# Patient Record
Sex: FEMALE | Race: WHITE | Employment: OTHER | URBAN - METROPOLITAN AREA
[De-identification: names, ages, dates, MRNs, and addresses within clinical notes are randomized per-mention and may not be internally consistent; named-entity substitution may affect disease eponyms.]

---

## 2017-03-08 ENCOUNTER — HOSPITAL ENCOUNTER (OUTPATIENT)
Dept: INFUSION THERAPY | Age: 77
Setting detail: INFUSION SERIES
Discharge: HOME | End: 2017-03-08
Payer: MEDICARE

## 2017-03-08 PROBLEM — D64.9 ANEMIA: Status: ACTIVE | Noted: 2017-03-08

## 2017-03-08 LAB
ABO/RH: NORMAL
ANTIBODY SCREEN: NORMAL
BLOOD BANK DISPENSE STATUS: NORMAL
BLOOD BANK DISPENSE STATUS: NORMAL
BLOOD BANK PRODUCT CODE: NORMAL
BLOOD BANK PRODUCT CODE: NORMAL
BPU ID: NORMAL
BPU ID: NORMAL
DESCRIPTION BLOOD BANK: NORMAL
DESCRIPTION BLOOD BANK: NORMAL

## 2017-03-08 PROCEDURE — 86923 COMPATIBILITY TEST ELECTRIC: CPT

## 2017-03-08 PROCEDURE — 6360000002 HC RX W HCPCS: Performed by: INTERNAL MEDICINE

## 2017-03-08 PROCEDURE — 2580000003 HC RX 258

## 2017-03-08 PROCEDURE — 86900 BLOOD TYPING SEROLOGIC ABO: CPT

## 2017-03-08 PROCEDURE — P9016 RBC LEUKOCYTES REDUCED: HCPCS

## 2017-03-08 PROCEDURE — 36430 TRANSFUSION BLD/BLD COMPNT: CPT

## 2017-03-08 PROCEDURE — 96374 THER/PROPH/DIAG INJ IV PUSH: CPT

## 2017-03-08 PROCEDURE — 86901 BLOOD TYPING SEROLOGIC RH(D): CPT

## 2017-03-08 PROCEDURE — 36415 COLL VENOUS BLD VENIPUNCTURE: CPT

## 2017-03-08 PROCEDURE — 86850 RBC ANTIBODY SCREEN: CPT

## 2017-03-08 RX ORDER — SODIUM CHLORIDE 9 MG/ML
INJECTION, SOLUTION INTRAVENOUS CONTINUOUS
Status: CANCELLED | OUTPATIENT
Start: 2017-03-08

## 2017-03-08 RX ORDER — FUROSEMIDE 10 MG/ML
40 INJECTION INTRAMUSCULAR; INTRAVENOUS ONCE
Status: CANCELLED | OUTPATIENT
Start: 2017-03-08 | End: 2017-03-08

## 2017-03-08 RX ORDER — FUROSEMIDE 10 MG/ML
40 INJECTION INTRAMUSCULAR; INTRAVENOUS ONCE
Status: DISCONTINUED | OUTPATIENT
Start: 2017-03-08 | End: 2017-03-08 | Stop reason: ALTCHOICE

## 2017-03-08 RX ORDER — SODIUM CHLORIDE 9 MG/ML
INJECTION, SOLUTION INTRAVENOUS
Status: COMPLETED
Start: 2017-03-08 | End: 2017-03-08

## 2017-03-08 RX ORDER — FUROSEMIDE 10 MG/ML
40 INJECTION INTRAMUSCULAR; INTRAVENOUS ONCE
Status: COMPLETED | OUTPATIENT
Start: 2017-03-08 | End: 2017-03-08

## 2017-03-08 RX ORDER — SODIUM CHLORIDE 9 MG/ML
INJECTION, SOLUTION INTRAVENOUS CONTINUOUS
Status: DISCONTINUED | OUTPATIENT
Start: 2017-03-08 | End: 2017-03-08 | Stop reason: ALTCHOICE

## 2017-03-08 RX ADMIN — SODIUM CHLORIDE: 9 INJECTION, SOLUTION INTRAVENOUS at 15:30

## 2017-03-08 RX ADMIN — FUROSEMIDE 40 MG: 10 INJECTION, SOLUTION INTRAMUSCULAR; INTRAVENOUS at 21:31

## 2017-03-08 NOTE — PROGRESS NOTES
1620. Report called to [facility]. Informed of pt from [facility] and is on her 1st unit PRBC. Pt to get 2 units blood with lasix 40mg between units. Pt to return to [facility] via [address]. Admission and blood consents obtained.

## 2017-03-09 VITALS
RESPIRATION RATE: 16 BRPM | DIASTOLIC BLOOD PRESSURE: 53 MMHG | SYSTOLIC BLOOD PRESSURE: 147 MMHG | TEMPERATURE: 98.1 F | OXYGEN SATURATION: 100 % | HEART RATE: 63 BPM

## 2017-03-09 NOTE — PROGRESS NOTES
Patient here for outpatient transfusion. Assumed care and received report. Patient had just finished receiving the first unit of blood. NAD.

## 2017-03-09 NOTE — PROGRESS NOTES
Dr. Saeed Knapp notified of drop in diastolic blood pressure. Advised it was ok to continue blood transfusion. Patient is resting comfortably with no signs of active distress and states that she has no SOB, or CP. No other signs of reaction present.

## 2017-03-09 NOTE — PROGRESS NOTES
Patient received IV lasix prior to starting second unit of blood. Initial vial signs where obtained for second unit of blood. Patient in good spirits. There was complication with documentation that delayed the transfusion but it was resolved and hung within allotted time.

## 2017-03-09 NOTE — PROGRESS NOTES
Patient finished blood transfusion requested [facility] so she could rest before her radiation treatment. No adverse reactions to blood transfusion.

## 2017-08-18 ENCOUNTER — HOSPITAL ENCOUNTER (INPATIENT)
Age: 77
LOS: 12 days | Discharge: NURSING, REHAB OR LONG-TERM CARE | DRG: 189 | End: 2017-08-30
Attending: EMERGENCY MEDICINE | Admitting: INTERNAL MEDICINE
Payer: COMMERCIAL

## 2017-08-18 ENCOUNTER — APPOINTMENT (OUTPATIENT)
Dept: GENERAL RADIOLOGY | Age: 77
DRG: 189 | End: 2017-08-18
Payer: COMMERCIAL

## 2017-08-18 ENCOUNTER — APPOINTMENT (OUTPATIENT)
Dept: CT IMAGING | Age: 77
DRG: 189 | End: 2017-08-18
Payer: COMMERCIAL

## 2017-08-18 DIAGNOSIS — R77.8 ELEVATED TROPONIN: Primary | ICD-10-CM

## 2017-08-18 DIAGNOSIS — T40.605A NARCOTIC INDUCED MENTAL ALTERATION: ICD-10-CM

## 2017-08-18 DIAGNOSIS — E87.29 CARBON DIOXIDE RETENTION: ICD-10-CM

## 2017-08-18 DIAGNOSIS — J90 PLEURAL EFFUSION, LEFT: ICD-10-CM

## 2017-08-18 DIAGNOSIS — R41.82 NARCOTIC INDUCED MENTAL ALTERATION: ICD-10-CM

## 2017-08-18 DIAGNOSIS — R41.0 DELIRIUM: ICD-10-CM

## 2017-08-18 DIAGNOSIS — N30.00 ACUTE CYSTITIS WITHOUT HEMATURIA: ICD-10-CM

## 2017-08-18 PROBLEM — I50.33 ACUTE ON CHRONIC DIASTOLIC CONGESTIVE HEART FAILURE (HCC): Status: ACTIVE | Noted: 2017-08-18

## 2017-08-18 PROBLEM — N39.0 UTI (URINARY TRACT INFECTION): Status: ACTIVE | Noted: 2017-08-18

## 2017-08-18 PROBLEM — G93.41 METABOLIC ENCEPHALOPATHY: Status: ACTIVE | Noted: 2017-08-18

## 2017-08-18 LAB
ALBUMIN SERPL-MCNC: 3.1 G/DL (ref 3.9–4.9)
ALP BLD-CCNC: 82 U/L (ref 40–130)
ALT SERPL-CCNC: 7 U/L (ref 0–33)
AMORPHOUS: ABNORMAL
AMPHETAMINE SCREEN, URINE: NORMAL
ANION GAP SERPL CALCULATED.3IONS-SCNC: 7 MEQ/L (ref 7–13)
AST SERPL-CCNC: 14 U/L (ref 0–35)
BACTERIA: ABNORMAL /HPF
BARBITURATE SCREEN URINE: NORMAL
BASOPHILS ABSOLUTE: 0.1 K/UL (ref 0–0.2)
BASOPHILS RELATIVE PERCENT: 0.9 %
BENZODIAZEPINE SCREEN, URINE: NORMAL
BILIRUB SERPL-MCNC: 0.2 MG/DL (ref 0–1.2)
BILIRUBIN URINE: NEGATIVE
BLOOD, URINE: ABNORMAL
BUN BLDV-MCNC: 24 MG/DL (ref 8–23)
CALCIUM SERPL-MCNC: 10.8 MG/DL (ref 8.6–10.2)
CANNABINOID SCREEN URINE: NORMAL
CHLORIDE BLD-SCNC: 102 MEQ/L (ref 98–107)
CHP ED QC CHECK: NORMAL
CLARITY: ABNORMAL
CO2: 36 MEQ/L (ref 22–29)
COCAINE METABOLITE SCREEN URINE: NORMAL
COLOR: YELLOW
CREAT SERPL-MCNC: 1.02 MG/DL (ref 0.5–0.9)
EOSINOPHILS ABSOLUTE: 0.1 K/UL (ref 0–0.7)
EOSINOPHILS RELATIVE PERCENT: 1.5 %
EPITHELIAL CELLS, UA: ABNORMAL /HPF
ETHANOL PERCENT: NORMAL G/DL
ETHANOL: <10 MG/DL (ref 0–0.08)
GFR AFRICAN AMERICAN: >60
GFR NON-AFRICAN AMERICAN: 52.2
GLOBULIN: 2.8 G/DL (ref 2.3–3.5)
GLUCOSE BLD-MCNC: 114 MG/DL (ref 74–109)
GLUCOSE BLD-MCNC: 129 MG/DL
GLUCOSE BLD-MCNC: 129 MG/DL (ref 60–115)
GLUCOSE URINE: NEGATIVE MG/DL
HCT VFR BLD CALC: 27.6 % (ref 37–47)
HEMOGLOBIN: 8.5 G/DL (ref 12–16)
KETONES, URINE: NEGATIVE MG/DL
LACTIC ACID: 0.8 MMOL/L (ref 0.5–2.2)
LEUKOCYTE ESTERASE, URINE: ABNORMAL
LYMPHOCYTES ABSOLUTE: 1.8 K/UL (ref 1–4.8)
LYMPHOCYTES RELATIVE PERCENT: 21.7 %
Lab: NORMAL
MCH RBC QN AUTO: 29.2 PG (ref 27–31.3)
MCHC RBC AUTO-ENTMCNC: 31 % (ref 33–37)
MCV RBC AUTO: 94.2 FL (ref 82–100)
MONOCYTES ABSOLUTE: 0.5 K/UL (ref 0.2–0.8)
MONOCYTES RELATIVE PERCENT: 5.7 %
NEUTROPHILS ABSOLUTE: 5.7 K/UL (ref 1.4–6.5)
NEUTROPHILS RELATIVE PERCENT: 70.2 %
NITRITE, URINE: POSITIVE
OPIATE SCREEN URINE: NORMAL
PDW BLD-RTO: 15.7 % (ref 11.5–14.5)
PERFORMED ON: ABNORMAL
PH UA: 5 (ref 5–9)
PHENCYCLIDINE SCREEN URINE: NORMAL
PLATELET # BLD: 214 K/UL (ref 130–400)
POTASSIUM SERPL-SCNC: 4.9 MEQ/L (ref 3.5–5.1)
PROTEIN UA: ABNORMAL MG/DL
RBC # BLD: 2.93 M/UL (ref 4.2–5.4)
RBC UA: ABNORMAL /HPF (ref 0–2)
SODIUM BLD-SCNC: 145 MEQ/L (ref 132–144)
SPECIFIC GRAVITY UA: 1.01 (ref 1–1.03)
TOTAL PROTEIN: 5.9 G/DL (ref 6.4–8.1)
TROPONIN: 0.06 NG/ML (ref 0–0.01)
URINE REFLEX TO CULTURE: YES
UROBILINOGEN, URINE: 0.2 E.U./DL
WBC # BLD: 8.2 K/UL (ref 4.8–10.8)
WBC UA: >100 /HPF (ref 0–5)

## 2017-08-18 PROCEDURE — 83605 ASSAY OF LACTIC ACID: CPT

## 2017-08-18 PROCEDURE — 96375 TX/PRO/DX INJ NEW DRUG ADDON: CPT

## 2017-08-18 PROCEDURE — G0480 DRUG TEST DEF 1-7 CLASSES: HCPCS

## 2017-08-18 PROCEDURE — 80307 DRUG TEST PRSMV CHEM ANLYZR: CPT

## 2017-08-18 PROCEDURE — 87040 BLOOD CULTURE FOR BACTERIA: CPT

## 2017-08-18 PROCEDURE — 2580000003 HC RX 258: Performed by: EMERGENCY MEDICINE

## 2017-08-18 PROCEDURE — 71010 XR CHEST PORTABLE: CPT

## 2017-08-18 PROCEDURE — 94660 CPAP INITIATION&MGMT: CPT

## 2017-08-18 PROCEDURE — 93005 ELECTROCARDIOGRAM TRACING: CPT

## 2017-08-18 PROCEDURE — 36415 COLL VENOUS BLD VENIPUNCTURE: CPT

## 2017-08-18 PROCEDURE — 85025 COMPLETE CBC W/AUTO DIFF WBC: CPT

## 2017-08-18 PROCEDURE — 70450 CT HEAD/BRAIN W/O DYE: CPT

## 2017-08-18 PROCEDURE — 36600 WITHDRAWAL OF ARTERIAL BLOOD: CPT

## 2017-08-18 PROCEDURE — 87086 URINE CULTURE/COLONY COUNT: CPT

## 2017-08-18 PROCEDURE — 6360000002 HC RX W HCPCS: Performed by: EMERGENCY MEDICINE

## 2017-08-18 PROCEDURE — 82803 BLOOD GASES ANY COMBINATION: CPT

## 2017-08-18 PROCEDURE — 87186 SC STD MICRODIL/AGAR DIL: CPT

## 2017-08-18 PROCEDURE — 80053 COMPREHEN METABOLIC PANEL: CPT

## 2017-08-18 PROCEDURE — 81001 URINALYSIS AUTO W/SCOPE: CPT

## 2017-08-18 PROCEDURE — 96367 TX/PROPH/DG ADDL SEQ IV INF: CPT

## 2017-08-18 PROCEDURE — 99285 EMERGENCY DEPT VISIT HI MDM: CPT

## 2017-08-18 PROCEDURE — 84484 ASSAY OF TROPONIN QUANT: CPT

## 2017-08-18 PROCEDURE — 96365 THER/PROPH/DIAG IV INF INIT: CPT

## 2017-08-18 PROCEDURE — 82948 REAGENT STRIP/BLOOD GLUCOSE: CPT

## 2017-08-18 PROCEDURE — 1210000000 HC MED SURG R&B

## 2017-08-18 PROCEDURE — 87077 CULTURE AEROBIC IDENTIFY: CPT

## 2017-08-18 RX ORDER — ALLOPURINOL 100 MG/1
100 TABLET ORAL DAILY
COMMUNITY

## 2017-08-18 RX ORDER — 0.9 % SODIUM CHLORIDE 0.9 %
500 INTRAVENOUS SOLUTION INTRAVENOUS ONCE
Status: COMPLETED | OUTPATIENT
Start: 2017-08-18 | End: 2017-08-18

## 2017-08-18 RX ORDER — NALOXONE HYDROCHLORIDE 1 MG/ML
1 INJECTION INTRAMUSCULAR; INTRAVENOUS; SUBCUTANEOUS ONCE
Status: COMPLETED | OUTPATIENT
Start: 2017-08-18 | End: 2017-08-18

## 2017-08-18 RX ORDER — SODIUM CHLORIDE 0.9 % (FLUSH) 0.9 %
3 SYRINGE (ML) INJECTION EVERY 8 HOURS
Status: DISCONTINUED | OUTPATIENT
Start: 2017-08-18 | End: 2017-08-19

## 2017-08-18 RX ORDER — DOCUSATE SODIUM 100 MG/1
100 CAPSULE, LIQUID FILLED ORAL 2 TIMES DAILY PRN
COMMUNITY

## 2017-08-18 RX ADMIN — CEFTRIAXONE SODIUM 1 G: 1 INJECTION, POWDER, FOR SOLUTION INTRAMUSCULAR; INTRAVENOUS at 21:09

## 2017-08-18 RX ADMIN — DEXTROSE MONOHYDRATE 500 MG: 50 INJECTION, SOLUTION INTRAVENOUS at 21:45

## 2017-08-18 RX ADMIN — NALOXONE HYDROCHLORIDE 1 MG: 1 INJECTION PARENTERAL at 21:09

## 2017-08-18 RX ADMIN — SODIUM CHLORIDE 500 ML: 9 INJECTION, SOLUTION INTRAVENOUS at 21:45

## 2017-08-19 PROBLEM — E66.9 OBESITY: Chronic | Status: ACTIVE | Noted: 2017-08-19

## 2017-08-19 PROBLEM — I69.354 HEMIPARESIS AFFECTING LEFT SIDE AS LATE EFFECT OF STROKE (HCC): Chronic | Status: ACTIVE | Noted: 2017-08-19

## 2017-08-19 PROBLEM — I48.91 ATRIAL FIBRILLATION (HCC): Status: ACTIVE | Noted: 2017-08-19

## 2017-08-19 PROBLEM — E87.0 HYPERNATREMIA: Status: ACTIVE | Noted: 2017-08-19

## 2017-08-19 LAB
ALBUMIN SERPL-MCNC: 3 G/DL (ref 3.9–4.9)
ALP BLD-CCNC: 81 U/L (ref 40–130)
ALT SERPL-CCNC: 7 U/L (ref 0–33)
ANION GAP SERPL CALCULATED.3IONS-SCNC: 12 MEQ/L (ref 7–13)
AST SERPL-CCNC: 13 U/L (ref 0–35)
BASE EXCESS ARTERIAL: 13 (ref -3–3)
BASE EXCESS ARTERIAL: 16 (ref -3–3)
BILIRUB SERPL-MCNC: 0.2 MG/DL (ref 0–1.2)
BUN BLDV-MCNC: 26 MG/DL (ref 8–23)
CALCIUM SERPL-MCNC: 10.9 MG/DL (ref 8.6–10.2)
CHLORIDE BLD-SCNC: 100 MEQ/L (ref 98–107)
CO2: 32 MEQ/L (ref 22–29)
CREAT SERPL-MCNC: 1.28 MG/DL (ref 0.5–0.9)
GFR AFRICAN AMERICAN: 48.6
GFR NON-AFRICAN AMERICAN: 40.2
GLOBULIN: 2.8 G/DL (ref 2.3–3.5)
GLUCOSE BLD-MCNC: 69 MG/DL (ref 74–109)
GLUCOSE BLD-MCNC: 80 MG/DL (ref 60–115)
GLUCOSE BLD-MCNC: 93 MG/DL (ref 60–115)
GLUCOSE BLD-MCNC: 94 MG/DL (ref 60–115)
GLUCOSE BLD-MCNC: 97 MG/DL (ref 60–115)
HBA1C MFR BLD: 5.8 % (ref 4.8–5.9)
HCO3 ARTERIAL: 38.8 MMOL/L (ref 21–29)
HCO3 ARTERIAL: 42.4 MMOL/L (ref 21–29)
HCT VFR BLD CALC: 27.5 % (ref 37–47)
HEMOGLOBIN: 8.4 G/DL (ref 12–16)
LACTATE: 0.36 MMOL/L (ref 0.4–2)
LACTATE: <0.3 MMOL/L (ref 0.4–2)
MCH RBC QN AUTO: 29.3 PG (ref 27–31.3)
MCHC RBC AUTO-ENTMCNC: 30.7 % (ref 33–37)
MCV RBC AUTO: 95.4 FL (ref 82–100)
O2 SAT, ARTERIAL: 95 % (ref 93–100)
O2 SAT, ARTERIAL: 98 % (ref 93–100)
PCO2 ARTERIAL: 71 MM HG (ref 35–45)
PCO2 ARTERIAL: 82 MM HG (ref 35–45)
PDW BLD-RTO: 15.5 % (ref 11.5–14.5)
PERFORMED ON: ABNORMAL
PERFORMED ON: ABNORMAL
PERFORMED ON: NORMAL
PH ARTERIAL: 7.32 (ref 7.35–7.45)
PH ARTERIAL: 7.35 (ref 7.35–7.45)
PLATELET # BLD: 191 K/UL (ref 130–400)
PO2 ARTERIAL: 115 MM HG (ref 75–108)
PO2 ARTERIAL: 84 MM HG (ref 75–108)
POC FIO2: 4
POC FIO2: 40
POC SAMPLE TYPE: ABNORMAL
POC SAMPLE TYPE: ABNORMAL
POTASSIUM SERPL-SCNC: 4.5 MEQ/L (ref 3.5–5.1)
RBC # BLD: 2.88 M/UL (ref 4.2–5.4)
SODIUM BLD-SCNC: 144 MEQ/L (ref 132–144)
TCO2 ARTERIAL: 41 (ref 22–29)
TCO2 ARTERIAL: 45 (ref 22–29)
TOTAL PROTEIN: 5.8 G/DL (ref 6.4–8.1)
TROPONIN: 0.05 NG/ML (ref 0–0.01)
TROPONIN: 0.07 NG/ML (ref 0–0.01)
WBC # BLD: 8.1 K/UL (ref 4.8–10.8)

## 2017-08-19 PROCEDURE — 6370000000 HC RX 637 (ALT 250 FOR IP): Performed by: INTERNAL MEDICINE

## 2017-08-19 PROCEDURE — 2700000000 HC OXYGEN THERAPY PER DAY

## 2017-08-19 PROCEDURE — 1210000000 HC MED SURG R&B

## 2017-08-19 PROCEDURE — 83036 HEMOGLOBIN GLYCOSYLATED A1C: CPT

## 2017-08-19 PROCEDURE — 85027 COMPLETE CBC AUTOMATED: CPT

## 2017-08-19 PROCEDURE — 83605 ASSAY OF LACTIC ACID: CPT

## 2017-08-19 PROCEDURE — 6360000002 HC RX W HCPCS: Performed by: INTERNAL MEDICINE

## 2017-08-19 PROCEDURE — 82803 BLOOD GASES ANY COMBINATION: CPT

## 2017-08-19 PROCEDURE — 82948 REAGENT STRIP/BLOOD GLUCOSE: CPT

## 2017-08-19 PROCEDURE — 94640 AIRWAY INHALATION TREATMENT: CPT

## 2017-08-19 PROCEDURE — 84484 ASSAY OF TROPONIN QUANT: CPT

## 2017-08-19 PROCEDURE — 94664 DEMO&/EVAL PT USE INHALER: CPT

## 2017-08-19 PROCEDURE — 36415 COLL VENOUS BLD VENIPUNCTURE: CPT

## 2017-08-19 PROCEDURE — 80053 COMPREHEN METABOLIC PANEL: CPT

## 2017-08-19 PROCEDURE — 2580000003 HC RX 258: Performed by: INTERNAL MEDICINE

## 2017-08-19 PROCEDURE — 94660 CPAP INITIATION&MGMT: CPT

## 2017-08-19 PROCEDURE — 99222 1ST HOSP IP/OBS MODERATE 55: CPT | Performed by: INTERNAL MEDICINE

## 2017-08-19 RX ORDER — DEXTROSE MONOHYDRATE 50 MG/ML
100 INJECTION, SOLUTION INTRAVENOUS PRN
Status: DISCONTINUED | OUTPATIENT
Start: 2017-08-19 | End: 2017-08-29

## 2017-08-19 RX ORDER — SODIUM CHLORIDE 0.9 % (FLUSH) 0.9 %
10 SYRINGE (ML) INJECTION EVERY 12 HOURS SCHEDULED
Status: DISCONTINUED | OUTPATIENT
Start: 2017-08-19 | End: 2017-08-29

## 2017-08-19 RX ORDER — IPRATROPIUM BROMIDE AND ALBUTEROL SULFATE 2.5; .5 MG/3ML; MG/3ML
1 SOLUTION RESPIRATORY (INHALATION) 3 TIMES DAILY
Status: DISCONTINUED | OUTPATIENT
Start: 2017-08-20 | End: 2017-08-22

## 2017-08-19 RX ORDER — FOLIC ACID 1 MG/1
1 TABLET ORAL DAILY
Status: DISCONTINUED | OUTPATIENT
Start: 2017-08-19 | End: 2017-08-29

## 2017-08-19 RX ORDER — ASPIRIN 81 MG/1
81 TABLET, CHEWABLE ORAL DAILY
Status: DISCONTINUED | OUTPATIENT
Start: 2017-08-19 | End: 2017-08-29

## 2017-08-19 RX ORDER — DOCUSATE SODIUM 100 MG/1
100 CAPSULE, LIQUID FILLED ORAL 2 TIMES DAILY
Status: DISCONTINUED | OUTPATIENT
Start: 2017-08-19 | End: 2017-08-29

## 2017-08-19 RX ORDER — INSULIN GLARGINE 100 [IU]/ML
36 INJECTION, SOLUTION SUBCUTANEOUS NIGHTLY
Status: DISCONTINUED | OUTPATIENT
Start: 2017-08-19 | End: 2017-08-29

## 2017-08-19 RX ORDER — ONDANSETRON 2 MG/ML
4 INJECTION INTRAMUSCULAR; INTRAVENOUS EVERY 6 HOURS PRN
Status: DISCONTINUED | OUTPATIENT
Start: 2017-08-19 | End: 2017-08-30 | Stop reason: HOSPADM

## 2017-08-19 RX ORDER — ASPIRIN 81 MG/1
81 TABLET, CHEWABLE ORAL DAILY
Status: DISCONTINUED | OUTPATIENT
Start: 2017-08-19 | End: 2017-08-19

## 2017-08-19 RX ORDER — IPRATROPIUM BROMIDE AND ALBUTEROL SULFATE 2.5; .5 MG/3ML; MG/3ML
1 SOLUTION RESPIRATORY (INHALATION) 4 TIMES DAILY
Status: DISCONTINUED | OUTPATIENT
Start: 2017-08-19 | End: 2017-08-19

## 2017-08-19 RX ORDER — LEVOTHYROXINE SODIUM 175 UG/1
175 TABLET ORAL DAILY
Status: DISCONTINUED | OUTPATIENT
Start: 2017-08-19 | End: 2017-08-29

## 2017-08-19 RX ORDER — SIMETHICONE 80 MG
80 TABLET,CHEWABLE ORAL EVERY 6 HOURS PRN
Status: DISCONTINUED | OUTPATIENT
Start: 2017-08-19 | End: 2017-08-29

## 2017-08-19 RX ORDER — OXYBUTYNIN CHLORIDE 5 MG/1
5 TABLET ORAL NIGHTLY
COMMUNITY

## 2017-08-19 RX ORDER — ACETAMINOPHEN 325 MG/1
650 TABLET ORAL EVERY 4 HOURS PRN
Status: DISCONTINUED | OUTPATIENT
Start: 2017-08-19 | End: 2017-08-30 | Stop reason: HOSPADM

## 2017-08-19 RX ORDER — CETIRIZINE HYDROCHLORIDE 10 MG/1
5 TABLET ORAL DAILY
Status: DISCONTINUED | OUTPATIENT
Start: 2017-08-19 | End: 2017-08-29

## 2017-08-19 RX ORDER — METOPROLOL SUCCINATE 25 MG/1
25 TABLET, EXTENDED RELEASE ORAL DAILY
Status: DISCONTINUED | OUTPATIENT
Start: 2017-08-19 | End: 2017-08-29

## 2017-08-19 RX ORDER — ATORVASTATIN CALCIUM 40 MG/1
40 TABLET, FILM COATED ORAL NIGHTLY
Status: ON HOLD | COMMUNITY
End: 2017-11-17 | Stop reason: HOSPADM

## 2017-08-19 RX ORDER — FUROSEMIDE 20 MG/1
20 TABLET ORAL DAILY
COMMUNITY

## 2017-08-19 RX ORDER — LORAZEPAM 0.5 MG/1
0.5 TABLET ORAL 2 TIMES DAILY PRN
Status: ON HOLD | COMMUNITY
End: 2017-08-28 | Stop reason: HOSPADM

## 2017-08-19 RX ORDER — ISOSORBIDE MONONITRATE 30 MG/1
30 TABLET, EXTENDED RELEASE ORAL DAILY
Status: DISCONTINUED | OUTPATIENT
Start: 2017-08-19 | End: 2017-08-24

## 2017-08-19 RX ORDER — LEVOTHYROXINE SODIUM 0.2 MG/1
200 TABLET ORAL DAILY
COMMUNITY

## 2017-08-19 RX ORDER — FERROUS SULFATE 325(65) MG
325 TABLET ORAL
COMMUNITY

## 2017-08-19 RX ORDER — ASPIRIN AND DIPYRIDAMOLE 25; 200 MG/1; MG/1
1 CAPSULE, EXTENDED RELEASE ORAL 2 TIMES DAILY
Status: DISCONTINUED | OUTPATIENT
Start: 2017-08-19 | End: 2017-08-19

## 2017-08-19 RX ORDER — GABAPENTIN 400 MG/1
400 CAPSULE ORAL 2 TIMES DAILY
Status: DISCONTINUED | OUTPATIENT
Start: 2017-08-19 | End: 2017-08-29

## 2017-08-19 RX ORDER — DEXTROSE MONOHYDRATE 25 G/50ML
12.5 INJECTION, SOLUTION INTRAVENOUS PRN
Status: DISCONTINUED | OUTPATIENT
Start: 2017-08-19 | End: 2017-08-29

## 2017-08-19 RX ORDER — FUROSEMIDE 10 MG/ML
40 INJECTION INTRAMUSCULAR; INTRAVENOUS DAILY
Status: DISCONTINUED | OUTPATIENT
Start: 2017-08-19 | End: 2017-08-19

## 2017-08-19 RX ORDER — ALBUTEROL SULFATE 2.5 MG/3ML
2.5 SOLUTION RESPIRATORY (INHALATION)
Status: DISCONTINUED | OUTPATIENT
Start: 2017-08-19 | End: 2017-08-30 | Stop reason: HOSPADM

## 2017-08-19 RX ORDER — LACTULOSE 10 G/15ML
20 SOLUTION ORAL DAILY
Status: DISCONTINUED | OUTPATIENT
Start: 2017-08-19 | End: 2017-08-29

## 2017-08-19 RX ORDER — SODIUM CHLORIDE 0.9 % (FLUSH) 0.9 %
10 SYRINGE (ML) INJECTION PRN
Status: DISCONTINUED | OUTPATIENT
Start: 2017-08-19 | End: 2017-08-29

## 2017-08-19 RX ORDER — NICOTINE POLACRILEX 4 MG
15 LOZENGE BUCCAL PRN
Status: DISCONTINUED | OUTPATIENT
Start: 2017-08-19 | End: 2017-08-29

## 2017-08-19 RX ORDER — PANTOPRAZOLE SODIUM 40 MG/1
40 TABLET, DELAYED RELEASE ORAL DAILY
Status: DISCONTINUED | OUTPATIENT
Start: 2017-08-19 | End: 2017-08-29

## 2017-08-19 RX ADMIN — IPRATROPIUM BROMIDE AND ALBUTEROL SULFATE 1 AMPULE: .5; 3 SOLUTION RESPIRATORY (INHALATION) at 19:39

## 2017-08-19 RX ADMIN — ENOXAPARIN SODIUM 100 MG: 100 INJECTION SUBCUTANEOUS at 23:22

## 2017-08-19 RX ADMIN — LEVOTHYROXINE SODIUM 175 MCG: 175 TABLET ORAL at 12:10

## 2017-08-19 RX ADMIN — DOCUSATE SODIUM 100 MG: 100 CAPSULE, LIQUID FILLED ORAL at 23:23

## 2017-08-19 RX ADMIN — IPRATROPIUM BROMIDE AND ALBUTEROL SULFATE 1 AMPULE: .5; 3 SOLUTION RESPIRATORY (INHALATION) at 14:10

## 2017-08-19 RX ADMIN — METOPROLOL SUCCINATE 25 MG: 25 TABLET, FILM COATED, EXTENDED RELEASE ORAL at 12:11

## 2017-08-19 RX ADMIN — PANTOPRAZOLE SODIUM 40 MG: 40 TABLET, DELAYED RELEASE ORAL at 12:11

## 2017-08-19 RX ADMIN — GABAPENTIN 400 MG: 400 CAPSULE ORAL at 12:10

## 2017-08-19 RX ADMIN — MAGNESIUM OXIDE TAB 400 MG (241.3 MG ELEMENTAL MG) 400 MG: 400 (241.3 MG) TAB at 12:10

## 2017-08-19 RX ADMIN — ASPIRIN 81 MG CHEWABLE TABLET 81 MG: 81 TABLET CHEWABLE at 23:23

## 2017-08-19 RX ADMIN — GABAPENTIN 400 MG: 400 CAPSULE ORAL at 23:23

## 2017-08-19 RX ADMIN — IPRATROPIUM BROMIDE AND ALBUTEROL SULFATE 1 AMPULE: .5; 3 SOLUTION RESPIRATORY (INHALATION) at 08:44

## 2017-08-19 RX ADMIN — FUROSEMIDE 40 MG: 10 INJECTION, SOLUTION INTRAVENOUS at 11:01

## 2017-08-19 RX ADMIN — CETIRIZINE HYDROCHLORIDE 5 MG: 10 TABLET, FILM COATED ORAL at 12:10

## 2017-08-19 RX ADMIN — FOLIC ACID 1 MG: 1 TABLET ORAL at 12:11

## 2017-08-19 RX ADMIN — CEFTRIAXONE SODIUM 1 G: 1 INJECTION, POWDER, FOR SOLUTION INTRAMUSCULAR; INTRAVENOUS at 23:24

## 2017-08-19 RX ADMIN — Medication 10 ML: at 10:59

## 2017-08-19 RX ADMIN — ENOXAPARIN SODIUM 40 MG: 40 INJECTION SUBCUTANEOUS at 11:01

## 2017-08-19 RX ADMIN — ISOSORBIDE MONONITRATE 30 MG: 30 TABLET, EXTENDED RELEASE ORAL at 12:10

## 2017-08-20 ENCOUNTER — APPOINTMENT (OUTPATIENT)
Dept: GENERAL RADIOLOGY | Age: 77
DRG: 189 | End: 2017-08-20
Payer: COMMERCIAL

## 2017-08-20 ENCOUNTER — APPOINTMENT (OUTPATIENT)
Dept: ULTRASOUND IMAGING | Age: 77
DRG: 189 | End: 2017-08-20
Payer: COMMERCIAL

## 2017-08-20 PROBLEM — J96.22 ACUTE ON CHRONIC RESPIRATORY FAILURE WITH HYPERCAPNIA (HCC): Status: ACTIVE | Noted: 2017-08-20

## 2017-08-20 LAB
BASE EXCESS ARTERIAL: 14 (ref -3–3)
FOLATE: >20 NG/ML (ref 7.3–26.1)
GLUCOSE BLD-MCNC: 103 MG/DL (ref 60–115)
GLUCOSE BLD-MCNC: 107 MG/DL (ref 60–115)
GLUCOSE BLD-MCNC: 112 MG/DL (ref 60–115)
GLUCOSE BLD-MCNC: 114 MG/DL (ref 60–115)
HCO3 ARTERIAL: 40 MMOL/L (ref 21–29)
HCT VFR BLD CALC: 23.8 % (ref 37–47)
HEMOGLOBIN: 7.4 G/DL (ref 12–16)
LACTATE: 0.36 MMOL/L (ref 0.4–2)
MCH RBC QN AUTO: 29.6 PG (ref 27–31.3)
MCHC RBC AUTO-ENTMCNC: 30.9 % (ref 33–37)
MCV RBC AUTO: 95.6 FL (ref 82–100)
O2 SAT, ARTERIAL: 98 % (ref 93–100)
PCO2 ARTERIAL: 81 MM HG (ref 35–45)
PDW BLD-RTO: 15.5 % (ref 11.5–14.5)
PERFORMED ON: ABNORMAL
PERFORMED ON: NORMAL
PH ARTERIAL: 7.3 (ref 7.35–7.45)
PLATELET # BLD: 175 K/UL (ref 130–400)
PO2 ARTERIAL: 117 MM HG (ref 75–108)
POC FIO2: 4
POC SAMPLE TYPE: ABNORMAL
RBC # BLD: 2.49 M/UL (ref 4.2–5.4)
TCO2 ARTERIAL: 43 (ref 22–29)
TSH SERPL DL<=0.05 MIU/L-ACNC: 3.35 UIU/ML (ref 0.27–4.2)
VITAMIN B-12: 761 PG/ML (ref 211–946)
VITAMIN D 25-HYDROXY: 86.6 NG/ML (ref 30–100)
WBC # BLD: 7.2 K/UL (ref 4.8–10.8)

## 2017-08-20 PROCEDURE — 6370000000 HC RX 637 (ALT 250 FOR IP): Performed by: INTERNAL MEDICINE

## 2017-08-20 PROCEDURE — 94660 CPAP INITIATION&MGMT: CPT

## 2017-08-20 PROCEDURE — 1210000000 HC MED SURG R&B

## 2017-08-20 PROCEDURE — 94760 N-INVAS EAR/PLS OXIMETRY 1: CPT

## 2017-08-20 PROCEDURE — 84443 ASSAY THYROID STIM HORMONE: CPT

## 2017-08-20 PROCEDURE — 82746 ASSAY OF FOLIC ACID SERUM: CPT

## 2017-08-20 PROCEDURE — 93005 ELECTROCARDIOGRAM TRACING: CPT

## 2017-08-20 PROCEDURE — 82948 REAGENT STRIP/BLOOD GLUCOSE: CPT

## 2017-08-20 PROCEDURE — 94640 AIRWAY INHALATION TREATMENT: CPT

## 2017-08-20 PROCEDURE — 6360000002 HC RX W HCPCS: Performed by: INTERNAL MEDICINE

## 2017-08-20 PROCEDURE — 82306 VITAMIN D 25 HYDROXY: CPT

## 2017-08-20 PROCEDURE — 2700000000 HC OXYGEN THERAPY PER DAY

## 2017-08-20 PROCEDURE — 85027 COMPLETE CBC AUTOMATED: CPT

## 2017-08-20 PROCEDURE — 71010 XR CHEST LIMITED: CPT

## 2017-08-20 PROCEDURE — 82803 BLOOD GASES ANY COMBINATION: CPT

## 2017-08-20 PROCEDURE — 2580000003 HC RX 258: Performed by: INTERNAL MEDICINE

## 2017-08-20 PROCEDURE — 83605 ASSAY OF LACTIC ACID: CPT

## 2017-08-20 PROCEDURE — 93880 EXTRACRANIAL BILAT STUDY: CPT

## 2017-08-20 PROCEDURE — 82607 VITAMIN B-12: CPT

## 2017-08-20 PROCEDURE — 99232 SBSQ HOSP IP/OBS MODERATE 35: CPT | Performed by: INTERNAL MEDICINE

## 2017-08-20 RX ORDER — AMIODARONE HYDROCHLORIDE 200 MG/1
200 TABLET ORAL 2 TIMES DAILY
Status: DISCONTINUED | OUTPATIENT
Start: 2017-08-20 | End: 2017-08-21

## 2017-08-20 RX ADMIN — LEVOTHYROXINE SODIUM 175 MCG: 175 TABLET ORAL at 05:48

## 2017-08-20 RX ADMIN — CETIRIZINE HYDROCHLORIDE 5 MG: 10 TABLET, FILM COATED ORAL at 08:22

## 2017-08-20 RX ADMIN — ASPIRIN 81 MG CHEWABLE TABLET 81 MG: 81 TABLET CHEWABLE at 08:22

## 2017-08-20 RX ADMIN — METOPROLOL SUCCINATE 25 MG: 25 TABLET, FILM COATED, EXTENDED RELEASE ORAL at 08:22

## 2017-08-20 RX ADMIN — ENOXAPARIN SODIUM 100 MG: 100 INJECTION SUBCUTANEOUS at 08:22

## 2017-08-20 RX ADMIN — LACTULOSE 13.3 G: 20 SOLUTION ORAL at 08:23

## 2017-08-20 RX ADMIN — FOLIC ACID 1 MG: 1 TABLET ORAL at 08:21

## 2017-08-20 RX ADMIN — ENOXAPARIN SODIUM 100 MG: 100 INJECTION SUBCUTANEOUS at 21:00

## 2017-08-20 RX ADMIN — IPRATROPIUM BROMIDE AND ALBUTEROL SULFATE 1 AMPULE: 2.5; .5 SOLUTION RESPIRATORY (INHALATION) at 13:19

## 2017-08-20 RX ADMIN — IPRATROPIUM BROMIDE AND ALBUTEROL SULFATE 1 AMPULE: 2.5; .5 SOLUTION RESPIRATORY (INHALATION) at 04:39

## 2017-08-20 RX ADMIN — Medication 10 ML: at 08:45

## 2017-08-20 RX ADMIN — MAGNESIUM OXIDE TAB 400 MG (241.3 MG ELEMENTAL MG) 400 MG: 400 (241.3 MG) TAB at 08:22

## 2017-08-20 RX ADMIN — DOCUSATE SODIUM 100 MG: 100 CAPSULE, LIQUID FILLED ORAL at 08:22

## 2017-08-20 RX ADMIN — GABAPENTIN 400 MG: 400 CAPSULE ORAL at 08:22

## 2017-08-20 RX ADMIN — GABAPENTIN 400 MG: 400 CAPSULE ORAL at 21:00

## 2017-08-20 RX ADMIN — DOCUSATE SODIUM 100 MG: 100 CAPSULE, LIQUID FILLED ORAL at 21:00

## 2017-08-20 RX ADMIN — ISOSORBIDE MONONITRATE 30 MG: 30 TABLET, EXTENDED RELEASE ORAL at 08:22

## 2017-08-20 RX ADMIN — Medication 10 ML: at 00:24

## 2017-08-20 RX ADMIN — AMIODARONE HYDROCHLORIDE 200 MG: 200 TABLET ORAL at 13:55

## 2017-08-20 RX ADMIN — CEFTRIAXONE SODIUM 1 G: 1 INJECTION, POWDER, FOR SOLUTION INTRAMUSCULAR; INTRAVENOUS at 23:48

## 2017-08-20 RX ADMIN — Medication 10 ML: at 22:18

## 2017-08-20 RX ADMIN — PANTOPRAZOLE SODIUM 40 MG: 40 TABLET, DELAYED RELEASE ORAL at 08:22

## 2017-08-20 RX ADMIN — AMIODARONE HYDROCHLORIDE 200 MG: 200 TABLET ORAL at 21:00

## 2017-08-20 RX ADMIN — IPRATROPIUM BROMIDE AND ALBUTEROL SULFATE 1 AMPULE: 2.5; .5 SOLUTION RESPIRATORY (INHALATION) at 19:14

## 2017-08-20 ASSESSMENT — ENCOUNTER SYMPTOMS
NAUSEA: 0
SHORTNESS OF BREATH: 1
VOMITING: 0

## 2017-08-20 ASSESSMENT — PAIN SCALES - GENERAL: PAINLEVEL_OUTOF10: 0

## 2017-08-21 ENCOUNTER — APPOINTMENT (OUTPATIENT)
Dept: ULTRASOUND IMAGING | Age: 77
DRG: 189 | End: 2017-08-21
Payer: COMMERCIAL

## 2017-08-21 LAB
ALBUMIN SERPL-MCNC: 2.9 G/DL (ref 3.9–4.9)
ALP BLD-CCNC: 80 U/L (ref 40–130)
ALT SERPL-CCNC: 6 U/L (ref 0–33)
ANION GAP SERPL CALCULATED.3IONS-SCNC: 10 MEQ/L (ref 7–13)
AST SERPL-CCNC: 14 U/L (ref 0–35)
BASE EXCESS ARTERIAL: 13 (ref -3–3)
BILIRUB SERPL-MCNC: 0.2 MG/DL (ref 0–1.2)
BUN BLDV-MCNC: 38 MG/DL (ref 8–23)
CALCIUM SERPL-MCNC: 10.5 MG/DL (ref 8.6–10.2)
CHLORIDE BLD-SCNC: 99 MEQ/L (ref 98–107)
CO2: 33 MEQ/L (ref 22–29)
CREAT SERPL-MCNC: 1.95 MG/DL (ref 0.5–0.9)
GFR AFRICAN AMERICAN: 29.9
GFR NON-AFRICAN AMERICAN: 24.7
GLOBULIN: 2.6 G/DL (ref 2.3–3.5)
GLUCOSE BLD-MCNC: 101 MG/DL (ref 60–115)
GLUCOSE BLD-MCNC: 112 MG/DL (ref 60–115)
GLUCOSE BLD-MCNC: 116 MG/DL (ref 74–109)
GLUCOSE BLD-MCNC: 120 MG/DL (ref 60–115)
GLUCOSE BLD-MCNC: 126 MG/DL (ref 60–115)
HCO3 ARTERIAL: 38.5 MMOL/L (ref 21–29)
HCT VFR BLD CALC: 25.5 % (ref 37–47)
HEMOGLOBIN: 8 G/DL (ref 12–16)
LACTATE: 0.4 MMOL/L (ref 0.4–2)
LV EF: 60 %
LVEF MODALITY: NORMAL
MCH RBC QN AUTO: 29.2 PG (ref 27–31.3)
MCHC RBC AUTO-ENTMCNC: 31.1 % (ref 33–37)
MCV RBC AUTO: 93.8 FL (ref 82–100)
O2 SAT, ARTERIAL: 92 % (ref 93–100)
ORGANISM: ABNORMAL
ORGANISM: ABNORMAL
PCO2 ARTERIAL: 70 MM HG (ref 35–45)
PDW BLD-RTO: 15.8 % (ref 11.5–14.5)
PERFORMED ON: ABNORMAL
PERFORMED ON: NORMAL
PERFORMED ON: NORMAL
PH ARTERIAL: 7.35 (ref 7.35–7.45)
PLATELET # BLD: 187 K/UL (ref 130–400)
PO2 ARTERIAL: 69 MM HG (ref 75–108)
POC FIO2: 40
POC SAMPLE TYPE: ABNORMAL
POTASSIUM SERPL-SCNC: 4.3 MEQ/L (ref 3.5–5.1)
RBC # BLD: 2.72 M/UL (ref 4.2–5.4)
SODIUM BLD-SCNC: 142 MEQ/L (ref 132–144)
TCO2 ARTERIAL: 41 (ref 22–29)
TOTAL PROTEIN: 5.5 G/DL (ref 6.4–8.1)
URINE CULTURE, ROUTINE: ABNORMAL
URINE CULTURE, ROUTINE: ABNORMAL
WBC # BLD: 8.3 K/UL (ref 4.8–10.8)

## 2017-08-21 PROCEDURE — 6360000002 HC RX W HCPCS: Performed by: INTERNAL MEDICINE

## 2017-08-21 PROCEDURE — 2580000003 HC RX 258: Performed by: INTERNAL MEDICINE

## 2017-08-21 PROCEDURE — 36600 WITHDRAWAL OF ARTERIAL BLOOD: CPT

## 2017-08-21 PROCEDURE — 6370000000 HC RX 637 (ALT 250 FOR IP): Performed by: INTERNAL MEDICINE

## 2017-08-21 PROCEDURE — 94660 CPAP INITIATION&MGMT: CPT

## 2017-08-21 PROCEDURE — 85027 COMPLETE CBC AUTOMATED: CPT

## 2017-08-21 PROCEDURE — 82948 REAGENT STRIP/BLOOD GLUCOSE: CPT

## 2017-08-21 PROCEDURE — 82803 BLOOD GASES ANY COMBINATION: CPT

## 2017-08-21 PROCEDURE — 95816 EEG AWAKE AND DROWSY: CPT

## 2017-08-21 PROCEDURE — 82330 ASSAY OF CALCIUM: CPT

## 2017-08-21 PROCEDURE — 84132 ASSAY OF SERUM POTASSIUM: CPT

## 2017-08-21 PROCEDURE — 93010 ELECTROCARDIOGRAM REPORT: CPT | Performed by: INTERNAL MEDICINE

## 2017-08-21 PROCEDURE — 80053 COMPREHEN METABOLIC PANEL: CPT

## 2017-08-21 PROCEDURE — 93005 ELECTROCARDIOGRAM TRACING: CPT

## 2017-08-21 PROCEDURE — 2700000000 HC OXYGEN THERAPY PER DAY

## 2017-08-21 PROCEDURE — 83605 ASSAY OF LACTIC ACID: CPT

## 2017-08-21 PROCEDURE — 1210000000 HC MED SURG R&B

## 2017-08-21 PROCEDURE — 94640 AIRWAY INHALATION TREATMENT: CPT

## 2017-08-21 PROCEDURE — 93970 EXTREMITY STUDY: CPT

## 2017-08-21 PROCEDURE — 93306 TTE W/DOPPLER COMPLETE: CPT

## 2017-08-21 RX ORDER — SODIUM CHLORIDE 9 MG/ML
INJECTION, SOLUTION INTRAVENOUS CONTINUOUS
Status: DISPENSED | OUTPATIENT
Start: 2017-08-21 | End: 2017-08-21

## 2017-08-21 RX ORDER — AMIODARONE HYDROCHLORIDE 200 MG/1
200 TABLET ORAL 3 TIMES DAILY
Status: DISCONTINUED | OUTPATIENT
Start: 2017-08-21 | End: 2017-08-28

## 2017-08-21 RX ADMIN — AMIODARONE HYDROCHLORIDE 200 MG: 200 TABLET ORAL at 15:00

## 2017-08-21 RX ADMIN — METOPROLOL SUCCINATE 25 MG: 25 TABLET, FILM COATED, EXTENDED RELEASE ORAL at 10:35

## 2017-08-21 RX ADMIN — INSULIN GLARGINE 36 UNITS: 100 INJECTION, SOLUTION SUBCUTANEOUS at 21:44

## 2017-08-21 RX ADMIN — CETIRIZINE HYDROCHLORIDE 5 MG: 10 TABLET, FILM COATED ORAL at 15:01

## 2017-08-21 RX ADMIN — MAGNESIUM OXIDE TAB 400 MG (241.3 MG ELEMENTAL MG) 400 MG: 400 (241.3 MG) TAB at 10:35

## 2017-08-21 RX ADMIN — Medication 10 ML: at 20:55

## 2017-08-21 RX ADMIN — FOLIC ACID 1 MG: 1 TABLET ORAL at 15:01

## 2017-08-21 RX ADMIN — AMIODARONE HYDROCHLORIDE 200 MG: 200 TABLET ORAL at 20:55

## 2017-08-21 RX ADMIN — CEFTRIAXONE SODIUM 1 G: 1 INJECTION, POWDER, FOR SOLUTION INTRAMUSCULAR; INTRAVENOUS at 20:55

## 2017-08-21 RX ADMIN — GABAPENTIN 400 MG: 400 CAPSULE ORAL at 15:00

## 2017-08-21 RX ADMIN — AMIODARONE HYDROCHLORIDE 200 MG: 200 TABLET ORAL at 09:34

## 2017-08-21 RX ADMIN — LEVOTHYROXINE SODIUM 175 MCG: 175 TABLET ORAL at 06:17

## 2017-08-21 RX ADMIN — SODIUM CHLORIDE: 9 INJECTION, SOLUTION INTRAVENOUS at 13:36

## 2017-08-21 RX ADMIN — IPRATROPIUM BROMIDE AND ALBUTEROL SULFATE 1 AMPULE: 2.5; .5 SOLUTION RESPIRATORY (INHALATION) at 19:27

## 2017-08-21 RX ADMIN — IPRATROPIUM BROMIDE AND ALBUTEROL SULFATE 1 AMPULE: 2.5; .5 SOLUTION RESPIRATORY (INHALATION) at 07:50

## 2017-08-21 RX ADMIN — ASPIRIN 81 MG CHEWABLE TABLET 81 MG: 81 TABLET CHEWABLE at 10:43

## 2017-08-21 RX ADMIN — IPRATROPIUM BROMIDE AND ALBUTEROL SULFATE 1 AMPULE: 2.5; .5 SOLUTION RESPIRATORY (INHALATION) at 12:44

## 2017-08-21 RX ADMIN — ISOSORBIDE MONONITRATE 30 MG: 30 TABLET, EXTENDED RELEASE ORAL at 10:35

## 2017-08-21 RX ADMIN — GABAPENTIN 400 MG: 400 CAPSULE ORAL at 20:55

## 2017-08-21 RX ADMIN — Medication 10 ML: at 13:36

## 2017-08-21 ASSESSMENT — ENCOUNTER SYMPTOMS
NAUSEA: 0
VOMITING: 0
SHORTNESS OF BREATH: 1

## 2017-08-22 ENCOUNTER — APPOINTMENT (OUTPATIENT)
Dept: MRI IMAGING | Age: 77
DRG: 189 | End: 2017-08-22
Payer: COMMERCIAL

## 2017-08-22 LAB
ANION GAP SERPL CALCULATED.3IONS-SCNC: 10 MEQ/L (ref 7–13)
BASE EXCESS ARTERIAL: 12 (ref -3–3)
BUN BLDV-MCNC: 35 MG/DL (ref 8–23)
CALCIUM IONIZED: 1.55 MMOL/L (ref 1.12–1.32)
CALCIUM SERPL-MCNC: 10.1 MG/DL (ref 8.6–10.2)
CHLORIDE BLD-SCNC: 103 MEQ/L (ref 98–107)
CO2: 33 MEQ/L (ref 22–29)
CREAT SERPL-MCNC: 1.73 MG/DL (ref 0.5–0.9)
GFR AFRICAN AMERICAN: 34.3
GFR NON-AFRICAN AMERICAN: 28.4
GLUCOSE BLD-MCNC: 150 MG/DL (ref 60–115)
GLUCOSE BLD-MCNC: 173 MG/DL (ref 60–115)
GLUCOSE BLD-MCNC: 80 MG/DL (ref 74–109)
GLUCOSE BLD-MCNC: 82 MG/DL (ref 60–115)
GLUCOSE BLD-MCNC: 93 MG/DL (ref 60–115)
HCO3 ARTERIAL: 39.2 MMOL/L (ref 21–29)
HCT VFR BLD CALC: 25.7 % (ref 37–47)
HEMOGLOBIN: 7.9 G/DL (ref 12–16)
LACTATE: <0.3 MMOL/L (ref 0.4–2)
MAGNESIUM: 2.8 MG/DL (ref 1.7–2.3)
MCH RBC QN AUTO: 29 PG (ref 27–31.3)
MCHC RBC AUTO-ENTMCNC: 30.6 % (ref 33–37)
MCV RBC AUTO: 94.6 FL (ref 82–100)
O2 SAT, ARTERIAL: 88 % (ref 93–100)
PCO2 ARTERIAL: 91 MM HG (ref 35–45)
PDW BLD-RTO: 15.8 % (ref 11.5–14.5)
PERFORMED ON: ABNORMAL
PERFORMED ON: NORMAL
PERFORMED ON: NORMAL
PH ARTERIAL: 7.24 (ref 7.35–7.45)
PLATELET # BLD: 180 K/UL (ref 130–400)
PO2 ARTERIAL: 67 MM HG (ref 75–108)
POC SAMPLE TYPE: ABNORMAL
POC SODIUM: 142 MEQ/L (ref 136–145)
POTASSIUM SERPL-SCNC: 4.4 MEQ/L (ref 3.5–5.1)
RBC # BLD: 2.72 M/UL (ref 4.2–5.4)
SODIUM BLD-SCNC: 146 MEQ/L (ref 132–144)
TCO2 ARTERIAL: 42 (ref 22–29)
WBC # BLD: 7.6 K/UL (ref 4.8–10.8)

## 2017-08-22 PROCEDURE — 83735 ASSAY OF MAGNESIUM: CPT

## 2017-08-22 PROCEDURE — 6360000002 HC RX W HCPCS: Performed by: INTERNAL MEDICINE

## 2017-08-22 PROCEDURE — 80048 BASIC METABOLIC PNL TOTAL CA: CPT

## 2017-08-22 PROCEDURE — 2700000000 HC OXYGEN THERAPY PER DAY

## 2017-08-22 PROCEDURE — 6370000000 HC RX 637 (ALT 250 FOR IP): Performed by: INTERNAL MEDICINE

## 2017-08-22 PROCEDURE — 2580000003 HC RX 258: Performed by: INTERNAL MEDICINE

## 2017-08-22 PROCEDURE — 94660 CPAP INITIATION&MGMT: CPT

## 2017-08-22 PROCEDURE — 85027 COMPLETE CBC AUTOMATED: CPT

## 2017-08-22 PROCEDURE — 93005 ELECTROCARDIOGRAM TRACING: CPT

## 2017-08-22 PROCEDURE — 94664 DEMO&/EVAL PT USE INHALER: CPT

## 2017-08-22 PROCEDURE — 94640 AIRWAY INHALATION TREATMENT: CPT

## 2017-08-22 PROCEDURE — 1210000000 HC MED SURG R&B

## 2017-08-22 PROCEDURE — 70551 MRI BRAIN STEM W/O DYE: CPT

## 2017-08-22 RX ORDER — IPRATROPIUM BROMIDE AND ALBUTEROL SULFATE 2.5; .5 MG/3ML; MG/3ML
1 SOLUTION RESPIRATORY (INHALATION) 4 TIMES DAILY
Status: DISCONTINUED | OUTPATIENT
Start: 2017-08-22 | End: 2017-08-30 | Stop reason: HOSPADM

## 2017-08-22 RX ORDER — FUROSEMIDE 10 MG/ML
20 INJECTION INTRAMUSCULAR; INTRAVENOUS ONCE
Status: COMPLETED | OUTPATIENT
Start: 2017-08-22 | End: 2017-08-22

## 2017-08-22 RX ADMIN — AMIODARONE HYDROCHLORIDE 200 MG: 200 TABLET ORAL at 15:07

## 2017-08-22 RX ADMIN — GABAPENTIN 400 MG: 400 CAPSULE ORAL at 23:06

## 2017-08-22 RX ADMIN — IPRATROPIUM BROMIDE AND ALBUTEROL SULFATE 1 AMPULE: 2.5; .5 SOLUTION RESPIRATORY (INHALATION) at 07:24

## 2017-08-22 RX ADMIN — AMIODARONE HYDROCHLORIDE 200 MG: 200 TABLET ORAL at 09:30

## 2017-08-22 RX ADMIN — ENOXAPARIN SODIUM 100 MG: 100 INJECTION SUBCUTANEOUS at 09:31

## 2017-08-22 RX ADMIN — METOPROLOL SUCCINATE 25 MG: 25 TABLET, FILM COATED, EXTENDED RELEASE ORAL at 09:30

## 2017-08-22 RX ADMIN — DOCUSATE SODIUM 100 MG: 100 CAPSULE, LIQUID FILLED ORAL at 23:06

## 2017-08-22 RX ADMIN — IPRATROPIUM BROMIDE AND ALBUTEROL SULFATE 1 AMPULE: 2.5; .5 SOLUTION RESPIRATORY (INHALATION) at 19:44

## 2017-08-22 RX ADMIN — Medication 10 ML: at 09:31

## 2017-08-22 RX ADMIN — Medication 10 ML: at 22:52

## 2017-08-22 RX ADMIN — GABAPENTIN 400 MG: 400 CAPSULE ORAL at 09:30

## 2017-08-22 RX ADMIN — LEVOTHYROXINE SODIUM 175 MCG: 175 TABLET ORAL at 05:57

## 2017-08-22 RX ADMIN — IPRATROPIUM BROMIDE AND ALBUTEROL SULFATE 1 AMPULE: 2.5; .5 SOLUTION RESPIRATORY (INHALATION) at 11:26

## 2017-08-22 RX ADMIN — AMIODARONE HYDROCHLORIDE 200 MG: 200 TABLET ORAL at 23:06

## 2017-08-22 RX ADMIN — CEFTRIAXONE SODIUM 1 G: 1 INJECTION, POWDER, FOR SOLUTION INTRAMUSCULAR; INTRAVENOUS at 22:45

## 2017-08-22 RX ADMIN — ISOSORBIDE MONONITRATE 30 MG: 30 TABLET, EXTENDED RELEASE ORAL at 09:31

## 2017-08-22 RX ADMIN — IPRATROPIUM BROMIDE AND ALBUTEROL SULFATE 1 AMPULE: 2.5; .5 SOLUTION RESPIRATORY (INHALATION) at 16:22

## 2017-08-22 RX ADMIN — FUROSEMIDE 20 MG: 10 INJECTION, SOLUTION INTRAVENOUS at 18:01

## 2017-08-22 RX ADMIN — ASPIRIN 81 MG CHEWABLE TABLET 81 MG: 81 TABLET CHEWABLE at 09:31

## 2017-08-22 ASSESSMENT — ENCOUNTER SYMPTOMS
NAUSEA: 0
VOMITING: 0
SHORTNESS OF BREATH: 1

## 2017-08-22 ASSESSMENT — PAIN SCALES - GENERAL: PAINLEVEL_OUTOF10: 0

## 2017-08-23 PROBLEM — I63.9 CEREBRAL INFARCT (HCC): Status: ACTIVE | Noted: 2017-08-23

## 2017-08-23 LAB
ANION GAP SERPL CALCULATED.3IONS-SCNC: 7 MEQ/L (ref 7–13)
BUN BLDV-MCNC: 34 MG/DL (ref 8–23)
CALCIUM SERPL-MCNC: 10.8 MG/DL (ref 8.6–10.2)
CHLORIDE BLD-SCNC: 103 MEQ/L (ref 98–107)
CO2: 34 MEQ/L (ref 22–29)
CREAT SERPL-MCNC: 1.51 MG/DL (ref 0.5–0.9)
GFR AFRICAN AMERICAN: 40.2
GFR NON-AFRICAN AMERICAN: 33.2
GLUCOSE BLD-MCNC: 116 MG/DL (ref 60–115)
GLUCOSE BLD-MCNC: 118 MG/DL (ref 60–115)
GLUCOSE BLD-MCNC: 129 MG/DL (ref 60–115)
GLUCOSE BLD-MCNC: 78 MG/DL (ref 74–109)
GLUCOSE BLD-MCNC: 96 MG/DL (ref 60–115)
HCT VFR BLD CALC: 25.1 % (ref 37–47)
HEMOGLOBIN: 7.8 G/DL (ref 12–16)
MAGNESIUM: 2.6 MG/DL (ref 1.7–2.3)
MCH RBC QN AUTO: 29.3 PG (ref 27–31.3)
MCHC RBC AUTO-ENTMCNC: 31.2 % (ref 33–37)
MCV RBC AUTO: 93.9 FL (ref 82–100)
PDW BLD-RTO: 16.2 % (ref 11.5–14.5)
PERFORMED ON: ABNORMAL
PERFORMED ON: NORMAL
PLATELET # BLD: 182 K/UL (ref 130–400)
POTASSIUM SERPL-SCNC: 4.5 MEQ/L (ref 3.5–5.1)
RBC # BLD: 2.67 M/UL (ref 4.2–5.4)
SODIUM BLD-SCNC: 144 MEQ/L (ref 132–144)
WBC # BLD: 8.7 K/UL (ref 4.8–10.8)

## 2017-08-23 PROCEDURE — G8988 SELF CARE GOAL STATUS: HCPCS

## 2017-08-23 PROCEDURE — 93005 ELECTROCARDIOGRAM TRACING: CPT

## 2017-08-23 PROCEDURE — 94640 AIRWAY INHALATION TREATMENT: CPT

## 2017-08-23 PROCEDURE — 97167 OT EVAL HIGH COMPLEX 60 MIN: CPT

## 2017-08-23 PROCEDURE — 6370000000 HC RX 637 (ALT 250 FOR IP): Performed by: INTERNAL MEDICINE

## 2017-08-23 PROCEDURE — 83735 ASSAY OF MAGNESIUM: CPT

## 2017-08-23 PROCEDURE — 1210000000 HC MED SURG R&B

## 2017-08-23 PROCEDURE — 92610 EVALUATE SWALLOWING FUNCTION: CPT

## 2017-08-23 PROCEDURE — G9163 LANG EXPRESS GOAL STATUS: HCPCS

## 2017-08-23 PROCEDURE — 6360000002 HC RX W HCPCS: Performed by: INTERNAL MEDICINE

## 2017-08-23 PROCEDURE — G8996 SWALLOW CURRENT STATUS: HCPCS

## 2017-08-23 PROCEDURE — 85027 COMPLETE CBC AUTOMATED: CPT

## 2017-08-23 PROCEDURE — 2700000000 HC OXYGEN THERAPY PER DAY

## 2017-08-23 PROCEDURE — 80048 BASIC METABOLIC PNL TOTAL CA: CPT

## 2017-08-23 PROCEDURE — G8997 SWALLOW GOAL STATUS: HCPCS

## 2017-08-23 PROCEDURE — G9162 LANG EXPRESS CURRENT STATUS: HCPCS

## 2017-08-23 PROCEDURE — 2580000003 HC RX 258: Performed by: INTERNAL MEDICINE

## 2017-08-23 PROCEDURE — G8987 SELF CARE CURRENT STATUS: HCPCS

## 2017-08-23 PROCEDURE — 92523 SPEECH SOUND LANG COMPREHEN: CPT

## 2017-08-23 RX ADMIN — ENOXAPARIN SODIUM 100 MG: 100 INJECTION SUBCUTANEOUS at 10:16

## 2017-08-23 RX ADMIN — LACTULOSE 10 G: 20 SOLUTION ORAL at 10:28

## 2017-08-23 RX ADMIN — LEVOTHYROXINE SODIUM 175 MCG: 175 TABLET ORAL at 10:15

## 2017-08-23 RX ADMIN — DOCUSATE SODIUM 100 MG: 100 CAPSULE, LIQUID FILLED ORAL at 10:15

## 2017-08-23 RX ADMIN — AMIODARONE HYDROCHLORIDE 200 MG: 200 TABLET ORAL at 14:13

## 2017-08-23 RX ADMIN — ASPIRIN 81 MG CHEWABLE TABLET 81 MG: 81 TABLET CHEWABLE at 10:14

## 2017-08-23 RX ADMIN — CETIRIZINE HYDROCHLORIDE 5 MG: 10 TABLET, FILM COATED ORAL at 10:14

## 2017-08-23 RX ADMIN — IPRATROPIUM BROMIDE AND ALBUTEROL SULFATE 1 AMPULE: 2.5; .5 SOLUTION RESPIRATORY (INHALATION) at 20:05

## 2017-08-23 RX ADMIN — IPRATROPIUM BROMIDE AND ALBUTEROL SULFATE 1 AMPULE: 2.5; .5 SOLUTION RESPIRATORY (INHALATION) at 07:52

## 2017-08-23 RX ADMIN — IPRATROPIUM BROMIDE AND ALBUTEROL SULFATE 1 AMPULE: 2.5; .5 SOLUTION RESPIRATORY (INHALATION) at 11:45

## 2017-08-23 RX ADMIN — MAGNESIUM OXIDE TAB 400 MG (241.3 MG ELEMENTAL MG) 400 MG: 400 (241.3 MG) TAB at 10:14

## 2017-08-23 RX ADMIN — IPRATROPIUM BROMIDE AND ALBUTEROL SULFATE 1 AMPULE: 2.5; .5 SOLUTION RESPIRATORY (INHALATION) at 15:10

## 2017-08-23 RX ADMIN — AMIODARONE HYDROCHLORIDE 200 MG: 200 TABLET ORAL at 21:43

## 2017-08-23 RX ADMIN — ISOSORBIDE MONONITRATE 30 MG: 30 TABLET, EXTENDED RELEASE ORAL at 10:14

## 2017-08-23 RX ADMIN — GABAPENTIN 400 MG: 400 CAPSULE ORAL at 21:44

## 2017-08-23 RX ADMIN — INSULIN GLARGINE 36 UNITS: 100 INJECTION, SOLUTION SUBCUTANEOUS at 21:30

## 2017-08-23 RX ADMIN — METOPROLOL SUCCINATE 25 MG: 25 TABLET, FILM COATED, EXTENDED RELEASE ORAL at 21:44

## 2017-08-23 RX ADMIN — FOLIC ACID 1 MG: 1 TABLET ORAL at 10:15

## 2017-08-23 RX ADMIN — DOCUSATE SODIUM 100 MG: 100 CAPSULE, LIQUID FILLED ORAL at 21:43

## 2017-08-23 RX ADMIN — CEFTRIAXONE SODIUM 1 G: 1 INJECTION, POWDER, FOR SOLUTION INTRAMUSCULAR; INTRAVENOUS at 21:46

## 2017-08-23 RX ADMIN — Medication 10 ML: at 10:15

## 2017-08-23 RX ADMIN — AMIODARONE HYDROCHLORIDE 200 MG: 200 TABLET ORAL at 10:14

## 2017-08-23 RX ADMIN — Medication 10 ML: at 21:00

## 2017-08-23 RX ADMIN — ENOXAPARIN SODIUM 100 MG: 100 INJECTION SUBCUTANEOUS at 21:45

## 2017-08-23 RX ADMIN — PANTOPRAZOLE SODIUM 40 MG: 40 TABLET, DELAYED RELEASE ORAL at 10:15

## 2017-08-23 RX ADMIN — GABAPENTIN 400 MG: 400 CAPSULE ORAL at 10:14

## 2017-08-23 ASSESSMENT — PAIN SCALES - GENERAL
PAINLEVEL_OUTOF10: 0
PAINLEVEL_OUTOF10: 0

## 2017-08-23 ASSESSMENT — ENCOUNTER SYMPTOMS
VOMITING: 0
SHORTNESS OF BREATH: 0
NAUSEA: 0

## 2017-08-24 LAB
ANION GAP SERPL CALCULATED.3IONS-SCNC: 7 MEQ/L (ref 7–13)
BLOOD CULTURE, ROUTINE: NORMAL
BUN BLDV-MCNC: 32 MG/DL (ref 8–23)
CALCIUM SERPL-MCNC: 10.9 MG/DL (ref 8.6–10.2)
CHLORIDE BLD-SCNC: 101 MEQ/L (ref 98–107)
CO2: 34 MEQ/L (ref 22–29)
CREAT SERPL-MCNC: 1.51 MG/DL (ref 0.5–0.9)
CULTURE, BLOOD 2: NORMAL
GFR AFRICAN AMERICAN: 40.2
GFR NON-AFRICAN AMERICAN: 33.2
GLUCOSE BLD-MCNC: 116 MG/DL (ref 60–115)
GLUCOSE BLD-MCNC: 79 MG/DL (ref 74–109)
GLUCOSE BLD-MCNC: 85 MG/DL (ref 60–115)
GLUCOSE BLD-MCNC: 86 MG/DL (ref 60–115)
GLUCOSE BLD-MCNC: 95 MG/DL (ref 60–115)
HCT VFR BLD CALC: 24 % (ref 37–47)
HEMOGLOBIN: 7.5 G/DL (ref 12–16)
MAGNESIUM: 2.5 MG/DL (ref 1.7–2.3)
MCH RBC QN AUTO: 29.4 PG (ref 27–31.3)
MCHC RBC AUTO-ENTMCNC: 31.2 % (ref 33–37)
MCV RBC AUTO: 94.4 FL (ref 82–100)
PDW BLD-RTO: 15.9 % (ref 11.5–14.5)
PERFORMED ON: ABNORMAL
PERFORMED ON: NORMAL
PLATELET # BLD: 206 K/UL (ref 130–400)
POTASSIUM SERPL-SCNC: 4.4 MEQ/L (ref 3.5–5.1)
RBC # BLD: 2.54 M/UL (ref 4.2–5.4)
SODIUM BLD-SCNC: 142 MEQ/L (ref 132–144)
WBC # BLD: 9.3 K/UL (ref 4.8–10.8)

## 2017-08-24 PROCEDURE — 6370000000 HC RX 637 (ALT 250 FOR IP): Performed by: INTERNAL MEDICINE

## 2017-08-24 PROCEDURE — 94640 AIRWAY INHALATION TREATMENT: CPT

## 2017-08-24 PROCEDURE — G8979 MOBILITY GOAL STATUS: HCPCS

## 2017-08-24 PROCEDURE — 83735 ASSAY OF MAGNESIUM: CPT

## 2017-08-24 PROCEDURE — 94660 CPAP INITIATION&MGMT: CPT

## 2017-08-24 PROCEDURE — 85027 COMPLETE CBC AUTOMATED: CPT

## 2017-08-24 PROCEDURE — G8980 MOBILITY D/C STATUS: HCPCS

## 2017-08-24 PROCEDURE — 93005 ELECTROCARDIOGRAM TRACING: CPT

## 2017-08-24 PROCEDURE — 80048 BASIC METABOLIC PNL TOTAL CA: CPT

## 2017-08-24 PROCEDURE — 97161 PT EVAL LOW COMPLEX 20 MIN: CPT

## 2017-08-24 PROCEDURE — 2700000000 HC OXYGEN THERAPY PER DAY

## 2017-08-24 PROCEDURE — 6360000002 HC RX W HCPCS: Performed by: INTERNAL MEDICINE

## 2017-08-24 PROCEDURE — 1210000000 HC MED SURG R&B

## 2017-08-24 PROCEDURE — 2580000003 HC RX 258: Performed by: INTERNAL MEDICINE

## 2017-08-24 RX ORDER — SODIUM CHLORIDE 9 MG/ML
INJECTION, SOLUTION INTRAVENOUS CONTINUOUS
Status: CANCELLED | OUTPATIENT
Start: 2017-08-28

## 2017-08-24 RX ORDER — FUROSEMIDE 20 MG/1
20 TABLET ORAL DAILY
Status: DISCONTINUED | OUTPATIENT
Start: 2017-08-24 | End: 2017-08-29

## 2017-08-24 RX ORDER — ONDANSETRON 2 MG/ML
4 INJECTION INTRAMUSCULAR; INTRAVENOUS EVERY 6 HOURS PRN
Status: CANCELLED | OUTPATIENT
Start: 2017-08-24

## 2017-08-24 RX ORDER — ALPRAZOLAM 0.5 MG/1
0.5 TABLET ORAL
Status: CANCELLED | OUTPATIENT
Start: 2017-08-24 | End: 2017-08-24

## 2017-08-24 RX ORDER — ACETAMINOPHEN 80 MG
TABLET,CHEWABLE ORAL ONCE
Status: COMPLETED | OUTPATIENT
Start: 2017-08-24 | End: 2017-08-24

## 2017-08-24 RX ORDER — SODIUM CHLORIDE 0.9 % (FLUSH) 0.9 %
10 SYRINGE (ML) INJECTION PRN
Status: CANCELLED | OUTPATIENT
Start: 2017-08-24

## 2017-08-24 RX ADMIN — GABAPENTIN 400 MG: 400 CAPSULE ORAL at 20:10

## 2017-08-24 RX ADMIN — METOPROLOL SUCCINATE 25 MG: 25 TABLET, FILM COATED, EXTENDED RELEASE ORAL at 09:56

## 2017-08-24 RX ADMIN — ASPIRIN 81 MG CHEWABLE TABLET 81 MG: 81 TABLET CHEWABLE at 09:56

## 2017-08-24 RX ADMIN — DOCUSATE SODIUM 100 MG: 100 CAPSULE, LIQUID FILLED ORAL at 09:56

## 2017-08-24 RX ADMIN — CEFTRIAXONE SODIUM 1 G: 1 INJECTION, POWDER, FOR SOLUTION INTRAMUSCULAR; INTRAVENOUS at 20:11

## 2017-08-24 RX ADMIN — IPRATROPIUM BROMIDE AND ALBUTEROL SULFATE 1 AMPULE: 2.5; .5 SOLUTION RESPIRATORY (INHALATION) at 07:26

## 2017-08-24 RX ADMIN — FUROSEMIDE 20 MG: 20 TABLET ORAL at 17:21

## 2017-08-24 RX ADMIN — RIVAROXABAN 15 MG: 15 TABLET, FILM COATED ORAL at 17:21

## 2017-08-24 RX ADMIN — IPRATROPIUM BROMIDE AND ALBUTEROL SULFATE 1 AMPULE: 2.5; .5 SOLUTION RESPIRATORY (INHALATION) at 15:11

## 2017-08-24 RX ADMIN — LACTULOSE 13.3 G: 20 SOLUTION ORAL at 09:55

## 2017-08-24 RX ADMIN — LEVOTHYROXINE SODIUM 175 MCG: 175 TABLET ORAL at 09:56

## 2017-08-24 RX ADMIN — IPRATROPIUM BROMIDE AND ALBUTEROL SULFATE 1 AMPULE: 2.5; .5 SOLUTION RESPIRATORY (INHALATION) at 19:18

## 2017-08-24 RX ADMIN — Medication: at 17:26

## 2017-08-24 RX ADMIN — Medication 10 ML: at 10:09

## 2017-08-24 RX ADMIN — FOLIC ACID 1 MG: 1 TABLET ORAL at 09:56

## 2017-08-24 RX ADMIN — Medication 10 ML: at 20:22

## 2017-08-24 RX ADMIN — CETIRIZINE HYDROCHLORIDE 5 MG: 10 TABLET, FILM COATED ORAL at 17:20

## 2017-08-24 RX ADMIN — DOCUSATE SODIUM 100 MG: 100 CAPSULE, LIQUID FILLED ORAL at 20:10

## 2017-08-24 RX ADMIN — PANTOPRAZOLE SODIUM 40 MG: 40 TABLET, DELAYED RELEASE ORAL at 09:56

## 2017-08-24 RX ADMIN — ACETAMINOPHEN 650 MG: 325 TABLET ORAL at 17:20

## 2017-08-24 RX ADMIN — AMIODARONE HYDROCHLORIDE 200 MG: 200 TABLET ORAL at 09:56

## 2017-08-24 RX ADMIN — GABAPENTIN 400 MG: 400 CAPSULE ORAL at 09:56

## 2017-08-24 RX ADMIN — INSULIN GLARGINE 36 UNITS: 100 INJECTION, SOLUTION SUBCUTANEOUS at 21:34

## 2017-08-24 RX ADMIN — AMIODARONE HYDROCHLORIDE 200 MG: 200 TABLET ORAL at 17:21

## 2017-08-24 RX ADMIN — AMIODARONE HYDROCHLORIDE 200 MG: 200 TABLET ORAL at 20:11

## 2017-08-24 RX ADMIN — IPRATROPIUM BROMIDE AND ALBUTEROL SULFATE 1 AMPULE: 2.5; .5 SOLUTION RESPIRATORY (INHALATION) at 11:27

## 2017-08-24 ASSESSMENT — PAIN SCALES - GENERAL
PAINLEVEL_OUTOF10: 0
PAINLEVEL_OUTOF10: 5

## 2017-08-24 ASSESSMENT — ENCOUNTER SYMPTOMS
NAUSEA: 0
VOMITING: 0
SHORTNESS OF BREATH: 0

## 2017-08-25 LAB
ANION GAP SERPL CALCULATED.3IONS-SCNC: 7 MEQ/L (ref 7–13)
BUN BLDV-MCNC: 31 MG/DL (ref 8–23)
CALCIUM SERPL-MCNC: 11.2 MG/DL (ref 8.6–10.2)
CHLORIDE BLD-SCNC: 99 MEQ/L (ref 98–107)
CO2: 36 MEQ/L (ref 22–29)
CREAT SERPL-MCNC: 1.46 MG/DL (ref 0.5–0.9)
FERRITIN: 28.4 NG/ML (ref 13–150)
FOLATE: >20 NG/ML (ref 7.3–26.1)
GFR AFRICAN AMERICAN: 41.8
GFR NON-AFRICAN AMERICAN: 34.5
GLUCOSE BLD-MCNC: 103 MG/DL (ref 60–115)
GLUCOSE BLD-MCNC: 106 MG/DL (ref 60–115)
GLUCOSE BLD-MCNC: 112 MG/DL (ref 60–115)
GLUCOSE BLD-MCNC: 126 MG/DL (ref 60–115)
GLUCOSE BLD-MCNC: 52 MG/DL (ref 74–109)
GLUCOSE BLD-MCNC: 57 MG/DL (ref 60–115)
GLUCOSE BLD-MCNC: 61 MG/DL (ref 60–115)
HCT VFR BLD CALC: 24.5 % (ref 37–47)
HEMOGLOBIN: 7.7 G/DL (ref 12–16)
IRON SATURATION: 10 % (ref 11–46)
IRON: 23 UG/DL (ref 37–145)
MAGNESIUM: 2.4 MG/DL (ref 1.7–2.3)
MCH RBC QN AUTO: 29.3 PG (ref 27–31.3)
MCHC RBC AUTO-ENTMCNC: 31.2 % (ref 33–37)
MCV RBC AUTO: 94.1 FL (ref 82–100)
PDW BLD-RTO: 16 % (ref 11.5–14.5)
PERFORMED ON: ABNORMAL
PERFORMED ON: ABNORMAL
PERFORMED ON: NORMAL
PLATELET # BLD: 220 K/UL (ref 130–400)
POTASSIUM SERPL-SCNC: 4.5 MEQ/L (ref 3.5–5.1)
RBC # BLD: 2.61 M/UL (ref 4.2–5.4)
SODIUM BLD-SCNC: 142 MEQ/L (ref 132–144)
TOTAL IRON BINDING CAPACITY: 236 UG/DL (ref 178–450)
VITAMIN B-12: 680 PG/ML (ref 211–946)
WBC # BLD: 7.1 K/UL (ref 4.8–10.8)

## 2017-08-25 PROCEDURE — 6360000002 HC RX W HCPCS: Performed by: INTERNAL MEDICINE

## 2017-08-25 PROCEDURE — 6370000000 HC RX 637 (ALT 250 FOR IP): Performed by: INTERNAL MEDICINE

## 2017-08-25 PROCEDURE — 93005 ELECTROCARDIOGRAM TRACING: CPT

## 2017-08-25 PROCEDURE — 99232 SBSQ HOSP IP/OBS MODERATE 35: CPT | Performed by: INTERNAL MEDICINE

## 2017-08-25 PROCEDURE — 1210000000 HC MED SURG R&B

## 2017-08-25 PROCEDURE — 85027 COMPLETE CBC AUTOMATED: CPT

## 2017-08-25 PROCEDURE — 94640 AIRWAY INHALATION TREATMENT: CPT

## 2017-08-25 PROCEDURE — 94664 DEMO&/EVAL PT USE INHALER: CPT

## 2017-08-25 PROCEDURE — 2580000003 HC RX 258: Performed by: INTERNAL MEDICINE

## 2017-08-25 PROCEDURE — 82607 VITAMIN B-12: CPT

## 2017-08-25 PROCEDURE — 94660 CPAP INITIATION&MGMT: CPT

## 2017-08-25 PROCEDURE — 82728 ASSAY OF FERRITIN: CPT

## 2017-08-25 PROCEDURE — 83735 ASSAY OF MAGNESIUM: CPT

## 2017-08-25 PROCEDURE — 92526 ORAL FUNCTION THERAPY: CPT

## 2017-08-25 PROCEDURE — 2700000000 HC OXYGEN THERAPY PER DAY

## 2017-08-25 PROCEDURE — 92507 TX SP LANG VOICE COMM INDIV: CPT

## 2017-08-25 PROCEDURE — 83550 IRON BINDING TEST: CPT

## 2017-08-25 PROCEDURE — 83540 ASSAY OF IRON: CPT

## 2017-08-25 PROCEDURE — 80048 BASIC METABOLIC PNL TOTAL CA: CPT

## 2017-08-25 PROCEDURE — 82746 ASSAY OF FOLIC ACID SERUM: CPT

## 2017-08-25 RX ADMIN — METOPROLOL SUCCINATE 25 MG: 25 TABLET, FILM COATED, EXTENDED RELEASE ORAL at 08:53

## 2017-08-25 RX ADMIN — Medication 10 ML: at 22:11

## 2017-08-25 RX ADMIN — DOCUSATE SODIUM 100 MG: 100 CAPSULE, LIQUID FILLED ORAL at 08:52

## 2017-08-25 RX ADMIN — CEFTRIAXONE SODIUM 1 G: 1 INJECTION, POWDER, FOR SOLUTION INTRAMUSCULAR; INTRAVENOUS at 21:18

## 2017-08-25 RX ADMIN — CETIRIZINE HYDROCHLORIDE 5 MG: 10 TABLET, FILM COATED ORAL at 08:53

## 2017-08-25 RX ADMIN — PANTOPRAZOLE SODIUM 40 MG: 40 TABLET, DELAYED RELEASE ORAL at 08:53

## 2017-08-25 RX ADMIN — IRON SUCROSE 200 MG: 20 INJECTION, SOLUTION INTRAVENOUS at 14:38

## 2017-08-25 RX ADMIN — DOCUSATE SODIUM 100 MG: 100 CAPSULE, LIQUID FILLED ORAL at 21:22

## 2017-08-25 RX ADMIN — RIVAROXABAN 15 MG: 15 TABLET, FILM COATED ORAL at 17:32

## 2017-08-25 RX ADMIN — GABAPENTIN 400 MG: 400 CAPSULE ORAL at 21:22

## 2017-08-25 RX ADMIN — MAGNESIUM OXIDE TAB 400 MG (241.3 MG ELEMENTAL MG) 400 MG: 400 (241.3 MG) TAB at 09:10

## 2017-08-25 RX ADMIN — ASPIRIN 81 MG CHEWABLE TABLET 81 MG: 81 TABLET CHEWABLE at 08:53

## 2017-08-25 RX ADMIN — IPRATROPIUM BROMIDE AND ALBUTEROL SULFATE 1 AMPULE: 2.5; .5 SOLUTION RESPIRATORY (INHALATION) at 20:29

## 2017-08-25 RX ADMIN — IPRATROPIUM BROMIDE AND ALBUTEROL SULFATE 1 AMPULE: 2.5; .5 SOLUTION RESPIRATORY (INHALATION) at 07:24

## 2017-08-25 RX ADMIN — AMIODARONE HYDROCHLORIDE 200 MG: 200 TABLET ORAL at 21:22

## 2017-08-25 RX ADMIN — AMIODARONE HYDROCHLORIDE 200 MG: 200 TABLET ORAL at 08:53

## 2017-08-25 RX ADMIN — FUROSEMIDE 20 MG: 20 TABLET ORAL at 08:53

## 2017-08-25 RX ADMIN — LEVOTHYROXINE SODIUM 175 MCG: 175 TABLET ORAL at 06:17

## 2017-08-25 RX ADMIN — GABAPENTIN 400 MG: 400 CAPSULE ORAL at 08:53

## 2017-08-25 RX ADMIN — AMIODARONE HYDROCHLORIDE 200 MG: 200 TABLET ORAL at 14:37

## 2017-08-25 RX ADMIN — IPRATROPIUM BROMIDE AND ALBUTEROL SULFATE 1 AMPULE: 2.5; .5 SOLUTION RESPIRATORY (INHALATION) at 11:28

## 2017-08-25 RX ADMIN — FOLIC ACID 1 MG: 1 TABLET ORAL at 08:52

## 2017-08-25 RX ADMIN — Medication 10 ML: at 08:54

## 2017-08-25 RX ADMIN — IPRATROPIUM BROMIDE AND ALBUTEROL SULFATE 1 AMPULE: 2.5; .5 SOLUTION RESPIRATORY (INHALATION) at 15:44

## 2017-08-25 ASSESSMENT — ENCOUNTER SYMPTOMS
SHORTNESS OF BREATH: 0
VOMITING: 0
NAUSEA: 0

## 2017-08-26 LAB
GLUCOSE BLD-MCNC: 143 MG/DL (ref 60–115)
GLUCOSE BLD-MCNC: 65 MG/DL (ref 60–115)
GLUCOSE BLD-MCNC: 74 MG/DL (ref 60–115)
GLUCOSE BLD-MCNC: 87 MG/DL (ref 60–115)
GLUCOSE BLD-MCNC: 90 MG/DL (ref 60–115)
PERFORMED ON: ABNORMAL
PERFORMED ON: NORMAL

## 2017-08-26 PROCEDURE — 1210000000 HC MED SURG R&B

## 2017-08-26 PROCEDURE — 94640 AIRWAY INHALATION TREATMENT: CPT

## 2017-08-26 PROCEDURE — 93010 ELECTROCARDIOGRAM REPORT: CPT | Performed by: INTERNAL MEDICINE

## 2017-08-26 PROCEDURE — 6370000000 HC RX 637 (ALT 250 FOR IP): Performed by: INTERNAL MEDICINE

## 2017-08-26 PROCEDURE — 2700000000 HC OXYGEN THERAPY PER DAY

## 2017-08-26 PROCEDURE — 2580000003 HC RX 258: Performed by: INTERNAL MEDICINE

## 2017-08-26 RX ADMIN — IPRATROPIUM BROMIDE AND ALBUTEROL SULFATE 1 AMPULE: 2.5; .5 SOLUTION RESPIRATORY (INHALATION) at 19:55

## 2017-08-26 RX ADMIN — IPRATROPIUM BROMIDE AND ALBUTEROL SULFATE 1 AMPULE: 2.5; .5 SOLUTION RESPIRATORY (INHALATION) at 15:53

## 2017-08-26 RX ADMIN — INSULIN GLARGINE 36 UNITS: 100 INJECTION, SOLUTION SUBCUTANEOUS at 21:58

## 2017-08-26 RX ADMIN — IPRATROPIUM BROMIDE AND ALBUTEROL SULFATE 1 AMPULE: 2.5; .5 SOLUTION RESPIRATORY (INHALATION) at 07:30

## 2017-08-26 RX ADMIN — AMIODARONE HYDROCHLORIDE 200 MG: 200 TABLET ORAL at 12:00

## 2017-08-26 RX ADMIN — Medication 10 ML: at 21:58

## 2017-08-26 RX ADMIN — Medication 10 ML: at 09:07

## 2017-08-26 RX ADMIN — GABAPENTIN 400 MG: 400 CAPSULE ORAL at 12:01

## 2017-08-26 RX ADMIN — PANTOPRAZOLE SODIUM 40 MG: 40 TABLET, DELAYED RELEASE ORAL at 12:01

## 2017-08-26 RX ADMIN — LACTULOSE 13.33 G: 20 SOLUTION ORAL at 12:03

## 2017-08-26 RX ADMIN — RIVAROXABAN 15 MG: 15 TABLET, FILM COATED ORAL at 18:43

## 2017-08-26 RX ADMIN — DOCUSATE SODIUM 100 MG: 100 CAPSULE, LIQUID FILLED ORAL at 12:00

## 2017-08-26 RX ADMIN — FUROSEMIDE 20 MG: 20 TABLET ORAL at 12:01

## 2017-08-26 RX ADMIN — LEVOTHYROXINE SODIUM 175 MCG: 175 TABLET ORAL at 05:56

## 2017-08-26 RX ADMIN — METOPROLOL SUCCINATE 25 MG: 25 TABLET, FILM COATED, EXTENDED RELEASE ORAL at 12:02

## 2017-08-26 RX ADMIN — ASPIRIN 81 MG CHEWABLE TABLET 81 MG: 81 TABLET CHEWABLE at 12:02

## 2017-08-26 RX ADMIN — IPRATROPIUM BROMIDE AND ALBUTEROL SULFATE 1 AMPULE: 2.5; .5 SOLUTION RESPIRATORY (INHALATION) at 11:35

## 2017-08-26 RX ADMIN — MAGNESIUM OXIDE TAB 400 MG (241.3 MG ELEMENTAL MG) 400 MG: 400 (241.3 MG) TAB at 12:00

## 2017-08-26 RX ADMIN — CETIRIZINE HYDROCHLORIDE 5 MG: 10 TABLET, FILM COATED ORAL at 12:01

## 2017-08-26 RX ADMIN — FOLIC ACID 1 MG: 1 TABLET ORAL at 12:06

## 2017-08-27 LAB
ABO/RH: NORMAL
ALBUMIN SERPL-MCNC: 2.6 G/DL (ref 3.9–4.9)
ALP BLD-CCNC: 80 U/L (ref 40–130)
ALT SERPL-CCNC: 7 U/L (ref 0–33)
ANION GAP SERPL CALCULATED.3IONS-SCNC: 7 MEQ/L (ref 7–13)
ANTIBODY SCREEN: NORMAL
AST SERPL-CCNC: 19 U/L (ref 0–35)
BASE EXCESS ARTERIAL: 13 (ref -3–3)
BILIRUB SERPL-MCNC: 0.2 MG/DL (ref 0–1.2)
BLOOD BANK DISPENSE STATUS: NORMAL
BLOOD BANK DISPENSE STATUS: NORMAL
BLOOD BANK PRODUCT CODE: NORMAL
BLOOD BANK PRODUCT CODE: NORMAL
BPU ID: NORMAL
BPU ID: NORMAL
BUN BLDV-MCNC: 31 MG/DL (ref 8–23)
CALCIUM SERPL-MCNC: 11.1 MG/DL (ref 8.6–10.2)
CHLORIDE BLD-SCNC: 99 MEQ/L (ref 98–107)
CO2: 35 MEQ/L (ref 22–29)
CREAT SERPL-MCNC: 1.5 MG/DL (ref 0.5–0.9)
DESCRIPTION BLOOD BANK: NORMAL
DESCRIPTION BLOOD BANK: NORMAL
GFR AFRICAN AMERICAN: 40.5
GFR NON-AFRICAN AMERICAN: 33.4
GLOBULIN: 2.8 G/DL (ref 2.3–3.5)
GLUCOSE BLD-MCNC: 103 MG/DL (ref 60–115)
GLUCOSE BLD-MCNC: 108 MG/DL (ref 60–115)
GLUCOSE BLD-MCNC: 136 MG/DL (ref 60–115)
GLUCOSE BLD-MCNC: 138 MG/DL (ref 60–115)
GLUCOSE BLD-MCNC: 78 MG/DL (ref 74–109)
HCO3 ARTERIAL: 39.2 MMOL/L (ref 21–29)
HCT VFR BLD CALC: 24.2 % (ref 37–47)
HEMOGLOBIN: 7.5 G/DL (ref 12–16)
LACTATE: 0.39 MMOL/L (ref 0.4–2)
MCH RBC QN AUTO: 29.4 PG (ref 27–31.3)
MCHC RBC AUTO-ENTMCNC: 30.9 % (ref 33–37)
MCV RBC AUTO: 94.9 FL (ref 82–100)
O2 SAT, ARTERIAL: 97 % (ref 93–100)
PCO2 ARTERIAL: 78 MM HG (ref 35–45)
PDW BLD-RTO: 15.9 % (ref 11.5–14.5)
PERFORMED ON: ABNORMAL
PERFORMED ON: NORMAL
PERFORMED ON: NORMAL
PH ARTERIAL: 7.31 (ref 7.35–7.45)
PLATELET # BLD: 222 K/UL (ref 130–400)
PO2 ARTERIAL: 100 MM HG (ref 75–108)
POC FIO2: 3
POC SAMPLE TYPE: ABNORMAL
POTASSIUM SERPL-SCNC: 4.9 MEQ/L (ref 3.5–5.1)
RBC # BLD: 2.55 M/UL (ref 4.2–5.4)
SODIUM BLD-SCNC: 141 MEQ/L (ref 132–144)
TCO2 ARTERIAL: 42 (ref 22–29)
TOTAL PROTEIN: 5.4 G/DL (ref 6.4–8.1)
WBC # BLD: 8.2 K/UL (ref 4.8–10.8)

## 2017-08-27 PROCEDURE — 6370000000 HC RX 637 (ALT 250 FOR IP): Performed by: INTERNAL MEDICINE

## 2017-08-27 PROCEDURE — 86900 BLOOD TYPING SEROLOGIC ABO: CPT

## 2017-08-27 PROCEDURE — 80053 COMPREHEN METABOLIC PANEL: CPT

## 2017-08-27 PROCEDURE — 82803 BLOOD GASES ANY COMBINATION: CPT

## 2017-08-27 PROCEDURE — 2580000003 HC RX 258: Performed by: INTERNAL MEDICINE

## 2017-08-27 PROCEDURE — 86901 BLOOD TYPING SEROLOGIC RH(D): CPT

## 2017-08-27 PROCEDURE — 82948 REAGENT STRIP/BLOOD GLUCOSE: CPT

## 2017-08-27 PROCEDURE — 1210000000 HC MED SURG R&B

## 2017-08-27 PROCEDURE — 6360000002 HC RX W HCPCS: Performed by: INTERNAL MEDICINE

## 2017-08-27 PROCEDURE — 83605 ASSAY OF LACTIC ACID: CPT

## 2017-08-27 PROCEDURE — 99232 SBSQ HOSP IP/OBS MODERATE 35: CPT | Performed by: INTERNAL MEDICINE

## 2017-08-27 PROCEDURE — P9016 RBC LEUKOCYTES REDUCED: HCPCS

## 2017-08-27 PROCEDURE — 94660 CPAP INITIATION&MGMT: CPT

## 2017-08-27 PROCEDURE — 94640 AIRWAY INHALATION TREATMENT: CPT

## 2017-08-27 PROCEDURE — 86850 RBC ANTIBODY SCREEN: CPT

## 2017-08-27 PROCEDURE — 36600 WITHDRAWAL OF ARTERIAL BLOOD: CPT

## 2017-08-27 PROCEDURE — 85027 COMPLETE CBC AUTOMATED: CPT

## 2017-08-27 PROCEDURE — 2700000000 HC OXYGEN THERAPY PER DAY

## 2017-08-27 PROCEDURE — 36430 TRANSFUSION BLD/BLD COMPNT: CPT

## 2017-08-27 PROCEDURE — 86923 COMPATIBILITY TEST ELECTRIC: CPT

## 2017-08-27 RX ORDER — DIPHENHYDRAMINE HYDROCHLORIDE 50 MG/ML
25 INJECTION INTRAMUSCULAR; INTRAVENOUS SEE ADMIN INSTRUCTIONS
Status: DISCONTINUED | OUTPATIENT
Start: 2017-08-27 | End: 2017-08-29

## 2017-08-27 RX ORDER — 0.9 % SODIUM CHLORIDE 0.9 %
250 INTRAVENOUS SOLUTION INTRAVENOUS ONCE
Status: COMPLETED | OUTPATIENT
Start: 2017-08-27 | End: 2017-08-27

## 2017-08-27 RX ORDER — ACETAMINOPHEN 325 MG/1
650 TABLET ORAL SEE ADMIN INSTRUCTIONS
Status: DISCONTINUED | OUTPATIENT
Start: 2017-08-27 | End: 2017-08-29

## 2017-08-27 RX ADMIN — AMIODARONE HYDROCHLORIDE 200 MG: 200 TABLET ORAL at 17:36

## 2017-08-27 RX ADMIN — MAGNESIUM OXIDE TAB 400 MG (241.3 MG ELEMENTAL MG) 400 MG: 400 (241.3 MG) TAB at 09:24

## 2017-08-27 RX ADMIN — LACTULOSE 13.33 G: 20 SOLUTION ORAL at 09:24

## 2017-08-27 RX ADMIN — CEFTRIAXONE SODIUM 1 G: 1 INJECTION, POWDER, FOR SOLUTION INTRAMUSCULAR; INTRAVENOUS at 23:38

## 2017-08-27 RX ADMIN — RIVAROXABAN 15 MG: 15 TABLET, FILM COATED ORAL at 17:35

## 2017-08-27 RX ADMIN — Medication 10 ML: at 23:39

## 2017-08-27 RX ADMIN — AMIODARONE HYDROCHLORIDE 200 MG: 200 TABLET ORAL at 23:38

## 2017-08-27 RX ADMIN — Medication 10 ML: at 09:24

## 2017-08-27 RX ADMIN — IPRATROPIUM BROMIDE AND ALBUTEROL SULFATE 1 AMPULE: 2.5; .5 SOLUTION RESPIRATORY (INHALATION) at 19:48

## 2017-08-27 RX ADMIN — METOPROLOL SUCCINATE 25 MG: 25 TABLET, FILM COATED, EXTENDED RELEASE ORAL at 09:32

## 2017-08-27 RX ADMIN — AMIODARONE HYDROCHLORIDE 200 MG: 200 TABLET ORAL at 09:24

## 2017-08-27 RX ADMIN — FUROSEMIDE 20 MG: 20 TABLET ORAL at 09:24

## 2017-08-27 RX ADMIN — IPRATROPIUM BROMIDE AND ALBUTEROL SULFATE 1 AMPULE: 2.5; .5 SOLUTION RESPIRATORY (INHALATION) at 16:31

## 2017-08-27 RX ADMIN — CETIRIZINE HYDROCHLORIDE 5 MG: 10 TABLET, FILM COATED ORAL at 09:24

## 2017-08-27 RX ADMIN — FOLIC ACID 1 MG: 1 TABLET ORAL at 09:25

## 2017-08-27 RX ADMIN — SODIUM CHLORIDE 250 ML: 9 INJECTION, SOLUTION INTRAVENOUS at 13:18

## 2017-08-27 RX ADMIN — DOCUSATE SODIUM 100 MG: 100 CAPSULE, LIQUID FILLED ORAL at 23:38

## 2017-08-27 RX ADMIN — DOCUSATE SODIUM 100 MG: 100 CAPSULE, LIQUID FILLED ORAL at 09:24

## 2017-08-27 RX ADMIN — GABAPENTIN 400 MG: 400 CAPSULE ORAL at 09:24

## 2017-08-27 RX ADMIN — IPRATROPIUM BROMIDE AND ALBUTEROL SULFATE 1 AMPULE: 2.5; .5 SOLUTION RESPIRATORY (INHALATION) at 11:30

## 2017-08-27 RX ADMIN — ASPIRIN 81 MG CHEWABLE TABLET 81 MG: 81 TABLET CHEWABLE at 09:25

## 2017-08-27 RX ADMIN — CEFTRIAXONE SODIUM 1 G: 1 INJECTION, POWDER, FOR SOLUTION INTRAMUSCULAR; INTRAVENOUS at 05:30

## 2017-08-27 RX ADMIN — IPRATROPIUM BROMIDE AND ALBUTEROL SULFATE 1 AMPULE: 2.5; .5 SOLUTION RESPIRATORY (INHALATION) at 07:39

## 2017-08-27 RX ADMIN — GABAPENTIN 400 MG: 400 CAPSULE ORAL at 23:37

## 2017-08-27 RX ADMIN — LEVOTHYROXINE SODIUM 175 MCG: 175 TABLET ORAL at 06:46

## 2017-08-27 RX ADMIN — PANTOPRAZOLE SODIUM 40 MG: 40 TABLET, DELAYED RELEASE ORAL at 09:25

## 2017-08-27 ASSESSMENT — PAIN SCALES - GENERAL
PAINLEVEL_OUTOF10: 0

## 2017-08-28 ENCOUNTER — APPOINTMENT (OUTPATIENT)
Dept: MRI IMAGING | Age: 77
DRG: 189 | End: 2017-08-28
Payer: COMMERCIAL

## 2017-08-28 LAB
ALBUMIN SERPL-MCNC: 2.8 G/DL (ref 3.9–4.9)
ALP BLD-CCNC: 84 U/L (ref 40–130)
ALT SERPL-CCNC: 8 U/L (ref 0–33)
ANION GAP SERPL CALCULATED.3IONS-SCNC: 12 MEQ/L (ref 7–13)
AST SERPL-CCNC: 16 U/L (ref 0–35)
BILIRUB SERPL-MCNC: 1.1 MG/DL (ref 0–1.2)
BUN BLDV-MCNC: 39 MG/DL (ref 8–23)
CALCIUM SERPL-MCNC: 11.2 MG/DL (ref 8.6–10.2)
CHLORIDE BLD-SCNC: 101 MEQ/L (ref 98–107)
CO2: 31 MEQ/L (ref 22–29)
CREAT SERPL-MCNC: 1.26 MG/DL (ref 0.5–0.9)
GFR AFRICAN AMERICAN: 49.5
GFR NON-AFRICAN AMERICAN: 40.9
GLOBULIN: 2.8 G/DL (ref 2.3–3.5)
GLUCOSE BLD-MCNC: 103 MG/DL (ref 60–115)
GLUCOSE BLD-MCNC: 111 MG/DL (ref 74–109)
GLUCOSE BLD-MCNC: 128 MG/DL (ref 60–115)
GLUCOSE BLD-MCNC: 130 MG/DL (ref 60–115)
HCT VFR BLD CALC: 32.2 % (ref 37–47)
HEMOGLOBIN: 10.2 G/DL (ref 12–16)
MCH RBC QN AUTO: 29.2 PG (ref 27–31.3)
MCHC RBC AUTO-ENTMCNC: 31.7 % (ref 33–37)
MCV RBC AUTO: 92 FL (ref 82–100)
PDW BLD-RTO: 16.5 % (ref 11.5–14.5)
PERFORMED ON: ABNORMAL
PERFORMED ON: ABNORMAL
PERFORMED ON: NORMAL
PLATELET # BLD: 272 K/UL (ref 130–400)
POTASSIUM SERPL-SCNC: 5.1 MEQ/L (ref 3.5–5.1)
RBC # BLD: 3.49 M/UL (ref 4.2–5.4)
SODIUM BLD-SCNC: 144 MEQ/L (ref 132–144)
TOTAL PROTEIN: 5.6 G/DL (ref 6.4–8.1)
WBC # BLD: 13.2 K/UL (ref 4.8–10.8)

## 2017-08-28 PROCEDURE — 6360000004 HC RX CONTRAST MEDICATION: Performed by: PSYCHIATRY & NEUROLOGY

## 2017-08-28 PROCEDURE — 1210000000 HC MED SURG R&B

## 2017-08-28 PROCEDURE — 94664 DEMO&/EVAL PT USE INHALER: CPT

## 2017-08-28 PROCEDURE — 2700000000 HC OXYGEN THERAPY PER DAY

## 2017-08-28 PROCEDURE — 2580000003 HC RX 258: Performed by: INTERNAL MEDICINE

## 2017-08-28 PROCEDURE — 85027 COMPLETE CBC AUTOMATED: CPT

## 2017-08-28 PROCEDURE — 6370000000 HC RX 637 (ALT 250 FOR IP): Performed by: INTERNAL MEDICINE

## 2017-08-28 PROCEDURE — 70553 MRI BRAIN STEM W/O & W/DYE: CPT

## 2017-08-28 PROCEDURE — 94640 AIRWAY INHALATION TREATMENT: CPT

## 2017-08-28 PROCEDURE — A9577 INJ MULTIHANCE: HCPCS | Performed by: PSYCHIATRY & NEUROLOGY

## 2017-08-28 PROCEDURE — 80053 COMPREHEN METABOLIC PANEL: CPT

## 2017-08-28 RX ORDER — IPRATROPIUM BROMIDE AND ALBUTEROL SULFATE 2.5; .5 MG/3ML; MG/3ML
3 SOLUTION RESPIRATORY (INHALATION) 4 TIMES DAILY
Qty: 360 ML | DISCHARGE
Start: 2017-08-28

## 2017-08-28 RX ORDER — CEFUROXIME AXETIL 250 MG/1
250 TABLET ORAL 2 TIMES DAILY
DISCHARGE
Start: 2017-08-28 | End: 2017-08-30 | Stop reason: HOSPADM

## 2017-08-28 RX ORDER — AMIODARONE HYDROCHLORIDE 200 MG/1
200 TABLET ORAL DAILY
Qty: 30 TABLET | Refills: 3 | DISCHARGE
Start: 2017-08-28 | End: 2017-08-30 | Stop reason: HOSPADM

## 2017-08-28 RX ORDER — AMIODARONE HYDROCHLORIDE 200 MG/1
200 TABLET ORAL DAILY
Status: DISCONTINUED | OUTPATIENT
Start: 2017-08-29 | End: 2017-08-29

## 2017-08-28 RX ORDER — GABAPENTIN 400 MG/1
400 CAPSULE ORAL 2 TIMES DAILY
Qty: 90 CAPSULE | Refills: 3 | DISCHARGE
Start: 2017-08-28

## 2017-08-28 RX ORDER — ALBUTEROL SULFATE 2.5 MG/3ML
2.5 SOLUTION RESPIRATORY (INHALATION)
Qty: 120 EACH | Refills: 3 | DISCHARGE
Start: 2017-08-28

## 2017-08-28 RX ORDER — CEFUROXIME AXETIL 250 MG/1
250 TABLET ORAL 2 TIMES DAILY
Status: DISCONTINUED | OUTPATIENT
Start: 2017-08-28 | End: 2017-08-29

## 2017-08-28 RX ADMIN — CETIRIZINE HYDROCHLORIDE 5 MG: 10 TABLET, FILM COATED ORAL at 09:37

## 2017-08-28 RX ADMIN — ACETAMINOPHEN 650 MG: 325 TABLET ORAL at 11:33

## 2017-08-28 RX ADMIN — Medication 10 ML: at 21:00

## 2017-08-28 RX ADMIN — AMIODARONE HYDROCHLORIDE 200 MG: 200 TABLET ORAL at 09:37

## 2017-08-28 RX ADMIN — GADOBENATE DIMEGLUMINE 19 ML: 529 INJECTION, SOLUTION INTRAVENOUS at 16:04

## 2017-08-28 RX ADMIN — LEVOTHYROXINE SODIUM 175 MCG: 175 TABLET ORAL at 07:00

## 2017-08-28 RX ADMIN — DOCUSATE SODIUM 100 MG: 100 CAPSULE, LIQUID FILLED ORAL at 09:37

## 2017-08-28 RX ADMIN — ASPIRIN 81 MG CHEWABLE TABLET 81 MG: 81 TABLET CHEWABLE at 09:36

## 2017-08-28 RX ADMIN — GABAPENTIN 400 MG: 400 CAPSULE ORAL at 21:44

## 2017-08-28 RX ADMIN — MAGNESIUM OXIDE TAB 400 MG (241.3 MG ELEMENTAL MG) 400 MG: 400 (241.3 MG) TAB at 09:37

## 2017-08-28 RX ADMIN — IPRATROPIUM BROMIDE AND ALBUTEROL SULFATE 1 AMPULE: 2.5; .5 SOLUTION RESPIRATORY (INHALATION) at 20:56

## 2017-08-28 RX ADMIN — FUROSEMIDE 20 MG: 20 TABLET ORAL at 09:36

## 2017-08-28 RX ADMIN — PANTOPRAZOLE SODIUM 40 MG: 40 TABLET, DELAYED RELEASE ORAL at 09:36

## 2017-08-28 RX ADMIN — FOLIC ACID 1 MG: 1 TABLET ORAL at 09:37

## 2017-08-28 RX ADMIN — IPRATROPIUM BROMIDE AND ALBUTEROL SULFATE 1 AMPULE: 2.5; .5 SOLUTION RESPIRATORY (INHALATION) at 07:13

## 2017-08-28 RX ADMIN — CEFUROXIME AXETIL 250 MG: 250 TABLET, FILM COATED ORAL at 21:44

## 2017-08-28 RX ADMIN — INSULIN GLARGINE 36 UNITS: 100 INJECTION, SOLUTION SUBCUTANEOUS at 21:46

## 2017-08-28 RX ADMIN — GABAPENTIN 400 MG: 400 CAPSULE ORAL at 09:36

## 2017-08-28 RX ADMIN — IPRATROPIUM BROMIDE AND ALBUTEROL SULFATE 1 AMPULE: 2.5; .5 SOLUTION RESPIRATORY (INHALATION) at 11:45

## 2017-08-28 RX ADMIN — METOPROLOL SUCCINATE 25 MG: 25 TABLET, FILM COATED, EXTENDED RELEASE ORAL at 09:37

## 2017-08-28 RX ADMIN — DOCUSATE SODIUM 100 MG: 100 CAPSULE, LIQUID FILLED ORAL at 21:44

## 2017-08-28 RX ADMIN — IPRATROPIUM BROMIDE AND ALBUTEROL SULFATE 1 AMPULE: 2.5; .5 SOLUTION RESPIRATORY (INHALATION) at 17:29

## 2017-08-28 RX ADMIN — Medication 10 ML: at 09:38

## 2017-08-28 ASSESSMENT — PAIN SCALES - GENERAL
PAINLEVEL_OUTOF10: 0
PAINLEVEL_OUTOF10: 7
PAINLEVEL_OUTOF10: 0

## 2017-08-28 ASSESSMENT — ENCOUNTER SYMPTOMS
VOMITING: 0
SHORTNESS OF BREATH: 0
NAUSEA: 0

## 2017-08-29 LAB
ANION GAP SERPL CALCULATED.3IONS-SCNC: 7 MEQ/L (ref 7–13)
BASE EXCESS ARTERIAL: 13 (ref -3–3)
BUN BLDV-MCNC: 41 MG/DL (ref 8–23)
CALCIUM SERPL-MCNC: 11.5 MG/DL (ref 8.6–10.2)
CHLORIDE BLD-SCNC: 100 MEQ/L (ref 98–107)
CO2: 38 MEQ/L (ref 22–29)
CREAT SERPL-MCNC: 1.36 MG/DL (ref 0.5–0.9)
GFR AFRICAN AMERICAN: 20
GFR AFRICAN AMERICAN: 45.3
GFR NON-AFRICAN AMERICAN: 16
GFR NON-AFRICAN AMERICAN: 37.4
GLUCOSE BLD-MCNC: 124 MG/DL (ref 74–109)
GLUCOSE BLD-MCNC: 131 MG/DL (ref 60–115)
GLUCOSE BLD-MCNC: 132 MG/DL (ref 60–115)
HCO3 ARTERIAL: 38.6 MMOL/L (ref 21–29)
LACTATE: 0.46 MMOL/L (ref 0.4–2)
MAGNESIUM: 2.4 MG/DL (ref 1.7–2.3)
O2 SAT, ARTERIAL: 90 % (ref 93–100)
PCO2 ARTERIAL: 70 MM HG (ref 35–45)
PERFORMED ON: ABNORMAL
PH ARTERIAL: 7.35 (ref 7.35–7.45)
PO2 ARTERIAL: 65 MM HG (ref 75–108)
POC CREATININE: 2.8 MG/DL (ref 0.6–1.2)
POC POTASSIUM: 4.7 MEQ/L (ref 3.5–5.1)
POC SAMPLE TYPE: ABNORMAL
POTASSIUM SERPL-SCNC: 4.9 MEQ/L (ref 3.5–5.1)
SODIUM BLD-SCNC: 145 MEQ/L (ref 132–144)
TCO2 ARTERIAL: 41 (ref 22–29)
TSH SERPL DL<=0.05 MIU/L-ACNC: 5.83 UIU/ML (ref 0.27–4.2)

## 2017-08-29 PROCEDURE — 83605 ASSAY OF LACTIC ACID: CPT

## 2017-08-29 PROCEDURE — 6370000000 HC RX 637 (ALT 250 FOR IP): Performed by: INTERNAL MEDICINE

## 2017-08-29 PROCEDURE — 94660 CPAP INITIATION&MGMT: CPT

## 2017-08-29 PROCEDURE — 83519 RIA NONANTIBODY: CPT

## 2017-08-29 PROCEDURE — 83735 ASSAY OF MAGNESIUM: CPT

## 2017-08-29 PROCEDURE — 36600 WITHDRAWAL OF ARTERIAL BLOOD: CPT

## 2017-08-29 PROCEDURE — 94640 AIRWAY INHALATION TREATMENT: CPT

## 2017-08-29 PROCEDURE — 2700000000 HC OXYGEN THERAPY PER DAY

## 2017-08-29 PROCEDURE — 1210000000 HC MED SURG R&B

## 2017-08-29 PROCEDURE — 82565 ASSAY OF CREATININE: CPT

## 2017-08-29 PROCEDURE — 84132 ASSAY OF SERUM POTASSIUM: CPT

## 2017-08-29 PROCEDURE — 84443 ASSAY THYROID STIM HORMONE: CPT

## 2017-08-29 PROCEDURE — 80048 BASIC METABOLIC PNL TOTAL CA: CPT

## 2017-08-29 PROCEDURE — 82948 REAGENT STRIP/BLOOD GLUCOSE: CPT

## 2017-08-29 PROCEDURE — 82803 BLOOD GASES ANY COMBINATION: CPT

## 2017-08-29 RX ORDER — ACETAMINOPHEN 325 MG/1
650 TABLET ORAL EVERY 4 HOURS PRN
Status: CANCELLED | OUTPATIENT
Start: 2017-08-29

## 2017-08-29 RX ORDER — ONDANSETRON 2 MG/ML
4 INJECTION INTRAMUSCULAR; INTRAVENOUS EVERY 6 HOURS PRN
Status: CANCELLED | OUTPATIENT
Start: 2017-08-29

## 2017-08-29 RX ORDER — ALBUTEROL SULFATE 2.5 MG/3ML
2.5 SOLUTION RESPIRATORY (INHALATION)
Status: CANCELLED | OUTPATIENT
Start: 2017-08-29

## 2017-08-29 RX ORDER — IPRATROPIUM BROMIDE AND ALBUTEROL SULFATE 2.5; .5 MG/3ML; MG/3ML
1 SOLUTION RESPIRATORY (INHALATION) 4 TIMES DAILY
Status: CANCELLED | OUTPATIENT
Start: 2017-08-29

## 2017-08-29 RX ADMIN — IPRATROPIUM BROMIDE AND ALBUTEROL SULFATE 1 AMPULE: 2.5; .5 SOLUTION RESPIRATORY (INHALATION) at 15:48

## 2017-08-29 RX ADMIN — IPRATROPIUM BROMIDE AND ALBUTEROL SULFATE 1 AMPULE: 2.5; .5 SOLUTION RESPIRATORY (INHALATION) at 11:32

## 2017-08-29 RX ADMIN — IPRATROPIUM BROMIDE AND ALBUTEROL SULFATE 1 AMPULE: 2.5; .5 SOLUTION RESPIRATORY (INHALATION) at 19:55

## 2017-08-29 RX ADMIN — LEVOTHYROXINE SODIUM 175 MCG: 175 TABLET ORAL at 07:23

## 2017-08-29 RX ADMIN — IPRATROPIUM BROMIDE AND ALBUTEROL SULFATE 1 AMPULE: 2.5; .5 SOLUTION RESPIRATORY (INHALATION) at 07:05

## 2017-08-29 ASSESSMENT — PAIN SCALES - GENERAL
PAINLEVEL_OUTOF10: 0

## 2017-08-29 ASSESSMENT — ENCOUNTER SYMPTOMS
NAUSEA: 0
SHORTNESS OF BREATH: 0
VOMITING: 0

## 2017-08-30 VITALS
OXYGEN SATURATION: 98 % | HEIGHT: 66 IN | DIASTOLIC BLOOD PRESSURE: 35 MMHG | TEMPERATURE: 98.4 F | BODY MASS INDEX: 34.12 KG/M2 | HEART RATE: 79 BPM | RESPIRATION RATE: 20 BRPM | WEIGHT: 212.3 LBS | SYSTOLIC BLOOD PRESSURE: 101 MMHG

## 2017-08-30 LAB — ACETYLCHOLINE BINDING ANTIBODY: 0 NMOL/L (ref 0–0.4)

## 2017-08-30 PROCEDURE — 94640 AIRWAY INHALATION TREATMENT: CPT

## 2017-08-30 PROCEDURE — 2700000000 HC OXYGEN THERAPY PER DAY

## 2017-08-30 PROCEDURE — 6370000000 HC RX 637 (ALT 250 FOR IP): Performed by: INTERNAL MEDICINE

## 2017-08-30 RX ORDER — FERROUS SULFATE 325(65) MG
325 TABLET ORAL
Status: DISCONTINUED | OUTPATIENT
Start: 2017-08-31 | End: 2017-08-30 | Stop reason: HOSPADM

## 2017-08-30 RX ORDER — INSULIN GLARGINE 100 [IU]/ML
8 INJECTION, SOLUTION SUBCUTANEOUS NIGHTLY
Status: DISCONTINUED | OUTPATIENT
Start: 2017-08-30 | End: 2017-08-30 | Stop reason: HOSPADM

## 2017-08-30 RX ORDER — FOLIC ACID 1 MG/1
1 TABLET ORAL DAILY
Status: DISCONTINUED | OUTPATIENT
Start: 2017-08-30 | End: 2017-08-30 | Stop reason: HOSPADM

## 2017-08-30 RX ORDER — LORAZEPAM 0.5 MG/1
0.5 TABLET ORAL EVERY 4 HOURS PRN
Qty: 12 TABLET | Refills: 0 | Status: ON HOLD | OUTPATIENT
Start: 2017-08-30 | End: 2017-09-09

## 2017-08-30 RX ORDER — ATORVASTATIN CALCIUM 40 MG/1
40 TABLET, FILM COATED ORAL NIGHTLY
Status: DISCONTINUED | OUTPATIENT
Start: 2017-08-30 | End: 2017-08-30 | Stop reason: HOSPADM

## 2017-08-30 RX ORDER — LACTULOSE 10 G/15ML
20 SOLUTION ORAL DAILY
Status: DISCONTINUED | OUTPATIENT
Start: 2017-08-30 | End: 2017-08-30 | Stop reason: HOSPADM

## 2017-08-30 RX ORDER — CETIRIZINE HYDROCHLORIDE 10 MG/1
5 TABLET ORAL DAILY
Status: DISCONTINUED | OUTPATIENT
Start: 2017-08-30 | End: 2017-08-30 | Stop reason: HOSPADM

## 2017-08-30 RX ORDER — SIMETHICONE 80 MG
80 TABLET,CHEWABLE ORAL EVERY 6 HOURS PRN
Status: DISCONTINUED | OUTPATIENT
Start: 2017-08-30 | End: 2017-08-30 | Stop reason: HOSPADM

## 2017-08-30 RX ORDER — ASPIRIN 81 MG/1
81 TABLET, CHEWABLE ORAL DAILY
Status: DISCONTINUED | OUTPATIENT
Start: 2017-08-30 | End: 2017-08-30 | Stop reason: HOSPADM

## 2017-08-30 RX ORDER — ISOSORBIDE MONONITRATE 30 MG/1
30 TABLET, EXTENDED RELEASE ORAL DAILY
Status: DISCONTINUED | OUTPATIENT
Start: 2017-08-30 | End: 2017-08-30 | Stop reason: HOSPADM

## 2017-08-30 RX ORDER — FUROSEMIDE 20 MG/1
20 TABLET ORAL DAILY
Status: DISCONTINUED | OUTPATIENT
Start: 2017-08-30 | End: 2017-08-30 | Stop reason: HOSPADM

## 2017-08-30 RX ORDER — MORPHINE SULFATE 2 MG/ML
2 INJECTION, SOLUTION INTRAMUSCULAR; INTRAVENOUS EVERY 4 HOURS PRN
Status: DISCONTINUED | OUTPATIENT
Start: 2017-08-30 | End: 2017-08-30

## 2017-08-30 RX ORDER — LEVOTHYROXINE SODIUM 0.2 MG/1
200 TABLET ORAL DAILY
Status: DISCONTINUED | OUTPATIENT
Start: 2017-08-30 | End: 2017-08-30 | Stop reason: HOSPADM

## 2017-08-30 RX ORDER — ALLOPURINOL 100 MG/1
100 TABLET ORAL DAILY
Status: DISCONTINUED | OUTPATIENT
Start: 2017-08-30 | End: 2017-08-30 | Stop reason: HOSPADM

## 2017-08-30 RX ORDER — DEXTROSE MONOHYDRATE 25 G/50ML
12.5 INJECTION, SOLUTION INTRAVENOUS PRN
Status: DISCONTINUED | OUTPATIENT
Start: 2017-08-30 | End: 2017-08-30 | Stop reason: HOSPADM

## 2017-08-30 RX ORDER — MORPHINE SULFATE 100 MG/5ML
5 SOLUTION ORAL
Qty: 30 ML | Refills: 0 | Status: ON HOLD | OUTPATIENT
Start: 2017-08-30 | End: 2017-09-09

## 2017-08-30 RX ORDER — ACETAMINOPHEN 325 MG/1
650 TABLET ORAL EVERY 4 HOURS PRN
Status: DISCONTINUED | OUTPATIENT
Start: 2017-08-30 | End: 2017-08-30 | Stop reason: HOSPADM

## 2017-08-30 RX ORDER — METOPROLOL SUCCINATE 25 MG/1
25 TABLET, EXTENDED RELEASE ORAL DAILY
Status: DISCONTINUED | OUTPATIENT
Start: 2017-08-30 | End: 2017-08-30 | Stop reason: HOSPADM

## 2017-08-30 RX ORDER — MORPHINE SULFATE 100 MG/5ML
5 SOLUTION ORAL
Status: DISCONTINUED | OUTPATIENT
Start: 2017-08-30 | End: 2017-08-30 | Stop reason: HOSPADM

## 2017-08-30 RX ORDER — DEXTROSE MONOHYDRATE 50 MG/ML
100 INJECTION, SOLUTION INTRAVENOUS PRN
Status: DISCONTINUED | OUTPATIENT
Start: 2017-08-30 | End: 2017-08-30 | Stop reason: HOSPADM

## 2017-08-30 RX ORDER — NICOTINE POLACRILEX 4 MG
15 LOZENGE BUCCAL PRN
Status: DISCONTINUED | OUTPATIENT
Start: 2017-08-30 | End: 2017-08-30 | Stop reason: HOSPADM

## 2017-08-30 RX ORDER — DOCUSATE SODIUM 100 MG/1
100 CAPSULE, LIQUID FILLED ORAL 2 TIMES DAILY PRN
Status: DISCONTINUED | OUTPATIENT
Start: 2017-08-30 | End: 2017-08-30 | Stop reason: HOSPADM

## 2017-08-30 RX ORDER — PANTOPRAZOLE SODIUM 40 MG/1
40 TABLET, DELAYED RELEASE ORAL DAILY
Status: DISCONTINUED | OUTPATIENT
Start: 2017-08-30 | End: 2017-08-30 | Stop reason: HOSPADM

## 2017-08-30 RX ORDER — OXYBUTYNIN CHLORIDE 5 MG/1
5 TABLET ORAL NIGHTLY
Status: DISCONTINUED | OUTPATIENT
Start: 2017-08-30 | End: 2017-08-30 | Stop reason: HOSPADM

## 2017-08-30 RX ORDER — INSULIN GLARGINE 100 [IU]/ML
36 INJECTION, SOLUTION SUBCUTANEOUS NIGHTLY
Status: DISCONTINUED | OUTPATIENT
Start: 2017-08-30 | End: 2017-08-30

## 2017-08-30 RX ORDER — LORAZEPAM 0.5 MG/1
0.5 TABLET ORAL EVERY 4 HOURS PRN
Status: DISCONTINUED | OUTPATIENT
Start: 2017-08-30 | End: 2017-08-30 | Stop reason: HOSPADM

## 2017-08-30 RX ADMIN — IPRATROPIUM BROMIDE AND ALBUTEROL SULFATE 1 AMPULE: 2.5; .5 SOLUTION RESPIRATORY (INHALATION) at 07:10

## 2017-08-30 RX ADMIN — ACETAMINOPHEN 325 MG: 325 TABLET ORAL at 13:57

## 2017-08-30 RX ADMIN — IPRATROPIUM BROMIDE AND ALBUTEROL SULFATE 1 AMPULE: 2.5; .5 SOLUTION RESPIRATORY (INHALATION) at 11:41

## 2017-08-30 ASSESSMENT — ENCOUNTER SYMPTOMS
SHORTNESS OF BREATH: 0
VOMITING: 0
NAUSEA: 0

## 2017-08-30 ASSESSMENT — PAIN SCALES - GENERAL
PAINLEVEL_OUTOF10: 4
PAINLEVEL_OUTOF10: 0

## 2017-08-31 LAB
EKG ATRIAL RATE: 357 BPM
EKG ATRIAL RATE: 374 BPM
EKG ATRIAL RATE: 396 BPM
EKG ATRIAL RATE: 416 BPM
EKG ATRIAL RATE: 60 BPM
EKG ATRIAL RATE: 68 BPM
EKG ATRIAL RATE: 71 BPM
EKG P-R INTERVAL: 186 MS
EKG P-R INTERVAL: 190 MS
EKG Q-T INTERVAL: 326 MS
EKG Q-T INTERVAL: 358 MS
EKG Q-T INTERVAL: 370 MS
EKG Q-T INTERVAL: 394 MS
EKG Q-T INTERVAL: 398 MS
EKG Q-T INTERVAL: 402 MS
EKG Q-T INTERVAL: 418 MS
EKG QRS DURATION: 102 MS
EKG QRS DURATION: 102 MS
EKG QRS DURATION: 108 MS
EKG QRS DURATION: 118 MS
EKG QRS DURATION: 60 MS
EKG QRS DURATION: 94 MS
EKG QRS DURATION: 98 MS
EKG QTC CALCULATION (BAZETT): 390 MS
EKG QTC CALCULATION (BAZETT): 418 MS
EKG QTC CALCULATION (BAZETT): 426 MS
EKG QTC CALCULATION (BAZETT): 442 MS
EKG QTC CALCULATION (BAZETT): 448 MS
EKG R AXIS: -2 DEGREES
EKG R AXIS: -4 DEGREES
EKG R AXIS: -4 DEGREES
EKG R AXIS: 1 DEGREES
EKG R AXIS: 4 DEGREES
EKG R AXIS: 5 DEGREES
EKG R AXIS: 5 DEGREES
EKG T AXIS: 36 DEGREES
EKG T AXIS: 38 DEGREES
EKG T AXIS: 48 DEGREES
EKG T AXIS: 48 DEGREES
EKG T AXIS: 51 DEGREES
EKG T AXIS: 55 DEGREES
EKG T AXIS: 79 DEGREES
EKG VENTRICULAR RATE: 60 BPM
EKG VENTRICULAR RATE: 68 BPM
EKG VENTRICULAR RATE: 69 BPM
EKG VENTRICULAR RATE: 75 BPM
EKG VENTRICULAR RATE: 82 BPM
EKG VENTRICULAR RATE: 86 BPM
EKG VENTRICULAR RATE: 86 BPM

## 2017-09-01 LAB — ACETYLCHOL MODUL AB: 8 %

## 2017-09-06 LAB
HCT VFR BLD CALC: 31 % (ref 37–47)
HEMOGLOBIN: 9.6 G/DL (ref 12–16)
MCH RBC QN AUTO: 28.9 PG (ref 27–31.3)
MCHC RBC AUTO-ENTMCNC: 30.8 % (ref 33–37)
MCV RBC AUTO: 93.9 FL (ref 82–100)
PDW BLD-RTO: 16.5 % (ref 11.5–14.5)
PLATELET # BLD: 309 K/UL (ref 130–400)
RBC # BLD: 3.3 M/UL (ref 4.2–5.4)
WBC # BLD: 8.8 K/UL (ref 4.8–10.8)

## 2017-09-07 ENCOUNTER — APPOINTMENT (OUTPATIENT)
Dept: INTERVENTIONAL RADIOLOGY/VASCULAR | Age: 77
DRG: 189 | End: 2017-09-07
Payer: MEDICARE

## 2017-09-07 ENCOUNTER — HOSPITAL ENCOUNTER (INPATIENT)
Age: 77
LOS: 2 days | Discharge: NURSING, REHAB OR LONG-TERM CARE | DRG: 189 | End: 2017-09-09
Attending: EMERGENCY MEDICINE | Admitting: INTERNAL MEDICINE
Payer: MEDICARE

## 2017-09-07 ENCOUNTER — APPOINTMENT (OUTPATIENT)
Dept: CT IMAGING | Age: 77
DRG: 189 | End: 2017-09-07
Payer: MEDICARE

## 2017-09-07 ENCOUNTER — APPOINTMENT (OUTPATIENT)
Dept: GENERAL RADIOLOGY | Age: 77
DRG: 189 | End: 2017-09-07
Payer: MEDICARE

## 2017-09-07 DIAGNOSIS — E86.0 DEHYDRATION: ICD-10-CM

## 2017-09-07 DIAGNOSIS — I69.320 APHASIA AS LATE EFFECT OF CEREBROVASCULAR ACCIDENT: ICD-10-CM

## 2017-09-07 DIAGNOSIS — I63.9 CEREBROVASCULAR ACCIDENT (CVA), UNSPECIFIED MECHANISM (HCC): Primary | ICD-10-CM

## 2017-09-07 DIAGNOSIS — E16.2 HYPOGLYCEMIA: ICD-10-CM

## 2017-09-07 DIAGNOSIS — I48.92 ATRIAL FLUTTER, UNSPECIFIED TYPE (HCC): ICD-10-CM

## 2017-09-07 PROBLEM — N39.0 UTI (URINARY TRACT INFECTION): Status: RESOLVED | Noted: 2017-08-18 | Resolved: 2017-09-07

## 2017-09-07 LAB
ALBUMIN SERPL-MCNC: 3 G/DL (ref 3.9–4.9)
ALP BLD-CCNC: 96 U/L (ref 40–130)
ALT SERPL-CCNC: 8 U/L (ref 0–33)
ANION GAP SERPL CALCULATED.3IONS-SCNC: 15 MEQ/L (ref 7–13)
AST SERPL-CCNC: 15 U/L (ref 0–35)
BACTERIA: ABNORMAL /HPF
BASE EXCESS ARTERIAL: 13 (ref -3–3)
BASOPHILS ABSOLUTE: 0.1 K/UL (ref 0–0.2)
BASOPHILS RELATIVE PERCENT: 0.9 %
BILIRUB SERPL-MCNC: 0.3 MG/DL (ref 0–1.2)
BILIRUBIN URINE: NEGATIVE
BLOOD, URINE: ABNORMAL
BUN BLDV-MCNC: 37 MG/DL (ref 8–23)
CALCIUM SERPL-MCNC: 11.8 MG/DL (ref 8.6–10.2)
CHLORIDE BLD-SCNC: 97 MEQ/L (ref 98–107)
CHP ED QC CHECK: NORMAL
CHP ED QC CHECK: YES
CLARITY: ABNORMAL
CO2: 34 MEQ/L (ref 22–29)
COLOR: YELLOW
CREAT SERPL-MCNC: 1.48 MG/DL (ref 0.5–0.9)
EOSINOPHILS ABSOLUTE: 0.1 K/UL (ref 0–0.7)
EOSINOPHILS RELATIVE PERCENT: 1.3 %
EPITHELIAL CELLS, UA: ABNORMAL /HPF
GFR AFRICAN AMERICAN: 41.1
GFR NON-AFRICAN AMERICAN: 34
GLOBULIN: 3.4 G/DL (ref 2.3–3.5)
GLUCOSE BLD-MCNC: 102 MG/DL (ref 60–115)
GLUCOSE BLD-MCNC: 138 MG/DL
GLUCOSE BLD-MCNC: 138 MG/DL (ref 60–115)
GLUCOSE BLD-MCNC: 45 MG/DL (ref 74–109)
GLUCOSE BLD-MCNC: 53 MG/DL
GLUCOSE BLD-MCNC: 53 MG/DL (ref 60–115)
GLUCOSE BLD-MCNC: 83 MG/DL (ref 60–115)
GLUCOSE BLD-MCNC: 85 MG/DL (ref 60–115)
GLUCOSE BLD-MCNC: 86 MG/DL (ref 60–115)
GLUCOSE BLD-MCNC: 94 MG/DL (ref 60–115)
GLUCOSE BLD-MCNC: 94 MG/DL (ref 60–115)
GLUCOSE URINE: NEGATIVE MG/DL
HBA1C MFR BLD: 5.5 % (ref 4.8–5.9)
HCO3 ARTERIAL: 37.5 MMOL/L (ref 21–29)
HCT VFR BLD CALC: 33.5 % (ref 37–47)
HEMOGLOBIN: 10.5 G/DL (ref 12–16)
KETONES, URINE: 40 MG/DL
LACTATE: 0.33 MMOL/L (ref 0.4–2)
LEUKOCYTE ESTERASE, URINE: ABNORMAL
LYMPHOCYTES ABSOLUTE: 1.5 K/UL (ref 1–4.8)
LYMPHOCYTES RELATIVE PERCENT: 17.9 %
MCH RBC QN AUTO: 29.2 PG (ref 27–31.3)
MCHC RBC AUTO-ENTMCNC: 31.3 % (ref 33–37)
MCV RBC AUTO: 93.3 FL (ref 82–100)
MONOCYTES ABSOLUTE: 0.7 K/UL (ref 0.2–0.8)
MONOCYTES RELATIVE PERCENT: 8.1 %
NEUTROPHILS ABSOLUTE: 6.1 K/UL (ref 1.4–6.5)
NEUTROPHILS RELATIVE PERCENT: 71.8 %
NITRITE, URINE: NEGATIVE
O2 SAT, ARTERIAL: 88 % (ref 93–100)
PCO2 ARTERIAL: 55 MM HG (ref 35–45)
PDW BLD-RTO: 16.2 % (ref 11.5–14.5)
PERFORMED ON: ABNORMAL
PERFORMED ON: NORMAL
PH ARTERIAL: 7.44 (ref 7.35–7.45)
PH UA: 6 (ref 5–9)
PLATELET # BLD: 341 K/UL (ref 130–400)
PO2 ARTERIAL: 55 MM HG (ref 75–108)
POC SAMPLE TYPE: ABNORMAL
POTASSIUM SERPL-SCNC: 4.6 MEQ/L (ref 3.5–5.1)
PROTEIN UA: ABNORMAL MG/DL
RBC # BLD: 3.59 M/UL (ref 4.2–5.4)
RBC UA: ABNORMAL /HPF (ref 0–2)
SODIUM BLD-SCNC: 146 MEQ/L (ref 132–144)
SPECIFIC GRAVITY UA: 1.01 (ref 1–1.03)
TCO2 ARTERIAL: 39 (ref 22–29)
TOTAL PROTEIN: 6.4 G/DL (ref 6.4–8.1)
TROPONIN: 0.12 NG/ML (ref 0–0.01)
URINE REFLEX TO CULTURE: YES
UROBILINOGEN, URINE: 0.2 E.U./DL
WBC # BLD: 8.5 K/UL (ref 4.8–10.8)
WBC UA: ABNORMAL /HPF (ref 0–5)
YEAST: PRESENT

## 2017-09-07 PROCEDURE — 83605 ASSAY OF LACTIC ACID: CPT

## 2017-09-07 PROCEDURE — 36415 COLL VENOUS BLD VENIPUNCTURE: CPT

## 2017-09-07 PROCEDURE — 93010 ELECTROCARDIOGRAM REPORT: CPT | Performed by: INTERNAL MEDICINE

## 2017-09-07 PROCEDURE — 84484 ASSAY OF TROPONIN QUANT: CPT

## 2017-09-07 PROCEDURE — 82803 BLOOD GASES ANY COMBINATION: CPT

## 2017-09-07 PROCEDURE — 6360000002 HC RX W HCPCS: Performed by: EMERGENCY MEDICINE

## 2017-09-07 PROCEDURE — 2580000003 HC RX 258: Performed by: EMERGENCY MEDICINE

## 2017-09-07 PROCEDURE — 87040 BLOOD CULTURE FOR BACTERIA: CPT

## 2017-09-07 PROCEDURE — 87086 URINE CULTURE/COLONY COUNT: CPT

## 2017-09-07 PROCEDURE — 82948 REAGENT STRIP/BLOOD GLUCOSE: CPT

## 2017-09-07 PROCEDURE — 2700000000 HC OXYGEN THERAPY PER DAY

## 2017-09-07 PROCEDURE — 1210000000 HC MED SURG R&B

## 2017-09-07 PROCEDURE — 94640 AIRWAY INHALATION TREATMENT: CPT

## 2017-09-07 PROCEDURE — 6370000000 HC RX 637 (ALT 250 FOR IP): Performed by: INTERNAL MEDICINE

## 2017-09-07 PROCEDURE — 71010 XR CHEST PORTABLE: CPT

## 2017-09-07 PROCEDURE — 81001 URINALYSIS AUTO W/SCOPE: CPT

## 2017-09-07 PROCEDURE — 36600 WITHDRAWAL OF ARTERIAL BLOOD: CPT

## 2017-09-07 PROCEDURE — 70450 CT HEAD/BRAIN W/O DYE: CPT

## 2017-09-07 PROCEDURE — 6360000002 HC RX W HCPCS: Performed by: INTERNAL MEDICINE

## 2017-09-07 PROCEDURE — 93005 ELECTROCARDIOGRAM TRACING: CPT

## 2017-09-07 PROCEDURE — 87186 SC STD MICRODIL/AGAR DIL: CPT

## 2017-09-07 PROCEDURE — 83036 HEMOGLOBIN GLYCOSYLATED A1C: CPT

## 2017-09-07 PROCEDURE — 94664 DEMO&/EVAL PT USE INHALER: CPT

## 2017-09-07 PROCEDURE — 99285 EMERGENCY DEPT VISIT HI MDM: CPT

## 2017-09-07 PROCEDURE — 2500000003 HC RX 250 WO HCPCS: Performed by: INTERNAL MEDICINE

## 2017-09-07 PROCEDURE — 87077 CULTURE AEROBIC IDENTIFY: CPT

## 2017-09-07 PROCEDURE — 85025 COMPLETE CBC W/AUTO DIFF WBC: CPT

## 2017-09-07 PROCEDURE — C1751 CATH, INF, PER/CENT/MIDLINE: HCPCS

## 2017-09-07 PROCEDURE — 99222 1ST HOSP IP/OBS MODERATE 55: CPT | Performed by: INTERNAL MEDICINE

## 2017-09-07 PROCEDURE — 96375 TX/PRO/DX INJ NEW DRUG ADDON: CPT

## 2017-09-07 PROCEDURE — 94660 CPAP INITIATION&MGMT: CPT

## 2017-09-07 PROCEDURE — 80053 COMPREHEN METABOLIC PANEL: CPT

## 2017-09-07 PROCEDURE — 2580000003 HC RX 258: Performed by: INTERNAL MEDICINE

## 2017-09-07 PROCEDURE — 96365 THER/PROPH/DIAG IV INF INIT: CPT

## 2017-09-07 RX ORDER — LEVOTHYROXINE SODIUM 0.2 MG/1
200 TABLET ORAL DAILY
Status: DISCONTINUED | OUTPATIENT
Start: 2017-09-07 | End: 2017-09-07

## 2017-09-07 RX ORDER — METOPROLOL TARTRATE 5 MG/5ML
2.5 INJECTION INTRAVENOUS
Status: DISCONTINUED | OUTPATIENT
Start: 2017-09-07 | End: 2017-09-10 | Stop reason: HOSPADM

## 2017-09-07 RX ORDER — LORAZEPAM 0.5 MG/1
0.5 TABLET ORAL EVERY 4 HOURS PRN
Status: DISCONTINUED | OUTPATIENT
Start: 2017-09-07 | End: 2017-09-07

## 2017-09-07 RX ORDER — DEXTROSE MONOHYDRATE 25 G/50ML
12.5 INJECTION, SOLUTION INTRAVENOUS PRN
Status: DISCONTINUED | OUTPATIENT
Start: 2017-09-07 | End: 2017-09-10 | Stop reason: HOSPADM

## 2017-09-07 RX ORDER — METOPROLOL SUCCINATE 25 MG/1
25 TABLET, EXTENDED RELEASE ORAL DAILY
Status: DISCONTINUED | OUTPATIENT
Start: 2017-09-07 | End: 2017-09-08

## 2017-09-07 RX ORDER — SODIUM CHLORIDE 0.9 % (FLUSH) 0.9 %
10 SYRINGE (ML) INJECTION PRN
Status: DISCONTINUED | OUTPATIENT
Start: 2017-09-07 | End: 2017-09-10 | Stop reason: HOSPADM

## 2017-09-07 RX ORDER — PANTOPRAZOLE SODIUM 40 MG/1
40 TABLET, DELAYED RELEASE ORAL DAILY
Status: DISCONTINUED | OUTPATIENT
Start: 2017-09-07 | End: 2017-09-07

## 2017-09-07 RX ORDER — NICOTINE POLACRILEX 4 MG
15 LOZENGE BUCCAL PRN
Status: DISCONTINUED | OUTPATIENT
Start: 2017-09-07 | End: 2017-09-10 | Stop reason: HOSPADM

## 2017-09-07 RX ORDER — ISOSORBIDE MONONITRATE 30 MG/1
30 TABLET, EXTENDED RELEASE ORAL DAILY
Status: DISCONTINUED | OUTPATIENT
Start: 2017-09-07 | End: 2017-09-07

## 2017-09-07 RX ORDER — SODIUM CHLORIDE 0.9 % (FLUSH) 0.9 %
3 SYRINGE (ML) INJECTION EVERY 8 HOURS
Status: DISCONTINUED | OUTPATIENT
Start: 2017-09-07 | End: 2017-09-07 | Stop reason: HOSPADM

## 2017-09-07 RX ORDER — ALBUTEROL SULFATE 2.5 MG/3ML
2.5 SOLUTION RESPIRATORY (INHALATION)
Status: DISCONTINUED | OUTPATIENT
Start: 2017-09-07 | End: 2017-09-10 | Stop reason: HOSPADM

## 2017-09-07 RX ORDER — SODIUM CHLORIDE 9 MG/ML
250 INJECTION, SOLUTION INTRAVENOUS ONCE
Status: DISCONTINUED | OUTPATIENT
Start: 2017-09-07 | End: 2017-09-10 | Stop reason: HOSPADM

## 2017-09-07 RX ORDER — DOCUSATE SODIUM 100 MG/1
100 CAPSULE, LIQUID FILLED ORAL 2 TIMES DAILY PRN
Status: DISCONTINUED | OUTPATIENT
Start: 2017-09-07 | End: 2017-09-10 | Stop reason: HOSPADM

## 2017-09-07 RX ORDER — IPRATROPIUM BROMIDE AND ALBUTEROL SULFATE 2.5; .5 MG/3ML; MG/3ML
3 SOLUTION RESPIRATORY (INHALATION) 3 TIMES DAILY
Status: DISCONTINUED | OUTPATIENT
Start: 2017-09-07 | End: 2017-09-10 | Stop reason: HOSPADM

## 2017-09-07 RX ORDER — ACETAMINOPHEN 325 MG/1
650 TABLET ORAL EVERY 4 HOURS PRN
Status: DISCONTINUED | OUTPATIENT
Start: 2017-09-07 | End: 2017-09-07 | Stop reason: ALTCHOICE

## 2017-09-07 RX ORDER — 0.9 % SODIUM CHLORIDE 0.9 %
1000 INTRAVENOUS SOLUTION INTRAVENOUS ONCE
Status: COMPLETED | OUTPATIENT
Start: 2017-09-07 | End: 2017-09-07

## 2017-09-07 RX ORDER — ONDANSETRON 2 MG/ML
4 INJECTION INTRAMUSCULAR; INTRAVENOUS EVERY 6 HOURS PRN
Status: DISCONTINUED | OUTPATIENT
Start: 2017-09-07 | End: 2017-09-10 | Stop reason: HOSPADM

## 2017-09-07 RX ORDER — LACTULOSE 10 G/15ML
20 SOLUTION ORAL DAILY
Status: DISCONTINUED | OUTPATIENT
Start: 2017-09-07 | End: 2017-09-07

## 2017-09-07 RX ORDER — LORAZEPAM 2 MG/ML
0.5 INJECTION INTRAMUSCULAR EVERY 6 HOURS PRN
Status: DISCONTINUED | OUTPATIENT
Start: 2017-09-07 | End: 2017-09-10 | Stop reason: HOSPADM

## 2017-09-07 RX ORDER — ASPIRIN 81 MG/1
81 TABLET, CHEWABLE ORAL DAILY
Status: DISCONTINUED | OUTPATIENT
Start: 2017-09-07 | End: 2017-09-07

## 2017-09-07 RX ORDER — LEVOTHYROXINE SODIUM ANHYDROUS 100 UG/5ML
50 INJECTION, POWDER, LYOPHILIZED, FOR SOLUTION INTRAVENOUS
Status: DISCONTINUED | OUTPATIENT
Start: 2017-09-08 | End: 2017-09-10 | Stop reason: HOSPADM

## 2017-09-07 RX ORDER — METOPROLOL TARTRATE 5 MG/5ML
5 INJECTION INTRAVENOUS EVERY 6 HOURS
Status: DISCONTINUED | OUTPATIENT
Start: 2017-09-07 | End: 2017-09-09

## 2017-09-07 RX ORDER — SODIUM CHLORIDE 0.9 % (FLUSH) 0.9 %
10 SYRINGE (ML) INJECTION EVERY 12 HOURS
Status: DISCONTINUED | OUTPATIENT
Start: 2017-09-07 | End: 2017-09-08 | Stop reason: SDUPTHER

## 2017-09-07 RX ORDER — FERROUS SULFATE 325(65) MG
325 TABLET ORAL
Status: DISCONTINUED | OUTPATIENT
Start: 2017-09-07 | End: 2017-09-07

## 2017-09-07 RX ORDER — SIMETHICONE 80 MG
80 TABLET,CHEWABLE ORAL EVERY 6 HOURS PRN
Status: DISCONTINUED | OUTPATIENT
Start: 2017-09-07 | End: 2017-09-07

## 2017-09-07 RX ORDER — LIDOCAINE HYDROCHLORIDE 20 MG/ML
5 INJECTION, SOLUTION INFILTRATION; PERINEURAL ONCE
Status: DISCONTINUED | OUTPATIENT
Start: 2017-09-07 | End: 2017-09-10 | Stop reason: HOSPADM

## 2017-09-07 RX ORDER — FUROSEMIDE 10 MG/ML
20 INJECTION INTRAMUSCULAR; INTRAVENOUS 2 TIMES DAILY
Status: DISCONTINUED | OUTPATIENT
Start: 2017-09-07 | End: 2017-09-07

## 2017-09-07 RX ORDER — MORPHINE SULFATE 2 MG/ML
2 INJECTION, SOLUTION INTRAMUSCULAR; INTRAVENOUS
Status: DISCONTINUED | OUTPATIENT
Start: 2017-09-07 | End: 2017-09-10 | Stop reason: HOSPADM

## 2017-09-07 RX ORDER — DEXTROSE MONOHYDRATE 50 MG/ML
100 INJECTION, SOLUTION INTRAVENOUS PRN
Status: DISCONTINUED | OUTPATIENT
Start: 2017-09-07 | End: 2017-09-10 | Stop reason: HOSPADM

## 2017-09-07 RX ORDER — ACETAMINOPHEN 325 MG/1
650 TABLET ORAL EVERY 4 HOURS PRN
Status: DISCONTINUED | OUTPATIENT
Start: 2017-09-07 | End: 2017-09-07

## 2017-09-07 RX ORDER — FUROSEMIDE 10 MG/ML
20 INJECTION INTRAMUSCULAR; INTRAVENOUS DAILY
Status: DISCONTINUED | OUTPATIENT
Start: 2017-09-08 | End: 2017-09-10 | Stop reason: HOSPADM

## 2017-09-07 RX ORDER — MORPHINE SULFATE 100 MG/5ML
5 SOLUTION ORAL
Status: DISCONTINUED | OUTPATIENT
Start: 2017-09-07 | End: 2017-09-07

## 2017-09-07 RX ORDER — DEXTROSE MONOHYDRATE 25 G/50ML
25 INJECTION, SOLUTION INTRAVENOUS ONCE
Status: COMPLETED | OUTPATIENT
Start: 2017-09-07 | End: 2017-09-07

## 2017-09-07 RX ORDER — SODIUM CHLORIDE 0.9 % (FLUSH) 0.9 %
10 SYRINGE (ML) INJECTION EVERY 12 HOURS SCHEDULED
Status: DISCONTINUED | OUTPATIENT
Start: 2017-09-07 | End: 2017-09-10 | Stop reason: HOSPADM

## 2017-09-07 RX ORDER — LORAZEPAM 2 MG/ML
0.5 INJECTION INTRAMUSCULAR ONCE
Status: COMPLETED | OUTPATIENT
Start: 2017-09-07 | End: 2017-09-07

## 2017-09-07 RX ORDER — IPRATROPIUM BROMIDE AND ALBUTEROL SULFATE 2.5; .5 MG/3ML; MG/3ML
3 SOLUTION RESPIRATORY (INHALATION) 4 TIMES DAILY
Status: DISCONTINUED | OUTPATIENT
Start: 2017-09-07 | End: 2017-09-07

## 2017-09-07 RX ORDER — DEXTROSE, SODIUM CHLORIDE, AND POTASSIUM CHLORIDE 5; .45; .15 G/100ML; G/100ML; G/100ML
INJECTION INTRAVENOUS CONTINUOUS
Status: DISCONTINUED | OUTPATIENT
Start: 2017-09-07 | End: 2017-09-10 | Stop reason: HOSPADM

## 2017-09-07 RX ORDER — ASPIRIN 81 MG/1
324 TABLET, CHEWABLE ORAL ONCE
Status: DISCONTINUED | OUTPATIENT
Start: 2017-09-07 | End: 2017-09-07 | Stop reason: HOSPADM

## 2017-09-07 RX ORDER — FUROSEMIDE 20 MG/1
20 TABLET ORAL DAILY
Status: DISCONTINUED | OUTPATIENT
Start: 2017-09-07 | End: 2017-09-07

## 2017-09-07 RX ORDER — GABAPENTIN 400 MG/1
400 CAPSULE ORAL 2 TIMES DAILY
Status: DISCONTINUED | OUTPATIENT
Start: 2017-09-07 | End: 2017-09-07

## 2017-09-07 RX ADMIN — ENOXAPARIN SODIUM 40 MG: 40 INJECTION SUBCUTANEOUS at 12:10

## 2017-09-07 RX ADMIN — SODIUM CHLORIDE 1000 ML: 9 INJECTION, SOLUTION INTRAVENOUS at 01:06

## 2017-09-07 RX ADMIN — HYPROMELLOSE 2 DROP: 4 SOLUTION/ DROPS OPHTHALMIC at 23:40

## 2017-09-07 RX ADMIN — IPRATROPIUM BROMIDE AND ALBUTEROL SULFATE 3 ML: 2.5; .5 SOLUTION RESPIRATORY (INHALATION) at 19:18

## 2017-09-07 RX ADMIN — HYPROMELLOSE 2 DROP: 4 SOLUTION/ DROPS OPHTHALMIC at 16:04

## 2017-09-07 RX ADMIN — POTASSIUM CHLORIDE, DEXTROSE MONOHYDRATE AND SODIUM CHLORIDE 75 ML/HR: 150; 5; 450 INJECTION, SOLUTION INTRAVENOUS at 03:55

## 2017-09-07 RX ADMIN — SODIUM CHLORIDE, PRESERVATIVE FREE 10 ML: 5 INJECTION INTRAVENOUS at 23:40

## 2017-09-07 RX ADMIN — IPRATROPIUM BROMIDE AND ALBUTEROL SULFATE 3 ML: 2.5; .5 SOLUTION RESPIRATORY (INHALATION) at 13:14

## 2017-09-07 RX ADMIN — AZITHROMYCIN MONOHYDRATE 500 MG: 500 INJECTION, POWDER, LYOPHILIZED, FOR SOLUTION INTRAVENOUS at 01:44

## 2017-09-07 RX ADMIN — METOPROLOL TARTRATE 5 MG: 5 INJECTION INTRAVENOUS at 16:04

## 2017-09-07 RX ADMIN — DEXTROSE MONOHYDRATE 25 G: 25 INJECTION, SOLUTION INTRAVENOUS at 01:05

## 2017-09-07 RX ADMIN — IPRATROPIUM BROMIDE AND ALBUTEROL SULFATE 3 ML: .5; 3 SOLUTION RESPIRATORY (INHALATION) at 07:29

## 2017-09-07 RX ADMIN — POTASSIUM CHLORIDE, DEXTROSE MONOHYDRATE AND SODIUM CHLORIDE: 150; 5; 450 INJECTION, SOLUTION INTRAVENOUS at 18:48

## 2017-09-07 RX ADMIN — SODIUM CHLORIDE, PRESERVATIVE FREE 3 ML: 5 INJECTION INTRAVENOUS at 01:45

## 2017-09-07 RX ADMIN — LORAZEPAM 0.5 MG: 2 INJECTION INTRAMUSCULAR; INTRAVENOUS at 02:34

## 2017-09-07 RX ADMIN — CEFTRIAXONE SODIUM 1 G: 1 INJECTION, POWDER, FOR SOLUTION INTRAMUSCULAR; INTRAVENOUS at 01:06

## 2017-09-07 RX ADMIN — SODIUM CHLORIDE, PRESERVATIVE FREE 10 ML: 5 INJECTION INTRAVENOUS at 12:15

## 2017-09-07 RX ADMIN — FUROSEMIDE 20 MG: 10 INJECTION, SOLUTION INTRAVENOUS at 12:10

## 2017-09-08 LAB
AMMONIA: 22 UMOL/L (ref 11–51)
ANION GAP SERPL CALCULATED.3IONS-SCNC: 11 MEQ/L (ref 7–13)
BASE EXCESS ARTERIAL: 13 (ref -3–3)
BUN BLDV-MCNC: 29 MG/DL (ref 8–23)
CALCIUM SERPL-MCNC: 10.3 MG/DL (ref 8.6–10.2)
CHLORIDE BLD-SCNC: 101 MEQ/L (ref 98–107)
CK MB: 1.5 NG/ML (ref 0–3.8)
CO2: 34 MEQ/L (ref 22–29)
CREAT SERPL-MCNC: 1.34 MG/DL (ref 0.5–0.9)
CREATINE KINASE-MB INDEX: 7.5 % (ref 0–3.5)
GFR AFRICAN AMERICAN: 46.1
GFR NON-AFRICAN AMERICAN: 38.1
GLUCOSE BLD-MCNC: 100 MG/DL (ref 60–115)
GLUCOSE BLD-MCNC: 101 MG/DL (ref 60–115)
GLUCOSE BLD-MCNC: 105 MG/DL (ref 60–115)
GLUCOSE BLD-MCNC: 107 MG/DL (ref 60–115)
GLUCOSE BLD-MCNC: 108 MG/DL (ref 60–115)
GLUCOSE BLD-MCNC: 108 MG/DL (ref 60–115)
GLUCOSE BLD-MCNC: 109 MG/DL (ref 74–109)
HCO3 ARTERIAL: 38.4 MMOL/L (ref 21–29)
HCT VFR BLD CALC: 25.1 % (ref 37–47)
HEMOGLOBIN: 8 G/DL (ref 12–16)
LACTATE: 0.35 MMOL/L (ref 0.4–2)
MAGNESIUM: 2.5 MG/DL (ref 1.7–2.3)
MCH RBC QN AUTO: 30.1 PG (ref 27–31.3)
MCHC RBC AUTO-ENTMCNC: 32 % (ref 33–37)
MCV RBC AUTO: 93.9 FL (ref 82–100)
O2 SAT, ARTERIAL: 99 % (ref 93–100)
PCO2 ARTERIAL: 69 MM HG (ref 35–45)
PDW BLD-RTO: 16.2 % (ref 11.5–14.5)
PERFORMED ON: ABNORMAL
PERFORMED ON: NORMAL
PH ARTERIAL: 7.35 (ref 7.35–7.45)
PLATELET # BLD: 303 K/UL (ref 130–400)
PO2 ARTERIAL: 159 MM HG (ref 75–108)
POC FIO2: 50
POC SAMPLE TYPE: ABNORMAL
POTASSIUM SERPL-SCNC: 4.3 MEQ/L (ref 3.5–5.1)
PRO-BNP: 7802 PG/ML
RBC # BLD: 2.67 M/UL (ref 4.2–5.4)
SODIUM BLD-SCNC: 146 MEQ/L (ref 132–144)
TCO2 ARTERIAL: 41 (ref 22–29)
TOTAL CK: 20 U/L (ref 0–170)
TROPONIN: 0.11 NG/ML (ref 0–0.01)
WBC # BLD: 7.2 K/UL (ref 4.8–10.8)

## 2017-09-08 PROCEDURE — 2500000003 HC RX 250 WO HCPCS: Performed by: ANESTHESIOLOGY

## 2017-09-08 PROCEDURE — 99212 OFFICE O/P EST SF 10 MIN: CPT

## 2017-09-08 PROCEDURE — 36591 DRAW BLOOD OFF VENOUS DEVICE: CPT

## 2017-09-08 PROCEDURE — 95816 EEG AWAKE AND DROWSY: CPT

## 2017-09-08 PROCEDURE — 6360000002 HC RX W HCPCS: Performed by: INTERNAL MEDICINE

## 2017-09-08 PROCEDURE — 2580000003 HC RX 258: Performed by: EMERGENCY MEDICINE

## 2017-09-08 PROCEDURE — 82948 REAGENT STRIP/BLOOD GLUCOSE: CPT

## 2017-09-08 PROCEDURE — 82550 ASSAY OF CK (CPK): CPT

## 2017-09-08 PROCEDURE — 2580000003 HC RX 258: Performed by: INTERNAL MEDICINE

## 2017-09-08 PROCEDURE — 94640 AIRWAY INHALATION TREATMENT: CPT

## 2017-09-08 PROCEDURE — 99233 SBSQ HOSP IP/OBS HIGH 50: CPT | Performed by: INTERNAL MEDICINE

## 2017-09-08 PROCEDURE — 6370000000 HC RX 637 (ALT 250 FOR IP): Performed by: INTERNAL MEDICINE

## 2017-09-08 PROCEDURE — 84484 ASSAY OF TROPONIN QUANT: CPT

## 2017-09-08 PROCEDURE — 82803 BLOOD GASES ANY COMBINATION: CPT

## 2017-09-08 PROCEDURE — 82140 ASSAY OF AMMONIA: CPT

## 2017-09-08 PROCEDURE — 2700000000 HC OXYGEN THERAPY PER DAY

## 2017-09-08 PROCEDURE — 2500000003 HC RX 250 WO HCPCS: Performed by: INTERNAL MEDICINE

## 2017-09-08 PROCEDURE — 85027 COMPLETE CBC AUTOMATED: CPT

## 2017-09-08 PROCEDURE — 1210000000 HC MED SURG R&B

## 2017-09-08 PROCEDURE — 36415 COLL VENOUS BLD VENIPUNCTURE: CPT

## 2017-09-08 PROCEDURE — 83735 ASSAY OF MAGNESIUM: CPT

## 2017-09-08 PROCEDURE — 83880 ASSAY OF NATRIURETIC PEPTIDE: CPT

## 2017-09-08 PROCEDURE — 36600 WITHDRAWAL OF ARTERIAL BLOOD: CPT

## 2017-09-08 PROCEDURE — 80048 BASIC METABOLIC PNL TOTAL CA: CPT

## 2017-09-08 PROCEDURE — S0028 INJECTION, FAMOTIDINE, 20 MG: HCPCS | Performed by: ANESTHESIOLOGY

## 2017-09-08 PROCEDURE — 83605 ASSAY OF LACTIC ACID: CPT

## 2017-09-08 PROCEDURE — 82553 CREATINE MB FRACTION: CPT

## 2017-09-08 PROCEDURE — 36592 COLLECT BLOOD FROM PICC: CPT

## 2017-09-08 RX ADMIN — SODIUM CHLORIDE, PRESERVATIVE FREE 10 ML: 5 INJECTION INTRAVENOUS at 09:10

## 2017-09-08 RX ADMIN — IPRATROPIUM BROMIDE AND ALBUTEROL SULFATE 3 ML: 2.5; .5 SOLUTION RESPIRATORY (INHALATION) at 21:26

## 2017-09-08 RX ADMIN — CEFTRIAXONE 1 G: 1 INJECTION, POWDER, FOR SOLUTION INTRAMUSCULAR; INTRAVENOUS at 02:10

## 2017-09-08 RX ADMIN — SODIUM CHLORIDE, PRESERVATIVE FREE 10 ML: 5 INJECTION INTRAVENOUS at 00:02

## 2017-09-08 RX ADMIN — FAMOTIDINE 20 MG: 10 INJECTION, SOLUTION INTRAVENOUS at 12:44

## 2017-09-08 RX ADMIN — IPRATROPIUM BROMIDE AND ALBUTEROL SULFATE 3 ML: 2.5; .5 SOLUTION RESPIRATORY (INHALATION) at 07:23

## 2017-09-08 RX ADMIN — IPRATROPIUM BROMIDE AND ALBUTEROL SULFATE 3 ML: 2.5; .5 SOLUTION RESPIRATORY (INHALATION) at 13:37

## 2017-09-08 RX ADMIN — METOPROLOL TARTRATE 5 MG: 5 INJECTION INTRAVENOUS at 17:45

## 2017-09-08 RX ADMIN — LEVOTHYROXINE SODIUM ANHYDROUS 50 MCG: 100 INJECTION, POWDER, LYOPHILIZED, FOR SOLUTION INTRAVENOUS at 06:27

## 2017-09-08 RX ADMIN — METOPROLOL TARTRATE 5 MG: 5 INJECTION INTRAVENOUS at 12:44

## 2017-09-08 RX ADMIN — POTASSIUM CHLORIDE, DEXTROSE MONOHYDRATE AND SODIUM CHLORIDE: 150; 5; 450 INJECTION, SOLUTION INTRAVENOUS at 08:57

## 2017-09-08 RX ADMIN — POTASSIUM CHLORIDE, DEXTROSE MONOHYDRATE AND SODIUM CHLORIDE: 150; 5; 450 INJECTION, SOLUTION INTRAVENOUS at 22:15

## 2017-09-08 RX ADMIN — ENOXAPARIN SODIUM 100 MG: 100 INJECTION SUBCUTANEOUS at 08:56

## 2017-09-08 RX ADMIN — FUROSEMIDE 20 MG: 10 INJECTION, SOLUTION INTRAVENOUS at 08:57

## 2017-09-08 ASSESSMENT — ENCOUNTER SYMPTOMS
NAUSEA: 0
VOMITING: 0
SHORTNESS OF BREATH: 1

## 2017-09-08 ASSESSMENT — PAIN SCALES - GENERAL: PAINLEVEL_OUTOF10: 0

## 2017-09-09 VITALS
BODY MASS INDEX: 33.75 KG/M2 | DIASTOLIC BLOOD PRESSURE: 34 MMHG | SYSTOLIC BLOOD PRESSURE: 114 MMHG | WEIGHT: 210 LBS | RESPIRATION RATE: 20 BRPM | TEMPERATURE: 98.1 F | OXYGEN SATURATION: 100 % | HEIGHT: 66 IN | HEART RATE: 80 BPM

## 2017-09-09 PROBLEM — Z51.5 HOSPICE CARE PATIENT: Status: ACTIVE | Noted: 2017-09-09

## 2017-09-09 LAB
ANION GAP SERPL CALCULATED.3IONS-SCNC: 8 MEQ/L (ref 7–13)
BUN BLDV-MCNC: 23 MG/DL (ref 8–23)
CALCIUM SERPL-MCNC: 9.9 MG/DL (ref 8.6–10.2)
CHLORIDE BLD-SCNC: 104 MEQ/L (ref 98–107)
CO2: 34 MEQ/L (ref 22–29)
CREAT SERPL-MCNC: 1.25 MG/DL (ref 0.5–0.9)
GFR AFRICAN AMERICAN: 49.9
GFR NON-AFRICAN AMERICAN: 41.3
GLUCOSE BLD-MCNC: 105 MG/DL (ref 60–115)
GLUCOSE BLD-MCNC: 92 MG/DL (ref 60–115)
GLUCOSE BLD-MCNC: 92 MG/DL (ref 74–109)
GLUCOSE BLD-MCNC: 93 MG/DL (ref 60–115)
GLUCOSE BLD-MCNC: 93 MG/DL (ref 60–115)
GLUCOSE BLD-MCNC: 94 MG/DL (ref 60–115)
HCT VFR BLD CALC: 27.6 % (ref 37–47)
HEMOGLOBIN: 8.6 G/DL (ref 12–16)
MCH RBC QN AUTO: 29.5 PG (ref 27–31.3)
MCHC RBC AUTO-ENTMCNC: 31.2 % (ref 33–37)
MCV RBC AUTO: 94.6 FL (ref 82–100)
ORGANISM: ABNORMAL
ORGANISM: ABNORMAL
PDW BLD-RTO: 17.5 % (ref 11.5–14.5)
PERFORMED ON: NORMAL
PLATELET # BLD: 258 K/UL (ref 130–400)
POTASSIUM SERPL-SCNC: 4.4 MEQ/L (ref 3.5–5.1)
RBC # BLD: 2.92 M/UL (ref 4.2–5.4)
SODIUM BLD-SCNC: 146 MEQ/L (ref 132–144)
URINE CULTURE, ROUTINE: ABNORMAL
URINE CULTURE, ROUTINE: ABNORMAL
WBC # BLD: 6.1 K/UL (ref 4.8–10.8)

## 2017-09-09 PROCEDURE — 2500000003 HC RX 250 WO HCPCS: Performed by: ANESTHESIOLOGY

## 2017-09-09 PROCEDURE — 99232 SBSQ HOSP IP/OBS MODERATE 35: CPT | Performed by: INTERNAL MEDICINE

## 2017-09-09 PROCEDURE — 6360000002 HC RX W HCPCS: Performed by: INTERNAL MEDICINE

## 2017-09-09 PROCEDURE — 6370000000 HC RX 637 (ALT 250 FOR IP): Performed by: INTERNAL MEDICINE

## 2017-09-09 PROCEDURE — 2580000003 HC RX 258: Performed by: INTERNAL MEDICINE

## 2017-09-09 PROCEDURE — 80048 BASIC METABOLIC PNL TOTAL CA: CPT

## 2017-09-09 PROCEDURE — 2580000003 HC RX 258: Performed by: EMERGENCY MEDICINE

## 2017-09-09 PROCEDURE — 2500000003 HC RX 250 WO HCPCS: Performed by: INTERNAL MEDICINE

## 2017-09-09 PROCEDURE — 94640 AIRWAY INHALATION TREATMENT: CPT

## 2017-09-09 PROCEDURE — S0028 INJECTION, FAMOTIDINE, 20 MG: HCPCS | Performed by: ANESTHESIOLOGY

## 2017-09-09 PROCEDURE — 94660 CPAP INITIATION&MGMT: CPT

## 2017-09-09 PROCEDURE — 2700000000 HC OXYGEN THERAPY PER DAY

## 2017-09-09 PROCEDURE — 85027 COMPLETE CBC AUTOMATED: CPT

## 2017-09-09 RX ORDER — METOPROLOL TARTRATE 5 MG/5ML
5 INJECTION INTRAVENOUS EVERY 8 HOURS
Status: DISCONTINUED | OUTPATIENT
Start: 2017-09-09 | End: 2017-09-10 | Stop reason: HOSPADM

## 2017-09-09 RX ORDER — LORAZEPAM 0.5 MG/1
0.5 TABLET ORAL EVERY 4 HOURS PRN
Qty: 20 TABLET | Refills: 0 | Status: SHIPPED | OUTPATIENT
Start: 2017-09-09 | End: 2017-09-29

## 2017-09-09 RX ORDER — MORPHINE SULFATE 100 MG/5ML
5 SOLUTION ORAL
Qty: 30 ML | Refills: 0 | Status: SHIPPED | OUTPATIENT
Start: 2017-09-09 | End: 2017-09-19

## 2017-09-09 RX ADMIN — IPRATROPIUM BROMIDE AND ALBUTEROL SULFATE 3 ML: 2.5; .5 SOLUTION RESPIRATORY (INHALATION) at 12:02

## 2017-09-09 RX ADMIN — IPRATROPIUM BROMIDE AND ALBUTEROL SULFATE 3 ML: 2.5; .5 SOLUTION RESPIRATORY (INHALATION) at 07:55

## 2017-09-09 RX ADMIN — FAMOTIDINE 20 MG: 10 INJECTION, SOLUTION INTRAVENOUS at 09:30

## 2017-09-09 RX ADMIN — CEFTRIAXONE 1 G: 1 INJECTION, POWDER, FOR SOLUTION INTRAMUSCULAR; INTRAVENOUS at 02:29

## 2017-09-09 RX ADMIN — POTASSIUM CHLORIDE, DEXTROSE MONOHYDRATE AND SODIUM CHLORIDE: 150; 5; 450 INJECTION, SOLUTION INTRAVENOUS at 13:39

## 2017-09-09 RX ADMIN — LEVOTHYROXINE SODIUM ANHYDROUS 50 MCG: 100 INJECTION, POWDER, LYOPHILIZED, FOR SOLUTION INTRAVENOUS at 09:28

## 2017-09-09 RX ADMIN — FUROSEMIDE 20 MG: 10 INJECTION, SOLUTION INTRAVENOUS at 09:30

## 2017-09-09 RX ADMIN — HYPROMELLOSE 2 DROP: 4 SOLUTION/ DROPS OPHTHALMIC at 09:31

## 2017-09-09 RX ADMIN — IPRATROPIUM BROMIDE AND ALBUTEROL SULFATE 3 ML: 2.5; .5 SOLUTION RESPIRATORY (INHALATION) at 19:08

## 2017-09-09 RX ADMIN — SODIUM CHLORIDE, PRESERVATIVE FREE 10 ML: 5 INJECTION INTRAVENOUS at 09:31

## 2017-09-09 RX ADMIN — METOPROLOL TARTRATE 5 MG: 5 INJECTION INTRAVENOUS at 02:28

## 2017-09-09 RX ADMIN — METOPROLOL TARTRATE 5 MG: 5 INJECTION INTRAVENOUS at 09:29

## 2017-09-09 RX ADMIN — ENOXAPARIN SODIUM 100 MG: 100 INJECTION SUBCUTANEOUS at 09:30

## 2017-09-09 ASSESSMENT — ENCOUNTER SYMPTOMS
SHORTNESS OF BREATH: 0
VOMITING: 0
NAUSEA: 0

## 2017-09-11 LAB
EKG ATRIAL RATE: 322 BPM
EKG P AXIS: 172 DEGREES
EKG Q-T INTERVAL: 322 MS
EKG QRS DURATION: 74 MS
EKG QTC CALCULATION (BAZETT): 421 MS
EKG R AXIS: 10 DEGREES
EKG T AXIS: 247 DEGREES
EKG VENTRICULAR RATE: 103 BPM

## 2017-09-12 LAB
BLOOD CULTURE, ROUTINE: NORMAL
CULTURE, BLOOD 2: NORMAL

## 2017-09-19 LAB
ANION GAP SERPL CALCULATED.3IONS-SCNC: 13 MEQ/L (ref 7–13)
BILIRUBIN URINE: ABNORMAL
BLOOD, URINE: ABNORMAL
BUN BLDV-MCNC: 20 MG/DL (ref 8–23)
CALCIUM SERPL-MCNC: 9.9 MG/DL (ref 8.6–10.2)
CHLORIDE BLD-SCNC: 109 MEQ/L (ref 98–107)
CLARITY: CLEAR
CO2: 30 MEQ/L (ref 22–29)
COLOR: YELLOW
CREAT SERPL-MCNC: 1.52 MG/DL (ref 0.5–0.9)
GFR AFRICAN AMERICAN: 39.9
GFR NON-AFRICAN AMERICAN: 32.9
GLUCOSE BLD-MCNC: 68 MG/DL (ref 74–109)
GLUCOSE URINE: NEGATIVE MG/DL
HCT VFR BLD CALC: 31.9 % (ref 37–47)
HEMOGLOBIN: 9.6 G/DL (ref 12–16)
KETONES, URINE: 40 MG/DL
LEUKOCYTE ESTERASE, URINE: ABNORMAL
MCH RBC QN AUTO: 29.3 PG (ref 27–31.3)
MCHC RBC AUTO-ENTMCNC: 30.3 % (ref 33–37)
MCV RBC AUTO: 96.9 FL (ref 82–100)
NITRITE, URINE: NEGATIVE
PDW BLD-RTO: 18.8 % (ref 11.5–14.5)
PH UA: 5.5 (ref 5–9)
PLATELET # BLD: 249 K/UL (ref 130–400)
POTASSIUM SERPL-SCNC: 4 MEQ/L (ref 3.5–5.1)
PROTEIN UA: 30 MG/DL
RBC # BLD: 3.29 M/UL (ref 4.2–5.4)
SODIUM BLD-SCNC: 152 MEQ/L (ref 132–144)
SPECIFIC GRAVITY UA: 1.02 (ref 1–1.03)
UROBILINOGEN, URINE: 0.2 E.U./DL
WBC # BLD: 7.7 K/UL (ref 4.8–10.8)

## 2017-09-25 LAB
ANION GAP SERPL CALCULATED.3IONS-SCNC: 11 MEQ/L (ref 7–13)
BUN BLDV-MCNC: 26 MG/DL (ref 8–23)
CALCIUM SERPL-MCNC: 10 MG/DL (ref 8.6–10.2)
CHLORIDE BLD-SCNC: 110 MEQ/L (ref 98–107)
CO2: 31 MEQ/L (ref 22–29)
CREAT SERPL-MCNC: 1.55 MG/DL (ref 0.5–0.9)
GFR AFRICAN AMERICAN: 39
GFR NON-AFRICAN AMERICAN: 32.2
GLUCOSE BLD-MCNC: 68 MG/DL (ref 74–109)
POTASSIUM SERPL-SCNC: 4.1 MEQ/L (ref 3.5–5.1)
SODIUM BLD-SCNC: 152 MEQ/L (ref 132–144)

## 2017-10-01 LAB — ACETYLCHOLINE BLOCKING AB: 25 % (ref 0–26)

## 2017-10-17 ENCOUNTER — HOSPITAL ENCOUNTER (OUTPATIENT)
Dept: INTERVENTIONAL RADIOLOGY/VASCULAR | Age: 77
Discharge: HOME | End: 2017-10-17
Payer: COMMERCIAL

## 2017-10-17 DIAGNOSIS — Z45.2 PICC (PERIPHERALLY INSERTED CENTRAL CATHETER) REMOVAL: ICD-10-CM

## 2017-10-17 NOTE — PROGRESS NOTES
Patient here for PICC line removal. Once orders verified. Dressing on the left arm removed while patient remained on the ambulance cot. Some serous yellow fluid noted on dressing. Site clean without drainage or redness. Patient instructed to hold her  breath while dual lumen Midline removed intact, measuring 11 cm. Patient tolerated procedure well. Area cleansed with chlor-prep and 4 by 4 dressing with tegaderm applied to site. Nursing staff at [address] [place] [address] phoned report for return via ambulance.

## 2017-10-23 ENCOUNTER — APPOINTMENT (OUTPATIENT)
Dept: INTERVENTIONAL RADIOLOGY/VASCULAR | Age: 77
DRG: 802 | End: 2017-10-23
Payer: COMMERCIAL

## 2017-10-23 ENCOUNTER — HOSPITAL ENCOUNTER (INPATIENT)
Age: 77
LOS: 9 days | Discharge: NURSING, REHAB OR LONG-TERM CARE | DRG: 802 | End: 2017-11-01
Attending: HOSPITALIST | Admitting: HOSPITALIST
Payer: COMMERCIAL

## 2017-10-23 DIAGNOSIS — A49.02 MRSA (METHICILLIN RESISTANT STAPHYLOCOCCUS AUREUS) INFECTION: ICD-10-CM

## 2017-10-23 DIAGNOSIS — D64.9 ANEMIA, UNSPECIFIED TYPE: Primary | ICD-10-CM

## 2017-10-23 DIAGNOSIS — N17.9 ACUTE KIDNEY INJURY (HCC): ICD-10-CM

## 2017-10-23 DIAGNOSIS — E86.0 DEHYDRATION: ICD-10-CM

## 2017-10-23 DIAGNOSIS — L89.153 DECUBITUS ULCER OF SACRAL REGION, STAGE 3 (HCC): ICD-10-CM

## 2017-10-23 DIAGNOSIS — I82.622 ACUTE DEEP VEIN THROMBOSIS (DVT) OF OTHER VEIN OF LEFT UPPER EXTREMITY (HCC): ICD-10-CM

## 2017-10-23 DIAGNOSIS — I48.91 ATRIAL FIBRILLATION WITH RVR (HCC): ICD-10-CM

## 2017-10-23 PROBLEM — D59.9 ACUTE HEMOLYTIC ANEMIA (HCC): Status: ACTIVE | Noted: 2017-10-23

## 2017-10-23 LAB
ABO/RH: NORMAL
ALBUMIN SERPL-MCNC: 2.3 G/DL (ref 3.9–4.9)
ALP BLD-CCNC: 126 U/L (ref 40–130)
ALT SERPL-CCNC: 16 U/L (ref 0–33)
ANION GAP SERPL CALCULATED.3IONS-SCNC: 10 MEQ/L (ref 7–13)
ANTIBODY SCREEN: NORMAL
APTT: 57.8 SEC (ref 21.6–35.4)
AST SERPL-CCNC: 26 U/L (ref 0–35)
BILIRUB SERPL-MCNC: 0.4 MG/DL (ref 0–1.2)
BUN BLDV-MCNC: 38 MG/DL (ref 8–23)
C-REACTIVE PROTEIN, HIGH SENSITIVITY: 76.3 MG/L (ref 0–5)
CALCIUM SERPL-MCNC: 9 MG/DL (ref 8.6–10.2)
CHLORIDE BLD-SCNC: 102 MEQ/L (ref 98–107)
CO2: 28 MEQ/L (ref 22–29)
CREAT SERPL-MCNC: 1.86 MG/DL (ref 0.5–0.9)
GFR AFRICAN AMERICAN: 31.6
GFR NON-AFRICAN AMERICAN: 26.1
GLOBULIN: 3.1 G/DL (ref 2.3–3.5)
GLUCOSE BLD-MCNC: 117 MG/DL (ref 74–109)
GLUCOSE BLD-MCNC: 190 MG/DL (ref 60–115)
HCT VFR BLD CALC: 20.1 % (ref 37–47)
HEMOGLOBIN: 6.3 G/DL (ref 12–16)
INR BLD: 3.3
MCH RBC QN AUTO: 29.4 PG (ref 27–31.3)
MCHC RBC AUTO-ENTMCNC: 31.5 % (ref 33–37)
MCV RBC AUTO: 93.3 FL (ref 82–100)
PDW BLD-RTO: 17.8 % (ref 11.5–14.5)
PERFORMED ON: ABNORMAL
PLATELET # BLD: 349 K/UL (ref 130–400)
POTASSIUM SERPL-SCNC: 4.4 MEQ/L (ref 3.5–5.1)
PROTHROMBIN TIME: 35.1 SEC (ref 8.1–13.7)
RBC # BLD: 2.16 M/UL (ref 4.2–5.4)
SEDIMENTATION RATE, ERYTHROCYTE: 78 MM (ref 0–30)
SODIUM BLD-SCNC: 140 MEQ/L (ref 132–144)
TOTAL PROTEIN: 5.4 G/DL (ref 6.4–8.1)
WBC # BLD: 9.9 K/UL (ref 4.8–10.8)

## 2017-10-23 PROCEDURE — 93005 ELECTROCARDIOGRAM TRACING: CPT

## 2017-10-23 PROCEDURE — C1751 CATH, INF, PER/CENT/MIDLINE: HCPCS

## 2017-10-23 PROCEDURE — 77001 FLUOROGUIDE FOR VEIN DEVICE: CPT | Performed by: RADIOLOGY

## 2017-10-23 PROCEDURE — 86900 BLOOD TYPING SEROLOGIC ABO: CPT

## 2017-10-23 PROCEDURE — P9016 RBC LEUKOCYTES REDUCED: HCPCS

## 2017-10-23 PROCEDURE — 85652 RBC SED RATE AUTOMATED: CPT

## 2017-10-23 PROCEDURE — 94640 AIRWAY INHALATION TREATMENT: CPT

## 2017-10-23 PROCEDURE — 2580000003 HC RX 258: Performed by: INTERNAL MEDICINE

## 2017-10-23 PROCEDURE — 02HV33Z INSERTION OF INFUSION DEVICE INTO SUPERIOR VENA CAVA, PERCUTANEOUS APPROACH: ICD-10-PCS | Performed by: PHYSICIAN ASSISTANT

## 2017-10-23 PROCEDURE — 80053 COMPREHEN METABOLIC PANEL: CPT

## 2017-10-23 PROCEDURE — 2580000003 HC RX 258

## 2017-10-23 PROCEDURE — 94664 DEMO&/EVAL PT USE INHALER: CPT

## 2017-10-23 PROCEDURE — 36415 COLL VENOUS BLD VENIPUNCTURE: CPT

## 2017-10-23 PROCEDURE — 2580000003 HC RX 258: Performed by: HOSPITALIST

## 2017-10-23 PROCEDURE — 85610 PROTHROMBIN TIME: CPT

## 2017-10-23 PROCEDURE — 86880 COOMBS TEST DIRECT: CPT

## 2017-10-23 PROCEDURE — 36569 INSJ PICC 5 YR+ W/O IMAGING: CPT | Performed by: RADIOLOGY

## 2017-10-23 PROCEDURE — 86901 BLOOD TYPING SEROLOGIC RH(D): CPT

## 2017-10-23 PROCEDURE — 6370000000 HC RX 637 (ALT 250 FOR IP): Performed by: HOSPITALIST

## 2017-10-23 PROCEDURE — 76937 US GUIDE VASCULAR ACCESS: CPT | Performed by: RADIOLOGY

## 2017-10-23 PROCEDURE — 99285 EMERGENCY DEPT VISIT HI MDM: CPT

## 2017-10-23 PROCEDURE — 86923 COMPATIBILITY TEST ELECTRIC: CPT

## 2017-10-23 PROCEDURE — 1210000000 HC MED SURG R&B

## 2017-10-23 PROCEDURE — 85730 THROMBOPLASTIN TIME PARTIAL: CPT

## 2017-10-23 PROCEDURE — 2500000003 HC RX 250 WO HCPCS: Performed by: PHYSICIAN ASSISTANT

## 2017-10-23 PROCEDURE — 85027 COMPLETE CBC AUTOMATED: CPT

## 2017-10-23 PROCEDURE — 86850 RBC ANTIBODY SCREEN: CPT

## 2017-10-23 PROCEDURE — 6370000000 HC RX 637 (ALT 250 FOR IP): Performed by: INTERNAL MEDICINE

## 2017-10-23 PROCEDURE — 36430 TRANSFUSION BLD/BLD COMPNT: CPT

## 2017-10-23 PROCEDURE — 96374 THER/PROPH/DIAG INJ IV PUSH: CPT

## 2017-10-23 PROCEDURE — 6360000002 HC RX W HCPCS: Performed by: PHYSICIAN ASSISTANT

## 2017-10-23 PROCEDURE — 2580000003 HC RX 258: Performed by: PHYSICIAN ASSISTANT

## 2017-10-23 PROCEDURE — 86141 C-REACTIVE PROTEIN HS: CPT

## 2017-10-23 RX ORDER — ALBUTEROL SULFATE 2.5 MG/3ML
2.5 SOLUTION RESPIRATORY (INHALATION)
Status: DISCONTINUED | OUTPATIENT
Start: 2017-10-23 | End: 2017-10-25 | Stop reason: SDUPTHER

## 2017-10-23 RX ORDER — DOCUSATE SODIUM 100 MG/1
100 CAPSULE, LIQUID FILLED ORAL 2 TIMES DAILY PRN
Status: DISCONTINUED | OUTPATIENT
Start: 2017-10-23 | End: 2017-11-01 | Stop reason: HOSPADM

## 2017-10-23 RX ORDER — GABAPENTIN 400 MG/1
400 CAPSULE ORAL 2 TIMES DAILY
Status: DISCONTINUED | OUTPATIENT
Start: 2017-10-23 | End: 2017-10-28

## 2017-10-23 RX ORDER — 0.9 % SODIUM CHLORIDE 0.9 %
250 INTRAVENOUS SOLUTION INTRAVENOUS ONCE
Status: COMPLETED | OUTPATIENT
Start: 2017-10-23 | End: 2017-10-24

## 2017-10-23 RX ORDER — ASPIRIN 81 MG/1
81 TABLET, CHEWABLE ORAL DAILY
Status: DISCONTINUED | OUTPATIENT
Start: 2017-10-25 | End: 2017-11-01 | Stop reason: HOSPADM

## 2017-10-23 RX ORDER — SODIUM CHLORIDE 0.9 % (FLUSH) 0.9 %
10 SYRINGE (ML) INJECTION EVERY 12 HOURS SCHEDULED
Status: DISCONTINUED | OUTPATIENT
Start: 2017-10-23 | End: 2017-11-01 | Stop reason: HOSPADM

## 2017-10-23 RX ORDER — ACETAMINOPHEN 325 MG/1
650 TABLET ORAL EVERY 4 HOURS PRN
Status: DISCONTINUED | OUTPATIENT
Start: 2017-10-23 | End: 2017-10-23 | Stop reason: SDUPTHER

## 2017-10-23 RX ORDER — ATORVASTATIN CALCIUM 40 MG/1
40 TABLET, FILM COATED ORAL NIGHTLY
Status: DISCONTINUED | OUTPATIENT
Start: 2017-10-23 | End: 2017-11-01 | Stop reason: HOSPADM

## 2017-10-23 RX ORDER — ONDANSETRON 2 MG/ML
4 INJECTION INTRAMUSCULAR; INTRAVENOUS EVERY 6 HOURS PRN
Status: DISCONTINUED | OUTPATIENT
Start: 2017-10-23 | End: 2017-10-23 | Stop reason: SDUPTHER

## 2017-10-23 RX ORDER — METOPROLOL SUCCINATE 25 MG/1
25 TABLET, EXTENDED RELEASE ORAL DAILY
Status: DISCONTINUED | OUTPATIENT
Start: 2017-10-23 | End: 2017-10-24

## 2017-10-23 RX ORDER — SODIUM CHLORIDE 0.9 % (FLUSH) 0.9 %
10 SYRINGE (ML) INJECTION PRN
Status: DISCONTINUED | OUTPATIENT
Start: 2017-10-23 | End: 2017-11-01 | Stop reason: HOSPADM

## 2017-10-23 RX ORDER — SODIUM CHLORIDE 9 MG/ML
INJECTION, SOLUTION INTRAVENOUS CONTINUOUS
Status: DISCONTINUED | OUTPATIENT
Start: 2017-10-23 | End: 2017-10-25

## 2017-10-23 RX ORDER — LIDOCAINE HYDROCHLORIDE 20 MG/ML
5 INJECTION, SOLUTION INFILTRATION; PERINEURAL ONCE
Status: COMPLETED | OUTPATIENT
Start: 2017-10-23 | End: 2017-10-23

## 2017-10-23 RX ORDER — OXYBUTYNIN CHLORIDE 5 MG/1
5 TABLET ORAL NIGHTLY
Status: DISCONTINUED | OUTPATIENT
Start: 2017-10-23 | End: 2017-11-01 | Stop reason: HOSPADM

## 2017-10-23 RX ORDER — CETIRIZINE HYDROCHLORIDE 10 MG/1
5 TABLET ORAL DAILY
Status: DISCONTINUED | OUTPATIENT
Start: 2017-10-23 | End: 2017-11-01 | Stop reason: HOSPADM

## 2017-10-23 RX ORDER — FOLIC ACID 1 MG/1
1 TABLET ORAL DAILY
Status: DISCONTINUED | OUTPATIENT
Start: 2017-10-23 | End: 2017-11-01 | Stop reason: HOSPADM

## 2017-10-23 RX ORDER — SODIUM CHLORIDE 9 MG/ML
INJECTION, SOLUTION INTRAVENOUS CONTINUOUS
Status: DISCONTINUED | OUTPATIENT
Start: 2017-10-23 | End: 2017-10-24

## 2017-10-23 RX ORDER — LEVOTHYROXINE SODIUM 0.2 MG/1
200 TABLET ORAL DAILY
Status: DISCONTINUED | OUTPATIENT
Start: 2017-10-23 | End: 2017-11-01 | Stop reason: HOSPADM

## 2017-10-23 RX ORDER — SODIUM CHLORIDE 9 MG/ML
INJECTION, SOLUTION INTRAVENOUS
Status: COMPLETED
Start: 2017-10-23 | End: 2017-10-24

## 2017-10-23 RX ORDER — DEXTROSE MONOHYDRATE 50 MG/ML
100 INJECTION, SOLUTION INTRAVENOUS PRN
Status: DISCONTINUED | OUTPATIENT
Start: 2017-10-23 | End: 2017-11-01 | Stop reason: HOSPADM

## 2017-10-23 RX ORDER — DEXTROSE MONOHYDRATE 25 G/50ML
12.5 INJECTION, SOLUTION INTRAVENOUS PRN
Status: DISCONTINUED | OUTPATIENT
Start: 2017-10-23 | End: 2017-11-01 | Stop reason: HOSPADM

## 2017-10-23 RX ORDER — DOCUSATE SODIUM 100 MG/1
100 CAPSULE, LIQUID FILLED ORAL 2 TIMES DAILY
Status: DISCONTINUED | OUTPATIENT
Start: 2017-10-23 | End: 2017-11-01 | Stop reason: HOSPADM

## 2017-10-23 RX ORDER — ACETAMINOPHEN 325 MG/1
650 TABLET ORAL EVERY 4 HOURS PRN
Status: DISCONTINUED | OUTPATIENT
Start: 2017-10-23 | End: 2017-10-25 | Stop reason: SDUPTHER

## 2017-10-23 RX ORDER — DIGOXIN 0.25 MG/ML
125 INJECTION INTRAMUSCULAR; INTRAVENOUS ONCE
Status: COMPLETED | OUTPATIENT
Start: 2017-10-23 | End: 2017-10-23

## 2017-10-23 RX ORDER — INSULIN GLARGINE 100 [IU]/ML
8 INJECTION, SOLUTION SUBCUTANEOUS NIGHTLY
Status: DISCONTINUED | OUTPATIENT
Start: 2017-10-23 | End: 2017-10-28

## 2017-10-23 RX ORDER — FUROSEMIDE 20 MG/1
20 TABLET ORAL DAILY
Status: DISCONTINUED | OUTPATIENT
Start: 2017-10-23 | End: 2017-10-24

## 2017-10-23 RX ORDER — LACTULOSE 10 G/15ML
20 SOLUTION ORAL DAILY
Status: DISCONTINUED | OUTPATIENT
Start: 2017-10-23 | End: 2017-11-01 | Stop reason: HOSPADM

## 2017-10-23 RX ORDER — SIMETHICONE 80 MG
80 TABLET,CHEWABLE ORAL EVERY 6 HOURS PRN
Status: DISCONTINUED | OUTPATIENT
Start: 2017-10-23 | End: 2017-11-01 | Stop reason: HOSPADM

## 2017-10-23 RX ORDER — NICOTINE POLACRILEX 4 MG
15 LOZENGE BUCCAL PRN
Status: DISCONTINUED | OUTPATIENT
Start: 2017-10-23 | End: 2017-11-01 | Stop reason: HOSPADM

## 2017-10-23 RX ORDER — PANTOPRAZOLE SODIUM 40 MG/1
40 TABLET, DELAYED RELEASE ORAL DAILY
Status: DISCONTINUED | OUTPATIENT
Start: 2017-10-23 | End: 2017-11-01 | Stop reason: HOSPADM

## 2017-10-23 RX ORDER — IPRATROPIUM BROMIDE AND ALBUTEROL SULFATE 2.5; .5 MG/3ML; MG/3ML
3 SOLUTION RESPIRATORY (INHALATION) 4 TIMES DAILY
Status: DISCONTINUED | OUTPATIENT
Start: 2017-10-23 | End: 2017-10-25 | Stop reason: SDUPTHER

## 2017-10-23 RX ORDER — ISOSORBIDE MONONITRATE 30 MG/1
30 TABLET, EXTENDED RELEASE ORAL DAILY
Status: DISCONTINUED | OUTPATIENT
Start: 2017-10-23 | End: 2017-11-01

## 2017-10-23 RX ORDER — FERROUS SULFATE 325(65) MG
325 TABLET ORAL
Status: DISCONTINUED | OUTPATIENT
Start: 2017-10-24 | End: 2017-10-26

## 2017-10-23 RX ORDER — ALLOPURINOL 100 MG/1
100 TABLET ORAL DAILY
Status: DISCONTINUED | OUTPATIENT
Start: 2017-10-23 | End: 2017-11-01 | Stop reason: HOSPADM

## 2017-10-23 RX ORDER — SODIUM CHLORIDE 9 MG/ML
250 INJECTION, SOLUTION INTRAVENOUS ONCE
Status: COMPLETED | OUTPATIENT
Start: 2017-10-23 | End: 2017-10-23

## 2017-10-23 RX ORDER — ONDANSETRON 2 MG/ML
4 INJECTION INTRAMUSCULAR; INTRAVENOUS EVERY 6 HOURS PRN
Status: DISCONTINUED | OUTPATIENT
Start: 2017-10-23 | End: 2017-11-01 | Stop reason: HOSPADM

## 2017-10-23 RX ADMIN — SODIUM CHLORIDE 250 ML: 9 INJECTION, SOLUTION INTRAVENOUS at 13:56

## 2017-10-23 RX ADMIN — SODIUM CHLORIDE, PRESERVATIVE FREE 10 ML: 5 INJECTION INTRAVENOUS at 22:03

## 2017-10-23 RX ADMIN — SODIUM CHLORIDE 250 ML: 9 INJECTION, SOLUTION INTRAVENOUS at 23:31

## 2017-10-23 RX ADMIN — Medication 250 ML: at 23:31

## 2017-10-23 RX ADMIN — INSULIN GLARGINE 8 UNITS: 100 INJECTION, SOLUTION SUBCUTANEOUS at 21:56

## 2017-10-23 RX ADMIN — SODIUM CHLORIDE: 9 INJECTION, SOLUTION INTRAVENOUS at 18:27

## 2017-10-23 RX ADMIN — DIGOXIN 125 MCG: 0.25 INJECTION INTRAMUSCULAR; INTRAVENOUS at 15:11

## 2017-10-23 RX ADMIN — IPRATROPIUM BROMIDE AND ALBUTEROL SULFATE 3 ML: .5; 3 SOLUTION RESPIRATORY (INHALATION) at 20:54

## 2017-10-23 RX ADMIN — ATORVASTATIN CALCIUM 40 MG: 40 TABLET, FILM COATED ORAL at 20:35

## 2017-10-23 RX ADMIN — LIDOCAINE HYDROCHLORIDE 5 ML: 20 INJECTION, SOLUTION INFILTRATION; PERINEURAL at 13:41

## 2017-10-23 RX ADMIN — OXYBUTYNIN CHLORIDE 5 MG: 5 TABLET ORAL at 20:35

## 2017-10-23 RX ADMIN — GABAPENTIN 400 MG: 400 CAPSULE ORAL at 20:35

## 2017-10-23 RX ADMIN — HYPROMELLOSE 2 DROP: 4 SOLUTION/ DROPS OPHTHALMIC at 21:37

## 2017-10-23 RX ADMIN — DOCUSATE SODIUM 100 MG: 100 CAPSULE, LIQUID FILLED ORAL at 20:35

## 2017-10-23 ASSESSMENT — ENCOUNTER SYMPTOMS
RHINORRHEA: 0
COLOR CHANGE: 0
SHORTNESS OF BREATH: 0
SORE THROAT: 0
CONSTIPATION: 0
ABDOMINAL PAIN: 0
ABDOMINAL DISTENTION: 0
EYE DISCHARGE: 0

## 2017-10-23 ASSESSMENT — PAIN DESCRIPTION - LOCATION: LOCATION: BUTTOCKS

## 2017-10-23 ASSESSMENT — PAIN DESCRIPTION - PAIN TYPE: TYPE: CHRONIC PAIN

## 2017-10-23 ASSESSMENT — PAIN SCALES - GENERAL
PAINLEVEL_OUTOF10: 9
PAINLEVEL_OUTOF10: 0

## 2017-10-23 NOTE — ED PROVIDER NOTES
activity change and appetite change. HENT: Negative for congestion, ear discharge, ear pain, nosebleeds, rhinorrhea and sore throat. Eyes: Negative for discharge. Respiratory: Negative for shortness of breath. Cardiovascular: Negative for chest pain, palpitations and leg swelling. Gastrointestinal: Negative for abdominal distention, abdominal pain and constipation. Genitourinary: Negative for difficulty urinating and dysuria. Musculoskeletal: Negative for arthralgias. Skin: Negative for color change, pallor, rash and wound. Neurological: Negative for dizziness, tremors, syncope, weakness, numbness and headaches. Psychiatric/Behavioral: Negative for agitation and confusion. Except as noted above the remainder of the review of systems was reviewed and negative.        PAST MEDICAL HISTORY     Past Medical History:   Diagnosis Date    CAD (coronary artery disease)     [facility]     Cerebral artery occlusion with cerebral infarction ([facility]     multiple TIAs in past    CHF (congestive heart failure) (HCC)     Chronic kidney disease (CKD)     COPD (chronic obstructive pulmonary disease) (HCC)     Diabetes mellitus ([facility]     Diastolic dysfunction     Endometrial cancer (HCC)     History of Clostridium difficile     History of TIAs     Lung disease     Pneumonia     Thyroid disease          SURGICAL HISTORY       Past Surgical History:   Procedure Laterality Date    APPENDECTOMY      CHOLECYSTECTOMY      ENDOMETRIAL BIOPSY      INCISION AND DRAINAGE Left 10/30/2017    EXCISIONAL DEBRIDEMENT OF NECROTIC WOUNDS ON SACRAL AREA AND LEFT FOREARM performed by Pradeep Kraus MD at [address] [place] [address]       Discharge Medication List as of 11/1/2017  7:17 PM      CONTINUE these medications which have NOT CHANGED    Details   LORazepam (ATIVAN) 0.5 MG tablet Take 0.5 mg by mouth every 4 hours as needed for AnxietyHistorical Med      bisacodyl (DULCOLAX) 10 MG suppository DAILY PRN, Until Discontinued, Historical Med      allopurinol (ZYLOPRIM) 100 MG tablet Take 100 mg by mouth dailyHistorical Med      acetaminophen (TYLENOL) 325 MG tablet Take 2 tablets by mouth every 4 hours as needed for Pain or Fever, Disp-120 tablet, R-0      aspirin 81 MG chewable tablet Take 1 tablet by mouth daily, Disp-30 tablet, R-3      lactulose (CHRONULAC) 10 GM/15ML solution Take 20 mLs by mouth daily      loratadine (CLARITIN) 10 MG tablet Take 10 mg by mouth daily      simethicone (MYLICON) 80 MG chewable tablet Take 80 mg by mouth every 6 hours as needed for Flatulence      atorvastatin (LIPITOR) 40 MG tablet Take 40 mg by mouth nightlyHistorical Med      ferrous sulfate 325 (65 Fe) MG tablet Take 325 mg by mouth daily (with breakfast)Historical Med      oxybutynin (DITROPAN) 5 MG tablet Take 5 mg by mouth nightlyHistorical Med      docusate sodium (COLACE) 100 MG capsule Take 100 mg by mouth 2 times daily as needed for ConstipationHistorical Med      magnesium oxide (MAG-OX) 400 (241.3 MG) MG TABS tablet Take 1 tablet by mouth daily, RPOO-16 tablet      folic acid (FOLVITE) 1 MG tablet Take 1 mg by mouth daily      insulin lispro (HUMALOG) 100 UNIT/ML injection vial Inject into the skin 3 times daily (before meals) Indications: sliding scale Historical Med      insulin glargine (LANTUS) 100 UNIT/ML injection vial Inject 8 Units into the skin nightly Historical Med      pantoprazole (PROTONIX) 20 MG tablet Take 40 mg by mouth daily             ALLERGIES     Review of patient's allergies indicates no known allergies.     FAMILY HISTORY       Family History   Problem Relation Age of Onset    Cancer Mother     Cancer Father           SOCIAL HISTORY       Social History     Social History    Marital status: Single     Spouse name: N/A    Number of children: N/A    Years of education: N/A     Social History Main Topics    Smoking status: Former Smoker    Smokeless tobacco: Never Used    Alcohol use No    Drug use: No    Sexual activity: Not Asked     Other Topics Concern    None     Social History Narrative    None       SCREENINGS    [place] Coma Scale  Eye Opening: To pain  Best Verbal Response: Confused  Best Motor Response: Localizes pain  Inder Coma Scale Score: 11        PHYSICAL EXAM    (up to 7 for level 4, 8 or more for level 5)     ED Triage Vitals   BP Temp Temp src Pulse Resp SpO2 Height Weight   -- -- -- -- -- -- -- --       Physical Exam   Constitutional: She is oriented to person, place, and time. She appears well-developed and well-nourished. HENT:   Head: Normocephalic. Eyes: Pupils are equal, round, and reactive to light. Neck: Neck supple. No JVD present. No tracheal deviation present. Cardiovascular: Normal rate. Pulmonary/Chest: Effort normal. No respiratory distress. She has no wheezes. She has no rales. She exhibits no tenderness. Abdominal: Soft. Bowel sounds are normal. She exhibits no distension and no mass. There is no tenderness. There is no rebound and no guarding. Genitourinary: Rectal exam shows guaiac negative stool. Genitourinary Comments: Rectal exam was completed with nurse Maria Del Rosario present , stool is light brown in color and is guaiac negative. Patient does have approximately 15 mm decubitus ulcer in the area of the coccyx approximate 3 mm deep. There is no active bleeding there is no discharge has a sterile dressing and from nursing facility. Musculoskeletal: Normal range of motion. She exhibits no edema or deformity. Neurological: She is alert and oriented to person, place, and time. Coordination normal.   Skin: Skin is warm and dry. Psychiatric: She has a normal mood and affect.        DIAGNOSTIC RESULTS     EKG: All EKG's are interpreted by the Emergency Department Physician who either signs or Co-signs this chart in the absence of a cardiologist.        RADIOLOGY:   Non-plain film images such as CT, Ultrasound and MRI are read by the SURGICAL CULTURE - Abnormal; Notable for the following:     Organism Staph aureus MRSA (*)     Culture Surgical   (*)     Value: Moderate growth  CONTACT PRECAUTIONS INDICATED  PBP2= POSITIVE      All other components within normal limits    Narrative:     ORDER#: [id number]                          ORDERED BY: Henrry Rush  SOURCE: Arm Left Forearm                   COLLECTED:  10/30/17 13:58  ANTIBIOTICS AT [facility].:                      RECEIVED :  10/30/17 15:18  CALL  [place]  4 tel. [phone],  MRSA results called to and read back by MONO Huertas, 10/31/2017 10:21, by [facility]   COMPREHENSIVE METABOLIC PANEL - Abnormal; Notable for the following:     Glucose 117 (*)     BUN 38 (*)     CREATININE 1.86 (*)     GFR Non- 26.1 (*)     GFR  31.6 (*)     Total Protein 5.4 (*)     Alb 2.3 (*)     All other components within normal limits   CBC - Abnormal; Notable for the following:     RBC 2.16 (*)     Hemoglobin 6.3 (*)     Hematocrit 20.1 (*)     MCHC 31.5 (*)     RDW 17.8 (*)     All other components within normal limits    Narrative:     Catarino Willams tel. [phone],  Hematology results called to and read back by Mikayla Kinney, 10/23/2017 15:18,  by [facility]   PROTIME-INR - Abnormal; Notable for the following:     Protime 35.1 (*)     All other components within normal limits   APTT - Abnormal; Notable for the following:     aPTT 57.8 (*)     All other components within normal limits   SEDIMENTATION RATE - Abnormal; Notable for the following:     Sed Rate 78 (*)     All other components within normal limits    Narrative:     Collection has been rescheduled by Tiffani Londono at 10/23/17 17:47. Reason: freya garnica picc, john perez to draw and send   HIGH SENSITIVITY CRP - Abnormal; Notable for the following:     CRP High Sensitivity 76.3 (*)     All other components within normal limits    Narrative:     Collection has been rescheduled by Tiffani Londono at 10/23/17 17:47.  Reason: freya garnica pichair, john perez to draw and send   BASIC METABOLIC PANEL - Abnormal; Notable for the following:     BUN 36 (*)     CREATININE 1.68 (*)     GFR Non- 29.3 (*)     GFR  35.5 (*)     Calcium 8.0 (*)     All other components within normal limits    Narrative:     Collection has been rescheduled by Esther Loyd at 10/24/17 04:35. Reason:   Patti villa has port or line   CBC WITH AUTO DIFFERENTIAL - Abnormal; Notable for the following:     RBC 2.71 (*)     Hemoglobin 8.1 (*)     Hematocrit 24.9 (*)     MCHC 32.5 (*)     RDW 16.9 (*)     Neutrophils # 9.2 (*)     Lymphocytes # 0.9 (*)     All other components within normal limits    Narrative:     Collection has been rescheduled by Esther Loyd at 10/24/17 04:35. Reason:   Patti villa has port or line   RETICULOCYTES - Abnormal; Notable for the following:     Retic Ct Pct 2.5 (*)     Hematocrit 24.6 (*)     All other components within normal limits    Narrative:     Collection has been rescheduled by Ethel Torres at 10/24/17 10:38. Reason:   Patti villa has port or line   URINALYSIS - Abnormal; Notable for the following:     Clarity, UA CLOUDY (*)     Blood, Urine TRACE (*)     Leukocyte Esterase, Urine LARGE (*)     All other components within normal limits   BASIC METABOLIC PANEL - Abnormal; Notable for the following:     Glucose 55 (*)     BUN 35 (*)     CREATININE 1.55 (*)     GFR Non- 32.2 (*)     GFR  39.0 (*)     Calcium 8.1 (*)     All other components within normal limits    Narrative:     Collection has been rescheduled by Esther Loyd at 10/25/17 04:33.  Reason:   Patti villa has port or line   CBC WITH AUTO DIFFERENTIAL - Abnormal; Notable for the following:     RBC 2.49 (*)     Hemoglobin 7.5 (*)     Hematocrit 23.3 (*)     MCHC 32.4 (*)     RDW 17.1 (*)     Neutrophils # 8.8 (*)     Lymphocytes # 0.8 (*)     Bands Relative 1 (*)     All other components within normal limits    Narrative:     Collection has been rescheduled by Esther Loyd at 10/25/17 (*)     All other components within normal limits    Narrative:     Collection has been rescheduled by Marti Fiore at 10/31/17 04:14. Reason:   Patti villa has port or line   COMPREHENSIVE METABOLIC PANEL - Abnormal; Notable for the following:     BUN 38 (*)     CREATININE 1.61 (*)     GFR Non- 30.8 (*)     GFR  37.3 (*)     Calcium 8.0 (*)     Total Protein 4.5 (*)     Alb 2.0 (*)     All other components within normal limits    Narrative:     Collection has been rescheduled by Marti Fiore at 10/31/17 04:14. Reason:   Patti villa has port or line   PROTIME-INR - Abnormal; Notable for the following:     Protime 15.4 (*)     All other components within normal limits    Narrative:     Collection has been rescheduled by Marti Fiore at 10/31/17 04:14.  Reason:   Patti villa has port or line   CBC WITH AUTO DIFFERENTIAL - Abnormal; Notable for the following:     RBC 3.21 (*)     Hemoglobin 9.8 (*)     Hematocrit 30.5 (*)     MCHC 32.2 (*)     RDW 17.0 (*)     Neutrophils # 7.5 (*)     Lymphocytes # 0.3 (*)     All other components within normal limits   BASIC METABOLIC PANEL - Abnormal; Notable for the following:     Glucose 62 (*)     BUN 37 (*)     CREATININE 1.69 (*)     GFR Non- 29.1 (*)     GFR  35.3 (*)     Calcium 8.0 (*)     All other components within normal limits   CBC WITH AUTO DIFFERENTIAL - Abnormal; Notable for the following:     RBC 3.18 (*)     Hemoglobin 9.8 (*)     Hematocrit 30.7 (*)     MCHC 32.0 (*)     RDW 17.2 (*)     Platelets 259 (*)     Neutrophils # 6.8 (*)     Monocytes # 0.9 (*)     All other components within normal limits   POCT GLUCOSE - Abnormal; Notable for the following:     POC Glucose 190 (*)     All other components within normal limits   POCT CAPILLARY - Abnormal; Notable for the following:     Protime 26.9 (*)     All other components within normal limits   POCT GLUCOSE - Abnormal; Notable for the following:     POC Glucose 148 (*)     All other components within normal limits   POCT GLUCOSE - Abnormal; Notable for the following:     POC Glucose 117 (*)     All other components within normal limits   POCT ARTERIAL - Abnormal; Notable for the following:     pCO2, Arterial 50 (*)     pO2, Arterial 80 (*)     O2 Sat, Arterial 95 (*)     TCO2, Arterial 30 (*)     All other components within normal limits   POCT GLUCOSE - Abnormal; Notable for the following:     POC Glucose 54 (*)     All other components within normal limits   POCT GLUCOSE - Abnormal; Notable for the following:     POC Glucose 55 (*)     All other components within normal limits   RESPIRATORY CULTURE   SODIUM, URINE, RANDOM   MAGNESIUM    Narrative:     Collection has been rescheduled by Pilar Bower at 10/24/17 04:35. Reason:   Patti  nt has port or line       Narrative:     Collection has been rescheduled by Laurel Feliciano at 10/24/17 10:38. Reason:   Patti  nt has port or line   MAGNESIUM    Narrative:     Collection has been rescheduled by Pilar Bower at 10/25/17 04:33. Reason:   Patti villa has port or line   CREATININE, RANDOM URINE   MAGNESIUM   MAGNESIUM   LEUKEMIA / LYMPHOMA PHENOTYPE    Narrative:     Bone Marrow: Rule out MDS   SURGICAL PATHOLOGY    Narrative:                                         [address]                                      [phone]  FINAL BONE MARROW REPORT  Patient Name:  Mike Davis           Accession No:  [id number]   Age Sex:   1940 76 Y / F          Location:      [facility] [id number]  Account No:    [de-identified]                  Collected:     10/27/2017  Med Rec No:    [id number]                   Received:      10/27/2017  Attend Phys:   Amari Mckeon                 Completed:     2017  Perform Phys:  Valarie Baxter          FINAL DIAGNOSIS:  A.   BONE MARROW BIOPSY-  SCANT BIOPSY, NORMOCELLULAR BONE MARROW, NO EVIDENCE M.D.  11/01/2017  Electronically signed out by               Page 1 of 1   MAGNESIUM   MAGNESIUM   MAGNESIUM    Narrative:     Collection has been rescheduled by [facility] at 10/30/17 05:30. Reason:   Patti villa has port or line   MAGNESIUM    Narrative:     Collection has been rescheduled by Chloe Obando at 10/31/17 04:14. Reason:   Patti villa has port or line   MAGNESIUM   POC INR   CREATININE, RANDOM URINE   BLOOD OCCULT STOOL SCREEN #1   BLOOD OCCULT STOOL SCREEN #1   CHROMOSOME ANALYSIS, BONE MARROW   POCT GLUCOSE   POCT GLUCOSE   POCT GLUCOSE   POCT GLUCOSE   POCT GLUCOSE   POCT GLUCOSE   POCT GLUCOSE   POCT GLUCOSE   POCT GLUCOSE   POCT GLUCOSE   POCT GLUCOSE   POCT GLUCOSE   POCT GLUCOSE   POCT GLUCOSE   POCT GLUCOSE   POCT GLUCOSE   POCT GLUCOSE   POCT GLUCOSE   POCT GLUCOSE   POCT GLUCOSE   POCT GLUCOSE   POCT GLUCOSE   POCT GLUCOSE   POCT GLUCOSE   POCT GLUCOSE   POCT GLUCOSE   POCT GLUCOSE   POCT GLUCOSE   POCT GLUCOSE   POCT GLUCOSE   POCT GLUCOSE   POCT GLUCOSE   POCT GLUCOSE   POCT GLUCOSE   POCT GLUCOSE   POCT GLUCOSE   POCT GLUCOSE   POCT GLUCOSE   POCT GLUCOSE   POCT GLUCOSE   POCT GLUCOSE   POCT GLUCOSE   POCT GLUCOSE   POCT GLUCOSE   POCT GLUCOSE   POCT GLUCOSE   POCT GLUCOSE   POCT GLUCOSE   POCT GLUCOSE   POCT GLUCOSE   POCT GLUCOSE   POCT GLUCOSE   POCT GLUCOSE   POCT GLUCOSE   POCT GLUCOSE   POCT GLUCOSE   POCT GLUCOSE   POCT GLUCOSE   POCT GLUCOSE   POCT GLUCOSE   POCT GLUCOSE   POCT GLUCOSE   POCT GLUCOSE   POCT GLUCOSE   POCT GLUCOSE   POCT GLUCOSE   POCT GLUCOSE   POCT GLUCOSE   POCT GLUCOSE   POCT GLUCOSE   POCT GLUCOSE   POCT GLUCOSE   POCT GLUCOSE   POCT GLUCOSE   TYPE AND SCREEN   PREPARE RBC (CROSSMATCH)   DIRECT ANTIGLOBULIN TEST   TYPE AND SCREEN   PREPARE RBC (CROSSMATCH)   TYPE AND SCREEN   PREPARE RBC (CROSSMATCH)       All other labs were within normal range or not returned as of this dictation.     EMERGENCY DEPARTMENT COURSE and DIFFERENTIAL DIAGNOSIS/MDM:   Vitals:    Vitals:    11/01/17 9533 11/01/17 0824 11/01/17 1215 11/01/17 1610   BP: (!) 130/90 (!) 158/128 (!) 83/40    Pulse: 95 82 73 78   Resp:  16 17 16   Temp:  98.1 °F (36.7 °C) 97.1 °F (36.2 °C)    TempSrc:  Axillary Oral    SpO2: 98% 90% 94% 98%   Weight:       Height:           ED Course      MDM  Number of Diagnoses or Management Options  Acute deep vein thrombosis (DVT) of other vein of left upper extremity ([facility] [address]):   Acute kidney injury ([facility] [address]): Anemia, unspecified type:   Atrial fibrillation with RVR [facility]):   Decubitus ulcer of sacral region, stage 3 [facility]):   Dehydration:   Diagnosis management comments: There was a long delay in patient's care since she has had no vascular access, she was sent down for a PICC line. Lungs were not drawn until PICC line was inserted and waiting H&H results for transfusion. Repeat CBC shows H&H hemoglobin of 6.3 and hematocrit of 20.1. Patient has no obvious signs of bleeding at this time she does not have any hematemesis she does not have any melena rectal exam is negative. Patient was started on 2 units of packed red blood cells, did speak with Dr. Oneida Cordon he will accept the admission. She does also have recent concerns of a DVT in her left upper extremity secondary to PICC line and she was removed off the Coumadin yesterday due to the drop in her H&H. I did discuss these issues with Dr. Dian Hernández, as she is not currently on any anticoagulation and also does present to the [facility] department in atrial fibrillation with RVR. Patient was given digoxin edema or department her rate did slow from 130s down to 112 bpm. Dr. shaver and will accept admission of this patient for further evaluation for anemia, acute kidney injury, dehydration, A. fib with RVR, decubitus ulcer, and recent diagnosis of DVT left upper extremity secondary to PICC line. CRITICAL CARE TIME   Total Critical Care time was 0 minutes, excluding separately reportable procedures.   There was a high probability of clinically

## 2017-10-24 LAB
AMORPHOUS: ABNORMAL
ANION GAP SERPL CALCULATED.3IONS-SCNC: 10 MEQ/L (ref 7–13)
ANISOCYTOSIS: ABNORMAL
ATYPICAL LYMPHOCYTE RELATIVE PERCENT: 2 %
BACTERIA: ABNORMAL /HPF
BASOPHILS ABSOLUTE: 0.1 K/UL (ref 0–0.2)
BASOPHILS RELATIVE PERCENT: 1 %
BILIRUBIN URINE: NEGATIVE
BLOOD, URINE: ABNORMAL
BUN BLDV-MCNC: 36 MG/DL (ref 8–23)
CA 125: 32 U/ML (ref 0–35)
CALCIUM SERPL-MCNC: 8 MG/DL (ref 8.6–10.2)
CHLORIDE BLD-SCNC: 104 MEQ/L (ref 98–107)
CLARITY: ABNORMAL
CO2: 26 MEQ/L (ref 22–29)
COLOR: YELLOW
CREAT SERPL-MCNC: 1.68 MG/DL (ref 0.5–0.9)
CREATININE URINE: 60 MG/DL
DAT POLYSPECIFIC: NORMAL
EOSINOPHILS ABSOLUTE: 0 K/UL (ref 0–0.7)
EOSINOPHILS RELATIVE PERCENT: 0.3 %
EPITHELIAL CELLS, UA: ABNORMAL /HPF
GFR AFRICAN AMERICAN: 35.5
GFR NON-AFRICAN AMERICAN: 29.3
GLUCOSE BLD-MCNC: 110 MG/DL (ref 60–115)
GLUCOSE BLD-MCNC: 117 MG/DL (ref 60–115)
GLUCOSE BLD-MCNC: 148 MG/DL (ref 60–115)
GLUCOSE BLD-MCNC: 90 MG/DL (ref 74–109)
GLUCOSE BLD-MCNC: 94 MG/DL (ref 60–115)
GLUCOSE URINE: NEGATIVE MG/DL
HCT VFR BLD CALC: 24.6 % (ref 37–47)
HCT VFR BLD CALC: 24.9 % (ref 37–47)
HEMOGLOBIN: 8.1 G/DL (ref 12–16)
HYPOCHROMIA: ABNORMAL
INR BLD: 2.3
KETONES, URINE: NEGATIVE MG/DL
LEUKOCYTE ESTERASE, URINE: ABNORMAL
LYMPHOCYTES ABSOLUTE: 0.9 K/UL (ref 1–4.8)
LYMPHOCYTES RELATIVE PERCENT: 6 %
MACROCYTES: ABNORMAL
MAGNESIUM: 2 MG/DL (ref 1.7–2.3)
MCH RBC QN AUTO: 29.8 PG (ref 27–31.3)
MCHC RBC AUTO-ENTMCNC: 32.5 % (ref 33–37)
MCV RBC AUTO: 91.9 FL (ref 82–100)
MICROCYTES: 0
MONOCYTES ABSOLUTE: 0.5 K/UL (ref 0.2–0.8)
MONOCYTES RELATIVE PERCENT: 4.6 %
MUCUS: PRESENT
NEUTROPHILS ABSOLUTE: 9.2 K/UL (ref 1.4–6.5)
NEUTROPHILS RELATIVE PERCENT: 86 %
NITRITE, URINE: NEGATIVE
NUCLEATED RED BLOOD CELLS: 5 /100 WBC
PDW BLD-RTO: 16.9 % (ref 11.5–14.5)
PERFORMED ON: ABNORMAL
PERFORMED ON: NORMAL
PERFORMED ON: NORMAL
PH UA: 5 (ref 5–9)
PLATELET # BLD: 302 K/UL (ref 130–400)
PLATELET SLIDE REVIEW: ADEQUATE
POC SAMPLE TYPE: ABNORMAL
POIKILOCYTES: 0
POLYCHROMASIA: ABNORMAL
POTASSIUM SERPL-SCNC: 4.3 MEQ/L (ref 3.5–5.1)
PROTEIN UA: NEGATIVE MG/DL
PROTHROMBIN TIME, POC: 26.9 SEC (ref 9.5–12.5)
RBC # BLD: 2.71 M/UL (ref 4.2–5.4)
RBC UA: ABNORMAL /HPF (ref 0–2)
RENAL EPITHELIAL, UA: ABNORMAL /HPF
RETICULOCYTE ABSOLUTE COUNT: 0.07 M/CUMM (ref 0.02–0.11)
RETICULOCYTE COUNT PCT: 2.5 % (ref 0.6–2.2)
SMUDGE CELLS: 0.9
SODIUM BLD-SCNC: 140 MEQ/L (ref 132–144)
SODIUM URINE: 24 MEQ/L
SPECIFIC GRAVITY UA: 1.01 (ref 1–1.03)
UROBILINOGEN, URINE: 0.2 E.U./DL
WBC # BLD: 10.7 K/UL (ref 4.8–10.8)
WBC UA: ABNORMAL /HPF (ref 0–5)

## 2017-10-24 PROCEDURE — 93010 ELECTROCARDIOGRAM REPORT: CPT | Performed by: INTERNAL MEDICINE

## 2017-10-24 PROCEDURE — 85046 RETICYTE/HGB CONCENTRATE: CPT

## 2017-10-24 PROCEDURE — 94640 AIRWAY INHALATION TREATMENT: CPT

## 2017-10-24 PROCEDURE — 80048 BASIC METABOLIC PNL TOTAL CA: CPT

## 2017-10-24 PROCEDURE — 6370000000 HC RX 637 (ALT 250 FOR IP): Performed by: INTERNAL MEDICINE

## 2017-10-24 PROCEDURE — 84300 ASSAY OF URINE SODIUM: CPT

## 2017-10-24 PROCEDURE — 86304 IMMUNOASSAY TUMOR CA 125: CPT

## 2017-10-24 PROCEDURE — 85025 COMPLETE CBC W/AUTO DIFF WBC: CPT

## 2017-10-24 PROCEDURE — 83735 ASSAY OF MAGNESIUM: CPT

## 2017-10-24 PROCEDURE — 94660 CPAP INITIATION&MGMT: CPT

## 2017-10-24 PROCEDURE — 6360000002 HC RX W HCPCS: Performed by: INTERNAL MEDICINE

## 2017-10-24 PROCEDURE — 99223 1ST HOSP IP/OBS HIGH 75: CPT | Performed by: INTERNAL MEDICINE

## 2017-10-24 PROCEDURE — 2580000003 HC RX 258: Performed by: INTERNAL MEDICINE

## 2017-10-24 PROCEDURE — 2580000003 HC RX 258: Performed by: HOSPITALIST

## 2017-10-24 PROCEDURE — 6370000000 HC RX 637 (ALT 250 FOR IP): Performed by: HOSPITALIST

## 2017-10-24 PROCEDURE — 82570 ASSAY OF URINE CREATININE: CPT

## 2017-10-24 PROCEDURE — 81001 URINALYSIS AUTO W/SCOPE: CPT

## 2017-10-24 PROCEDURE — 1210000000 HC MED SURG R&B

## 2017-10-24 RX ORDER — SODIUM PHOSPHATE, DIBASIC AND SODIUM PHOSPHATE, MONOBASIC 7; 19 G/133ML; G/133ML
1 ENEMA RECTAL DAILY PRN
COMMUNITY

## 2017-10-24 RX ORDER — METOPROLOL SUCCINATE 25 MG/1
25 TABLET, EXTENDED RELEASE ORAL 2 TIMES DAILY
Status: DISCONTINUED | OUTPATIENT
Start: 2017-10-24 | End: 2017-11-01

## 2017-10-24 RX ORDER — HYDROCODONE BITARTRATE AND ACETAMINOPHEN 5; 325 MG/1; MG/1
1 TABLET ORAL EVERY 6 HOURS PRN
COMMUNITY

## 2017-10-24 RX ORDER — HALOPERIDOL 0.5 MG/1
0.5 TABLET ORAL EVERY 4 HOURS PRN
COMMUNITY

## 2017-10-24 RX ORDER — BISACODYL 10 MG
10 SUPPOSITORY, RECTAL RECTAL DAILY PRN
COMMUNITY

## 2017-10-24 RX ORDER — HYOSCYAMINE SULFATE 0.12 MG/ML
LIQUID ORAL EVERY 4 HOURS PRN
COMMUNITY

## 2017-10-24 RX ORDER — SENNA AND DOCUSATE SODIUM 50; 8.6 MG/1; MG/1
1 TABLET, FILM COATED ORAL 2 TIMES DAILY
COMMUNITY

## 2017-10-24 RX ORDER — GUAIFENESIN/DEXTROMETHORPHAN 100-10MG/5
5 SYRUP ORAL EVERY 4 HOURS PRN
Status: DISCONTINUED | OUTPATIENT
Start: 2017-10-24 | End: 2017-11-01 | Stop reason: HOSPADM

## 2017-10-24 RX ORDER — PROMETHAZINE HYDROCHLORIDE 25 MG/1
25 TABLET ORAL EVERY 4 HOURS PRN
COMMUNITY

## 2017-10-24 RX ORDER — LORAZEPAM 0.5 MG/1
0.5 TABLET ORAL EVERY 4 HOURS PRN
COMMUNITY

## 2017-10-24 RX ORDER — MORPHINE SULFATE 10 MG/5ML
SOLUTION ORAL
COMMUNITY

## 2017-10-24 RX ADMIN — OXYBUTYNIN CHLORIDE 5 MG: 5 TABLET ORAL at 20:24

## 2017-10-24 RX ADMIN — Medication 400 MG: at 10:19

## 2017-10-24 RX ADMIN — IPRATROPIUM BROMIDE AND ALBUTEROL SULFATE 3 ML: .5; 3 SOLUTION RESPIRATORY (INHALATION) at 07:10

## 2017-10-24 RX ADMIN — LACTULOSE 13.3 G: 20 SOLUTION ORAL at 10:28

## 2017-10-24 RX ADMIN — GABAPENTIN 400 MG: 400 CAPSULE ORAL at 10:22

## 2017-10-24 RX ADMIN — Medication 10 ML: at 20:33

## 2017-10-24 RX ADMIN — METOPROLOL SUCCINATE 25 MG: 25 TABLET, FILM COATED, EXTENDED RELEASE ORAL at 10:14

## 2017-10-24 RX ADMIN — METOPROLOL SUCCINATE 25 MG: 25 TABLET, FILM COATED, EXTENDED RELEASE ORAL at 20:24

## 2017-10-24 RX ADMIN — GABAPENTIN 400 MG: 400 CAPSULE ORAL at 20:24

## 2017-10-24 RX ADMIN — CETIRIZINE HYDROCHLORIDE 5 MG: 10 TABLET, FILM COATED ORAL at 10:19

## 2017-10-24 RX ADMIN — SODIUM CHLORIDE: 9 INJECTION, SOLUTION INTRAVENOUS at 05:23

## 2017-10-24 RX ADMIN — IRON SUCROSE 200 MG: 20 INJECTION, SOLUTION INTRAVENOUS at 11:41

## 2017-10-24 RX ADMIN — HYPROMELLOSE 2 DROP: 4 SOLUTION/ DROPS OPHTHALMIC at 20:25

## 2017-10-24 RX ADMIN — IPRATROPIUM BROMIDE AND ALBUTEROL SULFATE 3 ML: .5; 3 SOLUTION RESPIRATORY (INHALATION) at 21:30

## 2017-10-24 RX ADMIN — FUROSEMIDE 20 MG: 20 TABLET ORAL at 10:13

## 2017-10-24 RX ADMIN — ATORVASTATIN CALCIUM 40 MG: 40 TABLET, FILM COATED ORAL at 20:24

## 2017-10-24 RX ADMIN — FOLIC ACID 1 MG: 1 TABLET ORAL at 10:14

## 2017-10-24 RX ADMIN — PANTOPRAZOLE SODIUM 40 MG: 40 TABLET, DELAYED RELEASE ORAL at 10:19

## 2017-10-24 RX ADMIN — ISOSORBIDE MONONITRATE 30 MG: 30 TABLET, EXTENDED RELEASE ORAL at 10:19

## 2017-10-24 RX ADMIN — IPRATROPIUM BROMIDE AND ALBUTEROL SULFATE 3 ML: .5; 3 SOLUTION RESPIRATORY (INHALATION) at 15:41

## 2017-10-24 RX ADMIN — FERROUS SULFATE TAB 325 MG (65 MG ELEMENTAL FE) 325 MG: 325 (65 FE) TAB at 10:13

## 2017-10-24 RX ADMIN — IPRATROPIUM BROMIDE AND ALBUTEROL SULFATE 3 ML: .5; 3 SOLUTION RESPIRATORY (INHALATION) at 11:27

## 2017-10-24 RX ADMIN — INSULIN GLARGINE 8 UNITS: 100 INJECTION, SOLUTION SUBCUTANEOUS at 22:52

## 2017-10-24 RX ADMIN — ALLOPURINOL 100 MG: 100 TABLET ORAL at 10:13

## 2017-10-24 RX ADMIN — LEVOTHYROXINE SODIUM 200 MCG: 200 TABLET ORAL at 05:23

## 2017-10-24 RX ADMIN — DOCUSATE SODIUM 100 MG: 100 CAPSULE, LIQUID FILLED ORAL at 10:13

## 2017-10-24 RX ADMIN — DOCUSATE SODIUM 100 MG: 100 CAPSULE, LIQUID FILLED ORAL at 20:24

## 2017-10-24 ASSESSMENT — ENCOUNTER SYMPTOMS
EYES NEGATIVE: 1
BACK PAIN: 1
SHORTNESS OF BREATH: 1
ALLERGIC/IMMUNOLOGIC NEGATIVE: 1
GASTROINTESTINAL NEGATIVE: 1

## 2017-10-24 ASSESSMENT — PAIN SCALES - GENERAL: PAINLEVEL_OUTOF10: 0

## 2017-10-24 NOTE — CONSULTS
Consults   Pulmonary Medicine  Consult Note      Reason for consultation: COPD, hypoxia and UDAY on BiPAP    Consulting physician: Dr. Garza See:      This is 53-year-old female who was presented with past medical history significant for DVT. Upper extremity, endometrial cancer, coronary artery disease, congestive heart failure, chronic kidney disease, diabetes mellitus, COPD, UDAY on BiPAP was admitted with complaint of vaginal discomfort, she was also feeling tired having increased shortness of breath she has no abdominal pain, nausea, vomiting or diarrhea. She has no chest pain or pleuritic pain. His very poor historian chart was reviewed for some detail. Patient has been coughing up bloody mucus and has a bloody noses, but she does not have any hemoptysis or epistaxis at this time. She has acute on chronic anemia with hemoglobin down to 6.3 on admission. Last hemoglobin 8.1      Past Medical History:        Diagnosis Date    CAD (coronary artery disease)     Cancer ([facility] [address]     Cerebral artery occlusion with cerebral infarction ([facility] [address]     multiple TIAs in past    CHF (congestive heart failure) (HCC)     Chronic kidney disease (CKD)     COPD (chronic obstructive pulmonary disease) (HCC)     Diabetes mellitus ([facility] [facility]     Diastolic dysfunction     Endometrial cancer ([facility] [address] [facility]     History of Clostridium difficile     History of TIAs     Lung disease     Pneumonia     Thyroid disease        Past Surgical History:        Procedure Laterality Date    APPENDECTOMY      CHOLECYSTECTOMY      ENDOMETRIAL BIOPSY         Social History:     reports that she has quit smoking. She has never used smokeless tobacco. She reports that she does not drink alcohol or use drugs. Family History:       Problem Relation Age of Onset   Emilie March Cancer Mother     Cancer Father        Allergies:  Review of patient's allergies indicates no known allergies.         MEDICATIONS during current hospitalization:

## 2017-10-24 NOTE — CONSULTS
injection 10 mL  10 mL Intravenous 2 times per day Twan Mccartney MD        sodium chloride flush 0.9 % injection 10 mL  10 mL Intravenous PRN Twan Mccartney MD        docusate sodium (COLACE) capsule 100 mg  100 mg Oral BID Twan Mccartney MD   100 mg at 10/23/17 2035    glucose (GLUTOSE) 40 % oral gel 15 g  15 g Oral PRN Twan Mccartney MD        dextrose 50 % solution 12.5 g  12.5 g Intravenous PRN Twan Mccartney MD        glucagon (rDNA) injection 1 mg  1 mg Intramuscular PRN Twan Mccartney MD        dextrose 5 % solution  100 mL/hr Intravenous PRN Twan Mccartney MD        pneumococcal 13-valent conjugate (PREVNAR) injection 0.5 mL  0.5 mL Intramuscular Once Twan Mccartney MD         Outpatient Prescriptions Marked as Taking for the 10/23/17 encounter [facility] Encounter)   Medication Sig Dispense Refill    LORazepam (ATIVAN) 0.5 MG tablet Take 0.5 mg by mouth every 4 hours as needed for Anxiety      bisacodyl (DULCOLAX) 10 MG suppository Place 10 mg rectally daily as needed for Constipation      Sodium Phosphates (FLEET) 7-19 GM/118ML Place 1 enema rectally daily as needed      haloperidol (HALDOL) 0.5 MG tablet Take 0.5 mg by mouth every 4 hours as needed      hyoscyamine (LEVSIN) 125 MCG/ML solution Take by mouth every 4 hours as needed      magnesium hydroxide (MILK OF MAGNESIA CONCENTRATE) 2400 MG/10ML SUSP Take 30 mLs by mouth daily as needed      morphine 10 MG/5ML solution Take by mouth every 2 hours as needed for Pain .  HYDROcodone-acetaminophen (NORCO) 5-325 MG per tablet Take 1 tablet by mouth every 6 hours as needed for Pain .       nystatin (MYCOSTATIN) 878776 UNIT/ML suspension Take 500,000 Units by mouth 4 times daily      promethazine (PHENERGAN) 25 MG tablet Take 25 mg by mouth every 4 hours as needed for Nausea      sennosides-docusate sodium (SENOKOT-S) 8.6-50 MG tablet Take 1 tablet by mouth 2 times daily      albuterol (PROVENTIL) (2.5 MG/3ML) 0.083% nebulizer solution Take 3 mLs by nebulization every 2 hours as needed for Wheezing 120 each 3    ipratropium-albuterol (DUONEB) 0.5-2.5 (3) MG/3ML SOLN nebulizer solution Inhale 3 mLs into the lungs 4 times daily 360 mL     gabapentin (NEURONTIN) 400 MG capsule Take 1 capsule by mouth 2 times daily 90 capsule 3    furosemide (LASIX) 20 MG tablet Take 20 mg by mouth daily      levothyroxine (SYNTHROID) 200 MCG tablet Take 200 mcg by mouth Daily      hypromellose (ARTIFICIAL TEARS) 0.4 % SOLN ophthalmic solution Place 2 drops into both eyes 2 times daily      diclofenac sodium 1 % GEL Apply 2 g topically 2 times daily as needed for Pain Apply to hands and knees as needed for pain      allopurinol (ZYLOPRIM) 100 MG tablet Take 100 mg by mouth daily      acetaminophen (TYLENOL) 325 MG tablet Take 2 tablets by mouth every 4 hours as needed for Pain or Fever 120 tablet 0    aspirin 81 MG chewable tablet Take 1 tablet by mouth daily 30 tablet 3    isosorbide mononitrate (IMDUR) 30 MG extended release tablet Take 1 tablet by mouth daily 30 tablet 3    metoprolol succinate (TOPROL XL) 25 MG extended release tablet Take 1 tablet by mouth daily 30 tablet 3    lactulose (CHRONULAC) 10 GM/15ML solution Take 20 mLs by mouth daily      loratadine (CLARITIN) 10 MG tablet Take 10 mg by mouth daily      simethicone (MYLICON) 80 MG chewable tablet Take 80 mg by mouth every 6 hours as needed for Flatulence       No Known Allergies     ROS all other systems are normal other than ones mentioned in HPI. PHYSICAL EXAMINATION:   VITAL SIGNS: BP (!) 103/39   Pulse 106   Temp 98.8 °F (37.1 °C) (Oral)   Resp 19   SpO2 94%       GENERAL: In no acute distress, well- nourished, well- developed, alert and oriented to person place and time. SKIN: Warm and dry, without jaundice, ecchymoses, or petechiae.   HEENT: Normocephalic, sclera anicteric, oral mucosa moist without lesion or exudate in the visible oral cavity or oropharynx, tongue mid-line with good mobility and no deviation with extension. NODES: No palpable adenopathy in the neck Levels I-V, bilateral   Supraclavicular fossae, axillary chains, or inguinal regions. LUNGS: Good inspiratory effort, no accessory muscle use, clear bilaterally, no focal wheeze, rales or rhonchi. CARDIAC: Regular rate and rhythm, without murmurs, rubs or gallops. ABDOMINAL: Normal bowel sounds present, soft, non-tender, no mass or  organomegaly.     LAB RESULTS:  Recent Results (from the past 24 hour(s))   Comprehensive Metabolic Panel    Collection Time: 10/23/17 11:45 AM   Result Value Ref Range    Sodium 140 132 - 144 mEq/L    Potassium 4.4 3.5 - 5.1 mEq/L    Chloride 102 98 - 107 mEq/L    CO2 28 22 - 29 mEq/L    Anion Gap 10 7 - 13 mEq/L    Glucose 117 (H) 74 - 109 mg/dL    BUN 38 (H) 8 - 23 mg/dL    CREATININE 1.86 (H) 0.50 - 0.90 mg/dL    GFR Non-African American 26.1 (L) >60    GFR  31.6 (L) >60    Calcium 9.0 8.6 - 10.2 mg/dL    Total Protein 5.4 (L) 6.4 - 8.1 g/dL    Alb 2.3 (L) 3.9 - 4.9 g/dL    Total Bilirubin 0.4 0.0 - 1.2 mg/dL    Alkaline Phosphatase 126 40 - 130 U/L    ALT 16 0 - 33 U/L    AST 26 0 - 35 U/L    Globulin 3.1 2.3 - 3.5 g/dL   CBC    Collection Time: 10/23/17 11:45 AM   Result Value Ref Range    WBC 9.9 4.8 - 10.8 K/uL    RBC 2.16 (L) 4.20 - 5.40 M/uL    Hemoglobin 6.3 (LL) 12.0 - 16.0 g/dL    Hematocrit 20.1 (LL) 37.0 - 47.0 %    MCV 93.3 82.0 - 100.0 fL    MCH 29.4 27.0 - 31.3 pg    MCHC 31.5 (L) 33.0 - 37.0 %    RDW 17.8 (H) 11.5 - 14.5 %    Platelets 617 226 - 929 K/uL   Protime-INR    Collection Time: 10/23/17 11:45 AM   Result Value Ref Range    Protime 35.1 (H) 8.1 - 13.7 sec    INR 3.3    APTT    Collection Time: 10/23/17 11:45 AM   Result Value Ref Range    aPTT 57.8 (H) 21.6 - 35.4 sec   TYPE AND SCREEN    Collection Time: 10/23/17 11:45 AM   Result Value Ref Range    ABO/Rh O POS     Antibody Screen NEG    PREPARE RBC (CROSSMATCH) Number of Units: 2, 2 FLUOROSCOPIC IMAGES SAVED TO PACS. 0SPOT FILM WAS EXPOSED. 60.6SECOND FLUOROSCOPY TIME. 6.51MGY CUMULATIVE X-RAY DOSE. ASSESSMENT AND PLAN  1. Anemia is due to multifactorial causes. Previous iron studies 8/2017 showed iron deficiency. Renal insufficiency is a contributing factor, Being on anticoagulation is another factor. 2. History of uterine cancer s/p radiation therapy 2017. Plan. Check, ERVIN, retic count, Ca 125. She would benfit from parenteral iron as well.      Electronically signed by Shey Dunlap MD on 10/24/2017 at 10:06 AM

## 2017-10-24 NOTE — H&P
chemoradiation, oncology is consulted and following as above    Diabetes type 2  - Insulin on sliding scale    UDAY  - Continue BiPAP    CAD  CHF    Dispo: Await consultant eval and clearance prior to D/C. Advised F/U with PCP 1 - 2 days of discharge. SUBJECTIVE  CHIEF COMPLAINT:  Abnormal labs at nursing home, patient anemic. HPI:  This is a 76 y.o. female who presents with past medical history of endometrial cancer, recent DVT of left upper extremity patient was on Coumadin at nursing facility. Patient also has CAD, CHF, chronic kidney disease stage III, diabetes type 2. Patient states she is tired, complains of vaginal discomfort, no chest pain, no heart palpitations, no dizziness. Patient is having some shortness of breath. Patient has no abdominal pain, no nausea vomiting or diarrhea. No black stools reported. Patient however is coughing up blood and has bloody nose per daughter. Information is obtained from daughter, because patient is a poor historian.     PAST MEDICAL HISTORY:    Past Medical History:   Diagnosis Date    CAD (coronary artery disease)     [facility] [address] [facility]     Cerebral artery occlusion with cerebral infarction ([facility] [address] [facility]     multiple TIAs in past    CHF (congestive heart failure) (HCC)     Chronic kidney disease (CKD)     COPD (chronic obstructive pulmonary disease) (HCC)     Diabetes mellitus ([facility] [address] [facility]     Diastolic dysfunction     Endometrial cancer ([facility] [address] [facility]     History of Clostridium difficile     History of TIAs     Lung disease     Pneumonia     Thyroid disease      PAST SURGICAL HISTORY:    Past Surgical History:   Procedure Laterality Date    APPENDECTOMY      CHOLECYSTECTOMY      ENDOMETRIAL BIOPSY       FAMILY HISTORY:    Family History   Problem Relation Age of Onset    Cancer Mother     Cancer Father      SOCIAL HISTORY:    Social History     Social History    Marital status: Single     Spouse name: N/A    Number of children: N/A    Years of education: N/A Occupational History    Not on file. Social History Main Topics    Smoking status: Former Smoker    Smokeless tobacco: Never Used    Alcohol use No    Drug use: No    Sexual activity: Not on file     Other Topics Concern    Not on file     Social History Narrative    No narrative on file     MEDICATIONS: reviewed    ALLERGIES: Review of patient's allergies indicates no known allergies. REVIEW OF SYSTEM:   ROS as noted in HPI, 12 point ROS reviewed and otherwise negative. OBJECTIVE  PHYSICAL EXAM: BP (!) 103/39   Pulse 106   Temp 98.8 °F (37.1 °C) (Oral)   Resp 19   SpO2 94%   CONSTITUTIONAL:  awake, alert, cooperative, no apparent distress, and appears stated age,pale  EYES:  Lids and lashes normal, pupils equal, round and reactive to light, extra ocular muscles intact, sclera clear, conjunctiva normal  LUNGS:  No increased work of breathing, good air exchange, decreased breath sounds bilaterally  CARDIOVASCULAR:  Heart irregular, irregular with rate at 103 bpm  ABDOMEN:  No scars, normal bowel sounds, soft, non-distended, non-tender, no masses palpated, no hepatosplenomegally  EXTREMITIES: Edema of the left upper extremity, trace edema bilateral lower extremities, no cyanosis no clubbing    DATA:     Diagnostic tests reviewed for today's visit:    Most recent labs and imaging results reviewed. VTE Prophylaxis:  SCD    Plan of care discussed with: patient    SIGNATURE: Zia Coleman PA-C  DATE: October 24, 2017  TIME: 9:10 AM     I have independently seen and examined this patient. I have reviewed and agree with the above documentation, assessment and plan.      Nina Reveles MD  10:25 AM  October 24, 2017

## 2017-10-24 NOTE — PROGRESS NOTES
Spoke with Miguel Hill to fax over a up to date medication list. Electronically signed by Oma Stern RN on 10/24/2017 at 12:36 AM

## 2017-10-24 NOTE — CONSULTS
[facility]. Nephrology  Consult Note           Reason for Consult:  Chong Carrera, ckd stage 3  Requesting Physician:  Dr. Becca Correa    Chief Complaint:  anemia  History Obtained From:  patient, electronic medical record    History of Present Ilness:    76y.o. year old female admitted with anemia. Denies bleeding. Has had transfusions before. Hematology seeing. Her cr is up to 1.7. Reviewed old labs. Cr 1.4 in 2016. 1.3 earlier in year. Past Medical History:        Diagnosis Date    CAD (coronary artery disease)     [facility] [address]     Cerebral artery occlusion with cerebral infarction ([facility] [address]     multiple TIAs in past    CHF (congestive heart failure) (HCC)     Chronic kidney disease (CKD)     COPD (chronic obstructive pulmonary disease) (HCC)     Diabetes mellitus ([facility] [address] [facility]     Diastolic dysfunction     Endometrial cancer (HCC)     History of Clostridium difficile     History of TIAs     Lung disease     Pneumonia     Thyroid disease        Past Surgical History:        Procedure Laterality Date    APPENDECTOMY      CHOLECYSTECTOMY      ENDOMETRIAL BIOPSY         Home Medications:    No current facility-administered medications on file prior to encounter.       Current Outpatient Prescriptions on File Prior to Encounter   Medication Sig Dispense Refill    albuterol (PROVENTIL) (2.5 MG/3ML) 0.083% nebulizer solution Take 3 mLs by nebulization every 2 hours as needed for Wheezing 120 each 3    ipratropium-albuterol (DUONEB) 0.5-2.5 (3) MG/3ML SOLN nebulizer solution Inhale 3 mLs into the lungs 4 times daily 360 mL     gabapentin (NEURONTIN) 400 MG capsule Take 1 capsule by mouth 2 times daily 90 capsule 3    furosemide (LASIX) 20 MG tablet Take 20 mg by mouth daily      levothyroxine (SYNTHROID) 200 MCG tablet Take 200 mcg by mouth Daily      hypromellose (ARTIFICIAL TEARS) 0.4 % SOLN ophthalmic solution Place 2 drops into both eyes 2 times daily      diclofenac sodium 1 % GEL Apply 2 g topically 2 times daily as needed for Pain Apply to hands and knees as needed for pain      allopurinol (ZYLOPRIM) 100 MG tablet Take 100 mg by mouth daily      acetaminophen (TYLENOL) 325 MG tablet Take 2 tablets by mouth every 4 hours as needed for Pain or Fever 120 tablet 0    aspirin 81 MG chewable tablet Take 1 tablet by mouth daily 30 tablet 3    isosorbide mononitrate (IMDUR) 30 MG extended release tablet Take 1 tablet by mouth daily 30 tablet 3    metoprolol succinate (TOPROL XL) 25 MG extended release tablet Take 1 tablet by mouth daily 30 tablet 3    lactulose (CHRONULAC) 10 GM/15ML solution Take 20 mLs by mouth daily      loratadine (CLARITIN) 10 MG tablet Take 10 mg by mouth daily      simethicone (MYLICON) 80 MG chewable tablet Take 80 mg by mouth every 6 hours as needed for Flatulence      atorvastatin (LIPITOR) 40 MG tablet Take 40 mg by mouth nightly      ferrous sulfate 325 (65 Fe) MG tablet Take 325 mg by mouth daily (with breakfast)      oxybutynin (DITROPAN) 5 MG tablet Take 5 mg by mouth nightly      docusate sodium (COLACE) 100 MG capsule Take 100 mg by mouth 2 times daily as needed for Constipation      magnesium oxide (MAG-OX) 400 (241.3 MG) MG TABS tablet Take 1 tablet by mouth daily 30 tablet     folic acid (FOLVITE) 1 MG tablet Take 1 mg by mouth daily      insulin lispro (HUMALOG) 100 UNIT/ML injection vial Inject into the skin 3 times daily (before meals) Indications: sliding scale       insulin glargine (LANTUS) 100 UNIT/ML injection vial Inject 8 Units into the skin nightly       pantoprazole (PROTONIX) 20 MG tablet Take 40 mg by mouth daily         Allergies:  Review of patient's allergies indicates no known allergies. Social History:    Social History     Social History    Marital status: Single     Spouse name: N/A    Number of children: N/A    Years of education: N/A     Occupational History    Not on file.      Social History Main Topics    Smoking status: Former tip of the catheter was placed in the superior vena cava. Single view, saved fluoroscopic image documentation of the final placement was performed. Sonographic image was recorded for the permanent record at the access site. The catheter was affixed to the skin in the usual fashion and the site dressed. There were no significant complications at the time of the procedure. Standard follow-up will have been performed. CONCLUSION SUCCESSFUL UNCOMPLICATED right UPPER EXTREMITY PICC INSTALLATION. X-RAY DOSE SUMMARY:    1 FLUOROSCOPIC IMAGES SAVED TO PACS. 0SPOT FILM WAS EXPOSED. 60.6SECOND FLUOROSCOPY TIME. 6.51MGY CUMULATIVE X-RAY DOSE. Assessment/  77 yo lady with CKD stage 3. Diabetic nephropathy. Mild BRIGIDA. On lasix at home. Has sig anemia. Being seen by hematology. Plan/  1- ivf. Hold lasix  2- check u/s.    3- last UA last month was ok. No rbc. Trace to 1+ protein. Repeat now    Thank you for the consultation. Please do not hesitate to call with questions.     Renetta Landryud

## 2017-10-24 NOTE — PROGRESS NOTES
[facility] EMERGENCY [facility] AT [place] Respiratory Therapy Evaluation   Current Order:  Duoneb QID & Alb Q2PRN  Home Regimen: yes   Ordering Physician: Becca Correa  Re-evaluation Date:  10/26     Diagnosis: GI Bleed    Patient Status: Stable     The following MDI Criteria must be met in order to convert aerosol to MDI with spacer. If unable to meet, MDI will be converted to aerosol:  []  Patient able to demonstrate the ability to use MDI effectively  []  Patient alert and cooperative  []  Patient able to take deep breath with 5-10 second hold  []  Medication(s) available in this delivery method   []  Peak flow greater than or equal to 200 ml/min            Current Order Substituted To  (same drug, same frequency)   Aerosol to MDI [] Albuterol Sulfate 0.083% unit dose by aerosol Albuterol Sulfate MDI 2 puffs by inhalation with spacer    [] Levalbuterol 1.25 mg unit dose by aerosol Levalbuterol MDI 2 puffs by inhalation with spacer    [] Levalbuterol 0.63 mg unit dose by aerosol Levalbuterol MDI 2 puffs by inhalation with spacer    [] Ipratropium Bromide 0.02% unit dose by aerosol Ipratropium Bromide MDI 2 puffs by inhalation with spacer    [] Duoneb (Ipratropium + Albuterol) unit dose by aerosol Ipratropium MDI + Albuterol MDI 2 puffs by inhalation w/spacer   MDI to Aerosol [] Albuterol Sulfate MDI Albuterol Sulfate 0.083% unit dose by aerosol    [] Levalbuterol MDI 2 puffs by inhalation Levalbuterol 1.25 mg unit dose by aerosol    [] Ipratropium Bromide MDI by inhalation Ipratropium Bromide 0.02% unit dose by aerosol    [] Combivent (Ipratropium + Albuterol) MDI by inhalation Duoneb (Ipratropium + Albuterol) unit dose by aerosol   Treatment Assessment [Frequency/Schedule]:  Change frequency to: _____no changes home freq________________________per Protocol, P&T, MEC      Points 0 1 2 3 4   Pulmonary Status  Non-Smoker  []   Smoking history   < 20 pack years  []   Smoking history  ?  20 pack years  []   Pulmonary Disorder  (acute or chronic)  [x] Severe or Chronic w/ Exacerbation  []     Surgical Status No [x]   Surgeries     General []   Surgery Lower []   Abdominal Thoracic or []   Upper Abdominal Thoracic with  PulmonaryDisorder  []     Chest X-ray Clear/Not  Ordered     [x]  Chronic Changes  Results Pending  []  Infiltrates, atelectasis, pleural effusion, or edema  []  Infiltrates in more than one lobe []  Infiltrate + Atelectasis, &/or pleural effusion  []    Respiratory Pattern Regular,  RR = 12-20 [x]  Increased,  RR = 21-25 []  COMBS, irregular,  or RR = 26-30 []  Decreased FEV1  or RR = 31-35 []  Severe SOB, use  of accessory muscles, or RR ? 35  []    Mental Status Alert, oriented,  Cooperative [x]  Confused but Follows commands []  Lethargic or unable to follow commands []  Obtunded  []  Comatose  []    Breath Sounds Clear to  auscultation  []  Decreased unilaterally or  in bases only []  Decreased  bilaterally  [x]  Crackles or intermittent wheezes []  Wheezes []    Cough Strong, Spontan., & nonproductive [x]  Strong,  spontaneous, &  productive []  Weak,  Nonproductive []  Weak, productive or  with wheezes []  No spontaneous  cough or may require suctioning []    Level of Activity Ambulatory []  Ambulatory w/ Assist  []  Non-ambulatory [x]  Paraplegic []  Quadriplegic []    Total    Score:____7___     Triage Score:___4_____      Tri       Triage:     1. (>20) Freq: Q3    2. (16-20) Freq: Q4   3. (11-15) Freq: QID & Albuterol Q2 PRN    4. (6-10) Freq: TID & Albuterol Q2 PRN    5. (0-5) Freq Q4prn

## 2017-10-24 NOTE — PROGRESS NOTES
This nurse replaced flores cath d/t current one falling out. 16f inserted without incidence.  Electronically signed by Soha Mills RN on 10/24/2017 at 4:02 PM

## 2017-10-25 ENCOUNTER — APPOINTMENT (OUTPATIENT)
Dept: ULTRASOUND IMAGING | Age: 77
DRG: 802 | End: 2017-10-25
Payer: COMMERCIAL

## 2017-10-25 ENCOUNTER — APPOINTMENT (OUTPATIENT)
Dept: GENERAL RADIOLOGY | Age: 77
DRG: 802 | End: 2017-10-25
Payer: COMMERCIAL

## 2017-10-25 PROBLEM — J96.21 ACUTE ON CHRONIC RESPIRATORY FAILURE WITH HYPOXIA AND HYPERCAPNIA (HCC): Status: ACTIVE | Noted: 2017-08-20

## 2017-10-25 LAB
ANION GAP SERPL CALCULATED.3IONS-SCNC: 10 MEQ/L (ref 7–13)
ANISOCYTOSIS: ABNORMAL
BANDED NEUTROPHILS RELATIVE PERCENT: 1 % (ref 5–11)
BASE EXCESS ARTERIAL: 3 (ref -3–3)
BASOPHILS ABSOLUTE: 0 K/UL (ref 0–0.2)
BASOPHILS RELATIVE PERCENT: 0.8 %
BUN BLDV-MCNC: 35 MG/DL (ref 8–23)
CALCIUM SERPL-MCNC: 8.1 MG/DL (ref 8.6–10.2)
CHLORIDE BLD-SCNC: 104 MEQ/L (ref 98–107)
CO2: 26 MEQ/L (ref 22–29)
CREAT SERPL-MCNC: 1.55 MG/DL (ref 0.5–0.9)
EOSINOPHILS ABSOLUTE: 0 K/UL (ref 0–0.7)
EOSINOPHILS RELATIVE PERCENT: 0.2 %
GFR AFRICAN AMERICAN: 39
GFR NON-AFRICAN AMERICAN: 32.2
GLUCOSE BLD-MCNC: 101 MG/DL (ref 60–115)
GLUCOSE BLD-MCNC: 54 MG/DL (ref 60–115)
GLUCOSE BLD-MCNC: 55 MG/DL (ref 74–109)
GLUCOSE BLD-MCNC: 71 MG/DL (ref 60–115)
GLUCOSE BLD-MCNC: 76 MG/DL (ref 60–115)
GLUCOSE BLD-MCNC: 95 MG/DL (ref 60–115)
HCO3 ARTERIAL: 28.5 MMOL/L (ref 21–29)
HCT VFR BLD CALC: 23.3 % (ref 37–47)
HEMOGLOBIN: 7.5 G/DL (ref 12–16)
HYPOCHROMIA: ABNORMAL
LACTATE: 0.5 MMOL/L (ref 0.4–2)
LYMPHOCYTES ABSOLUTE: 0.8 K/UL (ref 1–4.8)
LYMPHOCYTES RELATIVE PERCENT: 8 %
MACROCYTES: 0
MAGNESIUM: 2 MG/DL (ref 1.7–2.3)
MCH RBC QN AUTO: 30.3 PG (ref 27–31.3)
MCHC RBC AUTO-ENTMCNC: 32.4 % (ref 33–37)
MCV RBC AUTO: 93.4 FL (ref 82–100)
MICROCYTES: ABNORMAL
MONOCYTES ABSOLUTE: 0.8 K/UL (ref 0.2–0.8)
MONOCYTES RELATIVE PERCENT: 8.4 %
NEUTROPHILS ABSOLUTE: 8.8 K/UL (ref 1.4–6.5)
NEUTROPHILS RELATIVE PERCENT: 81 %
NUCLEATED RED BLOOD CELLS: 2 /100 WBC
O2 SAT, ARTERIAL: 95 % (ref 93–100)
PCO2 ARTERIAL: 50 MM HG (ref 35–45)
PDW BLD-RTO: 17.1 % (ref 11.5–14.5)
PERFORMED ON: ABNORMAL
PERFORMED ON: ABNORMAL
PERFORMED ON: NORMAL
PH ARTERIAL: 7.37 (ref 7.35–7.45)
PLATELET # BLD: 260 K/UL (ref 130–400)
PLATELET SLIDE REVIEW: ADEQUATE
PO2 ARTERIAL: 80 MM HG (ref 75–108)
POC FIO2: 30
POC SAMPLE TYPE: ABNORMAL
POIKILOCYTES: 0
POLYCHROMASIA: ABNORMAL
POTASSIUM SERPL-SCNC: 4.3 MEQ/L (ref 3.5–5.1)
PROMYELOCYTES PERCENT: 1 %
RBC # BLD: 2.49 M/UL (ref 4.2–5.4)
SODIUM BLD-SCNC: 140 MEQ/L (ref 132–144)
STOMATOCYTES: ABNORMAL
TCO2 ARTERIAL: 30 (ref 22–29)
WBC # BLD: 10.6 K/UL (ref 4.8–10.8)

## 2017-10-25 PROCEDURE — 2580000003 HC RX 258: Performed by: INTERNAL MEDICINE

## 2017-10-25 PROCEDURE — 93971 EXTREMITY STUDY: CPT

## 2017-10-25 PROCEDURE — 6360000002 HC RX W HCPCS: Performed by: HOSPITALIST

## 2017-10-25 PROCEDURE — 71010 XR CHEST PORTABLE: CPT

## 2017-10-25 PROCEDURE — 6370000000 HC RX 637 (ALT 250 FOR IP): Performed by: INTERNAL MEDICINE

## 2017-10-25 PROCEDURE — 92610 EVALUATE SWALLOWING FUNCTION: CPT

## 2017-10-25 PROCEDURE — 2580000003 HC RX 258: Performed by: PHYSICIAN ASSISTANT

## 2017-10-25 PROCEDURE — 6360000002 HC RX W HCPCS: Performed by: INTERNAL MEDICINE

## 2017-10-25 PROCEDURE — G8997 SWALLOW GOAL STATUS: HCPCS

## 2017-10-25 PROCEDURE — 83735 ASSAY OF MAGNESIUM: CPT

## 2017-10-25 PROCEDURE — 85025 COMPLETE CBC W/AUTO DIFF WBC: CPT

## 2017-10-25 PROCEDURE — 76775 US EXAM ABDO BACK WALL LIM: CPT

## 2017-10-25 PROCEDURE — 6370000000 HC RX 637 (ALT 250 FOR IP): Performed by: HOSPITALIST

## 2017-10-25 PROCEDURE — 80048 BASIC METABOLIC PNL TOTAL CA: CPT

## 2017-10-25 PROCEDURE — 82948 REAGENT STRIP/BLOOD GLUCOSE: CPT

## 2017-10-25 PROCEDURE — 36592 COLLECT BLOOD FROM PICC: CPT

## 2017-10-25 PROCEDURE — 83605 ASSAY OF LACTIC ACID: CPT

## 2017-10-25 PROCEDURE — 2580000003 HC RX 258: Performed by: HOSPITALIST

## 2017-10-25 PROCEDURE — 94660 CPAP INITIATION&MGMT: CPT

## 2017-10-25 PROCEDURE — G8996 SWALLOW CURRENT STATUS: HCPCS

## 2017-10-25 PROCEDURE — 1210000000 HC MED SURG R&B

## 2017-10-25 PROCEDURE — 94640 AIRWAY INHALATION TREATMENT: CPT

## 2017-10-25 PROCEDURE — 82803 BLOOD GASES ANY COMBINATION: CPT

## 2017-10-25 PROCEDURE — 36600 WITHDRAWAL OF ARTERIAL BLOOD: CPT

## 2017-10-25 PROCEDURE — 94760 N-INVAS EAR/PLS OXIMETRY 1: CPT

## 2017-10-25 PROCEDURE — 2700000000 HC OXYGEN THERAPY PER DAY

## 2017-10-25 PROCEDURE — 99232 SBSQ HOSP IP/OBS MODERATE 35: CPT | Performed by: INTERNAL MEDICINE

## 2017-10-25 RX ORDER — LEVOFLOXACIN 5 MG/ML
750 INJECTION, SOLUTION INTRAVENOUS EVERY 24 HOURS
Status: DISCONTINUED | OUTPATIENT
Start: 2017-10-25 | End: 2017-10-31

## 2017-10-25 RX ORDER — ONDANSETRON 2 MG/ML
4 INJECTION INTRAMUSCULAR; INTRAVENOUS EVERY 6 HOURS PRN
Status: DISCONTINUED | OUTPATIENT
Start: 2017-10-25 | End: 2017-11-01 | Stop reason: HOSPADM

## 2017-10-25 RX ORDER — ACETAMINOPHEN 325 MG/1
650 TABLET ORAL EVERY 4 HOURS PRN
Status: DISCONTINUED | OUTPATIENT
Start: 2017-10-25 | End: 2017-11-01 | Stop reason: HOSPADM

## 2017-10-25 RX ORDER — FUROSEMIDE 20 MG/1
20 TABLET ORAL
Status: DISCONTINUED | OUTPATIENT
Start: 2017-10-25 | End: 2017-11-01 | Stop reason: HOSPADM

## 2017-10-25 RX ORDER — IPRATROPIUM BROMIDE AND ALBUTEROL SULFATE 2.5; .5 MG/3ML; MG/3ML
1 SOLUTION RESPIRATORY (INHALATION) 4 TIMES DAILY
Status: DISCONTINUED | OUTPATIENT
Start: 2017-10-25 | End: 2017-11-01 | Stop reason: HOSPADM

## 2017-10-25 RX ORDER — ALBUTEROL SULFATE 2.5 MG/3ML
2.5 SOLUTION RESPIRATORY (INHALATION)
Status: DISCONTINUED | OUTPATIENT
Start: 2017-10-25 | End: 2017-11-01 | Stop reason: HOSPADM

## 2017-10-25 RX ADMIN — LEVOFLOXACIN 750 MG: 5 INJECTION, SOLUTION INTRAVENOUS at 14:05

## 2017-10-25 RX ADMIN — IRON SUCROSE 200 MG: 20 INJECTION, SOLUTION INTRAVENOUS at 13:57

## 2017-10-25 RX ADMIN — METOPROLOL SUCCINATE 25 MG: 25 TABLET, FILM COATED, EXTENDED RELEASE ORAL at 20:05

## 2017-10-25 RX ADMIN — PANTOPRAZOLE SODIUM 40 MG: 40 TABLET, DELAYED RELEASE ORAL at 10:59

## 2017-10-25 RX ADMIN — GABAPENTIN 400 MG: 400 CAPSULE ORAL at 10:58

## 2017-10-25 RX ADMIN — SODIUM CHLORIDE: 9 INJECTION, SOLUTION INTRAVENOUS at 01:00

## 2017-10-25 RX ADMIN — IPRATROPIUM BROMIDE AND ALBUTEROL SULFATE 3 ML: .5; 3 SOLUTION RESPIRATORY (INHALATION) at 06:53

## 2017-10-25 RX ADMIN — HYPROMELLOSE 2 DROP: 4 SOLUTION/ DROPS OPHTHALMIC at 11:01

## 2017-10-25 RX ADMIN — DOCUSATE SODIUM 100 MG: 100 CAPSULE, LIQUID FILLED ORAL at 10:58

## 2017-10-25 RX ADMIN — ISOSORBIDE MONONITRATE 30 MG: 30 TABLET, EXTENDED RELEASE ORAL at 10:59

## 2017-10-25 RX ADMIN — Medication 10 ML: at 23:02

## 2017-10-25 RX ADMIN — IPRATROPIUM BROMIDE AND ALBUTEROL SULFATE 1 AMPULE: 2.5; .5 SOLUTION RESPIRATORY (INHALATION) at 15:55

## 2017-10-25 RX ADMIN — CETIRIZINE HYDROCHLORIDE 5 MG: 10 TABLET, FILM COATED ORAL at 10:59

## 2017-10-25 RX ADMIN — FUROSEMIDE 20 MG: 20 TABLET ORAL at 14:17

## 2017-10-25 RX ADMIN — FOLIC ACID 1 MG: 1 TABLET ORAL at 10:59

## 2017-10-25 RX ADMIN — IPRATROPIUM BROMIDE AND ALBUTEROL SULFATE 1 AMPULE: 2.5; .5 SOLUTION RESPIRATORY (INHALATION) at 11:21

## 2017-10-25 RX ADMIN — ALLOPURINOL 100 MG: 100 TABLET ORAL at 10:59

## 2017-10-25 RX ADMIN — LACTULOSE 13.33 G: 20 SOLUTION ORAL at 11:00

## 2017-10-25 RX ADMIN — OXYBUTYNIN CHLORIDE 5 MG: 5 TABLET ORAL at 20:11

## 2017-10-25 RX ADMIN — FERROUS SULFATE TAB 325 MG (65 MG ELEMENTAL FE) 325 MG: 325 (65 FE) TAB at 10:59

## 2017-10-25 RX ADMIN — ACETAMINOPHEN 650 MG: 325 TABLET ORAL at 06:21

## 2017-10-25 RX ADMIN — DEXTROSE MONOHYDRATE 12.5 G: 25 INJECTION, SOLUTION INTRAVENOUS at 22:58

## 2017-10-25 RX ADMIN — ASPIRIN 81 MG 81 MG: 81 TABLET ORAL at 10:59

## 2017-10-25 RX ADMIN — IPRATROPIUM BROMIDE AND ALBUTEROL SULFATE 1 AMPULE: 2.5; .5 SOLUTION RESPIRATORY (INHALATION) at 20:08

## 2017-10-25 RX ADMIN — LEVOTHYROXINE SODIUM 200 MCG: 200 TABLET ORAL at 05:57

## 2017-10-25 RX ADMIN — HYPROMELLOSE 2 DROP: 4 SOLUTION/ DROPS OPHTHALMIC at 20:11

## 2017-10-25 RX ADMIN — SODIUM CHLORIDE, PRESERVATIVE FREE 10 ML: 5 INJECTION INTRAVENOUS at 23:02

## 2017-10-25 RX ADMIN — DOCUSATE SODIUM 100 MG: 100 CAPSULE, LIQUID FILLED ORAL at 20:05

## 2017-10-25 RX ADMIN — METOPROLOL SUCCINATE 25 MG: 25 TABLET, FILM COATED, EXTENDED RELEASE ORAL at 10:59

## 2017-10-25 RX ADMIN — ATORVASTATIN CALCIUM 40 MG: 40 TABLET, FILM COATED ORAL at 20:05

## 2017-10-25 RX ADMIN — GABAPENTIN 400 MG: 400 CAPSULE ORAL at 20:10

## 2017-10-25 RX ADMIN — Medication 400 MG: at 10:59

## 2017-10-25 ASSESSMENT — PAIN SCALES - GENERAL: PAINLEVEL_OUTOF10: 7

## 2017-10-25 NOTE — PROGRESS NOTES
Vidhi COURTNEY notified                 DIET LEVEL PRIOR TO THIS EVALUATION :    DIET CARDIAC;  Dental Soft    CURRENT RESPIRATORY STATUS:   []Room Air  [x]Nasal Canula []O2  Mask  []Non Re-Breather    []Ventilator  []BiPAP  []Tracheostomy with Room Air []Tracheostomy with O2        CURRENT COGNITIVE STATUS:   [x] Alert    [x]Responsive       []Non-responsive   [x]Confused  [] Cooperative     []Uncooperative    []Aphasic       []Impulsive              PROCEDURE   Consistencies Administered During the Evaluation   Liquids: [x]  Thin    [x]  Nectar   []  Honey  Solids:  [x]  Pureed/Pudding   []  Soft Solid   []  Solid     []Moistened [place]   []Other:      Method of Intake:   [x]  Cup    [x]  Spoon  []  Straw    []  Self Fed    []Set-Up     [x]  Fed by Clinician     Position:   [x]  Upright seated ([x]In bed [] in chair)   []  Reclined upright in bed                     ORAL MECHANISM EXAMINATION  [] Adequate lingual/labial strength  [x] Generalized oral weakness  [] Left labiobuccal weakness   [] Right labiobuccal weakness  [] Left lingual deviation   [] Right lingual deviation  [] Oral apraxia    [] CNT  [] DNT    RESULTS   Oral Stage:   []  WNL []  WFL [x]  Exceptions     [x]  Dentition: []  natural  []  missing teeth  [x]  Edentulous during BSE  []  partials  [x] dentures at bedside, unable to fit properly to BSE    [x]  Inadequate labial seal resulting anterior labial spillage from:   [x] right  [] left  [] midline     [x]  Decreased mastication due to:     [x]  poor/missing dentition  [x]  decreased lingual control  []  vertical munch    [x]  cognitive function    [x]  Delayed A-P transit due to [x]  decreased initiation   [x] reduced lingual strength         [x]  cognitive function     [x]  Oral residuals []  right buccal cavity  []  left buccal cavity []  sub lingually   []  on tongue base   [x]  throughout oral cavity     []  on superior tongue       []  on palate               []  on velum              []

## 2017-10-25 NOTE — PROGRESS NOTES
[facility] Occupational Therapy      Date: 10/25/2017  Patient Name: Chuy Estes        MRN: [id number]  Account: [de-identified]   : 1940  (76 y.o.)  Room: [place] [address]    Attempted OT tx at 2:30 PM, but pt. unavailable 2° to:    [x] Hold Hg 7.5, R/OUE DVT    [] Pt declined     [] Pt. . off floor for test/procedure. Will attempt again when able.     Electronically signed by ARTHUR Mithcell on 10/25/2017 at 2:30 PM

## 2017-10-25 NOTE — PROGRESS NOTES
INPATIENT PROGRESS NOTES    PATIENT NAME: Ally Pope  MRN: [id number]  SERVICE DATE:  October 25, 2017   SERVICE TIME:  2:08 PM      PRIMARY SERVICE:  Pulmonary Medicine     INTERVAL HPI: Patient seen and examined at bedside, Interval Notes, orders reviewed. Nursing notes noted: patient is a poor historian, but is responsive to verbal stimuli. She has many co morbid medical issues includine endometrial cancer, CAD, CHF, CKD, DM, COPD, UDAY on BiPAP. Patient was also found to be very anemia requiring blood transfusions. Patient reports no new concern in her respiratory status at this time. Review of Systems  Please see HPI above. OBJECTIVE    There is no height or weight on file to calculate BMI. PHYSICAL EXAM:  Vitals:  BP (!) 124/101   Pulse 60   Temp 98.3 °F (36.8 °C) (Oral)   Resp 18   SpO2 99%   General: Patient is comfortable in bed, No distress. Head: Atraumatic , Normocephalic   Eyes: PERRL. No sclera icterus. No conjunctival injection. No discharge   ENT: No nasal  discharge. Pharynx clear. Neck:  Trachea midline. No thyromegaly, no JVD, No cervical adenopathy. Resp : Fair BS bilaterally with decreased BS at the bases, no wheezing, no rhonchi normal effort with no accessory muscle use. CV: Normal  rate. Regular rhythm. No mumur ,  Rub or gallop  ABD: Non-tender. Non-distended. No masses. No organmegaly. Normal bowel sounds. No hernia. EXT: 1+ bilateral Pitting, non tender, No Cyanosis No clubbing  Neuro: no focal weakness  Skin: Warm and dry. No erythema rash on exposed extremities.         DATA:   Recent Labs      10/24/17   0600  10/24/17   1106  10/25/17   0600   WBC  10.7   --   10.6   HGB  8.1*   --   7.5*   HCT  24.9*  24.6*  23.3*   MCV  91.9   --   93.4   PLT  302   --   260     Recent Labs      10/23/17   1145  10/24/17   0549  10/25/17   0600   NA  140  140  140   K  4.4  4.3  4.3   CL  102  104  104   CO2  28  26  26   BUN  38*  36*  35*   CREATININE  1.86*  1.68*  1.55* glucose, dextrose, glucagon (rDNA), dextrose    Radiology    Xr Chest Portable    Result Date: 10/25/2017  EXAMINATION: XR CHEST PORTABLE REASON FOR EXAM: Shortness of breath. FINDINGS: Frontal view of the chest reveals inspiration somewhat shallow related to portable technique and positioning. There is a PICC catheter now in place in satisfactory position since previous studies. Opacification at the left base related to volume loss and pleural thickening is present. Increase infiltrate in the left perihilar region more prominent from previous studies and developing pneumonia superimposed on chronic disease should BE considered. Central pulmonary vascular congestion is present. Scarring in the right mid and lower lung field remain unchanged. MILD CENTRAL VASCULAR CONGESTION. ACUTE PNEUMONIA SUPERIMPOSED ON CHRONIC DISEASE LEFT BASE. ASSESSMENT /Plan:  1. Acute on Chronic respiratory failure with hypercapnia  2. Concern for aspiration pneumonia, CXR today showed new infiltrate in Left base  3. Acute on chronic anemia  4. Congestive Heart Failure  5. Acute kidney injury with chronic kidney disease    Will review case with Dr. Moncho Perea. Concern for aspiration pneumonia based on most recent CXR. Possible medication adjustment for aspiration coverage.      Electronically signed by Gael Cotton PA-C on 10/25/2017 at 2:08 PM

## 2017-10-25 NOTE — PROGRESS NOTES
INPATIENT PROGRESS NOTE    SERVICE DATE:  10/25/2017   SERVICE TIME:  11:10    ASSESSMENT AND PLAN   70-year-old female with past medical history of endometrial cancer, coronary artery disease, chronic kidney disease stage III, diabetes type 2. Sent from nursing facility due to acute and chronic anemia, A. fib with RVR, and acute kidney injury and chronic kidney disease stage III.     Acute on chronic anemia: Multifactorial. Down to 6.3 on admission, now 7.5 s/p 2 units PRBC's. - Admit to medical floor  - Patient had blood transfusion, hemoglobin 7.5  - Patient was recently on Coumadin, due to DVT of the left upper extremity, AC held  - Stool for occult blood is ordered  - Anticoagulations held, defer to Hem/Onc for recs on restarting  - Hematology has been consulted. Per Dr. Ruthie Figueroa, Anemia is due to multifactorial causes. Previous iron studies 8/2017 showed iron deficiency. Renal insufficiency is a contributing factor, Being on anticoagulation is another factor. History of uterine cancer s/p radiation therapy 2017. Check, ERVIN, retic count, Ca 125.  She would benfit from parenteral iron as well. Cristhian further recs.      Atrial fibrillation with rapid ventricular rate: Rate to the 120's  - Patient placed on telemetry  - Cardiology is consulted. Per Dr. Marilin Gaitan, AF with RVR in setting of acute blood loss anemia, with improvement following transfusion. VHD with moderately severe AS, no signs HF. BRIGIDA on CKD. Acute blood loss anemia s/p transfusion. Continue rate control with metoprolol, add prn IV doses for HR greater than 125. Supportive care including hydration, trnasfusion. Warfarin on hold until cleared by heme/onc to resume. Cristhian further dispo recs     Acute kidney injury and chronic kidney disease stage III:   - IV fluids with caution, to avoid fluid overload  - Nephrology consulted. Per Dr. Brigitte Aj, CKD stage 3. Diabetic nephropathy. Mild BRIGIDA. On lasix at home. Has sig anemia.  Being seen by hematology.  Ivf.  Hold lasix. check u/s. last UA last month was ok. No rbc. repeat here ok. Cristhian further recs. - DC nephrotoxic medications  - Continue to monitor     Pneumonia: As per CXR this AM. SOB with O2 down to 90%. Reports coughing up green phlegm. - Sputum culture  - Supplemental O2  - Pulm consulted. Per Dr. Lety Grant, Acute on chronic respiratory failure with hypercapnia, chronic obstructive pulmonary disease3. Obesity, UDAY on BiPAP. Patient is coughing green sputum and O2 saturation to 90%. Currently on 2 L O2 via nasal cannula O2 saturation 94%. I will request chest x-ray for further evaluation. She is on nebulizer with DuoNeb 4 times a day. Continue present treatment plan. Add antibiotic if chest x-ray sure infiltration patient developed fever. Check ABG and start her on BiPAP with IPAP 12 EPAP 6 with 3 L O2 with sleep and when necessary. Cristhian further recs  - IV Levaquin ordered  - Continue to monitor     DVT of left upper extremity  - As mentioned above, Cristhian hem/onc input on further treatment at this time given the anemia above     Endometrial cancer  - receiving chemoradiation, oncology is consulted and following as above     Diabetes type 2  - Insulin on sliding scale     UDAY  - Continue BiPAP     CAD  CHF     Dispo: Await consultant clearance prior to D/C. Advised F/U with PCP 1 - 2 days of discharge. SUBJECTIVE  CHIEF COMPLAINT: SOB; fatigue    PRIMARY SERVICE: Medicine    INTERVAL HPI: Pt says she feels slightly better today.      MEDICATIONS:    Current Facility-Administered Medications   Medication Dose Route Frequency Provider Last Rate Last Dose    acetaminophen (TYLENOL) tablet 650 mg  650 mg Oral Q4H PRN James Joyner MD        ondansetron (ZOFRAN) injection 4 mg  4 mg Intravenous Q6H PRN James Joyner MD        albuterol (PROVENTIL) nebulizer solution 2.5 mg  2.5 mg Nebulization Q2H PRN James Joyner MD        ipratropium-albuterol (DUONEB) nebulizer solution 1 ampule  1 ampule  levothyroxine (SYNTHROID) tablet 200 mcg  200 mcg Oral Daily Abby Bangura MD   200 mcg at 10/25/17 0557    hypromellose 0.4 % ophthalmic solution SOLN 2 drop  2 drop Both Eyes BID Abby Bangura MD   2 drop at 10/25/17 1101    atorvastatin (LIPITOR) tablet 40 mg  40 mg Oral Nightly Abby Bangura MD   40 mg at 10/24/17 2024    ferrous sulfate tablet 325 mg  325 mg Oral Daily with breakfast Abby Bangura MD   325 mg at 10/25/17 1059    oxybutynin (DITROPAN) tablet 5 mg  5 mg Oral Nightly Abby Bangura MD   5 mg at 10/24/17 2024    gabapentin (NEURONTIN) capsule 400 mg  400 mg Oral BID Abby Bangura MD   400 mg at 10/25/17 1058    sodium chloride flush 0.9 % injection 10 mL  10 mL Intravenous 2 times per day Abby Bangura MD   10 mL at 10/23/17 2203    sodium chloride flush 0.9 % injection 10 mL  10 mL Intravenous PRN Abby Bangura MD        magnesium hydroxide (MILK OF MAGNESIA) 400 MG/5ML suspension 30 mL  30 mL Oral Daily PRN Abby Bangura MD        ondansetron [facility]) injection 4 mg  4 mg Intravenous Q6H PRN Abby Bangura MD        0.9 % sodium chloride infusion   Intravenous Continuous Rose Mary Proctor MD 50 mL/hr at 10/25/17 0100      sodium chloride flush 0.9 % injection 10 mL  10 mL Intravenous 2 times per day Rose Mary Proctor MD   10 mL at 10/24/17 2033    sodium chloride flush 0.9 % injection 10 mL  10 mL Intravenous PRN Rose Mary Proctor MD        docusate sodium (COLACE) capsule 100 mg  100 mg Oral BID Rose Mary Proctor MD   100 mg at 10/25/17 1058    glucose (GLUTOSE) 40 % oral gel 15 g  15 g Oral PRN Rose Mary Proctor MD        dextrose 50 % solution 12.5 g  12.5 g Intravenous PRN Rose Mary Proctor MD        glucagon (rDNA) injection 1 mg  1 mg Intramuscular PRN Rose Mary Proctor MD        dextrose 5 % solution  100 mL/hr Intravenous PRN Rose Mary Proctor MD        pneumococcal 13-valent conjugate (PREVNAR) injection 0.5 mL  0.5 mL Intramuscular Once Madison Norton MD           OBJECTIVE  PHYSICAL EXAM:   BP (!) 124/101   Pulse 89   Temp 98.3 °F (36.8 °C) (Oral)   Resp 18   SpO2 95%   There is no height or weight on file to calculate BMI. CONSTITUTIONAL:  awake, alert, cooperative, no apparent distress, and appears stated age  EYES:  Lids and lashes normal, pupils equal, round and reactive to light, extra ocular muscles intact, sclera clear, conjunctiva normal  LUNGS:  Diminished BS bilaterally  CARDIOVASCULAR:  Normal apical impulse, regular rate and rhythm, normal S1 and S2, no S3 or S4, and no murmur noted  ABDOMEN:  No scars, normal bowel sounds, soft, non-distended, non-tender, no masses palpated, no hepatosplenomegally  EXT: No c/c/e    DATA:   Diagnostic tests reviewed for today's visit:    Most recent labs and imaging results reviewed.      SIGNATURE: Madison Norton MD PATIENT NAME: Giles Huerta   DATE: October 25, 2017 MRN: [id number]   TIME: 11:10 AM PAGER: [phone]

## 2017-10-25 NOTE — PROGRESS NOTES
10/25/17 0100    dextrose         acetaminophen 650 mg Q4H PRN   ondansetron 4 mg Q6H PRN   albuterol 2.5 mg Q2H PRN   guaiFENesin-dextromethorphan 5 mL Q4H PRN   sodium chloride flush 10 mL PRN   simethicone 80 mg Q6H PRN   docusate sodium 100 mg BID PRN   sodium chloride flush 10 mL PRN   magnesium hydroxide 30 mL Daily PRN   ondansetron 4 mg Q6H PRN   sodium chloride flush 10 mL PRN   glucose 15 g PRN   dextrose 12.5 g PRN   glucagon (rDNA) 1 mg PRN   dextrose 100 mL/hr PRN       Diagnostics:    Xr Chest Portable    Result Date: 10/25/2017  EXAMINATION: XR CHEST PORTABLE REASON FOR EXAM: Shortness of breath. FINDINGS: Frontal view of the chest reveals inspiration somewhat shallow related to portable technique and positioning. There is a PICC catheter now in place in satisfactory position since previous studies. Opacification at the left base related to volume loss and pleural thickening is present. Increase infiltrate in the left perihilar region more prominent from previous studies and developing pneumonia superimposed on chronic disease should BE considered. Central pulmonary vascular congestion is present. Scarring in the right mid and lower lung field remain unchanged. MILD CENTRAL VASCULAR CONGESTION. ACUTE PNEUMONIA SUPERIMPOSED ON CHRONIC DISEASE LEFT BASE. Lab Data:    Cardiac Enzymes:  No results for input(s): CKTOTAL, CKMB, CKMBINDEX, TROPONINI in the last 72 hours.   ProBNP:   Lab Results   Component Value Date    PROBNP 1,594 09/27/2017       CBC:   Lab Results   Component Value Date    WBC 10.6 10/25/2017    RBC 2.49 10/25/2017    HGB 7.5 10/25/2017    HCT 23.3 10/25/2017     10/25/2017       BMP: @(bmp:3)@    CMP:  Lab Results   Component Value Date     10/25/2017    K 4.3 10/25/2017     10/25/2017    CO2 26 10/25/2017    BUN 35 10/25/2017    CREATININE 1.55 10/25/2017    GFRAA 39.0 10/25/2017    LABGLOM 32.2 10/25/2017    GLUCOSE 55 10/25/2017    CALCIUM 8.1

## 2017-10-26 LAB
ANION GAP SERPL CALCULATED.3IONS-SCNC: 10 MEQ/L (ref 7–13)
ANISOCYTOSIS: ABNORMAL
BASOPHILIC STIPPLING: 1
BASOPHILS ABSOLUTE: 0.1 K/UL (ref 0–0.2)
BASOPHILS RELATIVE PERCENT: 0.7 %
BUN BLDV-MCNC: 35 MG/DL (ref 8–23)
CALCIUM SERPL-MCNC: 7.9 MG/DL (ref 8.6–10.2)
CHLORIDE BLD-SCNC: 105 MEQ/L (ref 98–107)
CO2: 27 MEQ/L (ref 22–29)
CREAT SERPL-MCNC: 1.41 MG/DL (ref 0.5–0.9)
EOSINOPHILS ABSOLUTE: 0 K/UL (ref 0–0.7)
EOSINOPHILS RELATIVE PERCENT: 0.4 %
GFR AFRICAN AMERICAN: 43.5
GFR NON-AFRICAN AMERICAN: 35.9
GLUCOSE BLD-MCNC: 114 MG/DL (ref 60–115)
GLUCOSE BLD-MCNC: 57 MG/DL (ref 74–109)
GLUCOSE BLD-MCNC: 79 MG/DL (ref 60–115)
GLUCOSE BLD-MCNC: 85 MG/DL (ref 60–115)
GLUCOSE BLD-MCNC: 86 MG/DL (ref 60–115)
HCT VFR BLD CALC: 23.3 % (ref 37–47)
HEMOGLOBIN: 7.4 G/DL (ref 12–16)
HOWELL-JOLLY BODIES: ABNORMAL
HYPOCHROMIA: ABNORMAL
LYMPHOCYTES ABSOLUTE: 1.6 K/UL (ref 1–4.8)
LYMPHOCYTES RELATIVE PERCENT: 15.5 %
MACROCYTES: 0
MAGNESIUM: 2 MG/DL (ref 1.7–2.3)
MCH RBC QN AUTO: 30.2 PG (ref 27–31.3)
MCHC RBC AUTO-ENTMCNC: 31.7 % (ref 33–37)
MCV RBC AUTO: 95.4 FL (ref 82–100)
MICROCYTES: 0
MONOCYTES ABSOLUTE: 1.2 K/UL (ref 0.2–0.8)
MONOCYTES RELATIVE PERCENT: 11.5 %
NEUTROPHILS ABSOLUTE: 7.2 K/UL (ref 1.4–6.5)
NEUTROPHILS RELATIVE PERCENT: 71.9 %
PDW BLD-RTO: 17.1 % (ref 11.5–14.5)
PERFORMED ON: NORMAL
PLATELET # BLD: 242 K/UL (ref 130–400)
PLATELET SLIDE REVIEW: ADEQUATE
POIKILOCYTES: ABNORMAL
POLYCHROMASIA: ABNORMAL
POTASSIUM SERPL-SCNC: 4.1 MEQ/L (ref 3.5–5.1)
RBC # BLD: 2.45 M/UL (ref 4.2–5.4)
SLIDE REVIEW: ABNORMAL
SODIUM BLD-SCNC: 142 MEQ/L (ref 132–144)
STOMATOCYTES: ABNORMAL
VACUOLATED NEUTROPHILS: ABNORMAL
WBC # BLD: 10 K/UL (ref 4.8–10.8)

## 2017-10-26 PROCEDURE — 94640 AIRWAY INHALATION TREATMENT: CPT

## 2017-10-26 PROCEDURE — 85025 COMPLETE CBC W/AUTO DIFF WBC: CPT

## 2017-10-26 PROCEDURE — 07DR3ZX EXTRACTION OF ILIAC BONE MARROW, PERCUTANEOUS APPROACH, DIAGNOSTIC: ICD-10-PCS | Performed by: INTERNAL MEDICINE

## 2017-10-26 PROCEDURE — 1210000000 HC MED SURG R&B

## 2017-10-26 PROCEDURE — 6360000002 HC RX W HCPCS: Performed by: INTERNAL MEDICINE

## 2017-10-26 PROCEDURE — 2500000003 HC RX 250 WO HCPCS: Performed by: INTERNAL MEDICINE

## 2017-10-26 PROCEDURE — 88184 FLOWCYTOMETRY/ TC 1 MARKER: CPT

## 2017-10-26 PROCEDURE — 80048 BASIC METABOLIC PNL TOTAL CA: CPT

## 2017-10-26 PROCEDURE — 6370000000 HC RX 637 (ALT 250 FOR IP): Performed by: INTERNAL MEDICINE

## 2017-10-26 PROCEDURE — 94664 DEMO&/EVAL PT USE INHALER: CPT

## 2017-10-26 PROCEDURE — 83735 ASSAY OF MAGNESIUM: CPT

## 2017-10-26 PROCEDURE — 88264 CHROMOSOME ANALYSIS 20-25: CPT

## 2017-10-26 PROCEDURE — 6360000002 HC RX W HCPCS: Performed by: HOSPITALIST

## 2017-10-26 PROCEDURE — 2580000003 HC RX 258: Performed by: HOSPITALIST

## 2017-10-26 PROCEDURE — 2700000000 HC OXYGEN THERAPY PER DAY

## 2017-10-26 PROCEDURE — 88237 TISSUE CULTURE BONE MARROW: CPT

## 2017-10-26 PROCEDURE — 99232 SBSQ HOSP IP/OBS MODERATE 35: CPT | Performed by: INTERNAL MEDICINE

## 2017-10-26 PROCEDURE — 6370000000 HC RX 637 (ALT 250 FOR IP): Performed by: HOSPITALIST

## 2017-10-26 PROCEDURE — 99213 OFFICE O/P EST LOW 20 MIN: CPT

## 2017-10-26 PROCEDURE — 2580000003 HC RX 258: Performed by: PHYSICIAN ASSISTANT

## 2017-10-26 RX ORDER — HEPARIN SODIUM (PORCINE) LOCK FLUSH IV SOLN 100 UNIT/ML 100 UNIT/ML
100 SOLUTION INTRAVENOUS PRN
Status: DISCONTINUED | OUTPATIENT
Start: 2017-10-26 | End: 2017-11-01 | Stop reason: HOSPADM

## 2017-10-26 RX ORDER — LIDOCAINE HYDROCHLORIDE AND EPINEPHRINE BITARTRATE 20; .01 MG/ML; MG/ML
20 INJECTION, SOLUTION SUBCUTANEOUS ONCE
Status: COMPLETED | OUTPATIENT
Start: 2017-10-26 | End: 2017-10-26

## 2017-10-26 RX ADMIN — INSULIN GLARGINE 8 UNITS: 100 INJECTION, SOLUTION SUBCUTANEOUS at 23:15

## 2017-10-26 RX ADMIN — OXYBUTYNIN CHLORIDE 5 MG: 5 TABLET ORAL at 20:14

## 2017-10-26 RX ADMIN — HEPARIN 100 UNITS: 100 SYRINGE at 12:50

## 2017-10-26 RX ADMIN — Medication 10 ML: at 20:14

## 2017-10-26 RX ADMIN — METOPROLOL SUCCINATE 25 MG: 25 TABLET, FILM COATED, EXTENDED RELEASE ORAL at 12:43

## 2017-10-26 RX ADMIN — SODIUM CHLORIDE, PRESERVATIVE FREE 10 ML: 5 INJECTION INTRAVENOUS at 20:15

## 2017-10-26 RX ADMIN — ATORVASTATIN CALCIUM 40 MG: 40 TABLET, FILM COATED ORAL at 20:14

## 2017-10-26 RX ADMIN — GABAPENTIN 400 MG: 400 CAPSULE ORAL at 20:14

## 2017-10-26 RX ADMIN — IPRATROPIUM BROMIDE AND ALBUTEROL SULFATE 1 AMPULE: 2.5; .5 SOLUTION RESPIRATORY (INHALATION) at 11:19

## 2017-10-26 RX ADMIN — LEVOTHYROXINE SODIUM 200 MCG: 200 TABLET ORAL at 06:08

## 2017-10-26 RX ADMIN — IPRATROPIUM BROMIDE AND ALBUTEROL SULFATE 1 AMPULE: 2.5; .5 SOLUTION RESPIRATORY (INHALATION) at 15:00

## 2017-10-26 RX ADMIN — GABAPENTIN 400 MG: 400 CAPSULE ORAL at 12:43

## 2017-10-26 RX ADMIN — IPRATROPIUM BROMIDE AND ALBUTEROL SULFATE 1 AMPULE: 2.5; .5 SOLUTION RESPIRATORY (INHALATION) at 07:39

## 2017-10-26 RX ADMIN — IPRATROPIUM BROMIDE AND ALBUTEROL SULFATE 1 AMPULE: 2.5; .5 SOLUTION RESPIRATORY (INHALATION) at 19:57

## 2017-10-26 RX ADMIN — DOCUSATE SODIUM 100 MG: 100 CAPSULE, LIQUID FILLED ORAL at 20:14

## 2017-10-26 RX ADMIN — LIDOCAINE HYDROCHLORIDE,EPINEPHRINE BITARTRATE 20 ML: 20; .01 INJECTION, SOLUTION INFILTRATION; PERINEURAL at 12:50

## 2017-10-26 RX ADMIN — LEVOFLOXACIN 750 MG: 5 INJECTION, SOLUTION INTRAVENOUS at 11:20

## 2017-10-26 RX ADMIN — HYPROMELLOSE 2 DROP: 4 SOLUTION/ DROPS OPHTHALMIC at 20:16

## 2017-10-26 ASSESSMENT — PAIN SCALES - GENERAL: PAINLEVEL_OUTOF10: 4

## 2017-10-26 NOTE — PROGRESS NOTES
 Alcohol use No       ALLERGIES    No Known Allergies    MEDICATIONS    No current facility-administered medications on file prior to encounter.       Current Outpatient Prescriptions on File Prior to Encounter   Medication Sig Dispense Refill    albuterol (PROVENTIL) (2.5 MG/3ML) 0.083% nebulizer solution Take 3 mLs by nebulization every 2 hours as needed for Wheezing 120 each 3    ipratropium-albuterol (DUONEB) 0.5-2.5 (3) MG/3ML SOLN nebulizer solution Inhale 3 mLs into the lungs 4 times daily 360 mL     gabapentin (NEURONTIN) 400 MG capsule Take 1 capsule by mouth 2 times daily 90 capsule 3    furosemide (LASIX) 20 MG tablet Take 20 mg by mouth daily      levothyroxine (SYNTHROID) 200 MCG tablet Take 200 mcg by mouth Daily      hypromellose (ARTIFICIAL TEARS) 0.4 % SOLN ophthalmic solution Place 2 drops into both eyes 2 times daily      diclofenac sodium 1 % GEL Apply 2 g topically 2 times daily as needed for Pain Apply to hands and knees as needed for pain      allopurinol (ZYLOPRIM) 100 MG tablet Take 100 mg by mouth daily      acetaminophen (TYLENOL) 325 MG tablet Take 2 tablets by mouth every 4 hours as needed for Pain or Fever 120 tablet 0    aspirin 81 MG chewable tablet Take 1 tablet by mouth daily 30 tablet 3    isosorbide mononitrate (IMDUR) 30 MG extended release tablet Take 1 tablet by mouth daily 30 tablet 3    metoprolol succinate (TOPROL XL) 25 MG extended release tablet Take 1 tablet by mouth daily 30 tablet 3    lactulose (CHRONULAC) 10 GM/15ML solution Take 20 mLs by mouth daily      loratadine (CLARITIN) 10 MG tablet Take 10 mg by mouth daily      simethicone (MYLICON) 80 MG chewable tablet Take 80 mg by mouth every 6 hours as needed for Flatulence      atorvastatin (LIPITOR) 40 MG tablet Take 40 mg by mouth nightly      ferrous sulfate 325 (65 Fe) MG tablet Take 325 mg by mouth daily (with breakfast)      oxybutynin (DITROPAN) 5 MG tablet Take 5 mg by mouth nightly      docusate sodium (COLACE) 100 MG capsule Take 100 mg by mouth 2 times daily as needed for Constipation      magnesium oxide (MAG-OX) 400 (241.3 MG) MG TABS tablet Take 1 tablet by mouth daily 30 tablet     folic acid (FOLVITE) 1 MG tablet Take 1 mg by mouth daily      insulin lispro (HUMALOG) 100 UNIT/ML injection vial Inject into the skin 3 times daily (before meals) Indications: sliding scale       insulin glargine (LANTUS) 100 UNIT/ML injection vial Inject 8 Units into the skin nightly       pantoprazole (PROTONIX) 20 MG tablet Take 40 mg by mouth daily         Objective    BP (!) 91/46   Pulse 114   Temp 99.7 °F (37.6 °C) (Oral)   Resp 17   SpO2 96%     LABS:  WBC:    Lab Results   Component Value Date    WBC 10.0 10/26/2017     H/H:    Lab Results   Component Value Date    HGB 7.4 10/26/2017    HCT 23.3 10/26/2017     PTT:    Lab Results   Component Value Date    APTT 57.8 10/23/2017   [APTT}  PT/INR:    Lab Results   Component Value Date    PROTIME 26.9 10/23/2017    INR 2.3 10/23/2017     HgBA1c:    Lab Results   Component Value Date    LABA1C 5.5 09/07/2017       Assessment   Carlos Risk Score: Carlos Scale Score: 16    Patient Active Problem List   Diagnosis    Chronic obstructive pulmonary disease with acute exacerbation (HCC)    Type 2 diabetes mellitus ([address]    Anemia    Metabolic encephalopathy    Acute on chronic diastolic congestive heart failure (HCC)    Hypernatremia    Obesity    Hemiparesis affecting left side as late effect of stroke ([address]    Atrial fibrillation ([address]    Acute on chronic respiratory failure with hypoxia and hypercapnia (HCC)    Cerebral infarct ([address]    Hospice Care    Atrial fibrillation with RVR (HCC)    Acute hemolytic anemia (HCC)    BRIGIDA (acute kidney injury) ([address]       Measurements:  Pressure Ulcer 10/25/17 Coccyx (Active)   Reina-wound Assessment Red;Painful;Non-blanchable erythema 10/26/2017 11:00 AM   Pressure Ulcer Staging Unstagable 10/26/2017 11:00 AM   Wound Assessment Drainage;Slough;Tan;Yellow 10/26/2017 11:00 AM   Wound Length (cm) 4.2 cm 10/26/2017 11:00 AM   Wound Width (cm) 3 cm 10/26/2017 11:00 AM   Calculated Wound Size (cm^2) (l*w) 12.6 cm^2 10/26/2017 11:00 AM   Dressing Change Due 10/26/17 10/26/2017 11:00 AM   Necrotic Type Yellow Fibrin/Slough 10/26/2017 11:00 AM   Necrotic Amount Large: % 10/26/2017 11:00 AM   Drainage Amount Moderate 10/26/2017 11:00 AM   Drainage Description Foul purulent;Tan;Yellow 10/26/2017 11:00 AM   Odor Strong 10/26/2017 11:00 AM   Yellow%Wound Bed 100 10/26/2017 11:00 AM   Number of days: 1       Assessment:    Patient laying in bed upon entering the room. Patient is a maximum assist to turn in bed. Patient turned to the left side for skin assessment. Patient found to have an unstageable pressure injury to the sacrum. Please See Wound LDA for assessment details. To the left buttock is also a healing stage 2 pressure injury measuring 1x1cm which is dry with new tissue growth in bed. Nursing to call attending for surgical consultation    Plan   Plan of Care:   1) all pressure injury prevention interventions 2) Wet to dry dressing change until seen by Dr. Shelley Miller 3) Consult Dr. Shelley Miller, possible surgical debridement. Specialty Bed Required : Yes   [] Low Air Loss   [] Pressure Redistribution  [] Fluid Immersion  [] Bariatric  [] Other:     Current Diet: DIET CARDIAC;  Dental Soft  Dietician consult:  Yes    Discharge Plan:  Placement for patient upon discharge: skilled nursing    Patient appropriate for Outpatient [address] [facility]: Yes    Referrals:  []   [] [address] [facility]  [] Supplies  [] Other    Patient/Caregiver Teaching: Pressure injury prevention interventions  Level of patient/caregiver understanding able to:   [] Indicates understanding       [x] Needs reinforcement  [] Unsuccessful      [] Verbal Understanding  [] Demonstrated understanding       [] No evidence of learning  [] Refused

## 2017-10-26 NOTE — PROGRESS NOTES
per day    docusate sodium  100 mg Oral BID    pneumococcal 13-valent conjugate  0.5 mL Intramuscular Once     Continuous Infusions:   dextrose         CBC:   Recent Labs      10/24/17   0600  10/25/17   0600   WBC  10.7  10.6   HGB  8.1*  7.5*   PLT  302  260     CMP:    Recent Labs      10/24/17   0549  10/25/17   0600  10/26/17   0600   NA  140  140  142   K  4.3  4.3  4.1   CL  104  104  105   CO2  26  26  27   BUN  36*  35*  35*   CREATININE  1.68*  1.55*  1.41*   GLUCOSE  90  55*  57*   CALCIUM  8.0*  8.1*  7.9*   LABGLOM  29.3*  32.2*  35.9*     Troponin: No results for input(s): TROPONINI in the last 72 hours. BNP: No results for input(s): BNP in the last 72 hours. INR:   Recent Labs      10/23/17   1217   INR  2.3     Lipids: No results for input(s): CHOL, LDLDIRECT, TRIG, HDL, AMYLASE, LIPASE in the last 72 hours. Liver:   Recent Labs      10/23/17   1145   AST  26   ALT  16   ALKPHOS  126   PROT  5.4*   LABALBU  2.3*   BILITOT  0.4     Iron:  No results for input(s): IRONS, FERRITIN in the last 72 hours. Invalid input(s): LABIRONS  Urinalysis: No results for input(s): UA in the last 72 hours. Objective:   Vitals: BP (!) 133/95   Pulse 96   Temp 98.8 °F (37.1 °C) (Axillary)   Resp 18   SpO2 98%    Wt Readings from Last 3 Encounters:   09/07/17 210 lb (95.3 kg)   08/26/17 212 lb 4.9 oz (96.3 kg)   10/20/16 208 lb (94.3 kg)      24HR INTAKE/OUTPUT:      Intake/Output Summary (Last 24 hours) at 10/26/17 0910  Last data filed at 10/26/17 0527   Gross per 24 hour   Intake              914 ml   Output              550 ml   Net              364 ml       Constitutional:  Alert, awake, no apparent distress   Skin:normal, no rash  HEENT:sclera anicteric.   Head atraumatic normocephalic  Neck:supple with no thyromegally  Cardiovascular:  S1, S2 without m/r/g   Respiratory:  CTA B without w/r/r   Abdomen: +bs, soft, nt  Ext: no LE edema  Musculoskeletal:Intact  Neuro:Alert and oriented with no

## 2017-10-26 NOTE — PROGRESS NOTES
13-valent conjugate  0.5 mL Intramuscular Once     Continuous Infusions:   dextrose       PRN Meds:heparin flush, acetaminophen, ondansetron, albuterol, guaiFENesin-dextromethorphan, sodium chloride flush, simethicone, docusate sodium, sodium chloride flush, magnesium hydroxide, ondansetron, sodium chloride flush, glucose, dextrose, glucagon (rDNA), dextrose    PHYSICAL EXAM:    CURRENT VITALS: BP (!) 91/46   Pulse 114   Temp 99.7 °F (37.6 °C) (Oral)   Resp 17   SpO2 96%     CONSTITUTIONAL:  awake, alert, cooperative, no apparent distress,   ENT:  Normocephalic, without obvious abnormality, atraumatic, sinuses nontender on palpation, external ears without lesions,  NECK:  Supple, symmetrical, trachea midline, no adenopathy, thyroid symmetric, not enlarged and no tenderness, skin normal  LUNGS:  No increased work of breathing, good air exchange, clear to auscultation bilaterally, no crackles or wheezing  CARDIOVASCULAR:  Normal apical impulse, regular rate and rhythm, normal S1 and S2,  and 3/6 murmur noted  ABDOMEN:  Obese, Normal bowel sounds, soft, non-distended, non-tender, no masses palpated, no hepatosplenomegally  EXTREMITIES:  1+ edema, Pulses strong throughout. NEUROLOGIC:  Awake, alert, oriented to name, place and time. Following all commands and moving all extremties, somewhat confused.   SKIN:  no bruising or bleeding, normal skin color, texture, turgor and no rashes     Data:        LABS:      Recent Results (from the past 24 hour(s))   POCT Glucose    Collection Time: 10/25/17  3:48 PM   Result Value Ref Range    POC Glucose 101 60 - 115 mg/dl    Performed on ACCU-CHEK    POCT Glucose    Collection Time: 10/25/17 10:40 PM   Result Value Ref Range    POC Glucose 71 60 - 115 mg/dl    Performed on ACCU-CHEK    POCT Glucose    Collection Time: 10/26/17 12:18 AM   Result Value Ref Range    POC Glucose 79 60 - 115 mg/dl    Performed on ACCU-CHEK    Magnesium    Collection Time: 10/26/17  6:00 AM   Result

## 2017-10-26 NOTE — PROGRESS NOTES
CO2  28   < >  26  27   BUN  38*   < >  35*  35*   CREATININE  1.86*   < >  1.55*  1.41*   GLUCOSE  117*   < >  55*  57*   CALCIUM  9.0   < >  8.1*  7.9*   PROT  5.4*   --    --    --    LABALBU  2.3*   --    --    --    BILITOT  0.4   --    --    --    ALKPHOS  126   --    --    --    AST  26   --    --    --    ALT  16   --    --    --    LABGLOM  26.1*   < >  32.2*  35.9*   GFRAA  31.6*   < >  39.0*  43.5*   GLOB  3.1   --    --    --     < > = values in this interval not displayed.          Recent Labs      10/25/17   0250   PHART  7.365   [id number]  50*   PO2ART  80*   PVV2KSH  28.5   BEART  3   [id number]  95*     O2 Device: Nasal cannula  O2 Flow Rate (L/min): 2 L/min  Lab Results   Component Value Date    LACTA 0.8 08/18/2017        MEDICATIONS during current hospitalization:    Continuous Infusions:   dextrose         Scheduled Meds:   ipratropium-albuterol  1 ampule Inhalation 4x daily    levofloxacin  750 mg Intravenous Q24H    furosemide  20 mg Oral Once per day on Mon Wed Fri    metoprolol succinate  25 mg Oral BID    sodium chloride flush  10 mL Intravenous 2 times per day    folic acid  1 mg Oral Daily    lactulose  20 mL Oral Daily    insulin glargine  8 Units Subcutaneous Nightly    cetirizine  5 mg Oral Daily    pantoprazole  40 mg Oral Daily    aspirin  81 mg Oral Daily    isosorbide mononitrate  30 mg Oral Daily    magnesium oxide  400 mg Oral Daily    allopurinol  100 mg Oral Daily    levothyroxine  200 mcg Oral Daily    hypromellose  2 drop Both Eyes BID    atorvastatin  40 mg Oral Nightly    ferrous sulfate  325 mg Oral Daily with breakfast    oxybutynin  5 mg Oral Nightly    gabapentin  400 mg Oral BID    sodium chloride flush  10 mL Intravenous 2 times per day    sodium chloride flush  10 mL Intravenous 2 times per day    docusate sodium  100 mg Oral BID    pneumococcal 13-valent conjugate  0.5 mL Intramuscular Once       PRN Meds:acetaminophen, ondansetron, albuterol,

## 2017-10-26 NOTE — PROGRESS NOTES
Hematology/Oncology   Progress Note        CHIEF COMPLAINT/HPI:  Follow up of anemia. Hemoglobin at 6.5 today. Spoke to daughter about doing bone marrow biopsy to evaluate etiology for anemia. REVIEW OF SYSTEMS:    Unremarkable except for symptoms mentioned in HPI.     Current Inpatient Medications:    Current Facility-Administered Medications   Medication Dose Route Frequency Provider Last Rate Last Dose    acetaminophen (TYLENOL) tablet 650 mg  650 mg Oral Q4H PRN Joby Yeboah MD        ondansetron (ZOFRAN) injection 4 mg  4 mg Intravenous Q6H PRN Joby Yeboah MD        albuterol (PROVENTIL) nebulizer solution 2.5 mg  2.5 mg Nebulization Q2H PRN Joby Yeboah MD        ipratropium-albuterol (DUONEB) nebulizer solution 1 ampule  1 ampule Inhalation 4x daily Joby Yeboah MD   1 ampule at 10/26/17 0739    levofloxacin (LEVAQUIN) 750 MG/150ML infusion 750 mg  750 mg Intravenous Q24H Bri Mon MD   Stopped at 10/25/17 1634    furosemide (LASIX) tablet 20 mg  20 mg Oral Once per day on Mon Wed Fri Ruffus Prey Zee Enamorado MD   20 mg at 10/25/17 1417    guaiFENesin-dextromethorphan (ROBITUSSIN DM) 100-10 MG/5ML syrup 5 mL  5 mL Oral Q4H PRN Bri Mon MD        metoprolol succinate (TOPROL XL) extended release tablet 25 mg  25 mg Oral BID Oz Anthony MD   25 mg at 10/25/17 2005    sodium chloride flush 0.9 % injection 10 mL  10 mL Intravenous 2 times per day Pau Torres PA-C   10 mL at 10/25/17 2302    sodium chloride flush 0.9 % injection 10 mL  10 mL Intravenous PRN Pau Torres PA-C        folic acid (FOLVITE) tablet 1 mg  1 mg Oral Daily Maureen Andujar MD   1 mg at 10/25/17 1059    lactulose (CHRONULAC) 10 GM/15ML solution 13.3333 g  20 mL Oral Daily Maureen Andujar MD   13.3333 g at 10/25/17 1100    insulin glargine (LANTUS) injection vial 8 Units  8 Units Subcutaneous Nightly Maureen Andujar MD   8 Units at 10/24/17 2252    cetirizine (ZYRTEC) tablet 5 mg  5 mg Oral Daily Mikala Rahman MD   5 mg at 10/25/17 1059    pantoprazole (PROTONIX) tablet 40 mg  40 mg Oral Daily Mikala Rahman MD   40 mg at 10/25/17 1059    simethicone (MYLICON) chewable tablet 80 mg  80 mg Oral Q6H PRN Mikala Rahman MD        aspirin chewable tablet 81 mg  81 mg Oral Daily Mikala Rahman MD   81 mg at 10/25/17 1059    isosorbide mononitrate (IMDUR) extended release tablet 30 mg  30 mg Oral Daily Mikala Rahman MD   30 mg at 10/25/17 1059    magnesium oxide (MAG-OX) tablet 400 mg  400 mg Oral Daily Mikala Rahman MD   400 mg at 10/25/17 1059    allopurinol (ZYLOPRIM) tablet 100 mg  100 mg Oral Daily Mikala Rahman MD   100 mg at 10/25/17 1059    docusate sodium (COLACE) capsule 100 mg  100 mg Oral BID PRN Mikala Rahman MD        levothyroxine (SYNTHROID) tablet 200 mcg  200 mcg Oral Daily Mikala Rahman MD   200 mcg at 10/26/17 1501    hypromellose 0.4 % ophthalmic solution SOLN 2 drop  2 drop Both Eyes BID Mikala Rahman MD   2 drop at 10/25/17 2011    atorvastatin (LIPITOR) tablet 40 mg  40 mg Oral Nightly Mikala Rahman MD   40 mg at 10/25/17 2005    ferrous sulfate tablet 325 mg  325 mg Oral Daily with breakfast Mikala Rahman MD   325 mg at 10/25/17 1059    oxybutynin (DITROPAN) tablet 5 mg  5 mg Oral Nightly Mikala Rahman MD   5 mg at 10/25/17 2011    gabapentin (NEURONTIN) capsule 400 mg  400 mg Oral BID Mikala Rahman MD   400 mg at 10/25/17 2010    sodium chloride flush 0.9 % injection 10 mL  10 mL Intravenous 2 times per day Mikala Rahman MD   10 mL at 10/23/17 2203    sodium chloride flush 0.9 % injection 10 mL  10 mL Intravenous PRN Mikala Rahman MD        magnesium hydroxide (MILK OF MAGNESIA) 400 MG/5ML suspension 30 mL  30 mL Oral Daily PRN Mikala Rahman MD        ondansetron [facility]) injection 4 mg  4 mg Intravenous Q6H PRN MD Romy Kenny [facility]

## 2017-10-26 NOTE — PROGRESS NOTES
[facility] Occupational Therapy      Date: 10/26/2017  Patient Name: Kenisha Grover        MRN: [id number]  Account: [de-identified]   : 1940  (76 y.o.)  Room: [place] [address]    Attempted OT tx at 8:31 AM, but pt. unavailable 2° to:    [x] Hold for imaging results to r/o DVT in UE.      [] Pt declined     [] Pt. . off floor for test/procedure. Will attempt again when able.     Electronically signed by MATILDA Richardson/L on 10/26/2017 at 8:31 AM

## 2017-10-26 NOTE — PROGRESS NOTES
Speech Language Pathology  Attempted to schedule recommended MBS. RN stated that the family refused test.  Family and RN would like to speak to  For next steps. Rn stated she will input order if approved by family and physician.   Electronically signed by GEO Hampton on 10/26/2017 at 4:46 PM

## 2017-10-26 NOTE — PROGRESS NOTES
[facility] Occupational Therapy      Date: 10/26/2017  Patient Name: Casa Rivera        MRN: [id number]  Account: [de-identified]   : 1940  (76 y.o.)  [facility]    Attempted OT eval at 10:53 AM, but pt. unavailable 2° to:    [x]  Hold, waiting for updated labs, Hgb previous 7.5 Pt neg for DVT in UE.    [] Pt declined     [] Pt. . off floor for test/procedure. Will attempt again when able.     Electronically signed by ARTHUR Sanchez on 10/26/2017 at 10:53 AM

## 2017-10-26 NOTE — PROGRESS NOTES
Physical Therapy   [facility] Q408/A239-92    NAME: Brian Ortega    : 1940 (76 y.o.)  MRN: [id number]    Account: [de-identified]  Gender: female    PT evaluation and treatment orders received. Chart reviewed. PT eval attempted. Hold PT eval on this date d/t Hgb 7.5. Awaiting update lab values. Will attempt PT evaluation again at earliest convenience.         Electronically signed by Jayde Gonzalez PT on 10/26/17 at 2:15 PM

## 2017-10-26 NOTE — PROGRESS NOTES
[facility] AT [place] Respiratory Therapy Evaluation   Current Order:  QID duoneb    Home Regimen:QID     Ordering Physician: Renetta Coppola  Re-evaluation Date:  10/29   Diagnosis:COPD   Patient Status: Stable / Unstable + Physician notified    The following MDI Criteria must be met in order to convert aerosol to MDI with spacer. If unable to meet, MDI will be converted to aerosol:  []  Patient able to demonstrate the ability to use MDI effectively  []  Patient alert and cooperative  []  Patient able to take deep breath with 5-10 second hold  []  Medication(s) available in this delivery method   []  Peak flow greater than or equal to 200 ml/min            Current Order Substituted To  (same drug, same frequency)   Aerosol to MDI [] Albuterol Sulfate 0.083% unit dose by aerosol Albuterol Sulfate MDI 2 puffs by inhalation with spacer    [] Levalbuterol 1.25 mg unit dose by aerosol Levalbuterol MDI 2 puffs by inhalation with spacer    [] Levalbuterol 0.63 mg unit dose by aerosol Levalbuterol MDI 2 puffs by inhalation with spacer    [] Ipratropium Bromide 0.02% unit dose by aerosol Ipratropium Bromide MDI 2 puffs by inhalation with spacer    [] Duoneb (Ipratropium + Albuterol) unit dose by aerosol Ipratropium MDI + Albuterol MDI 2 puffs by inhalation w/spacer   MDI to Aerosol [] Albuterol Sulfate MDI Albuterol Sulfate 0.083% unit dose by aerosol    [] Levalbuterol MDI 2 puffs by inhalation Levalbuterol 1.25 mg unit dose by aerosol    [] Ipratropium Bromide MDI by inhalation Ipratropium Bromide 0.02% unit dose by aerosol    [] Combivent (Ipratropium + Albuterol) MDI by inhalation Duoneb (Ipratropium + Albuterol) unit dose by aerosol   Treatment Assessment [Frequency/Schedule]:  Change frequency to: ___QID home freq._______________________________________________per Protocol, P&T, MEC      Points 0 1 2 3 4   Pulmonary Status  Non-Smoker  []   Smoking history   < 20 pack years  []   Smoking history  ?  20 pack years  []   Pulmonary

## 2017-10-26 NOTE — PROGRESS NOTES
baseline cr 1.4-1.6. Mild BRIGIDA peak cr about 1.9.  On lasix at home.  Has sig anemia.  Being seen by hematology. off ivf and on lasix 3 x/ week. will sign off. F/u as outpt. - DC'd nephrotoxic medications  - Continue to monitor     Pneumonia: As per CXR. SOB with O2 down to 90%. Reports coughing up green phlegm. - Sputum culture  - Supplemental O2  - Pulm consulted. Per Dr. Debra Cardoso, acute on chronic hypoxemic and hypercarbic respiratory failure, possible aspiration pneumonia continue empiric treatment and follow-up x-ray. Cristhian further recs  - IV Levaquin continued  - Continue to monitor     DVT of left upper extremity  - As mentioned above, Cristhian hem/onc input on further treatment at this time given the anemia above  -Venous ultrasound of left upper extremity done, results are pending     Endometrial cancer  - receiving chemoradiation, oncology is consulted and following as above     Diabetes type 2  - Insulin on sliding scale     UDAY  - Continue BiPAP    Stage III Sacral Decubitus Ulcer: Likely require debridement  - Wound nurse consulted and following  - Consult Gen Surgery, Dr. Jt Winters, cristhian recs     CAD  CHF     Dispo: Await consultant clearance prior to D/C. Advised F/U with PCP 1 - 2 days of discharge. SUBJECTIVE  CHIEF COMPLAINT: SOB; fatigue    PRIMARY SERVICE: Medicine    INTERVAL HPI:  No new complaints, decreased appetite. Otherwise feels the same.      MEDICATIONS:    Current Facility-Administered Medications   Medication Dose Route Frequency Provider Last Rate Last Dose    acetaminophen (TYLENOL) tablet 650 mg  650 mg Oral Q4H PRN Sugey Mix MD        ondansetron (ZOFRAN) injection 4 mg  4 mg Intravenous Q6H PRN Sugey Mix MD        albuterol (PROVENTIL) nebulizer solution 2.5 mg  2.5 mg Nebulization Q2H PRN Sugey Mix MD        ipratropium-albuterol (DUONEB) nebulizer solution 1 ampule  1 ampule Inhalation 4x daily Sugey Mix MD   1 ampule at 10/26/17 0739    levofloxacin (LEVAQUIN) 750 MG/150ML infusion 750 mg  750 mg Intravenous Q24H Bri Mon MD   Stopped at 10/25/17 1634    furosemide (LASIX) tablet 20 mg  20 mg Oral Once per day on Mon Wed Fri Ruffus Prey Zee Enamorado MD   20 mg at 10/25/17 1417    guaiFENesin-dextromethorphan (ROBITUSSIN DM) 100-10 MG/5ML syrup 5 mL  5 mL Oral Q4H PRN Bri oMn MD        metoprolol succinate (TOPROL XL) extended release tablet 25 mg  25 mg Oral BID Oz Anthony MD   25 mg at 10/25/17 2005    sodium chloride flush 0.9 % injection 10 mL  10 mL Intravenous 2 times per day Pau Torres PA-C   10 mL at 10/25/17 2302    sodium chloride flush 0.9 % injection 10 mL  10 mL Intravenous PRN Pau Torres PA-C        folic acid (FOLVITE) tablet 1 mg  1 mg Oral Daily Maureen Andujar MD   1 mg at 10/25/17 1059    lactulose (CHRONULAC) 10 GM/15ML solution 13.3333 g  20 mL Oral Daily Maureen Andujar MD   13.3333 g at 10/25/17 1100    insulin glargine (LANTUS) injection vial 8 Units  8 Units Subcutaneous Nightly Maureen Andujar MD   8 Units at 10/24/17 2252    cetirizine (ZYRTEC) tablet 5 mg  5 mg Oral Daily Maureen Andujar MD   5 mg at 10/25/17 1059    pantoprazole (PROTONIX) tablet 40 mg  40 mg Oral Daily Maureen Andujar MD   40 mg at 10/25/17 1059    simethicone (MYLICON) chewable tablet 80 mg  80 mg Oral Q6H PRN Maureen Andujar MD        aspirin chewable tablet 81 mg  81 mg Oral Daily Maureen Andujar MD   81 mg at 10/25/17 1059    isosorbide mononitrate (IMDUR) extended release tablet 30 mg  30 mg Oral Daily Maureen Andujar MD   30 mg at 10/25/17 1059    magnesium oxide (MAG-OX) tablet 400 mg  400 mg Oral Daily Maureen Andujar MD   400 mg at 10/25/17 1059    allopurinol (ZYLOPRIM) tablet 100 mg  100 mg Oral Daily Maureen Andujar MD   100 mg at 10/25/17 1059    docusate sodium (COLACE) capsule 100 mg  100 mg Oral BID PRN Maureen Andujar MD        levothyroxine

## 2017-10-27 ENCOUNTER — APPOINTMENT (OUTPATIENT)
Dept: GENERAL RADIOLOGY | Age: 77
DRG: 802 | End: 2017-10-27
Payer: COMMERCIAL

## 2017-10-27 LAB
ABO/RH: NORMAL
ANION GAP SERPL CALCULATED.3IONS-SCNC: 9 MEQ/L (ref 7–13)
ANTIBODY SCREEN: NORMAL
BASOPHILS ABSOLUTE: 0.1 K/UL (ref 0–0.2)
BASOPHILS RELATIVE PERCENT: 0.6 %
BLOOD BANK DISPENSE STATUS: NORMAL
BLOOD BANK PRODUCT CODE: NORMAL
BPU ID: NORMAL
BUN BLDV-MCNC: 35 MG/DL (ref 8–23)
CALCIUM SERPL-MCNC: 8.2 MG/DL (ref 8.6–10.2)
CHLORIDE BLD-SCNC: 103 MEQ/L (ref 98–107)
CO2: 27 MEQ/L (ref 22–29)
CREAT SERPL-MCNC: 1.54 MG/DL (ref 0.5–0.9)
DESCRIPTION BLOOD BANK: NORMAL
EOSINOPHILS ABSOLUTE: 0 K/UL (ref 0–0.7)
EOSINOPHILS RELATIVE PERCENT: 0.2 %
GFR AFRICAN AMERICAN: 39.2
GFR NON-AFRICAN AMERICAN: 32.4
GLUCOSE BLD-MCNC: 100 MG/DL (ref 60–115)
GLUCOSE BLD-MCNC: 56 MG/DL (ref 74–109)
GLUCOSE BLD-MCNC: 71 MG/DL (ref 60–115)
GLUCOSE BLD-MCNC: 73 MG/DL (ref 60–115)
GLUCOSE BLD-MCNC: 77 MG/DL (ref 60–115)
GLUCOSE BLD-MCNC: 80 MG/DL (ref 60–115)
GLUCOSE BLD-MCNC: 88 MG/DL (ref 60–115)
HCT VFR BLD CALC: 22.2 % (ref 37–47)
HEMOGLOBIN: 7 G/DL (ref 12–16)
LYMPHOCYTES ABSOLUTE: 2.2 K/UL (ref 1–4.8)
LYMPHOCYTES RELATIVE PERCENT: 18.5 %
MAGNESIUM: 2 MG/DL (ref 1.7–2.3)
MCH RBC QN AUTO: 29.9 PG (ref 27–31.3)
MCHC RBC AUTO-ENTMCNC: 31.5 % (ref 33–37)
MCV RBC AUTO: 94.9 FL (ref 82–100)
MONOCYTES ABSOLUTE: 1.6 K/UL (ref 0.2–0.8)
MONOCYTES RELATIVE PERCENT: 13.6 %
NEUTROPHILS ABSOLUTE: 7.8 K/UL (ref 1.4–6.5)
NEUTROPHILS RELATIVE PERCENT: 67.1 %
PDW BLD-RTO: 17.2 % (ref 11.5–14.5)
PERFORMED ON: NORMAL
PLATELET # BLD: 243 K/UL (ref 130–400)
POTASSIUM SERPL-SCNC: 4.2 MEQ/L (ref 3.5–5.1)
RBC # BLD: 2.34 M/UL (ref 4.2–5.4)
SODIUM BLD-SCNC: 139 MEQ/L (ref 132–144)
WBC # BLD: 11.6 K/UL (ref 4.8–10.8)

## 2017-10-27 PROCEDURE — 86900 BLOOD TYPING SEROLOGIC ABO: CPT

## 2017-10-27 PROCEDURE — 85025 COMPLETE CBC W/AUTO DIFF WBC: CPT

## 2017-10-27 PROCEDURE — 2580000003 HC RX 258: Performed by: INTERNAL MEDICINE

## 2017-10-27 PROCEDURE — 6370000000 HC RX 637 (ALT 250 FOR IP): Performed by: INTERNAL MEDICINE

## 2017-10-27 PROCEDURE — 2700000000 HC OXYGEN THERAPY PER DAY

## 2017-10-27 PROCEDURE — 1210000000 HC MED SURG R&B

## 2017-10-27 PROCEDURE — 86901 BLOOD TYPING SEROLOGIC RH(D): CPT

## 2017-10-27 PROCEDURE — 92526 ORAL FUNCTION THERAPY: CPT

## 2017-10-27 PROCEDURE — 94660 CPAP INITIATION&MGMT: CPT

## 2017-10-27 PROCEDURE — 71010 XR CHEST PORTABLE: CPT

## 2017-10-27 PROCEDURE — 86923 COMPATIBILITY TEST ELECTRIC: CPT

## 2017-10-27 PROCEDURE — 6360000002 HC RX W HCPCS: Performed by: HOSPITALIST

## 2017-10-27 PROCEDURE — 83735 ASSAY OF MAGNESIUM: CPT

## 2017-10-27 PROCEDURE — 36592 COLLECT BLOOD FROM PICC: CPT

## 2017-10-27 PROCEDURE — 99232 SBSQ HOSP IP/OBS MODERATE 35: CPT | Performed by: INTERNAL MEDICINE

## 2017-10-27 PROCEDURE — 6370000000 HC RX 637 (ALT 250 FOR IP): Performed by: HOSPITALIST

## 2017-10-27 PROCEDURE — 88311 DECALCIFY TISSUE: CPT

## 2017-10-27 PROCEDURE — 36430 TRANSFUSION BLD/BLD COMPNT: CPT

## 2017-10-27 PROCEDURE — 94640 AIRWAY INHALATION TREATMENT: CPT

## 2017-10-27 PROCEDURE — 2580000003 HC RX 258: Performed by: HOSPITALIST

## 2017-10-27 PROCEDURE — 88313 SPECIAL STAINS GROUP 2: CPT

## 2017-10-27 PROCEDURE — 86850 RBC ANTIBODY SCREEN: CPT

## 2017-10-27 PROCEDURE — 88305 TISSUE EXAM BY PATHOLOGIST: CPT

## 2017-10-27 PROCEDURE — P9016 RBC LEUKOCYTES REDUCED: HCPCS

## 2017-10-27 PROCEDURE — 80048 BASIC METABOLIC PNL TOTAL CA: CPT

## 2017-10-27 RX ORDER — 0.9 % SODIUM CHLORIDE 0.9 %
250 INTRAVENOUS SOLUTION INTRAVENOUS ONCE
Status: COMPLETED | OUTPATIENT
Start: 2017-10-27 | End: 2017-10-27

## 2017-10-27 RX ORDER — MORPHINE SULFATE 2 MG/ML
2 INJECTION, SOLUTION INTRAMUSCULAR; INTRAVENOUS EVERY 4 HOURS PRN
Status: DISCONTINUED | OUTPATIENT
Start: 2017-10-27 | End: 2017-10-28

## 2017-10-27 RX ADMIN — FOLIC ACID 1 MG: 1 TABLET ORAL at 11:54

## 2017-10-27 RX ADMIN — ALLOPURINOL 100 MG: 100 TABLET ORAL at 11:55

## 2017-10-27 RX ADMIN — CETIRIZINE HYDROCHLORIDE 5 MG: 10 TABLET, FILM COATED ORAL at 11:54

## 2017-10-27 RX ADMIN — SODIUM CHLORIDE, PRESERVATIVE FREE 10 ML: 5 INJECTION INTRAVENOUS at 12:05

## 2017-10-27 RX ADMIN — SODIUM CHLORIDE 250 ML: 9 INJECTION, SOLUTION INTRAVENOUS at 17:00

## 2017-10-27 RX ADMIN — OXYBUTYNIN CHLORIDE 5 MG: 5 TABLET ORAL at 20:14

## 2017-10-27 RX ADMIN — LACTULOSE 13.33 G: 20 SOLUTION ORAL at 11:53

## 2017-10-27 RX ADMIN — Medication 10 ML: at 12:05

## 2017-10-27 RX ADMIN — HYPROMELLOSE 2 DROP: 4 SOLUTION/ DROPS OPHTHALMIC at 20:14

## 2017-10-27 RX ADMIN — FUROSEMIDE 20 MG: 20 TABLET ORAL at 11:53

## 2017-10-27 RX ADMIN — DOCUSATE SODIUM 100 MG: 100 CAPSULE, LIQUID FILLED ORAL at 20:14

## 2017-10-27 RX ADMIN — GABAPENTIN 400 MG: 400 CAPSULE ORAL at 20:14

## 2017-10-27 RX ADMIN — ASPIRIN 81 MG 81 MG: 81 TABLET ORAL at 11:55

## 2017-10-27 RX ADMIN — LEVOTHYROXINE SODIUM 200 MCG: 200 TABLET ORAL at 06:12

## 2017-10-27 RX ADMIN — SODIUM CHLORIDE, PRESERVATIVE FREE 10 ML: 5 INJECTION INTRAVENOUS at 20:26

## 2017-10-27 RX ADMIN — IPRATROPIUM BROMIDE AND ALBUTEROL SULFATE 1 AMPULE: 2.5; .5 SOLUTION RESPIRATORY (INHALATION) at 12:33

## 2017-10-27 RX ADMIN — ATORVASTATIN CALCIUM 40 MG: 40 TABLET, FILM COATED ORAL at 20:14

## 2017-10-27 RX ADMIN — ISOSORBIDE MONONITRATE 30 MG: 30 TABLET, EXTENDED RELEASE ORAL at 11:55

## 2017-10-27 RX ADMIN — HYPROMELLOSE 2 DROP: 4 SOLUTION/ DROPS OPHTHALMIC at 12:05

## 2017-10-27 RX ADMIN — METOPROLOL SUCCINATE 25 MG: 25 TABLET, FILM COATED, EXTENDED RELEASE ORAL at 11:55

## 2017-10-27 RX ADMIN — PANTOPRAZOLE SODIUM 40 MG: 40 TABLET, DELAYED RELEASE ORAL at 11:55

## 2017-10-27 RX ADMIN — IPRATROPIUM BROMIDE AND ALBUTEROL SULFATE 1 AMPULE: 2.5; .5 SOLUTION RESPIRATORY (INHALATION) at 16:46

## 2017-10-27 RX ADMIN — IPRATROPIUM BROMIDE AND ALBUTEROL SULFATE 1 AMPULE: 2.5; .5 SOLUTION RESPIRATORY (INHALATION) at 08:18

## 2017-10-27 RX ADMIN — Medication 10 ML: at 20:14

## 2017-10-27 RX ADMIN — IPRATROPIUM BROMIDE AND ALBUTEROL SULFATE 1 AMPULE: 2.5; .5 SOLUTION RESPIRATORY (INHALATION) at 19:49

## 2017-10-27 RX ADMIN — DOCUSATE SODIUM 100 MG: 100 CAPSULE, LIQUID FILLED ORAL at 11:54

## 2017-10-27 RX ADMIN — LEVOFLOXACIN 750 MG: 5 INJECTION, SOLUTION INTRAVENOUS at 11:52

## 2017-10-27 RX ADMIN — GABAPENTIN 400 MG: 400 CAPSULE ORAL at 11:54

## 2017-10-27 RX ADMIN — Medication 400 MG: at 11:54

## 2017-10-27 NOTE — PROGRESS NOTES
INPATIENT PROGRESS NOTE    SERVICE DATE:  10/27/2017   SERVICE TIME:  10:37 am    ASSESSMENT AND PLAN   68-year-old female with past medical history of endometrial cancer, coronary artery disease, chronic kidney disease stage III, diabetes type 2. Sent from nursing facility due to acute and chronic anemia, A. fib with RVR, and acute kidney injury and chronic kidney disease stage III.     Acute on chronic anemia: Multifactorial. Down to 6.3 on admission, now 7.0 s/p 2 units PRBC's 2 days ago. - Patient was recently on Coumadin, due to DVT of the left upper extremity, this was D/C'd by Hem/Onc reporting no longer needed. Use warm compresses instead. - Stool for occult blood ordered  - Anticoagulations held, defer to Hem/Onc for recs on restarting  - Hematology has been consulted. Per Dr. Lenka Hoyos, Anemia is due to multifactorial causes. Anemia, rule out myelodysplastic syndrome and suggested bone marrow biopsy, completed yesterday. Previous iron studies 8/2017 showed iron deficiency. Renal insufficiency is a contributing factor, Being on anticoagulation is another factor. History of uterine cancer s/p radiation therapy 2017. Check, ERVIN, retic count, Ca 125.  She would benfit from parenteral iron as well. Hemoglobin today at 7. Has a blood blister in left forearm. She can be discharge when ready and will follow up in our office in 1 month. - hgb is at 7.0 today. Discussed with hem/onc. Will transfuse an additional unit of PRBC today. - Continue to monitor     Atrial fibrillation with rapid ventricular rate: Rate to the 120's  - Patient placed on telemetry  - Cardiology is consulted. Per Dr. Ruthie Armendariz, AF with RVR in setting of acute blood loss anemia, with improvement following transfusion. VHD with moderately severe AS, no signs HF. BRIGIDA on CKD. Acute blood loss anemia s/p transfusion. Continue rate control with metoprolol, add prn IV doses for HR greater than 125.  Supportive care including hydration, tablet 80 mg  80 mg Oral Q6H PRN Yaz Mijares MD        aspirin chewable tablet 81 mg  81 mg Oral Daily Yaz Mijares MD   81 mg at 10/25/17 1059    isosorbide mononitrate (IMDUR) extended release tablet 30 mg  30 mg Oral Daily Yaz Mijares MD   30 mg at 10/25/17 1059    magnesium oxide (MAG-OX) tablet 400 mg  400 mg Oral Daily Yaz Mijares MD   400 mg at 10/25/17 1059    allopurinol (ZYLOPRIM) tablet 100 mg  100 mg Oral Daily Yaz Mijares MD   100 mg at 10/25/17 1059    docusate sodium (COLACE) capsule 100 mg  100 mg Oral BID PRN Yaz Mijares MD        levothyroxine (SYNTHROID) tablet 200 mcg  200 mcg Oral Daily Yaz Mijares MD   200 mcg at 10/27/17 0612    hypromellose 0.4 % ophthalmic solution SOLN 2 drop  2 drop Both Eyes BID Yaz Mijares MD   2 drop at 10/26/17 2016    atorvastatin (LIPITOR) tablet 40 mg  40 mg Oral Nightly Yaz Mijares MD   40 mg at 10/26/17 2014    oxybutynin (DITROPAN) tablet 5 mg  5 mg Oral Nightly Yaz Mijares MD   5 mg at 10/26/17 2014    gabapentin (NEURONTIN) capsule 400 mg  400 mg Oral BID Yaz Mijares MD   400 mg at 10/26/17 2014    sodium chloride flush 0.9 % injection 10 mL  10 mL Intravenous 2 times per day Yaz Mijares MD   10 mL at 10/23/17 2203    sodium chloride flush 0.9 % injection 10 mL  10 mL Intravenous PRN Yaz Mijares MD        magnesium hydroxide (MILK OF MAGNESIA) 400 MG/5ML suspension 30 mL  30 mL Oral Daily PRN Yaz Mijares MD        [facility] injection 4 mg  4 mg Intravenous Q6H PRN Yaz Mijares MD        sodium chloride flush 0.9 % injection 10 mL  10 mL Intravenous 2 times per day Claribel Clifton MD   10 mL at 10/26/17 2014    sodium chloride flush 0.9 % injection 10 mL  10 mL Intravenous PRN Claribel Clifton MD        docusate sodium (COLACE) capsule 100 mg  100 mg Oral BID Claribel Clifton MD   100 mg at 10/26/17 2014    glucose

## 2017-10-27 NOTE — PROGRESS NOTES
Abi's breath sounds were diminished especially in the left lower lobe. Rattling and gurgling sounds noted in the back of her throat. When I asked her to cough, she was able to produce a spontaneous cough, but unable to clear any of the secretions in the back of her throat. I orally and nasotracheal suctioned Pravin Eloina without incident for a moderate amount of white result. The secretions were thick, but not tenacious.

## 2017-10-27 NOTE — FLOWSHEET NOTE
Assumed care of pt along with ROZ pritchard, pt is a 1:1 so will monitor pt at bedside, vitals as of now are bp 98/44, 98hr, temp 99.5, res 20, o2 is 100% on 2ltrs. Pt sleeping at the moment. Will continue to monitor pt at bedside.

## 2017-10-27 NOTE — PROGRESS NOTES
INPATIENT PROGRESS NOTES    PATIENT NAME: Brian Ortega  MRN: [id number]  SERVICE DATE:  October 27, 2017   SERVICE TIME:  10:20 AM      PRIMARY SERVICE:  Pulmonary Medicine     INTERVAL HPI: Patient seen and examined at bedside, Interval Notes, orders reviewed. Nursing notes noted: Patient reports feeling about the same as yesterday. She appears somewhat sleepy this morning but is alert, oriented and responsive to questions. She has a cough but denies any chest pain and worsening SOB. Repeat CXR this morning shows no significant change from prior CXR. Patient is able to tolerate BiPAP as advised. SUBJECTIVE    CHIEF COMPLAINT:     Review of Systems  Please see HPI above. OBJECTIVE    There is no height or weight on file to calculate BMI. PHYSICAL EXAM:  Vitals:  BP 87/72   Pulse 105   Temp 99.9 °F (37.7 °C) (Oral)   Resp 18   SpO2 93%   General: Patient is comfortable in bed, No distress. She is alert and oriented to questions  Head: Atraumatic , Normocephalic   Eyes: PERRL. No sclera icterus. No conjunctival injection. No discharge   ENT: No nasal  discharge. Pharynx clear. Neck:  Trachea midline. No thyromegaly, no JVD, No cervical adenopathy. Resp : Diminished breath sounds at the bases with poor inspiratory effort and scattered rhonchi at the bases. There are no wheezing or rales noted. CV: Normal  rate. Regular rhythm. No mumur ,  Rub or gallop  ABD: Non-tender. Non-distended. No masses. No organmegaly. Normal bowel sounds. No hernia. EXT: Trace bilateral Pitting, No Cyanosis No clubbing  Neuro: no focal weakness  Skin: Warm and dry. No erythema rash on exposed extremities.         DATA:   Recent Labs      10/26/17   1304  10/27/17   0600   WBC  10.0  11.6*   HGB  7.4*  7.0*   HCT  23.3*  22.2*   MCV  95.4  94.9   PLT  242  243     Recent Labs      10/26/17   0600  10/27/17   0600   NA  142  139   K  4.1  4.2   CL  105  103   CO2  27  27   BUN  35*  35*   CREATININE  1.41*  1.54* GLUCOSE  57*  56*   CALCIUM  7.9*  8.2*   LABGLOM  35.9*  32.4*   GFRAA  43.5*  39.2*         Recent Labs      10/25/17   0250   PHART  7.365   BBS2RUO  50*   PO2ART  80*   ODV5AFL  28.5   BEART  3   [id number]  95*     O2 Device: Nasal cannula  O2 Flow Rate (L/min): 2 L/min  Lab Results   Component Value Date    LACTA 0.8 08/18/2017        MEDICATIONS during current hospitalization:    Continuous Infusions:   dextrose         Scheduled Meds:   ipratropium-albuterol  1 ampule Inhalation 4x daily    levofloxacin  750 mg Intravenous Q24H    furosemide  20 mg Oral Once per day on Mon Wed Fri    metoprolol succinate  25 mg Oral BID    sodium chloride flush  10 mL Intravenous 2 times per day    folic acid  1 mg Oral Daily    lactulose  20 mL Oral Daily    insulin glargine  8 Units Subcutaneous Nightly    cetirizine  5 mg Oral Daily    pantoprazole  40 mg Oral Daily    aspirin  81 mg Oral Daily    isosorbide mononitrate  30 mg Oral Daily    magnesium oxide  400 mg Oral Daily    allopurinol  100 mg Oral Daily    levothyroxine  200 mcg Oral Daily    hypromellose  2 drop Both Eyes BID    atorvastatin  40 mg Oral Nightly    oxybutynin  5 mg Oral Nightly    gabapentin  400 mg Oral BID    sodium chloride flush  10 mL Intravenous 2 times per day    sodium chloride flush  10 mL Intravenous 2 times per day    docusate sodium  100 mg Oral BID    pneumococcal 13-valent conjugate  0.5 mL Intramuscular Once       PRN Meds:heparin flush, acetaminophen, ondansetron, albuterol, guaiFENesin-dextromethorphan, sodium chloride flush, simethicone, docusate sodium, sodium chloride flush, magnesium hydroxide, ondansetron, sodium chloride flush, glucose, dextrose, glucagon (rDNA), dextrose    Radiology  Xr Chest Portable    Result Date: 10/25/2017  EXAMINATION: XR CHEST PORTABLE REASON FOR EXAM: Shortness of breath.  FINDINGS: Frontal view of the chest reveals inspiration somewhat shallow related to portable technique and positioning. There is a PICC catheter now in place in satisfactory position since previous studies. Opacification at the left base related to volume loss and pleural thickening is present. Increase infiltrate in the left perihilar region more prominent from previous studies and developing pneumonia superimposed on chronic disease should BE considered. Central pulmonary vascular congestion is present. Scarring in the right mid and lower lung field remain unchanged. MILD CENTRAL VASCULAR CONGESTION. ACUTE PNEUMONIA SUPERIMPOSED ON CHRONIC DISEASE LEFT BASE. ASSESSMENT / Plan:  1. Acute on chronic hypercarbic and hypoxemic respiratory failure  2. Possible aspiration pneumonia, CXR today appears stable with no acute change from prior  3. Acute on chronic anemia  4. Congestive heart failure    CXR today remains stable. Patient is doing well on empiric antibiotic treatment. Will continue current treatment plan. Patient remains stable from the pulmonary standpoint.      Electronically signed by Christine Waller PA-C on 10/27/2017 at 10:20 AM

## 2017-10-27 NOTE — PROGRESS NOTES
Time: 10/27/17  6:00 AM   Result Value Ref Range    Magnesium 2.0 1.7 - 2.3 mg/dL   Basic Metabolic Panel    Collection Time: 10/27/17  6:00 AM   Result Value Ref Range    Sodium 139 132 - 144 mEq/L    Potassium 4.2 3.5 - 5.1 mEq/L    Chloride 103 98 - 107 mEq/L    CO2 27 22 - 29 mEq/L    Anion Gap 9 7 - 13 mEq/L    Glucose 56 (L) 74 - 109 mg/dL    BUN 35 (H) 8 - 23 mg/dL    CREATININE 1.54 (H) 0.50 - 0.90 mg/dL    GFR Non- 32.4 (L) >60    GFR  39.2 (L) >60    Calcium 8.2 (L) 8.6 - 10.2 mg/dL   CBC Auto Differential    Collection Time: 10/27/17  6:00 AM   Result Value Ref Range    WBC 11.6 (H) 4.8 - 10.8 K/uL    RBC 2.34 (L) 4.20 - 5.40 M/uL    Hemoglobin 7.0 (LL) 12.0 - 16.0 g/dL    Hematocrit 22.2 (L) 37.0 - 47.0 %    MCV 94.9 82.0 - 100.0 fL    MCH 29.9 27.0 - 31.3 pg    MCHC 31.5 (L) 33.0 - 37.0 %    RDW 17.2 (H) 11.5 - 14.5 %    Platelets 593 725 - 037 K/uL    Neutrophils % 67.1 %    Lymphocytes % 18.5 %    Monocytes % 13.6 %    Eosinophils % 0.2 %    Basophils % 0.6 %    Neutrophils # 7.8 (H) 1.4 - 6.5 K/uL    Lymphocytes # 2.2 1.0 - 4.8 K/uL    Monocytes # 1.6 (H) 0.2 - 0.8 K/uL    Eosinophils # 0.0 0.0 - 0.7 K/uL    Basophils # 0.1 0.0 - 0.2 K/uL   POCT Glucose    Collection Time: 10/27/17  7:05 AM   Result Value Ref Range    POC Glucose 71 60 - 115 mg/dl    Performed on ACCU-CHEK    POCT Glucose    Collection Time: 10/27/17  7:40 AM   Result Value Ref Range    POC Glucose 73 60 - 115 mg/dl    Performed on ACCU-CHEK    POCT Glucose    Collection Time: 10/27/17 11:18 AM   Result Value Ref Range    POC Glucose 100 60 - 115 mg/dl    Performed on ACCU-CHEK      CXR:  Infiltrate/consolidation of the mid to basilar left lung,      Jeff James MD ,[address] [place] [address] Cardiology

## 2017-10-27 NOTE — PROGRESS NOTES
10/25/17 1100    insulin glargine (LANTUS) injection vial 8 Units  8 Units Subcutaneous Nightly Carlos Manuel Rodriguez MD   8 Units at 10/26/17 2315    cetirizine (ZYRTEC) tablet 5 mg  5 mg Oral Daily Carlos Manuel Rodriguez MD   5 mg at 10/25/17 1059    pantoprazole (PROTONIX) tablet 40 mg  40 mg Oral Daily Carlos Manuel Rodriguez MD   40 mg at 10/25/17 1059    simethicone (MYLICON) chewable tablet 80 mg  80 mg Oral Q6H PRN Carlos Manuel Rodriguez MD        aspirin chewable tablet 81 mg  81 mg Oral Daily Carlos Manuel Rodriguez MD   81 mg at 10/25/17 1059    isosorbide mononitrate (IMDUR) extended release tablet 30 mg  30 mg Oral Daily Carlos Manuel Rodriguez MD   30 mg at 10/25/17 1059    magnesium oxide (MAG-OX) tablet 400 mg  400 mg Oral Daily Carlos Manuel Rodriguez MD   400 mg at 10/25/17 1059    allopurinol (ZYLOPRIM) tablet 100 mg  100 mg Oral Daily Carlos Manuel Rodriguez MD   100 mg at 10/25/17 1059    docusate sodium (COLACE) capsule 100 mg  100 mg Oral BID PRN Carlos Manuel Rodriguez MD        levothyroxine (SYNTHROID) tablet 200 mcg  200 mcg Oral Daily Carlos Manuel Rodriguez MD   200 mcg at 10/27/17 0612    hypromellose 0.4 % ophthalmic solution SOLN 2 drop  2 drop Both Eyes BID Carlos Manuel Rodriguez MD   2 drop at 10/26/17 2016    atorvastatin (LIPITOR) tablet 40 mg  40 mg Oral Nightly Carlos Manuel Rodriguez MD   40 mg at 10/26/17 2014    oxybutynin (DITROPAN) tablet 5 mg  5 mg Oral Nightly Carlos Manuel Rodriguez MD   5 mg at 10/26/17 2014    gabapentin (NEURONTIN) capsule 400 mg  400 mg Oral BID Carlos Manuel Rodriguez MD   400 mg at 10/26/17 2014    sodium chloride flush 0.9 % injection 10 mL  10 mL Intravenous 2 times per day Carlos Manuel Rodriguez MD   10 mL at 10/23/17 2203    sodium chloride flush 0.9 % injection 10 mL  10 mL Intravenous PRN Carlos Manuel Rodriguez MD        magnesium hydroxide (MILK OF MAGNESIA) 400 MG/5ML suspension 30 mL  30 mL Oral Daily PRN Carlos Manuel Rodriguez MD        ondansetron [facility]) injection 4 mg  4 mg arm    NEURO:    DATA:      PT/INR:  No results found for: PTINR  PTT:    Lab Results   Component Value Date    APTT 57.8 10/23/2017     CMP:    Lab Results   Component Value Date     10/27/2017    K 4.2 10/27/2017     10/27/2017    CO2 27 10/27/2017    BUN 35 10/27/2017    PROT 5.4 10/23/2017     Magnesium:    Lab Results   Component Value Date    MG 2.0 10/27/2017     Phosphorus:  No components found for: PO4  Calcium:  No components found for: CA  CBC:    Lab Results   Component Value Date    WBC 11.6 10/27/2017    RBC 2.34 10/27/2017    HGB 7.0 10/27/2017    HCT 22.2 10/27/2017    MCV 94.9 10/27/2017    RDW 17.2 10/27/2017     10/27/2017     DIFF:    Lab Results   Component Value Date    MCV 94.9 10/27/2017    RDW 17.2 10/27/2017      LDH:  No results found for: LDH  Uric Acid:  No components found for: URIC    EKG Reviewed  Appropriate Radiology Reviewed      Pathology: Reviewed where indicated      ASSESSMENT:  Principal Problem:    Acute hemolytic anemia ([address]  Active Problems:    Atrial fibrillation with RVR ([facility]    BRIGIDA (acute kidney injury) ([facility]    Patient Active Problem List   Diagnosis    Chronic obstructive pulmonary disease with acute exacerbation ([address]    Type 2 diabetes mellitus ([address]    Anemia    Metabolic encephalopathy    Acute on chronic diastolic congestive heart failure (HCC)    Hypernatremia    Obesity    Hemiparesis affecting left side as late effect of stroke (HCC)    Atrial fibrillation (HCC)    Acute on chronic respiratory failure with hypoxia and hypercapnia (HCC)    Cerebral infarct ([address]    Hospice Care    Atrial fibrillation with RVR (HCC)    Acute hemolytic anemia (HCC)    BRIGIDA (acute kidney injury) ([address]       PLAN:  Ok to discharge from oncology.           Electronically signed by Lilli Connelly MD on 10/27/17 at 9:50 AM

## 2017-10-27 NOTE — PROGRESS NOTES
Patient has ulcer on sacrum. Wound care nurse [place] in to see and access her. Also, left lower arm has blood blister, where a previous cut had been per patient. Dressed with xeroform and wrapped. Patient has large ecchymosis on right side, per patient and patient's daughter this happened at [facility] while using the mikel. Bedside bone marrow biopsy was done by Dr. Adam Pepe, patient tolerated well. After procedure VS stable, and patient was repositioned to her comfort.

## 2017-10-27 NOTE — PROGRESS NOTES
Spent about 1 hour with patient and family. During this time Dr. Cathy Humphrey updated family and plan. Helped bathe patient completely with soap and wash clothes. Changed bed linens with PCA Tonny. Turned patient on Left side with pillow support. Cleansed left arm blood blister with saline. Applied warm compresses to left upper arm.  Nurse Gopi Palacios administering medications and requesting different pain medication per family request. Electronically signed by Bettina Estrada RN on 10/27/17 at 12:12 PM

## 2017-10-28 LAB
% CD 3 POS. LYMPH.: 84 %
% CD10: <1 %
% CD11B: 3 %
% CD13: 2 %
% CD16: 11 %
% CD20: 1 %
% CD33: 2 %
% CD34: <1 %
% CD38: NORMAL %
% CD45: NORMAL %
% CD4: 37 %
% CD56: 11 %
% CD5: 86 %
% CD64: 1 %
% CD7: 89 %
% CYTO KAPPA: NORMAL %
% CYTO LAMBDA: NORMAL %
% KAPPA: NORMAL %
% LAMBDA: NORMAL %
ALBUMIN SERPL-MCNC: 1.9 G/DL (ref 3.9–4.9)
ALP BLD-CCNC: 119 U/L (ref 40–130)
ALT SERPL-CCNC: 13 U/L (ref 0–33)
ANION GAP SERPL CALCULATED.3IONS-SCNC: 11 MEQ/L (ref 7–13)
AST SERPL-CCNC: 23 U/L (ref 0–35)
BASOPHILS ABSOLUTE: 0.1 K/UL (ref 0–0.2)
BASOPHILS RELATIVE PERCENT: 1 %
BILIRUB SERPL-MCNC: 0.6 MG/DL (ref 0–1.2)
BUN BLDV-MCNC: 37 MG/DL (ref 8–23)
C-REACTIVE PROTEIN, HIGH SENSITIVITY: 234.8 MG/L (ref 0–5)
CALCIUM SERPL-MCNC: 8.2 MG/DL (ref 8.6–10.2)
CD19 %: 2 %
CD8 % POS LYMPH: 49 %
CHLORIDE BLD-SCNC: 103 MEQ/L (ref 98–107)
CO2: 27 MEQ/L (ref 22–29)
CREAT SERPL-MCNC: 1.58 MG/DL (ref 0.5–0.9)
EOSINOPHILS ABSOLUTE: 0 K/UL (ref 0–0.7)
EOSINOPHILS RELATIVE PERCENT: 0.2 %
GFR AFRICAN AMERICAN: 38.1
GFR NON-AFRICAN AMERICAN: 31.5
GLOBULIN: 2.6 G/DL (ref 2.3–3.5)
GLUCOSE BLD-MCNC: 109 MG/DL (ref 60–115)
GLUCOSE BLD-MCNC: 62 MG/DL (ref 74–109)
GLUCOSE BLD-MCNC: 64 MG/DL (ref 60–115)
GLUCOSE BLD-MCNC: 66 MG/DL (ref 60–115)
HCT VFR BLD CALC: 24.2 % (ref 37–47)
HEMOGLOBIN: 8.2 G/DL (ref 12–16)
INTERPRETATION: NORMAL
LEUKEMIA/LYMPHOMA PHENOTYPE: 94 %
LYMPHOCYTES ABSOLUTE: 1.4 K/UL (ref 1–4.8)
LYMPHOCYTES RELATIVE PERCENT: 14.3 %
MAGNESIUM: 2.1 MG/DL (ref 1.7–2.3)
MCH RBC QN AUTO: 30.8 PG (ref 27–31.3)
MCHC RBC AUTO-ENTMCNC: 33.8 % (ref 33–37)
MCV RBC AUTO: 91.1 FL (ref 82–100)
MONOCYTES ABSOLUTE: 1.1 K/UL (ref 0.2–0.8)
MONOCYTES RELATIVE PERCENT: 11.2 %
NEUTROPHILS ABSOLUTE: 7.4 K/UL (ref 1.4–6.5)
NEUTROPHILS RELATIVE PERCENT: 73.3 %
NUMBER OF MARKERS: 22 MARKERS
PDW BLD-RTO: 16.4 % (ref 11.5–14.5)
PERFORMED ON: NORMAL
PLATELET # BLD: 215 K/UL (ref 130–400)
POTASSIUM SERPL-SCNC: 4.3 MEQ/L (ref 3.5–5.1)
PROF LEUK/LYMPH PHENO 16 OR MORE MARKERS: NORMAL
RBC # BLD: 2.66 M/UL (ref 4.2–5.4)
SEDIMENTATION RATE, ERYTHROCYTE: 58 MM (ref 0–30)
SODIUM BLD-SCNC: 141 MEQ/L (ref 132–144)
TECHNICAL LEUK/LYMPH PHENO, 22 MARKERS: NORMAL
TOTAL PROTEIN: 4.5 G/DL (ref 6.4–8.1)
WBC # BLD: 10.2 K/UL (ref 4.8–10.8)

## 2017-10-28 PROCEDURE — 94640 AIRWAY INHALATION TREATMENT: CPT

## 2017-10-28 PROCEDURE — 86141 C-REACTIVE PROTEIN HS: CPT

## 2017-10-28 PROCEDURE — 1210000000 HC MED SURG R&B

## 2017-10-28 PROCEDURE — 93005 ELECTROCARDIOGRAM TRACING: CPT

## 2017-10-28 PROCEDURE — 85652 RBC SED RATE AUTOMATED: CPT

## 2017-10-28 PROCEDURE — 94660 CPAP INITIATION&MGMT: CPT

## 2017-10-28 PROCEDURE — 2580000003 HC RX 258: Performed by: INTERNAL MEDICINE

## 2017-10-28 PROCEDURE — 80053 COMPREHEN METABOLIC PANEL: CPT

## 2017-10-28 PROCEDURE — 83735 ASSAY OF MAGNESIUM: CPT

## 2017-10-28 PROCEDURE — 6370000000 HC RX 637 (ALT 250 FOR IP): Performed by: HOSPITALIST

## 2017-10-28 PROCEDURE — 6370000000 HC RX 637 (ALT 250 FOR IP): Performed by: INTERNAL MEDICINE

## 2017-10-28 PROCEDURE — 2580000003 HC RX 258: Performed by: HOSPITALIST

## 2017-10-28 PROCEDURE — 2580000003 HC RX 258: Performed by: PHYSICIAN ASSISTANT

## 2017-10-28 PROCEDURE — 85025 COMPLETE CBC W/AUTO DIFF WBC: CPT

## 2017-10-28 PROCEDURE — 2700000000 HC OXYGEN THERAPY PER DAY

## 2017-10-28 PROCEDURE — 6360000002 HC RX W HCPCS: Performed by: HOSPITALIST

## 2017-10-28 PROCEDURE — 99232 SBSQ HOSP IP/OBS MODERATE 35: CPT | Performed by: INTERNAL MEDICINE

## 2017-10-28 RX ORDER — INSULIN GLARGINE 100 [IU]/ML
8 INJECTION, SOLUTION SUBCUTANEOUS NIGHTLY
Status: DISCONTINUED | OUTPATIENT
Start: 2017-10-30 | End: 2017-11-01 | Stop reason: HOSPADM

## 2017-10-28 RX ORDER — HYDROCODONE BITARTRATE AND ACETAMINOPHEN 5; 325 MG/1; MG/1
1 TABLET ORAL EVERY 6 HOURS PRN
Status: DISCONTINUED | OUTPATIENT
Start: 2017-10-28 | End: 2017-11-01 | Stop reason: HOSPADM

## 2017-10-28 RX ORDER — GABAPENTIN 400 MG/1
400 CAPSULE ORAL 2 TIMES DAILY
Status: DISCONTINUED | OUTPATIENT
Start: 2017-10-31 | End: 2017-11-01 | Stop reason: HOSPADM

## 2017-10-28 RX ORDER — MORPHINE SULFATE 2 MG/ML
0.5 INJECTION, SOLUTION INTRAMUSCULAR; INTRAVENOUS EVERY 4 HOURS PRN
Status: DISCONTINUED | OUTPATIENT
Start: 2017-10-28 | End: 2017-11-01 | Stop reason: HOSPADM

## 2017-10-28 RX ADMIN — LEVOFLOXACIN 750 MG: 5 INJECTION, SOLUTION INTRAVENOUS at 12:30

## 2017-10-28 RX ADMIN — Medication 10 ML: at 20:20

## 2017-10-28 RX ADMIN — DEXTROSE MONOHYDRATE 12.5 G: 25 INJECTION, SOLUTION INTRAVENOUS at 12:48

## 2017-10-28 RX ADMIN — OXYBUTYNIN CHLORIDE 5 MG: 5 TABLET ORAL at 20:19

## 2017-10-28 RX ADMIN — IPRATROPIUM BROMIDE AND ALBUTEROL SULFATE 1 AMPULE: 2.5; .5 SOLUTION RESPIRATORY (INHALATION) at 11:46

## 2017-10-28 RX ADMIN — ACETAMINOPHEN 650 MG: 325 TABLET ORAL at 11:11

## 2017-10-28 RX ADMIN — IPRATROPIUM BROMIDE AND ALBUTEROL SULFATE 1 AMPULE: 2.5; .5 SOLUTION RESPIRATORY (INHALATION) at 16:37

## 2017-10-28 RX ADMIN — LEVOTHYROXINE SODIUM 200 MCG: 200 TABLET ORAL at 06:58

## 2017-10-28 RX ADMIN — HYDROCODONE BITARTRATE AND ACETAMINOPHEN 1 TABLET: 5; 325 TABLET ORAL at 20:12

## 2017-10-28 RX ADMIN — IPRATROPIUM BROMIDE AND ALBUTEROL SULFATE 1 AMPULE: 2.5; .5 SOLUTION RESPIRATORY (INHALATION) at 07:43

## 2017-10-28 RX ADMIN — SODIUM CHLORIDE, PRESERVATIVE FREE 10 ML: 5 INJECTION INTRAVENOUS at 20:21

## 2017-10-28 RX ADMIN — METOPROLOL SUCCINATE 25 MG: 25 TABLET, FILM COATED, EXTENDED RELEASE ORAL at 20:19

## 2017-10-28 RX ADMIN — DOCUSATE SODIUM 100 MG: 100 CAPSULE, LIQUID FILLED ORAL at 20:19

## 2017-10-28 RX ADMIN — HYPROMELLOSE 2 DROP: 4 SOLUTION/ DROPS OPHTHALMIC at 20:22

## 2017-10-28 RX ADMIN — ATORVASTATIN CALCIUM 40 MG: 40 TABLET, FILM COATED ORAL at 20:19

## 2017-10-28 RX ADMIN — HYPROMELLOSE 2 DROP: 4 SOLUTION/ DROPS OPHTHALMIC at 10:00

## 2017-10-28 ASSESSMENT — PAIN SCALES - GENERAL
PAINLEVEL_OUTOF10: 5
PAINLEVEL_OUTOF10: 7

## 2017-10-28 NOTE — PROGRESS NOTES
CARDIOLOGY [address] PROGRESS NOTE         10/28/2017      Les Dc    [id number]  1940    Rounding MD: Tatiana Heck MD ,[facility] [place]    Primary Cardiologist:  Zee Enamorado      SUBJECTIVE:     Resting comfortably. On BiPAP mask. Denies any CP. Denies SOB.   States she feels thirsty        OBJECTIVE:     Scheduled Meds:   ipratropium-albuterol  1 ampule Inhalation 4x daily    levofloxacin  750 mg Intravenous Q24H    furosemide  20 mg Oral Once per day on Mon Wed Fri    metoprolol succinate  25 mg Oral BID    sodium chloride flush  10 mL Intravenous 2 times per day    folic acid  1 mg Oral Daily    lactulose  20 mL Oral Daily    insulin glargine  8 Units Subcutaneous Nightly    cetirizine  5 mg Oral Daily    pantoprazole  40 mg Oral Daily    aspirin  81 mg Oral Daily    isosorbide mononitrate  30 mg Oral Daily    magnesium oxide  400 mg Oral Daily    allopurinol  100 mg Oral Daily    levothyroxine  200 mcg Oral Daily    hypromellose  2 drop Both Eyes BID    atorvastatin  40 mg Oral Nightly    oxybutynin  5 mg Oral Nightly    gabapentin  400 mg Oral BID    sodium chloride flush  10 mL Intravenous 2 times per day    sodium chloride flush  10 mL Intravenous 2 times per day    docusate sodium  100 mg Oral BID    pneumococcal 13-valent conjugate  0.5 mL Intramuscular Once     Continuous Infusions:   dextrose       PRN Meds:morphine, heparin flush, acetaminophen, ondansetron, albuterol, guaiFENesin-dextromethorphan, sodium chloride flush, simethicone, docusate sodium, sodium chloride flush, magnesium hydroxide, ondansetron, sodium chloride flush, glucose, dextrose, glucagon (rDNA), dextrose    PHYSICAL EXAM:    CURRENT VITALS: BP 94/69   Pulse 105   Temp 98.1 °F (36.7 °C) (Oral)   Resp 20   SpO2 100%     CONSTITUTIONAL:  awake, alert, cooperative, no apparent distress,   NECK:  Supple, symmetrical, trachea midline, no adenopathy, thyroid symmetric, not enlarged and no tenderness, skin

## 2017-10-28 NOTE — PROGRESS NOTES
Did full assessment on patient as well as the LPN charted assessment and found lung sounds to be Rhoncorous bilaterally, heart sounds irregular and bowel sounds hypoactive. Pt has stage 2 pressure ulcer on coccyx and dressing is clean dry and intact. Pt currently on Bipap and is tolerating well.   Will continue to monitor patient

## 2017-10-28 NOTE — FLOWSHEET NOTE
Wet to dry dressing done on  Coccyx, cleansed wound area with ns and applied wet to dry 4x4 and applied small abd dressing. Pt cesario well, repositioned pt onto her right side. Will continue to monitor pt.

## 2017-10-28 NOTE — PROGRESS NOTES
Nutrition Assessment    Type and Reason for Visit: Initial, Consult    Nutrition Recommendations:     START FROZEN ONS- TID. Modify Diet Order To d/c Cardiac with intake < 25%. Consider Initiation of Nutrition Support if Aggressive   Measures Desired. Malnutrition Assessment:  · Malnutrition Status: Insufficient data  · Context: Acute illness or injury  · Findings of the 6 clinical characteristics of malnutrition (Minimum of 2 out of 6 clinical characteristics is required to make the diagnosis of moderate or severe Protein Calorie Malnutrition based on AND/ASPEN Guidelines):  1. Energy Intake-Not available, not able to assess    2. Weight Loss-No significant weight loss,    3. Fat Loss-No significant subcutaneous fat loss,    4. Muscle Loss-No significant muscle mass loss,    5. Fluid Accumulation-Moderate to severe fluid accumulation, Generalized  6.  Strength-     Nutrition Diagnosis:   · Problem: Inadequate oral intake, in context of acute illness or injury  · Etiology: related to Difficulty swallowing, Increased demand for energy/nutrients due to (Impaired Respiratory Function)     Signs and symptoms:  as evidenced by Intake 0-25%, Presence of wounds, Swallow study results    Nutrition Assessment:  · Subjective Assessment: Noted patient more lethargic today, on BiPap. Per nursing refused meals today, only drinking water. Noted c/s for Hospice. · Nutrition-Focused Physical Findings: Generalized Pitting Edema. Picc Line. 10-27-I/- 1490 ml O/- 550 ml. Last BM- pta. Noted will need Debridement of Necrotic Sacral Pressure Ulcer and Necrotic Hematoma of Left Forearm.   · Wound Type: Pressure Ulcer (Sacrum)  · Current Nutrition Therapies:  · Oral Diet Orders: Dysphagia 1 (Pureed), Nectar Thick   · Oral Diet intake: 0%  · Oral Nutrition Supplement (ONS) Orders: None  · Anthropometric Measures:  · Ht: 5' 6\" (167.6 cm) (Last Admission)   · Current Body Wt: 206 lb 14.4 oz (93.8 kg) (RD Weighed 10/28/17)  · Usual Body Wt: 208 lb (94.3 kg) (10/20/16)  · Ideal Body Wt: 130 lb (59 kg), % Ideal Body 162%  · BMI Classification: BMI 30.0 - 34.9 Obese Class I (33.5)  · Comparative Standards (Estimated Nutrition Needs):  · Estimated Daily Total Kcal: 1880 to 2065  · Estimated Daily Protein (g): 77 to 89    Estimated Intake vs Estimated Needs: Intake Less Than Needs    Nutrition Risk Level: High    Nutrition Interventions:   Start ONS (Consideration for Nutrition Support if Aggressive Measures Desired.)  Continued Inpatient Monitoring, Education not appropriate at this time    Nutrition Evaluation:   · Evaluation: Goals set   · Goals: Improved Oral Intake of Meals/ONS'S > 50% to75%. Consideration for Nutrition Support. Improved Wound Healing. · Monitoring: Meal Intake, Supplement Intake, Weight, Pertinent Labs, Chewing/Swallowing, Wound Healing, Fluid Balance    See Adult Nutrition Doc Flowsheet for more detail.      Electronically signed by Jesse Robertson RD, LAURA on 10/28/17 at 1:47 PM    Contact Number: [phone]

## 2017-10-28 NOTE — PROGRESS NOTES
INPATIENT PROGRESS NOTES    PATIENT NAME: Brian Ortega  MRN: [id number]  SERVICE DATE:  October 28, 2017   SERVICE TIME:  1:44 PM      PRIMARY SERVICE:  Pulmonary Medicine     INTERVAL HPI: Patient seen and examined at bedside, Interval Notes, orders reviewed. Nursing notes noted:     Patient is sleepy this morning but doing better now she was using BiPAP therapy earlier. Currently eating without assistance. Patient is alert, oriented and responsive to questions. She has a cough but denies any chest pain and worsening SOB. She had a complaint of back pain     SUBJECTIVE    CHIEF COMPLAINT:     Review of Systems  Please see HPI above. OBJECTIVE    There is no height or weight on file to calculate BMI. PHYSICAL EXAM:  Vitals:  BP 94/69   Pulse 80   Temp 98.1 °F (36.7 °C) (Oral)   Resp 24   Ht 5' 6\" (1.676 m) Comment: Last Admission  SpO2 100%   General: Patient is comfortable in bed, No distress. She is alert and oriented to questions  Head: Atraumatic , Normocephalic   Eyes: PERRL. No sclera icterus. No conjunctival injection. No discharge   ENT: No nasal  discharge. Pharynx clear. Neck:  Trachea midline. No thyromegaly, no JVD, No cervical adenopathy. Resp : Diminished breath sounds at the bases with poor inspiratory effort and scattered rhonchi at the bases. There are no wheezing or rales noted. CV: Normal  rate. Regular rhythm. No mumur ,  Rub or gallop  ABD: Non-tender. Non-distended. No masses. No organmegaly. Normal bowel sounds. No hernia. EXT: Trace bilateral Pitting, No Cyanosis No clubbing  Neuro: no focal weakness  Skin: Warm and dry. No erythema rash on exposed extremities.         DATA:   Recent Labs      10/27/17   0600  10/28/17   0600   WBC  11.6*  10.2   HGB  7.0*  8.2*   HCT  22.2*  24.2*   MCV  94.9  91.1   PLT  243  215     Recent Labs      10/27/17   0600  10/28/17   0600   NA  139  141   K  4.2  4.3   CL  103  103   CO2  27  27   BUN  35*  37*   CREATININE  1.54*  1.58*

## 2017-10-28 NOTE — PROGRESS NOTES
Pt cannot have blood pressures done on left side and has a PICC in Right upper arm so have to use the right lower arm and when blood pressure cuff inflates patient starts to twitch and tense up which results in an inaccurate reading showing too high. When a BP done on right leg got 80/20 so know it is not that low as it was done on the leg and know its not as high as showing on lower arm D/T tensing and shaking. Going to let pt fall asleep again and we will retry shortly.

## 2017-10-28 NOTE — CONSULTS
Consults                    Consult Dictated. Impression:  Necrotic pressure wound on Sacral area. Necrotic hematoma, lt Forearm. Recommend:  Will need surgical debridement, will be scheduled in the next 24-48 hs.

## 2017-10-28 NOTE — PROGRESS NOTES
acetaminophen, ondansetron, albuterol, guaiFENesin-dextromethorphan, sodium chloride flush, simethicone, docusate sodium, sodium chloride flush, magnesium hydroxide, ondansetron, sodium chloride flush, glucose, dextrose, glucagon (rDNA), dextrose    Radiology  Ir Romayne Plane Cva Device Placement    Result Date: 10/23/2017  EXAMINATION: IR PICC EQUAL OR GREATER THAN 5 YEARS, IR ULTRASOUND GUIDANCE VASCULAR ACCESS, IR FLUOROSCOPY GUIDED CENTRAL VENOUS ACCESS DEVICE PLACEMENT CLINICAL HISTORY:  low hemoglobin . Long-term venous access needed for medical therapy. Transfusion therapy. COMPARISONS: None available. FINDINGS: Right UPPER EXTREMITY PICC INSERTION Complete, routine aseptic technique, including a sterile barrier, including participating providers wearing caps, masks, sterile gowns and sterile gloves was used. The treatment area underwent isolation with a sterile barrier following skin preparation. Using sonographic and fluoroscopic guidance, following appropriate preparation and local anesthesia, with micropuncture technique, the right basilic vein was accessed and soft tissue track developed, through which an insertion cannula was installed, through which a 5-Sudanese dual lumen PICC was placed. The tip of the catheter was placed in the superior vena cava. Single view, saved fluoroscopic image documentation of the final placement was performed. Sonographic image was recorded for the permanent record at the access site. The catheter was affixed to the skin in the usual fashion and the site dressed. There were no significant complications at the time of the procedure. Standard follow-up will have been performed. CONCLUSION SUCCESSFUL UNCOMPLICATED right UPPER EXTREMITY PICC INSTALLATION. X-RAY DOSE SUMMARY:    1 FLUOROSCOPIC IMAGES SAVED TO PACS. 0SPOT FILM WAS EXPOSED. 60.6SECOND FLUOROSCOPY TIME. 6.51MGY CUMULATIVE X-RAY DOSE.      Xr Chest Portable    Result Date: 10/27/2017  PORTABLE CHEST: 10/27/2017 sterile gowns and sterile gloves was used. The treatment area underwent isolation with a sterile barrier following skin preparation. Using sonographic and fluoroscopic guidance, following appropriate preparation and local anesthesia, with micropuncture technique, the right basilic vein was accessed and soft tissue track developed, through which an insertion cannula was installed, through which a 5-Hungarian dual lumen PICC was placed. The tip of the catheter was placed in the superior vena cava. Single view, saved fluoroscopic image documentation of the final placement was performed. Sonographic image was recorded for the permanent record at the access site. The catheter was affixed to the skin in the usual fashion and the site dressed. There were no significant complications at the time of the procedure. Standard follow-up will have been performed. CONCLUSION SUCCESSFUL UNCOMPLICATED right UPPER EXTREMITY PICC INSTALLATION. X-RAY DOSE SUMMARY:    1 FLUOROSCOPIC IMAGES SAVED TO PACS. 0SPOT FILM WAS EXPOSED. 60.6SECOND FLUOROSCOPY TIME. 6.51MGY CUMULATIVE X-RAY DOSE. Us Upper Extremity Left Venous Duplex    Result Date: 10/26/2017  LEFT UPPER EXTREMITY DEEP VENOUS DUPLEX SONOGRAM: 10/25/2017 CLINICAL HISTORY: Left upper extremity swelling. COMPARISON: None available. Grayscale, compression, color and waveform Doppler analysis of the left upper extremity venous system was performed with augmentation. FINDINGS: There is no deep venous thrombosis, abnormal masses, fluid collections or other findings of concern identified within the left upper extremity. The superficial basilic and cephalic veins are patent. NO LEFT UPPER EXTREMITY DEEP VENOUS THROMBOSIS IDENTIFIED.      Ir Ultrasound Guidance Vascular Access    Result Date: 10/23/2017  EXAMINATION: IR PICC EQUAL OR GREATER THAN 5 YEARS, IR ULTRASOUND GUIDANCE VASCULAR ACCESS, IR FLUOROSCOPY GUIDED CENTRAL VENOUS ACCESS DEVICE PLACEMENT CLINICAL HISTORY:  low cm. The average renal cortical thickness is approximately 7 mm. A small simple cyst is noted within the lower pole of the left kidney, measuring up to 2 cm. NO EVIDENCE OF URINARY TRACT OBSTRUCTION. M patient is still somewhat lethargic. ILD RENAL ATROPHY. 2 CM LOWER POLE LEFT RENAL CYST.       ASSESSMENT AND PLAN     Principal Problem:    Acute hemolytic anemia (HCC)  Active Problems:    Atrial fibrillation with RVR (HCC)    BRIGIDA (acute kidney injury) ([facility]  DM type II  Chronic respiratory failure with lethargy  History of diastolic heart failure    Mild hypoglycemia  Possible hypercarbia  Left upper extremity blister filled with blood. Reduce the dose of Lantus to prevent hypoglycemia. Patient may need an ABG to rule out hypercarbia as reason for lethargy. We will hold Neurontin until the patient is more alert. Await decision on bone marrow. Overall prognosis guarded.     Efrain Howe MD

## 2017-10-29 LAB
ANION GAP SERPL CALCULATED.3IONS-SCNC: 10 MEQ/L (ref 7–13)
ANISOCYTOSIS: ABNORMAL
BANDED NEUTROPHILS RELATIVE PERCENT: 1 % (ref 5–11)
BASOPHILIC STIPPLING: 1
BASOPHILS ABSOLUTE: 0 K/UL (ref 0–0.2)
BASOPHILS RELATIVE PERCENT: 1 %
BUN BLDV-MCNC: 38 MG/DL (ref 8–23)
CALCIUM SERPL-MCNC: 8.1 MG/DL (ref 8.6–10.2)
CHLORIDE BLD-SCNC: 104 MEQ/L (ref 98–107)
CO2: 27 MEQ/L (ref 22–29)
CREAT SERPL-MCNC: 1.44 MG/DL (ref 0.5–0.9)
EOSINOPHILS ABSOLUTE: 0 K/UL (ref 0–0.7)
EOSINOPHILS RELATIVE PERCENT: 0.4 %
GFR AFRICAN AMERICAN: 42.4
GFR NON-AFRICAN AMERICAN: 35
GLUCOSE BLD-MCNC: 63 MG/DL (ref 74–109)
GLUCOSE BLD-MCNC: 70 MG/DL (ref 60–115)
GLUCOSE BLD-MCNC: 71 MG/DL (ref 60–115)
GLUCOSE BLD-MCNC: 72 MG/DL (ref 60–115)
GLUCOSE BLD-MCNC: 97 MG/DL (ref 60–115)
HCT VFR BLD CALC: 25.2 % (ref 37–47)
HEMOGLOBIN: 8 G/DL (ref 12–16)
HYPOCHROMIA: ABNORMAL
LYMPHOCYTES ABSOLUTE: 0.5 K/UL (ref 1–4.8)
LYMPHOCYTES RELATIVE PERCENT: 6 %
MACROCYTES: ABNORMAL
MAGNESIUM: 2.1 MG/DL (ref 1.7–2.3)
MCH RBC QN AUTO: 30.3 PG (ref 27–31.3)
MCHC RBC AUTO-ENTMCNC: 31.8 % (ref 33–37)
MCV RBC AUTO: 95.3 FL (ref 82–100)
MICROCYTES: 0
MONOCYTES ABSOLUTE: 0.2 K/UL (ref 0.2–0.8)
MONOCYTES RELATIVE PERCENT: 1.8 %
MYELOCYTE PERCENT: 5 %
NEUTROPHILS ABSOLUTE: 8 K/UL (ref 1.4–6.5)
NEUTROPHILS RELATIVE PERCENT: 87 %
PDW BLD-RTO: 16.6 % (ref 11.5–14.5)
PERFORMED ON: NORMAL
PLATELET # BLD: 203 K/UL (ref 130–400)
PLATELET SLIDE REVIEW: ADEQUATE
POIKILOCYTES: ABNORMAL
POLYCHROMASIA: ABNORMAL
POTASSIUM SERPL-SCNC: 4.5 MEQ/L (ref 3.5–5.1)
RBC # BLD: 2.64 M/UL (ref 4.2–5.4)
SMUDGE CELLS: 2.7
SODIUM BLD-SCNC: 141 MEQ/L (ref 132–144)
STOMATOCYTES: ABNORMAL
WBC # BLD: 8.6 K/UL (ref 4.8–10.8)

## 2017-10-29 PROCEDURE — 94760 N-INVAS EAR/PLS OXIMETRY 1: CPT

## 2017-10-29 PROCEDURE — 6360000002 HC RX W HCPCS: Performed by: INTERNAL MEDICINE

## 2017-10-29 PROCEDURE — 83735 ASSAY OF MAGNESIUM: CPT

## 2017-10-29 PROCEDURE — 2580000003 HC RX 258: Performed by: PHYSICIAN ASSISTANT

## 2017-10-29 PROCEDURE — 85025 COMPLETE CBC W/AUTO DIFF WBC: CPT

## 2017-10-29 PROCEDURE — 2580000003 HC RX 258: Performed by: INTERNAL MEDICINE

## 2017-10-29 PROCEDURE — 94660 CPAP INITIATION&MGMT: CPT

## 2017-10-29 PROCEDURE — 80048 BASIC METABOLIC PNL TOTAL CA: CPT

## 2017-10-29 PROCEDURE — 1210000000 HC MED SURG R&B

## 2017-10-29 PROCEDURE — 36415 COLL VENOUS BLD VENIPUNCTURE: CPT

## 2017-10-29 PROCEDURE — 6370000000 HC RX 637 (ALT 250 FOR IP): Performed by: HOSPITALIST

## 2017-10-29 PROCEDURE — 2580000003 HC RX 258: Performed by: HOSPITALIST

## 2017-10-29 PROCEDURE — 6370000000 HC RX 637 (ALT 250 FOR IP): Performed by: INTERNAL MEDICINE

## 2017-10-29 PROCEDURE — 94640 AIRWAY INHALATION TREATMENT: CPT

## 2017-10-29 PROCEDURE — 2700000000 HC OXYGEN THERAPY PER DAY

## 2017-10-29 PROCEDURE — 99232 SBSQ HOSP IP/OBS MODERATE 35: CPT | Performed by: INTERNAL MEDICINE

## 2017-10-29 PROCEDURE — 6360000002 HC RX W HCPCS: Performed by: HOSPITALIST

## 2017-10-29 RX ORDER — FUROSEMIDE 10 MG/ML
40 INJECTION INTRAMUSCULAR; INTRAVENOUS ONCE
Status: COMPLETED | OUTPATIENT
Start: 2017-10-29 | End: 2017-10-29

## 2017-10-29 RX ADMIN — LEVOTHYROXINE SODIUM 200 MCG: 200 TABLET ORAL at 05:51

## 2017-10-29 RX ADMIN — DOCUSATE SODIUM 100 MG: 100 CAPSULE, LIQUID FILLED ORAL at 08:35

## 2017-10-29 RX ADMIN — Medication 400 MG: at 08:35

## 2017-10-29 RX ADMIN — SODIUM CHLORIDE, PRESERVATIVE FREE 10 ML: 5 INJECTION INTRAVENOUS at 21:40

## 2017-10-29 RX ADMIN — CETIRIZINE HYDROCHLORIDE 5 MG: 10 TABLET, FILM COATED ORAL at 08:34

## 2017-10-29 RX ADMIN — ALTEPLASE 2 MG: 2.2 INJECTION, POWDER, LYOPHILIZED, FOR SOLUTION INTRAVENOUS at 10:00

## 2017-10-29 RX ADMIN — DOCUSATE SODIUM 100 MG: 100 CAPSULE, LIQUID FILLED ORAL at 21:27

## 2017-10-29 RX ADMIN — METOPROLOL SUCCINATE 25 MG: 25 TABLET, FILM COATED, EXTENDED RELEASE ORAL at 21:26

## 2017-10-29 RX ADMIN — ALLOPURINOL 100 MG: 100 TABLET ORAL at 08:35

## 2017-10-29 RX ADMIN — HYDROCODONE BITARTRATE AND ACETAMINOPHEN 1 TABLET: 5; 325 TABLET ORAL at 21:38

## 2017-10-29 RX ADMIN — HYDROCODONE BITARTRATE AND ACETAMINOPHEN 1 TABLET: 5; 325 TABLET ORAL at 12:16

## 2017-10-29 RX ADMIN — LEVOFLOXACIN 750 MG: 5 INJECTION, SOLUTION INTRAVENOUS at 11:36

## 2017-10-29 RX ADMIN — ALTEPLASE 2 MG: 2.2 INJECTION, POWDER, LYOPHILIZED, FOR SOLUTION INTRAVENOUS at 10:01

## 2017-10-29 RX ADMIN — ASPIRIN 81 MG 81 MG: 81 TABLET ORAL at 08:36

## 2017-10-29 RX ADMIN — PANTOPRAZOLE SODIUM 40 MG: 40 TABLET, DELAYED RELEASE ORAL at 08:35

## 2017-10-29 RX ADMIN — IPRATROPIUM BROMIDE AND ALBUTEROL SULFATE 1 AMPULE: 2.5; .5 SOLUTION RESPIRATORY (INHALATION) at 21:00

## 2017-10-29 RX ADMIN — IPRATROPIUM BROMIDE AND ALBUTEROL SULFATE 1 AMPULE: 2.5; .5 SOLUTION RESPIRATORY (INHALATION) at 07:43

## 2017-10-29 RX ADMIN — HYPROMELLOSE 2 DROP: 4 SOLUTION/ DROPS OPHTHALMIC at 08:35

## 2017-10-29 RX ADMIN — IPRATROPIUM BROMIDE AND ALBUTEROL SULFATE 1 AMPULE: 2.5; .5 SOLUTION RESPIRATORY (INHALATION) at 11:20

## 2017-10-29 RX ADMIN — FUROSEMIDE 40 MG: 10 INJECTION, SOLUTION INTRAVENOUS at 15:02

## 2017-10-29 RX ADMIN — ATORVASTATIN CALCIUM 40 MG: 40 TABLET, FILM COATED ORAL at 21:26

## 2017-10-29 RX ADMIN — IPRATROPIUM BROMIDE AND ALBUTEROL SULFATE 1 AMPULE: 2.5; .5 SOLUTION RESPIRATORY (INHALATION) at 15:50

## 2017-10-29 RX ADMIN — OXYBUTYNIN CHLORIDE 5 MG: 5 TABLET ORAL at 21:26

## 2017-10-29 RX ADMIN — HYPROMELLOSE 2 DROP: 4 SOLUTION/ DROPS OPHTHALMIC at 22:04

## 2017-10-29 RX ADMIN — HYDROCODONE BITARTRATE AND ACETAMINOPHEN 1 TABLET: 5; 325 TABLET ORAL at 06:16

## 2017-10-29 RX ADMIN — ISOSORBIDE MONONITRATE 30 MG: 30 TABLET, EXTENDED RELEASE ORAL at 08:35

## 2017-10-29 RX ADMIN — FOLIC ACID 1 MG: 1 TABLET ORAL at 08:35

## 2017-10-29 RX ADMIN — Medication 10 ML: at 21:39

## 2017-10-29 ASSESSMENT — PAIN SCALES - GENERAL
PAINLEVEL_OUTOF10: 8
PAINLEVEL_OUTOF10: 9
PAINLEVEL_OUTOF10: 10
PAINLEVEL_OUTOF10: 8
PAINLEVEL_OUTOF10: 8

## 2017-10-29 ASSESSMENT — PAIN DESCRIPTION - LOCATION: LOCATION: BUTTOCKS

## 2017-10-29 ASSESSMENT — PAIN DESCRIPTION - PAIN TYPE
TYPE: CHRONIC PAIN
TYPE: ACUTE PAIN;OTHER (COMMENT)

## 2017-10-29 NOTE — PROGRESS NOTES
INPATIENT PROGRESS NOTES    PATIENT NAME: Chuy Estes  MRN: [id number]  SERVICE DATE:  October 29, 2017   SERVICE TIME:  8:23 AM      PRIMARY SERVICE:Internal Medicine    INTERVAL HPI: Patient seen and examined at bedside, Interval Notes, orders reviewed. Nursing notes noted    SUBJECTIVE    CHIEF COMPLAINT: No new complaints. No GI bleed noted by staff. Review of Systems  Please see HPI above. All bolded are positive. All un-bolded are negative. Gen: fever, chills, fatigue, weakness, diaphoresis, increase in thirst, unintentional weight change, loss of appetite  Head: headache, vision change, hearing loss  Chest: chest pain, chest heaviness, palpitations, orthopnea, PND, COMBS  Lungs: shortness of breath, wheezing, coughing  Abdomen: abdominal pain, nausea, vomiting, diarrhea, constipation, melena, hematochezia, hematemesis  Extremities: lower extremity edema, myalgias, arthralgias  Urinary: dysuria, hematuria, or increase in frequency  Neurologic: lightheadedness, dizziness, confusion, syncope  Psychiatric: depression, suicidal ideation, or anxiety        OBJECTIVE    There is no height or weight on file to calculate BMI. PHYSICAL EXAM:  Vitals:  BP 94/69   Pulse 140   Temp 98.1 °F (36.7 °C) (Oral)   Resp 20   Ht 5' 6\" (1.676 m) Comment: Last Admission  SpO2 100%   General: comfortable in bed, No distress. Head: Atraumatic , Normocephalic   Eyes: PERRL. No sclera icterus. No conjunctival injection. No discharge   ENT: No nasal  discharge. Pharynx clear. Neck:  Trachea midline. No thyromegaly, no JVD, No cervical adenopathy. Resp : Diffusely diminished at movement noted. CV: Normal  rate. Regular rhythm. No mumur ,  Rub or gallop  ABD: Non-tender. Non-distended. No masses. No organmegaly. Normal bowel sounds. No hernia. EXT: No Pitting, No Cyanosis No clubbing  Neuro: no focal weakness  Skin: Warm and dry. No erythema rash on exposed extremities.         DATA:   Recent Labs      10/27/17 0600  10/28/17   0600   WBC  11.6*  10.2   HGB  7.0*  8.2*   HCT  22.2*  24.2*   MCV  94.9  91.1   PLT  243  215     Recent Labs      10/27/17   0600  10/28/17   0600   NA  139  141   K  4.2  4.3   CL  103  103   CO2  27  27   BUN  35*  37*   CREATININE  1.54*  1.58*   GLUCOSE  56*  62*   CALCIUM  8.2*  8.2*   PROT   --   4.5*   LABALBU   --   1.9*   BILITOT   --   0.6   ALKPHOS   --   119   AST   --   23   ALT   --   13   LABGLOM  32.4*  31.5*   GFRAA  39.2*  38.1*   GLOB   --   2.6         No results for input(s): PHART, QNI5VIS, PO2ART, YVQ0MNI, BEART, L3HHOQIE in the last 72 hours.   O2 Device: Bi-PAP  O2 Flow Rate (L/min): 2 L/min  Lab Results   Component Value Date    LACTA 0.8 08/18/2017        MEDICATIONS during current hospitalization:    Continuous Infusions:   dextrose         Scheduled Meds:   alteplase  2 mg Other Once    And    sterile water  2.2 mL Other Once    alteplase  2 mg Other Once    And    sterile water  2.2 mL Other Once    alteplase  2 mg Other Once    And    sterile water  2.2 mL Other Once    [START ON 10/30/2017] insulin glargine  8 Units Subcutaneous Nightly    [START ON 10/31/2017] gabapentin  400 mg Oral BID    ipratropium-albuterol  1 ampule Inhalation 4x daily    levofloxacin  750 mg Intravenous Q24H    furosemide  20 mg Oral Once per day on Mon Wed Fri    metoprolol succinate  25 mg Oral BID    sodium chloride flush  10 mL Intravenous 2 times per day    folic acid  1 mg Oral Daily    lactulose  20 mL Oral Daily    cetirizine  5 mg Oral Daily    pantoprazole  40 mg Oral Daily    aspirin  81 mg Oral Daily    isosorbide mononitrate  30 mg Oral Daily    magnesium oxide  400 mg Oral Daily    allopurinol  100 mg Oral Daily    levothyroxine  200 mcg Oral Daily    hypromellose  2 drop Both Eyes BID    atorvastatin  40 mg Oral Nightly    oxybutynin  5 mg Oral Nightly    sodium chloride flush  10 mL Intravenous 2 times per day    sodium chloride flush  10 mL COMPARISONS: None available. FINDINGS: Right UPPER EXTREMITY PICC INSERTION Complete, routine aseptic technique, including a sterile barrier, including participating providers wearing caps, masks, sterile gowns and sterile gloves was used. The treatment area underwent isolation with a sterile barrier following skin preparation. Using sonographic and fluoroscopic guidance, following appropriate preparation and local anesthesia, with micropuncture technique, the right basilic vein was accessed and soft tissue track developed, through which an insertion cannula was installed, through which a 5-[place] dual lumen PICC was placed. The tip of the catheter was placed in the superior vena cava. Single view, saved fluoroscopic image documentation of the final placement was performed. Sonographic image was recorded for the permanent record at the access site. The catheter was affixed to the skin in the usual fashion and the site dressed. There were no significant complications at the time of the procedure. Standard follow-up will have been performed. CONCLUSION SUCCESSFUL UNCOMPLICATED right UPPER EXTREMITY PICC INSTALLATION. X-RAY DOSE SUMMARY:    1 FLUOROSCOPIC IMAGES SAVED TO PACS. 0SPOT FILM WAS EXPOSED. 60.6SECOND FLUOROSCOPY TIME. 6.51MGY CUMULATIVE X-RAY DOSE. Us Upper Extremity Left Venous Duplex    Result Date: 10/26/2017  LEFT UPPER EXTREMITY DEEP VENOUS DUPLEX SONOGRAM: 10/25/2017 CLINICAL HISTORY: Left upper extremity swelling. COMPARISON: None available. Grayscale, compression, color and waveform Doppler analysis of the left upper extremity venous system was performed with augmentation. FINDINGS: There is no deep venous thrombosis, abnormal masses, fluid collections or other findings of concern identified within the left upper extremity. The superficial basilic and cephalic veins are patent. NO LEFT UPPER EXTREMITY DEEP VENOUS THROMBOSIS IDENTIFIED.      Ir Ultrasound Guidance Vascular Access    Result length with mild to moderate diffuse cortical thinning and borderline increased cortical echogenicity. The right kidney measures approximately 12.1 cm in length. The left kidney approximately 10.2 cm. The average renal cortical thickness is approximately 7 mm. A small simple cyst is noted within the lower pole of the left kidney, measuring up to 2 cm. NO EVIDENCE OF URINARY TRACT OBSTRUCTION. MILD RENAL ATROPHY. 2 CM LOWER POLE LEFT RENAL CYST.       ASSESSMENT AND PLAN     Principal Problem:    Acute hemolytic anemia (HCC)  Active Problems:    Atrial fibrillation with RVR (HCC)    BRIGIDA (acute kidney injury) ([facility] [address]      1. Acute hemolytic anemia. Hemoglobin is improving. We will repeat hematocrit in the morning  2. COPD exacerbation plus pneumonia resulting in restrictive failure. On BiPAP and on Levaquin. 3.  Decubitus ulcer and left arm hemorrhagic blister  Pending debridement by Dr. Soo Torres tomorrow    4. A. fib with RVR. Anti-correlation be resumed when cleared by hematologist.    5.  Acute kidney injury. Improving. Monitor BUN and creatinine closely. 6.  History of endometrial cancer    7. Acute on chronic diastolic heart failure    8. History of left approximately DVT.       Toshia Willams MD

## 2017-10-29 NOTE — PROGRESS NOTES
Purple port now flushes with ans blood return noted.  Electronically signed by Derrick Cartwright RN on 10/29/2017 at 2:59 PM

## 2017-10-29 NOTE — PROGRESS NOTES
Able to get blood return and flush red port after removing the cap. So cath swati not needed for this port.  Purple portwill not flush or get blood return, so attempted to instill cath swati Electronically signed by Wilmer Merrill RN on 10/29/2017 at 10:40 AM

## 2017-10-29 NOTE — PROGRESS NOTES
INPATIENT PROGRESS NOTES    PATIENT NAME: Kori Proctor  MRN: [id number]  SERVICE DATE:  October 29, 2017   SERVICE TIME:  4:39 PM      PRIMARY SERVICE:  Pulmonary Medicine     INTERVAL HPI: Patient seen and examined at bedside, Interval Notes, orders reviewed. Nursing notes noted: Patient is on a full face BiPAP mask. She is doing better. Patient is alert, awake with verbal command. She has a cough but denies any chest pain and worsening SOB. SUBJECTIVE    CHIEF COMPLAINT:     Review of Systems  Please see HPI above. OBJECTIVE    There is no height or weight on file to calculate BMI. PHYSICAL EXAM:  Vitals:  /65   Pulse 104   Temp 97.7 °F (36.5 °C)   Resp 20   Ht 5' 6\" (1.676 m) Comment: Last Admission  SpO2 98%   General: Patient is comfortable in bed, No distress. She is alert and oriented to questions  Head: Atraumatic , Normocephalic   Eyes: PERRL. No sclera icterus. No conjunctival injection. No discharge   ENT: No nasal  discharge. Pharynx clear. Neck:  Trachea midline. No thyromegaly, no JVD, No cervical adenopathy. Resp : Diminished breath sounds at the bases with poor inspiratory effort and scattered rhonchi at the bases. There are no wheezing or rales noted. CV: Normal  rate. Regular rhythm. No mumur ,  Rub or gallop  ABD: Non-tender. Non-distended. No masses. No organmegaly. Normal bowel sounds. No hernia. EXT: Trace bilateral Pitting, No Cyanosis No clubbing  Neuro: no focal weakness  Skin: Warm and dry. No erythema rash on exposed extremities.         DATA:   Recent Labs      10/28/17   0600  10/29/17   0836   WBC  10.2  8.6   HGB  8.2*  8.0*   HCT  24.2*  25.2*   MCV  91.1  95.3   PLT  215  203     Recent Labs      10/28/17   0600  10/29/17   0836   NA  141  141   K  4.3  4.5   CL  103  104   CO2  27  27   BUN  37*  38*   CREATININE  1.58*  1.44*   GLUCOSE  62*  63*   CALCIUM  8.2*  8.1*   PROT  4.5*   --    LABALBU  1.9*   --    BILITOT  0.6   --    ALKPHOS  119   -- AST  23   --    ALT  13   --    LABGLOM  31.5*  35.0*   GFRAA  38.1*  42.4*   GLOB  2.6   --          No results for input(s): PHART, THA1VJR, PO2ART, VGK2UOB, BEART, P2HZZRGD in the last 72 hours.   O2 Device: Bi-PAP  O2 Flow Rate (L/min): 2 L/min  Lab Results   Component Value Date    LACTA 0.8 08/18/2017        MEDICATIONS during current hospitalization:    Continuous Infusions:   dextrose         Scheduled Meds:   sterile water  2.2 mL Other Once    alteplase  2 mg Other Once    And    sterile water  2.2 mL Other Once    sterile water  2.2 mL Other Once    [START ON 10/30/2017] insulin glargine  8 Units Subcutaneous Nightly    [START ON 10/31/2017] gabapentin  400 mg Oral BID    ipratropium-albuterol  1 ampule Inhalation 4x daily    levofloxacin  750 mg Intravenous Q24H    furosemide  20 mg Oral Once per day on Mon Wed Fri    metoprolol succinate  25 mg Oral BID    sodium chloride flush  10 mL Intravenous 2 times per day    folic acid  1 mg Oral Daily    lactulose  20 mL Oral Daily    cetirizine  5 mg Oral Daily    pantoprazole  40 mg Oral Daily    aspirin  81 mg Oral Daily    isosorbide mononitrate  30 mg Oral Daily    magnesium oxide  400 mg Oral Daily    allopurinol  100 mg Oral Daily    levothyroxine  200 mcg Oral Daily    hypromellose  2 drop Both Eyes BID    atorvastatin  40 mg Oral Nightly    oxybutynin  5 mg Oral Nightly    sodium chloride flush  10 mL Intravenous 2 times per day    sodium chloride flush  10 mL Intravenous 2 times per day    docusate sodium  100 mg Oral BID    pneumococcal 13-valent conjugate  0.5 mL Intramuscular Once       PRN Meds:morphine, HYDROcodone 5 mg - acetaminophen, heparin flush, acetaminophen, ondansetron, albuterol, guaiFENesin-dextromethorphan, sodium chloride flush, simethicone, docusate sodium, sodium chloride flush, magnesium hydroxide, ondansetron, sodium chloride flush, glucose, dextrose, glucagon (rDNA), dextrose    Radiology  Xr Chest

## 2017-10-29 NOTE — PROGRESS NOTES
CARDIOLOGY [address]  [place] PROGRESS NOTE         10/29/2017      Les Dc    [id number]  1940    Rounding MD: Tatiana Heck MD ,[facility] [place]    Primary Cardiologist:  Zee Enamorado      SUBJECTIVE:     Resting comfortably. On BiPAP mask. Denies any CP. Denies SOB. States she feels thirsty. Noted to have increasing edema of her arms and legs. General surgery recs noted. She will need debridement within the next 24-48 hours.          OBJECTIVE:     Scheduled Meds:   alteplase  2 mg Other Once    And    sterile water  2.2 mL Other Once    alteplase  2 mg Other Once    And    sterile water  2.2 mL Other Once    alteplase  2 mg Other Once    And    sterile water  2.2 mL Other Once    [START ON 10/30/2017] insulin glargine  8 Units Subcutaneous Nightly    [START ON 10/31/2017] gabapentin  400 mg Oral BID    ipratropium-albuterol  1 ampule Inhalation 4x daily    levofloxacin  750 mg Intravenous Q24H    furosemide  20 mg Oral Once per day on Mon Wed Fri    metoprolol succinate  25 mg Oral BID    sodium chloride flush  10 mL Intravenous 2 times per day    folic acid  1 mg Oral Daily    lactulose  20 mL Oral Daily    cetirizine  5 mg Oral Daily    pantoprazole  40 mg Oral Daily    aspirin  81 mg Oral Daily    isosorbide mononitrate  30 mg Oral Daily    magnesium oxide  400 mg Oral Daily    allopurinol  100 mg Oral Daily    levothyroxine  200 mcg Oral Daily    hypromellose  2 drop Both Eyes BID    atorvastatin  40 mg Oral Nightly    oxybutynin  5 mg Oral Nightly    sodium chloride flush  10 mL Intravenous 2 times per day    sodium chloride flush  10 mL Intravenous 2 times per day    docusate sodium  100 mg Oral BID    pneumococcal 13-valent conjugate  0.5 mL Intramuscular Once     Continuous Infusions:   dextrose       PRN Meds:morphine, HYDROcodone 5 mg - acetaminophen, heparin flush, acetaminophen, ondansetron, albuterol, guaiFENesin-dextromethorphan, sodium chloride flush, simethicone, docusate sodium, sodium chloride flush, magnesium hydroxide, ondansetron, sodium chloride flush, glucose, dextrose, glucagon (rDNA), dextrose    PHYSICAL EXAM:    CURRENT VITALS: /65   Pulse 90   Temp 97.7 °F (36.5 °C)   Resp 20   Ht 5' 6\" (1.676 m) Comment: Last Admission  SpO2 98%     CONSTITUTIONAL:  awake, alert, cooperative, no apparent distress,   NECK:  Supple, symmetrical, trachea midline, no adenopathy, thyroid symmetric, not enlarged and no tenderness, skin normal  LUNGS:  No increased work of breathing, good air exchange, Decreased breath sounds bilaterally, no crackles or wheezing  CARDIOVASCULAR:  Normal apical impulse, irregularly irregular rate and rhythm, normal S1 and S2,  and 3/6 murmur noted  ABDOMEN:  Obese, Normal bowel sounds, soft, non-distended, non-tender, no masses palpated, no hepatosplenomegally  EXTREMITIES:  1+ edema, Pulses strong throughout. NEUROLOGIC:  Awake, alert, oriented to name, place and time. Following all commands and moving all extremties, somewhat confused.   SKIN:  Large blister left forearm          LABS:      Recent Results (from the past 24 hour(s))   POCT Glucose    Collection Time: 10/28/17 12:40 PM   Result Value Ref Range    POC Glucose 66 60 - 115 mg/dl    Performed on ACCU-CHEK    POCT Glucose    Collection Time: 10/28/17  7:44 PM   Result Value Ref Range    POC Glucose 64 60 - 115 mg/dl    Performed on ACCU-CHEK    POCT Glucose    Collection Time: 10/28/17  8:52 PM   Result Value Ref Range    POC Glucose 109 60 - 115 mg/dl    Performed on ACCU-CHEK    POCT Glucose    Collection Time: 10/29/17  8:22 AM   Result Value Ref Range    POC Glucose 70 60 - 115 mg/dl    Performed on ACCU-CHEK    Basic Metabolic Panel    Collection Time: 10/29/17  8:36 AM   Result Value Ref Range    Sodium 141 132 - 144 mEq/L    Potassium 4.5 3.5 - 5.1 mEq/L    Chloride 104 98 - 107 mEq/L    CO2 27 22 - 29 mEq/L    Anion Gap 10 7 - 13 mEq/L    Glucose 63 (L) 74 - 109 mg/dL    BUN 38 (H) 8 - 23 mg/dL    CREATININE 1.44 (H) 0.50 - 0.90 mg/dL    GFR Non-African American 35.0 (L) >60    GFR  42.4 (L) >60    Calcium 8.1 (L) 8.6 - 10.2 mg/dL   CBC Auto Differential    Collection Time: 10/29/17  8:36 AM   Result Value Ref Range    WBC 8.6 4.8 - 10.8 K/uL    RBC 2.64 (L) 4.20 - 5.40 M/uL    Hemoglobin 8.0 (L) 12.0 - 16.0 g/dL    Hematocrit 25.2 (L) 37.0 - 47.0 %    MCV 95.3 82.0 - 100.0 fL    MCH 30.3 27.0 - 31.3 pg    MCHC 31.8 (L) 33.0 - 37.0 %    RDW 16.6 (H) 11.5 - 14.5 %    Platelets 824 216 - 820 K/uL   Magnesium    Collection Time: 10/29/17  8:36 AM   Result Value Ref Range    Magnesium 2.1 1.7 - 2.3 mg/dL     CXR:  Infiltrate/consolidation of the mid to basilar left lung,       ASSESSMENT:    1. Chronic atrial fibrillation:   Tachycardic on admission in the setting of acute blood loss anemia. Heart rates now much better controlled. Anticoagulation discontinued. Eventually resume if and when OK with hematology. 2. Probable Pneumaonia    2. BRIGIDA on CKD    3. VHD with moderately severe AS. 4. LUE DVT, prior warfarin anticoagulation. Completed course of anticoaglation. 5. Acute blood loss anemia, HgB 7.5 post transfusion, on IV venofer    6. Endometrial Carcinoma:  s/p radiation    7. Generalized weakness and debility     8. DNR-CCA status      PLAN:    Cautious diuresis  Continue same cardiac medications  Conservative cardiac care  Restart Anticoagulation if and when ok with hematology  No new cardiac recommendations. Reasonably low risk to proceed with debridement as planned.        Tatiana Heck MD ,[facility] [address] [facility]

## 2017-10-30 ENCOUNTER — ANESTHESIA (OUTPATIENT)
Dept: OPERATING ROOM | Age: 77
DRG: 802 | End: 2017-10-30
Payer: COMMERCIAL

## 2017-10-30 ENCOUNTER — ANESTHESIA EVENT (OUTPATIENT)
Dept: OPERATING ROOM | Age: 77
DRG: 802 | End: 2017-10-30
Payer: COMMERCIAL

## 2017-10-30 VITALS
SYSTOLIC BLOOD PRESSURE: 92 MMHG | RESPIRATION RATE: 11 BRPM | DIASTOLIC BLOOD PRESSURE: 56 MMHG | OXYGEN SATURATION: 99 %

## 2017-10-30 LAB
ANION GAP SERPL CALCULATED.3IONS-SCNC: 11 MEQ/L (ref 7–13)
BASOPHILS ABSOLUTE: 0.1 K/UL (ref 0–0.2)
BASOPHILS RELATIVE PERCENT: 0.8 %
BUN BLDV-MCNC: 37 MG/DL (ref 8–23)
CALCIUM SERPL-MCNC: 8.3 MG/DL (ref 8.6–10.2)
CHLORIDE BLD-SCNC: 101 MEQ/L (ref 98–107)
CO2: 27 MEQ/L (ref 22–29)
CREAT SERPL-MCNC: 1.55 MG/DL (ref 0.5–0.9)
EOSINOPHILS ABSOLUTE: 0 K/UL (ref 0–0.7)
EOSINOPHILS RELATIVE PERCENT: 0.6 %
GFR AFRICAN AMERICAN: 39
GFR NON-AFRICAN AMERICAN: 32.2
GLUCOSE BLD-MCNC: 59 MG/DL (ref 74–109)
GLUCOSE BLD-MCNC: 60 MG/DL (ref 60–115)
GLUCOSE BLD-MCNC: 62 MG/DL (ref 60–115)
GLUCOSE BLD-MCNC: 66 MG/DL (ref 60–115)
GLUCOSE BLD-MCNC: 67 MG/DL (ref 60–115)
GLUCOSE BLD-MCNC: 73 MG/DL (ref 60–115)
HCT VFR BLD CALC: 25.2 % (ref 37–47)
HEMOGLOBIN: 7.9 G/DL (ref 12–16)
INR BLD: 1.5
LYMPHOCYTES ABSOLUTE: 1.4 K/UL (ref 1–4.8)
LYMPHOCYTES RELATIVE PERCENT: 17.2 %
MAGNESIUM: 2 MG/DL (ref 1.7–2.3)
MCH RBC QN AUTO: 30.4 PG (ref 27–31.3)
MCHC RBC AUTO-ENTMCNC: 31.5 % (ref 33–37)
MCV RBC AUTO: 96.7 FL (ref 82–100)
MONOCYTES ABSOLUTE: 1.1 K/UL (ref 0.2–0.8)
MONOCYTES RELATIVE PERCENT: 13.9 %
NEUTROPHILS ABSOLUTE: 5.6 K/UL (ref 1.4–6.5)
NEUTROPHILS RELATIVE PERCENT: 67.5 %
PDW BLD-RTO: 17.4 % (ref 11.5–14.5)
PERFORMED ON: NORMAL
PLATELET # BLD: 179 K/UL (ref 130–400)
POTASSIUM SERPL-SCNC: 4.2 MEQ/L (ref 3.5–5.1)
PROTHROMBIN TIME: 15.4 SEC (ref 8.1–13.7)
RBC # BLD: 2.6 M/UL (ref 4.2–5.4)
SODIUM BLD-SCNC: 139 MEQ/L (ref 132–144)
WBC # BLD: 8.2 K/UL (ref 4.8–10.8)

## 2017-10-30 PROCEDURE — 87077 CULTURE AEROBIC IDENTIFY: CPT

## 2017-10-30 PROCEDURE — 99232 SBSQ HOSP IP/OBS MODERATE 35: CPT | Performed by: INTERNAL MEDICINE

## 2017-10-30 PROCEDURE — 6370000000 HC RX 637 (ALT 250 FOR IP): Performed by: INTERNAL MEDICINE

## 2017-10-30 PROCEDURE — 3600000013 HC SURGERY LEVEL 3 ADDTL 15MIN: Performed by: SURGERY

## 2017-10-30 PROCEDURE — 87147 CULTURE TYPE IMMUNOLOGIC: CPT

## 2017-10-30 PROCEDURE — 83735 ASSAY OF MAGNESIUM: CPT

## 2017-10-30 PROCEDURE — 3600000003 HC SURGERY LEVEL 3 BASE: Performed by: SURGERY

## 2017-10-30 PROCEDURE — 0JBH0ZZ EXCISION OF LEFT LOWER ARM SUBCUTANEOUS TISSUE AND FASCIA, OPEN APPROACH: ICD-10-PCS | Performed by: SURGERY

## 2017-10-30 PROCEDURE — 2580000003 HC RX 258: Performed by: NURSE ANESTHETIST, CERTIFIED REGISTERED

## 2017-10-30 PROCEDURE — 87185 SC STD ENZYME DETCJ PER NZM: CPT

## 2017-10-30 PROCEDURE — 0JB70ZZ EXCISION OF BACK SUBCUTANEOUS TISSUE AND FASCIA, OPEN APPROACH: ICD-10-PCS | Performed by: SURGERY

## 2017-10-30 PROCEDURE — 7100000001 HC PACU RECOVERY - ADDTL 15 MIN: Performed by: SURGERY

## 2017-10-30 PROCEDURE — 94664 DEMO&/EVAL PT USE INHALER: CPT

## 2017-10-30 PROCEDURE — 94640 AIRWAY INHALATION TREATMENT: CPT

## 2017-10-30 PROCEDURE — 6360000002 HC RX W HCPCS: Performed by: HOSPITALIST

## 2017-10-30 PROCEDURE — 87186 SC STD MICRODIL/AGAR DIL: CPT

## 2017-10-30 PROCEDURE — 2580000003 HC RX 258: Performed by: SURGERY

## 2017-10-30 PROCEDURE — 87205 SMEAR GRAM STAIN: CPT

## 2017-10-30 PROCEDURE — 87070 CULTURE OTHR SPECIMN AEROBIC: CPT

## 2017-10-30 PROCEDURE — 87075 CULTR BACTERIA EXCEPT BLOOD: CPT

## 2017-10-30 PROCEDURE — 1210000000 HC MED SURG R&B

## 2017-10-30 PROCEDURE — 85025 COMPLETE CBC W/AUTO DIFF WBC: CPT

## 2017-10-30 PROCEDURE — 6370000000 HC RX 637 (ALT 250 FOR IP): Performed by: HOSPITALIST

## 2017-10-30 PROCEDURE — 93005 ELECTROCARDIOGRAM TRACING: CPT

## 2017-10-30 PROCEDURE — 2580000003 HC RX 258: Performed by: STUDENT IN AN ORGANIZED HEALTH CARE EDUCATION/TRAINING PROGRAM

## 2017-10-30 PROCEDURE — 6360000002 HC RX W HCPCS: Performed by: NURSE ANESTHETIST, CERTIFIED REGISTERED

## 2017-10-30 PROCEDURE — 80048 BASIC METABOLIC PNL TOTAL CA: CPT

## 2017-10-30 PROCEDURE — 3700000000 HC ANESTHESIA ATTENDED CARE: Performed by: SURGERY

## 2017-10-30 PROCEDURE — 94660 CPAP INITIATION&MGMT: CPT

## 2017-10-30 PROCEDURE — 7100000000 HC PACU RECOVERY - FIRST 15 MIN: Performed by: SURGERY

## 2017-10-30 PROCEDURE — 3700000001 HC ADD 15 MINUTES (ANESTHESIA): Performed by: SURGERY

## 2017-10-30 PROCEDURE — 2700000000 HC OXYGEN THERAPY PER DAY

## 2017-10-30 PROCEDURE — 2500000003 HC RX 250 WO HCPCS: Performed by: SURGERY

## 2017-10-30 PROCEDURE — 85610 PROTHROMBIN TIME: CPT

## 2017-10-30 PROCEDURE — 2580000003 HC RX 258: Performed by: HOSPITALIST

## 2017-10-30 RX ORDER — DIPHENHYDRAMINE HYDROCHLORIDE 50 MG/ML
12.5 INJECTION INTRAMUSCULAR; INTRAVENOUS
Status: DISCONTINUED | OUTPATIENT
Start: 2017-10-30 | End: 2017-10-30 | Stop reason: HOSPADM

## 2017-10-30 RX ORDER — MAGNESIUM HYDROXIDE 1200 MG/15ML
LIQUID ORAL CONTINUOUS PRN
Status: DISCONTINUED | OUTPATIENT
Start: 2017-10-30 | End: 2017-10-30 | Stop reason: HOSPADM

## 2017-10-30 RX ORDER — MIDAZOLAM HYDROCHLORIDE 1 MG/ML
INJECTION INTRAMUSCULAR; INTRAVENOUS PRN
Status: DISCONTINUED | OUTPATIENT
Start: 2017-10-30 | End: 2017-10-30 | Stop reason: SDUPTHER

## 2017-10-30 RX ORDER — FENTANYL CITRATE 50 UG/ML
INJECTION, SOLUTION INTRAMUSCULAR; INTRAVENOUS PRN
Status: DISCONTINUED | OUTPATIENT
Start: 2017-10-30 | End: 2017-10-30 | Stop reason: SDUPTHER

## 2017-10-30 RX ORDER — FENTANYL CITRATE 50 UG/ML
50 INJECTION, SOLUTION INTRAMUSCULAR; INTRAVENOUS EVERY 10 MIN PRN
Status: DISCONTINUED | OUTPATIENT
Start: 2017-10-30 | End: 2017-10-30 | Stop reason: HOSPADM

## 2017-10-30 RX ORDER — SODIUM CHLORIDE 9 MG/ML
INJECTION, SOLUTION INTRAVENOUS CONTINUOUS PRN
Status: DISCONTINUED | OUTPATIENT
Start: 2017-10-30 | End: 2017-10-30 | Stop reason: SDUPTHER

## 2017-10-30 RX ORDER — METOCLOPRAMIDE HYDROCHLORIDE 5 MG/ML
10 INJECTION INTRAMUSCULAR; INTRAVENOUS
Status: DISCONTINUED | OUTPATIENT
Start: 2017-10-30 | End: 2017-10-30 | Stop reason: HOSPADM

## 2017-10-30 RX ORDER — HYDROCODONE BITARTRATE AND ACETAMINOPHEN 5; 325 MG/1; MG/1
1 TABLET ORAL PRN
Status: DISCONTINUED | OUTPATIENT
Start: 2017-10-30 | End: 2017-10-30 | Stop reason: HOSPADM

## 2017-10-30 RX ORDER — SODIUM CHLORIDE 9 MG/ML
INJECTION, SOLUTION INTRAVENOUS CONTINUOUS
Status: DISCONTINUED | OUTPATIENT
Start: 2017-10-30 | End: 2017-10-31

## 2017-10-30 RX ORDER — ONDANSETRON 2 MG/ML
4 INJECTION INTRAMUSCULAR; INTRAVENOUS
Status: DISCONTINUED | OUTPATIENT
Start: 2017-10-30 | End: 2017-10-30 | Stop reason: HOSPADM

## 2017-10-30 RX ORDER — MEPERIDINE HYDROCHLORIDE 25 MG/ML
12.5 INJECTION INTRAMUSCULAR; INTRAVENOUS; SUBCUTANEOUS EVERY 5 MIN PRN
Status: DISCONTINUED | OUTPATIENT
Start: 2017-10-30 | End: 2017-10-30 | Stop reason: HOSPADM

## 2017-10-30 RX ORDER — PROPOFOL 10 MG/ML
INJECTION, EMULSION INTRAVENOUS PRN
Status: DISCONTINUED | OUTPATIENT
Start: 2017-10-30 | End: 2017-10-30 | Stop reason: SDUPTHER

## 2017-10-30 RX ORDER — ONDANSETRON 2 MG/ML
INJECTION INTRAMUSCULAR; INTRAVENOUS PRN
Status: DISCONTINUED | OUTPATIENT
Start: 2017-10-30 | End: 2017-10-30 | Stop reason: SDUPTHER

## 2017-10-30 RX ORDER — BUPIVACAINE HYDROCHLORIDE 2.5 MG/ML
INJECTION, SOLUTION EPIDURAL; INFILTRATION; INTRACAUDAL PRN
Status: DISCONTINUED | OUTPATIENT
Start: 2017-10-30 | End: 2017-10-30 | Stop reason: HOSPADM

## 2017-10-30 RX ORDER — HYDROCODONE BITARTRATE AND ACETAMINOPHEN 5; 325 MG/1; MG/1
2 TABLET ORAL PRN
Status: DISCONTINUED | OUTPATIENT
Start: 2017-10-30 | End: 2017-10-30 | Stop reason: HOSPADM

## 2017-10-30 RX ORDER — PROPOFOL 10 MG/ML
INJECTION, EMULSION INTRAVENOUS CONTINUOUS PRN
Status: DISCONTINUED | OUTPATIENT
Start: 2017-10-30 | End: 2017-10-30 | Stop reason: SDUPTHER

## 2017-10-30 RX ADMIN — IPRATROPIUM BROMIDE AND ALBUTEROL SULFATE 1 AMPULE: 2.5; .5 SOLUTION RESPIRATORY (INHALATION) at 17:54

## 2017-10-30 RX ADMIN — FENTANYL CITRATE 12.5 MCG: 50 INJECTION, SOLUTION INTRAMUSCULAR; INTRAVENOUS at 13:43

## 2017-10-30 RX ADMIN — Medication 0.1 MG: at 13:01

## 2017-10-30 RX ADMIN — ISOSORBIDE MONONITRATE 30 MG: 30 TABLET, EXTENDED RELEASE ORAL at 14:56

## 2017-10-30 RX ADMIN — DOCUSATE SODIUM 100 MG: 100 CAPSULE, LIQUID FILLED ORAL at 20:36

## 2017-10-30 RX ADMIN — IPRATROPIUM BROMIDE AND ALBUTEROL SULFATE 1 AMPULE: 2.5; .5 SOLUTION RESPIRATORY (INHALATION) at 20:48

## 2017-10-30 RX ADMIN — HYDROCODONE BITARTRATE AND ACETAMINOPHEN 1 TABLET: 5; 325 TABLET ORAL at 14:56

## 2017-10-30 RX ADMIN — Medication 0.1 MG: at 13:12

## 2017-10-30 RX ADMIN — FENTANYL CITRATE 12.5 MCG: 50 INJECTION, SOLUTION INTRAMUSCULAR; INTRAVENOUS at 13:29

## 2017-10-30 RX ADMIN — Medication 0.1 MG: at 13:00

## 2017-10-30 RX ADMIN — FENTANYL CITRATE 12.5 MCG: 50 INJECTION, SOLUTION INTRAMUSCULAR; INTRAVENOUS at 13:32

## 2017-10-30 RX ADMIN — ONDANSETRON 4 MG: 2 INJECTION INTRAMUSCULAR; INTRAVENOUS at 13:48

## 2017-10-30 RX ADMIN — METOPROLOL SUCCINATE 25 MG: 25 TABLET, FILM COATED, EXTENDED RELEASE ORAL at 14:57

## 2017-10-30 RX ADMIN — PROPOFOL 10 MCG/KG/MIN: 10 INJECTION, EMULSION INTRAVENOUS at 12:59

## 2017-10-30 RX ADMIN — Medication 0.1 MG: at 13:19

## 2017-10-30 RX ADMIN — OXYBUTYNIN CHLORIDE 5 MG: 5 TABLET ORAL at 20:36

## 2017-10-30 RX ADMIN — SODIUM CHLORIDE: 900 INJECTION, SOLUTION INTRAVENOUS at 12:54

## 2017-10-30 RX ADMIN — PANTOPRAZOLE SODIUM 40 MG: 40 TABLET, DELAYED RELEASE ORAL at 14:55

## 2017-10-30 RX ADMIN — HYPROMELLOSE 2 DROP: 4 SOLUTION/ DROPS OPHTHALMIC at 20:36

## 2017-10-30 RX ADMIN — FUROSEMIDE 20 MG: 20 TABLET ORAL at 14:55

## 2017-10-30 RX ADMIN — ATORVASTATIN CALCIUM 40 MG: 40 TABLET, FILM COATED ORAL at 20:36

## 2017-10-30 RX ADMIN — FENTANYL CITRATE 12.5 MCG: 50 INJECTION, SOLUTION INTRAMUSCULAR; INTRAVENOUS at 13:35

## 2017-10-30 RX ADMIN — ASPIRIN 81 MG 81 MG: 81 TABLET ORAL at 14:55

## 2017-10-30 RX ADMIN — FENTANYL CITRATE 12.5 MCG: 50 INJECTION, SOLUTION INTRAMUSCULAR; INTRAVENOUS at 13:19

## 2017-10-30 RX ADMIN — IPRATROPIUM BROMIDE AND ALBUTEROL SULFATE 1 AMPULE: 2.5; .5 SOLUTION RESPIRATORY (INHALATION) at 08:07

## 2017-10-30 RX ADMIN — MIDAZOLAM HYDROCHLORIDE 2 MG: 1 INJECTION, SOLUTION INTRAMUSCULAR; INTRAVENOUS at 12:54

## 2017-10-30 RX ADMIN — SODIUM CHLORIDE: 9 INJECTION, SOLUTION INTRAVENOUS at 21:18

## 2017-10-30 RX ADMIN — PROPOFOL 20 MG: 10 INJECTION, EMULSION INTRAVENOUS at 12:59

## 2017-10-30 RX ADMIN — FENTANYL CITRATE 25 MCG: 50 INJECTION, SOLUTION INTRAMUSCULAR; INTRAVENOUS at 12:58

## 2017-10-30 RX ADMIN — LEVOFLOXACIN 750 MG: 5 INJECTION, SOLUTION INTRAVENOUS at 11:58

## 2017-10-30 RX ADMIN — ALLOPURINOL 100 MG: 100 TABLET ORAL at 14:56

## 2017-10-30 RX ADMIN — SODIUM CHLORIDE: 9 INJECTION, SOLUTION INTRAVENOUS at 11:57

## 2017-10-30 RX ADMIN — Medication 400 MG: at 14:56

## 2017-10-30 RX ADMIN — Medication 10 ML: at 20:36

## 2017-10-30 RX ADMIN — METOPROLOL SUCCINATE 25 MG: 25 TABLET, FILM COATED, EXTENDED RELEASE ORAL at 20:36

## 2017-10-30 RX ADMIN — LEVOFLOXACIN 0.1 MG: 5 INJECTION, SOLUTION INTRAVENOUS at 13:20

## 2017-10-30 RX ADMIN — CETIRIZINE HYDROCHLORIDE 5 MG: 10 TABLET, FILM COATED ORAL at 14:56

## 2017-10-30 RX ADMIN — DOCUSATE SODIUM 100 MG: 100 CAPSULE, LIQUID FILLED ORAL at 14:56

## 2017-10-30 RX ADMIN — FOLIC ACID 1 MG: 1 TABLET ORAL at 14:56

## 2017-10-30 RX ADMIN — FENTANYL CITRATE 12.5 MCG: 50 INJECTION, SOLUTION INTRAMUSCULAR; INTRAVENOUS at 13:48

## 2017-10-30 RX ADMIN — LACTULOSE 13.33 G: 20 SOLUTION ORAL at 14:57

## 2017-10-30 RX ADMIN — Medication 0.1 MG: at 13:07

## 2017-10-30 RX ADMIN — Medication 0.1 MG: at 13:03

## 2017-10-30 RX ADMIN — Medication 0.1 MG: at 13:05

## 2017-10-30 ASSESSMENT — PAIN - FUNCTIONAL ASSESSMENT: PAIN_FUNCTIONAL_ASSESSMENT: 0-10

## 2017-10-30 ASSESSMENT — PAIN DESCRIPTION - ORIENTATION: ORIENTATION: RIGHT;LEFT

## 2017-10-30 ASSESSMENT — PAIN SCALES - GENERAL
PAINLEVEL_OUTOF10: 0
PAINLEVEL_OUTOF10: 6
PAINLEVEL_OUTOF10: 8

## 2017-10-30 ASSESSMENT — PAIN DESCRIPTION - LOCATION: LOCATION: FOOT;HIP

## 2017-10-30 ASSESSMENT — PAIN DESCRIPTION - PAIN TYPE: TYPE: ACUTE PAIN

## 2017-10-30 ASSESSMENT — PAIN DESCRIPTION - DESCRIPTORS: DESCRIPTORS: SORE

## 2017-10-30 NOTE — PROGRESS NOTES
kidney injury and chronic kidney disease stage III:   - IV fluids with caution, to avoid fluid overload  - Nephrology consulted. Per Dr. Anne-Marie Zavala, CKD stage 3. Diabetic nephropathy.  baseline cr 1.4-1.6. Mild BRIGIDA peak cr about 1.9.  On lasix at home.  Has sig anemia.  Being seen by hematology. off ivf and on lasix 3 x/ week. will sign off. F/u as outpt. - DC'd nephrotoxic medications  - Continue to monitor     Pneumonia: As per CXR. SOB with O2 down to 90%. Reports coughing up green phlegm. - Sputum culture  - Supplemental O2  - Pulm consulted. Per Dr. Sajan Gavin, acute on chronic hypoxemic and hypercarbic respiratory failure, possible aspiration pneumonia continue empiric treatment and follow-up x-ray remains stable. Cristhian further dispo recs  - IV Levaquin continued per pulm  - Continue to monitor     DVT of left upper extremity  - As mentioned above. Per Hem/Onc may D/C coumadin and just use warm compresses. - Venous ultrasound of left upper extremity done, results are pending    VHD with mod severe AS:   - Cardio following  - Likely give prn lasix today given edema and follow  - Continue to monitor     Endometrial cancer  - receiving chemoradiation, oncology is consulted and following as above     Diabetes type 2  - Insulin on sliding scale     UDAY  - Continue BiPAP    Stage III Sacral Decubitus Ulcer: Likely require debridement  - Wound nurse consulted and following  - Consulted Gen Surgery, Dr. Argentina Valentine, for I & D of sacral ulcer along with wound of the left upper extremity today     CAD  CHF     Dispo: Await consultant clearance prior to D/C, likely in next 24 hours. Advised F/U with PCP 1 - 2 days of discharge. SUBJECTIVE  CHIEF COMPLAINT: SOB; fatigue    PRIMARY SERVICE: Medicine    INTERVAL HPI:  Patient feels better, less SOB, no chest pain. Breathing better with the use of the BiPAP. For I & D of sacral wound and wound of the left upper extremity today  .   MEDICATIONS:    Current Facility-Administered 0.9 % injection 10 mL  10 mL Intravenous 2 times per day Rosalinda Roberts PA-C   10 mL at 10/29/17 2140    sodium chloride flush 0.9 % injection 10 mL  10 mL Intravenous PRN Rosalinda Roberts PA-C        folic acid (FOLVITE) tablet 1 mg  1 mg Oral Daily Tanna Garcia MD   1 mg at 10/29/17 0835    lactulose (CHRONULAC) 10 GM/15ML solution 13.3333 g  20 mL Oral Daily Tanna Garcia MD   13.3333 g at 10/27/17 1153    cetirizine (ZYRTEC) tablet 5 mg  5 mg Oral Daily Tanna Radha MD   5 mg at 10/29/17 0834    pantoprazole (PROTONIX) tablet 40 mg  40 mg Oral Daily Tanna Radha MD   40 mg at 10/29/17 0835    simethicone (MYLICON) chewable tablet 80 mg  80 mg Oral Q6H PRN Tanna Garcia MD        aspirin chewable tablet 81 mg  81 mg Oral Daily Tanna Radha MD   81 mg at 10/29/17 0836    isosorbide mononitrate (IMDUR) extended release tablet 30 mg  30 mg Oral Daily Tanna Radha MD   30 mg at 10/29/17 0835    magnesium oxide (MAG-OX) tablet 400 mg  400 mg Oral Daily Tanna Radha MD   400 mg at 10/29/17 0835    allopurinol (ZYLOPRIM) tablet 100 mg  100 mg Oral Daily Tanna Garcia MD   100 mg at 10/29/17 8308    docusate sodium (COLACE) capsule 100 mg  100 mg Oral BID PRN Tanna Garcia MD        levothyroxine (SYNTHROID) tablet 200 mcg  200 mcg Oral Daily Tanna Radha MD   200 mcg at 10/29/17 0551    hypromellose 0.4 % ophthalmic solution SOLN 2 drop  2 drop Both Eyes BID Tanna Radha MD   2 drop at 10/29/17 2204    atorvastatin (LIPITOR) tablet 40 mg  40 mg Oral Nightly Tanna Radha, MD   40 mg at 10/29/17 2126    oxybutynin (DITROPAN) tablet 5 mg  5 mg Oral Nightly Tanna Radha MD   5 mg at 10/29/17 2126    sodium chloride flush 0.9 % injection 10 mL  10 mL Intravenous 2 times per day Tanna Garcia MD   10 mL at 10/29/17 2140    sodium chloride flush 0.9 % injection 10 mL  10 mL Intravenous PRN Valeriy Dsouza, MRN: [id number]   TIME: 10:16 AM PAGER: [phone]     I have independently seen and examined this patient. I have reviewed and agree with the above documentation, assessment and plan.      Brett Abarca MD  11:00 AM  October 30, 2017

## 2017-10-30 NOTE — PROGRESS NOTES
Assessment completed, patient had complained of pain near wound, medicated with Norco. Patient was able to take medication in applesauce with no issues and was rotated in bed. Bed in lowest position, call light in reach and Osmany is in the room with her.    Electronically signed by Eric Ellis RN on 10/29/2017 at 9:47 PM

## 2017-10-30 NOTE — PROGRESS NOTES
Dr. Michael Avendano aware of one touch results. Repositioned patient onto left side, rails up. Patient verbalizing. Aware of what she is having done. No family present. Mouth swabbed.

## 2017-10-30 NOTE — PROGRESS NOTES
[facility] Respiratory Therapy Evaluation   Current Order:  DUONEB QID AND ALBUTEROL Q2 PRN     Home Regimen: QID      Ordering Physician: Frank Ellis  Re-evaluation Date:  11/2     Diagnosis: GI BLEED      Patient Status: Stable / Unstable + Physician notified    The following MDI Criteria must be met in order to convert aerosol to MDI with spacer.  If unable to meet, MDI will be converted to aerosol:  []  Patient able to demonstrate the ability to use MDI effectively  []  Patient alert and cooperative  []  Patient able to take deep breath with 5-10 second hold  []  Medication(s) available in this delivery method   []  Peak flow greater than or equal to 200 ml/min            Current Order Substituted To  (same drug, same frequency)   Aerosol to MDI [] Albuterol Sulfate 0.083% unit dose by aerosol Albuterol Sulfate MDI 2 puffs by inhalation with spacer    [] Levalbuterol 1.25 mg unit dose by aerosol Levalbuterol MDI 2 puffs by inhalation with spacer    [] Levalbuterol 0.63 mg unit dose by aerosol Levalbuterol MDI 2 puffs by inhalation with spacer    [] Ipratropium Bromide 0.02% unit dose by aerosol Ipratropium Bromide MDI 2 puffs by inhalation with spacer    [] Duoneb (Ipratropium + Albuterol) unit dose by aerosol Ipratropium MDI + Albuterol MDI 2 puffs by inhalation w/spacer   MDI to Aerosol [] Albuterol Sulfate MDI Albuterol Sulfate 0.083% unit dose by aerosol    [] Levalbuterol MDI 2 puffs by inhalation Levalbuterol 1.25 mg unit dose by aerosol    [] Ipratropium Bromide MDI by inhalation Ipratropium Bromide 0.02% unit dose by aerosol    [] Combivent (Ipratropium + Albuterol) MDI by inhalation Duoneb (Ipratropium + Albuterol) unit dose by aerosol   Treatment Assessment [Frequency/Schedule]:  Change frequency to: ______NO CHANGES DUE TO HOME FREQUENCY____________________________________________per Protocol, P&T, MEC      Points 0 1 2 3 4   Pulmonary Status  Non-Smoker  []   Smoking history   < 20 pack years  []

## 2017-10-30 NOTE — PLAN OF CARE
Problem: Falls - Risk of  Goal: Absence of falls  Outcome: Ongoing      Problem: Risk for Impaired Skin Integrity  Goal: Tissue integrity - skin and mucous membranes  Structural intactness and normal physiological function of skin and  mucous membranes. Outcome: Ongoing      Problem: Nutrition  Goal: Optimal nutrition therapy  Nutrition Problem: Inadequate oral intake, in context of acute illness or injury   Intervention: Food and/or Nutrient Delivery: Start ONS (Consideration for Nutrition Support if Aggressive Measures Desired.)  Nutritional Goals: Improved Oral Intake of Meals/ONS'S > 50% to75%. Consideration for Nutrition Support. Improved Wound Healing.    Outcome: Ongoing

## 2017-10-30 NOTE — BRIEF OP NOTE
Brief Postoperative Note  ______________________________________________________________    Patient: Lydia Segundo  YOB: 1940  MRN: [id number]  Date of Procedure: 10/30/2017    Pre-Op Diagnosis: wounds    Post-Op Diagnosis: Same       Procedure(s):  EXCISIONAL DEBRIDEMENT OF NECROTIC WOUNDS ON SACRAL AREA AND LEFT FOREARM    Anesthesia: General    Surgeon(s):  Cholo Wray MD    Staff:  First Assistant: Kael Osuna Person First: April Spring Carla Rank; Luciana Severe     Estimated Blood Loss: * No values recorded between 10/30/2017 12:54 PM and 10/30/2017  9:91 PM *    Complications: None    Specimens:   ID Type Source Tests Collected by Time Destination   1 : left forearm Tissue Arm SURGICAL CULTURE Cholo Wray MD 10/30/2017 1325        Implants:  * No implants in log *      Drains:   Urethral Catheter Non-latex 16 fr (Active)   Catheter Indications Need for fluid management in critically ill patients in a critical care setting not able to be managed by other means such as BSC with hat, bedpan, urinal, condom catheter, or short term intermittent urethral catherization 10/28/2017  9:06 AM   Site Assessment No urethral drainage 10/28/2017  6:48 AM   Urine Color Yellow 10/30/2017 10:05 AM   Urine Appearance Cloudy 10/30/2017 10:05 AM   Urine Odor Malodorous 10/30/2017 10:05 AM   Output (mL) 300 mL 10/30/2017  7:01 AM       Findings: necrotic tissius    Cholo Wray MD  Date: 10/30/2017  Time: 1:43 PM

## 2017-10-30 NOTE — PROGRESS NOTES
[facility] Occupational Therapy      Date: 10/30/2017  Patient Name: Mike Pringle        MRN: [id number]  Account: [de-identified]   : 1940  (76 y.o.)  [place] [facility] [place]    Chart reviewed, attempted OT tx at 1:08 PM, but pt. unavailable 2° to:    [] Hold per nsg request    [] Pt declined     [x] Pt. . off floor for test/procedure. Will attempt again when able.     Electronically signed by ARTHUR Narvaez on 10/30/2017 at 1:08 PM

## 2017-10-30 NOTE — ANESTHESIA PRE PROCEDURE
Historical Provider, MD   docusate sodium (COLACE) 100 MG capsule Take 100 mg by mouth 2 times daily as needed for Constipation    Historical Provider, MD   magnesium oxide (MAG-OX) 400 (241.3 MG) MG TABS tablet Take 1 tablet by mouth daily 10/24/16   Nestor Napoles MD   folic acid (FOLVITE) 1 MG tablet Take 1 mg by mouth daily    Historical Provider, MD   insulin lispro (HUMALOG) 100 UNIT/ML injection vial Inject into the skin 3 times daily (before meals) Indications: sliding scale     Historical Provider, MD   insulin glargine (LANTUS) 100 UNIT/ML injection vial Inject 8 Units into the skin nightly     Historical Provider, MD   pantoprazole (PROTONIX) 20 MG tablet Take 40 mg by mouth daily    Historical Provider, MD       Current medications:    Current Facility-Administered Medications   Medication Dose Route Frequency Provider Last Rate Last Dose    sterile water injection 2.2 mL  2.2 mL Other Once Zo Linder MD        alteplase (CATHFLO) injection 2 mg  2 mg Other Once Zo Linder MD        And    sterile water injection 2.2 mL  2.2 mL Other Once Zo Linder MD        sterile water injection 2.2 mL  2.2 mL Other Once Reynaldo Clark MD        insulin glargine (LANTUS) injection vial 8 Units  8 Units Subcutaneous Nightly Reynaldo Clark MD        [START ON 10/31/2017] gabapentin (NEURONTIN) capsule 400 mg  400 mg Oral BID Reynaldo Clark MD        morphine injection 0.5 mg  0.5 mg Intravenous Q4H PRN Reynaldo Clark MD        HYDROcodone-acetaminophen (NORCO) 5-325 MG per tablet 1 tablet  1 tablet Oral Q6H PRN Reynaldo Clark MD   1 tablet at 10/29/17 2138    heparin flush 100 UNIT/ML injection 100 Units  100 Units Intercatheter PRN Mare Lorenzana MD   100 Units at 10/26/17 1250    acetaminophen (TYLENOL) tablet 650 mg  650 mg Oral Q4H PRN Nestor Napoles MD   650 mg at 10/28/17 1111    ondansetron (ZOFRAN) injection 4 mg  4 mg Intravenous Q6H PRN Anuja Salvador MD        albuterol (PROVENTIL) nebulizer solution 2.5 mg  2.5 mg Nebulization Q2H PRN Anuja Salvador MD        ipratropium-albuterol (DUONEB) nebulizer solution 1 ampule  1 ampule Inhalation 4x daily Anuja Salvador MD   1 ampule at 10/30/17 0807    levofloxacin (LEVAQUIN) 750 MG/150ML infusion 750 mg  750 mg Intravenous Q24H Santosh Curtis MD   Stopped at 10/29/17 1424    furosemide (LASIX) tablet 20 mg  20 mg Oral Once per day on Mon Wed Fri Lendon Samy Strauss MD   20 mg at 10/27/17 1153    guaiFENesin-dextromethorphan (ROBITUSSIN DM) 100-10 MG/5ML syrup 5 mL  5 mL Oral Q4H PRN Santosh Curtis MD        metoprolol succinate (TOPROL XL) extended release tablet 25 mg  25 mg Oral BID Sharon Cervantes MD   25 mg at 10/29/17 2126    sodium chloride flush 0.9 % injection 10 mL  10 mL Intravenous 2 times per day Nicole Yi PA-C   10 mL at 10/29/17 2140    sodium chloride flush 0.9 % injection 10 mL  10 mL Intravenous PRN Nicole Yi PA-C        folic acid (FOLVITE) tablet 1 mg  1 mg Oral Daily Sharon Cervantes MD   1 mg at 10/29/17 0835    lactulose (CHRONULAC) 10 GM/15ML solution 13.3333 g  20 mL Oral Daily Sharon Cervantes MD   13.3333 g at 10/27/17 1153    cetirizine (ZYRTEC) tablet 5 mg  5 mg Oral Daily Sharon Cervantes MD   5 mg at 10/29/17 0834    pantoprazole (PROTONIX) tablet 40 mg  40 mg Oral Daily Sharon Cervantes MD   40 mg at 10/29/17 0835    simethicone (MYLICON) chewable tablet 80 mg  80 mg Oral Q6H PRN Sharon Cervantes MD        aspirin chewable tablet 81 mg  81 mg Oral Daily Sharon Cervantes MD   81 mg at 10/29/17 0836    isosorbide mononitrate (IMDUR) extended release tablet 30 mg  30 mg Oral Daily Sharon Cervantes MD   30 mg at 10/29/17 0835    magnesium oxide (MAG-OX) tablet 400 mg  400 mg Oral Daily Sharon Cervantes MD   400 mg at 10/29/17 0835    allopurinol (ZYLOPRIM) tablet 100 mg  100 mg Oral Daily Valeriy Dsouza, MD   100 mg at 10/29/17 0835    docusate sodium (COLACE) capsule 100 mg  100 mg Oral BID PRN Jelani Frazier MD        levothyroxine (SYNTHROID) tablet 200 mcg  200 mcg Oral Daily Jelani Frazier MD   200 mcg at 10/29/17 0551    hypromellose 0.4 % ophthalmic solution SOLN 2 drop  2 drop Both Eyes BID Jelani Frazier MD   2 drop at 10/29/17 2204    atorvastatin (LIPITOR) tablet 40 mg  40 mg Oral Nightly Jelani Frazier MD   40 mg at 10/29/17 2126    oxybutynin (DITROPAN) tablet 5 mg  5 mg Oral Nightly Jelani Frazier MD   5 mg at 10/29/17 2126    sodium chloride flush 0.9 % injection 10 mL  10 mL Intravenous 2 times per day Jelani Frazier MD   10 mL at 10/29/17 2140    sodium chloride flush 0.9 % injection 10 mL  10 mL Intravenous PRN Jelani Frazier MD        magnesium hydroxide (MILK OF MAGNESIA) 400 MG/5ML suspension 30 mL  30 mL Oral Daily PRN Jelani Frazier MD        ondansetron [facility]) injection 4 mg  4 mg Intravenous Q6H PRN Jelani Frazier MD        sodium chloride flush 0.9 % injection 10 mL  10 mL Intravenous 2 times per day Drew Moralez MD   10 mL at 10/29/17 2139    sodium chloride flush 0.9 % injection 10 mL  10 mL Intravenous PRN Drew Moralez MD        docusate sodium (COLACE) capsule 100 mg  100 mg Oral BID Drew Moralez MD   100 mg at 10/29/17 2127    glucose (GLUTOSE) 40 % oral gel 15 g  15 g Oral PRN Drew Moralez MD        dextrose 50 % solution 12.5 g  12.5 g Intravenous PRN Drew Moralez MD   12.5 g at 10/28/17 1248    glucagon (rDNA) injection 1 mg  1 mg Intramuscular PRN Drew Moralez MD        dextrose 5 % solution  100 mL/hr Intravenous PRN Drew Moralez MD        pneumococcal 13-valent conjugate (PREVNAR) injection 0.5 mL  0.5 mL Intramuscular Once Drew Moralez MD           Allergies:  No Known Allergies    Problem List:    Patient Active Problem List   Diagnosis Code    Pneumonia of left lower lobe due to infectious last liquid consumption: 2330                        Time of last solid consumption: 0900 (patient unsure, but said did not eat yesterday)                        Date of last liquid consumption: 10/29/17                        Date of last solid food consumption: 10/28/17    BMI:   Wt Readings from Last 3 Encounters:   09/07/17 210 lb (95.3 kg)   08/26/17 212 lb 4.9 oz (96.3 kg)   10/20/16 208 lb (94.3 kg)     There is no height or weight on file to calculate BMI.    CBC:   Lab Results   Component Value Date    WBC 8.2 10/30/2017    RBC 2.60 10/30/2017    HGB 7.9 10/30/2017    HCT 25.2 10/30/2017    MCV 96.7 10/30/2017    RDW 17.4 10/30/2017     10/30/2017       CMP:   Lab Results   Component Value Date     10/30/2017    K 4.2 10/30/2017     10/30/2017    CO2 27 10/30/2017    BUN 37 10/30/2017    CREATININE 1.55 10/30/2017    GFRAA 39.0 10/30/2017    LABGLOM 32.2 10/30/2017    GLUCOSE 59 10/30/2017    PROT 4.5 10/28/2017    CALCIUM 8.3 10/30/2017    BILITOT 0.6 10/28/2017    ALKPHOS 119 10/28/2017    AST 23 10/28/2017    ALT 13 10/28/2017       POC Tests:   Recent Labs      10/30/17   0819   POCGLU  62       Coags:   Lab Results   Component Value Date    PROTIME 15.4 10/30/2017    PROTIME 26.9 10/23/2017    INR 1.5 10/30/2017    APTT 57.8 10/23/2017       HCG (If Applicable): No results found for: PREGTESTUR, PREGSERUM, HCG, HCGQUANT     ABGs:   Lab Results   Component Value Date    PHART 7.365 10/25/2017    PO2ART 80 10/25/2017    PJH9ZQH 50 10/25/2017    AYR3NRV 28.5 10/25/2017    BEART 3 10/25/2017    Y5GXNQEY 95 10/25/2017        Type & Screen (If Applicable):  No results found for: LABABO, [address]    Anesthesia Evaluation  Patient summary reviewed and Nursing notes reviewed no history of anesthetic complications:   Airway: Mallampati: II  TM distance: >3 FB   Neck ROM: full  Mouth opening: > = 3 FB Dental: normal exam         Pulmonary:normal exam  breath sounds clear to auscultation  (+) pneumonia:  COPD:                             Cardiovascular:  Exercise tolerance: good (>4 METS),   (+) CAD:, dysrhythmias: atrial fibrillation, CHF:, hyperlipidemia      ECG reviewed  Rhythm: irregular  Rate: abnormal           Beta Blocker:  Dose within 24 Hrs      ROS comment: Atrial fibrillation  Incomplete right bundle branch block  Moderate voltage criteria for LVH, may be normal variant  Cannot rule out Septal infarct (cited on or before 23-OCT-2017)  Inferior infarct (cited on or before 23-OCT-2017)  Abnormal ECG  When compared with ECG of 23-OCT-2017 12:31,  Incomplete right bundle branch block is now present  Questionable change in initial forces of Anterolateral leads    Left ventricular ejection fraction is visually estimated at 60%.  Mild concentric left ventricular hypertrophy.   Pt appeared to be in at fib--difficult to assess diastolic filling   Mitral annular calcification is present.   Mild (1+) mitral regurgitation is present.   The aortic valve area is 1.47 cm2 with a maximum gradient of 59 mmHg and a   mean gradient of 36 mmHg. The 2-d imaging and gradient are cw at least   moderate (moderately-severe?) Aortic stenosis   The aortic valve leaflets were not well visualized.   Aortic valve leaflets are moderately thickened.   Mild aortic regurgitation is noted.  Bhavik Dines is mild ( 1 +) tricuspid regurgitation with estimated RVSP of 36 mm   Hg.   Normal right ventricle structure and function.   Normal right ventricle systolic pressure.   Aortic root is somewhat fibrocalcified.   Dilated aortic root. ---probably at least mild   IVC not visualized.    C/W the exam of 10/2016---the gradient across the aortic valve has   worsened   consider PRIYA to look at aortic valve when able     Neuro/Psych:   (+) CVA: residual symptoms,             GI/Hepatic/Renal: neg ROS  (+) renal disease: ARF,           Endo/Other:    (+) Type II DM, using insulin,     (-) patient has not had pre-anesthesia visit        Pt had no PAT visit       Abdominal:           Vascular:                                      Anesthesia Plan      general     ASA 3     (ETT)  Induction: intravenous. MIPS: Postoperative opioids intended and Prophylactic antiemetics administered. Anesthetic plan and risks discussed with patient. Plan discussed with CRNA.     Attending anesthesiologist reviewed and agrees with Dominic Britton DO   10/30/2017

## 2017-10-30 NOTE — PROGRESS NOTES
--    BILITOT  0.6   --    --    ALKPHOS  119   --    --    AST  23   --    --    ALT  13   --    --    LABGLOM  31.5*  35.0*  32.2*   GFRAA  38.1*  42.4*  39.0*   GLOB  2.6   --    --          No results for input(s): PHART, OME2OOT, PO2ART, ETO1YFD, BEART, R6BSVGNR in the last 72 hours.   O2 Device: Nasal cannula  O2 Flow Rate (L/min): 2 L/min  Lab Results   Component Value Date    LACTA 0.8 08/18/2017        MEDICATIONS during current hospitalization:    Continuous Infusions:   dextrose         Scheduled Meds:   sterile water  2.2 mL Other Once    alteplase  2 mg Other Once    And    sterile water  2.2 mL Other Once    sterile water  2.2 mL Other Once    insulin glargine  8 Units Subcutaneous Nightly    [START ON 10/31/2017] gabapentin  400 mg Oral BID    ipratropium-albuterol  1 ampule Inhalation 4x daily    levofloxacin  750 mg Intravenous Q24H    furosemide  20 mg Oral Once per day on Mon Wed Fri    metoprolol succinate  25 mg Oral BID    sodium chloride flush  10 mL Intravenous 2 times per day    folic acid  1 mg Oral Daily    lactulose  20 mL Oral Daily    cetirizine  5 mg Oral Daily    pantoprazole  40 mg Oral Daily    aspirin  81 mg Oral Daily    isosorbide mononitrate  30 mg Oral Daily    magnesium oxide  400 mg Oral Daily    allopurinol  100 mg Oral Daily    levothyroxine  200 mcg Oral Daily    hypromellose  2 drop Both Eyes BID    atorvastatin  40 mg Oral Nightly    oxybutynin  5 mg Oral Nightly    sodium chloride flush  10 mL Intravenous 2 times per day    sodium chloride flush  10 mL Intravenous 2 times per day    docusate sodium  100 mg Oral BID    pneumococcal 13-valent conjugate  0.5 mL Intramuscular Once       PRN Meds:morphine, HYDROcodone 5 mg - acetaminophen, heparin flush, acetaminophen, ondansetron, albuterol, guaiFENesin-dextromethorphan, sodium chloride flush, simethicone, docusate sodium, sodium chloride flush, magnesium hydroxide, ondansetron, sodium chloride flush, glucose, dextrose, glucagon (rDNA), dextrose    Radiology  Xr Chest Portable    Result Date: 10/27/2017  PORTABLE CHEST: 10/27/2017 CLINICAL HISTORY:  follow up possible aspiration pneumonia . COMPARISON: 10/25/2017. A portable upright AP radiograph of the chest was obtained. FINDINGS: Infiltrate/consolidation of the mid to basilar left lung, with a small left pleural effusion appears unchanged. Probable mild right basilar atelectasis appears improved. There is no significant right pleural effusion, vascular congestion, pneumothorax, or displaced fractures identified. The right upper extremity PICC catheter is again noted     INFILTRATE/CONSOLIDATION OF THE MID TO BASILAR LEFT LUNG WITH A SMALL PLEURAL EFFUSION. NO SIGNIFICANT CHANGE FROM 10/25/2017    Xr Chest Portable    Result Date: 10/25/2017  EXAMINATION: XR CHEST PORTABLE REASON FOR EXAM: Shortness of breath. FINDINGS: Frontal view of the chest reveals inspiration somewhat shallow related to portable technique and positioning. There is a PICC catheter now in place in satisfactory position since previous studies. Opacification at the left base related to volume loss and pleural thickening is present. Increase infiltrate in the left perihilar region more prominent from previous studies and developing pneumonia superimposed on chronic disease should BE considered. Central pulmonary vascular congestion is present. Scarring in the right mid and lower lung field remain unchanged. MILD CENTRAL VASCULAR CONGESTION. ACUTE PNEUMONIA SUPERIMPOSED ON CHRONIC DISEASE LEFT BASE. ASSESSMENT /Plan:  1. Acute on chronic hypercarbic and hypoxemic respiratory failure  2. Possible aspiration pneumonia  3. Acute on chronic anemia  4. Congestive heart failure    Patient is doing much better today, continue antibiotic treatment for possible aspiration. Continue BiPAP therapy with sleep and when necessary during the day. We will continue to follow. Electronically signed by Latasha Mercer PA-C on 10/30/2017 at 10:30 AM

## 2017-10-30 NOTE — PROGRESS NOTES
Patient has had her bath and both wounds were cleansed and redressed. The wound on the left arm was cleansed with normal saline and a wet to dry was applied with an ABD and kerlix. The site is dry with no drainage. The wound is not open but the site is black and has a mounded appearance. The skin is bruised around the wound and her arm is edematous. The wound on the coccyx is deep, the tissue is yellow with pink edges. Site was cleansed with normal saline and a wet to dry dressing was applied. Minimal order.

## 2017-10-31 ENCOUNTER — APPOINTMENT (OUTPATIENT)
Dept: ULTRASOUND IMAGING | Age: 77
DRG: 802 | End: 2017-10-31
Payer: COMMERCIAL

## 2017-10-31 LAB
ABO/RH: NORMAL
ALBUMIN SERPL-MCNC: 2 G/DL (ref 3.9–4.9)
ALP BLD-CCNC: 110 U/L (ref 40–130)
ALT SERPL-CCNC: 13 U/L (ref 0–33)
ANION GAP SERPL CALCULATED.3IONS-SCNC: 11 MEQ/L (ref 7–13)
ANTIBODY SCREEN: NORMAL
AST SERPL-CCNC: 24 U/L (ref 0–35)
BASOPHILS ABSOLUTE: 0 K/UL (ref 0–0.2)
BASOPHILS RELATIVE PERCENT: 0.7 %
BILIRUB SERPL-MCNC: 0.5 MG/DL (ref 0–1.2)
BUN BLDV-MCNC: 38 MG/DL (ref 8–23)
CALCIUM SERPL-MCNC: 8 MG/DL (ref 8.6–10.2)
CHLORIDE BLD-SCNC: 103 MEQ/L (ref 98–107)
CO2: 25 MEQ/L (ref 22–29)
CREAT SERPL-MCNC: 1.61 MG/DL (ref 0.5–0.9)
EKG ATRIAL RATE: 174 BPM
EKG ATRIAL RATE: 258 BPM
EKG ATRIAL RATE: 394 BPM
EKG ATRIAL RATE: 83 BPM
EKG Q-T INTERVAL: 298 MS
EKG Q-T INTERVAL: 338 MS
EKG Q-T INTERVAL: 340 MS
EKG Q-T INTERVAL: 354 MS
EKG QRS DURATION: 100 MS
EKG QRS DURATION: 104 MS
EKG QRS DURATION: 90 MS
EKG QRS DURATION: 96 MS
EKG QTC CALCULATION (BAZETT): 411 MS
EKG QTC CALCULATION (BAZETT): 423 MS
EKG QTC CALCULATION (BAZETT): 428 MS
EKG QTC CALCULATION (BAZETT): 436 MS
EKG R AXIS: -1 DEGREES
EKG R AXIS: 2 DEGREES
EKG R AXIS: 23 DEGREES
EKG R AXIS: 9 DEGREES
EKG T AXIS: 132 DEGREES
EKG T AXIS: 41 DEGREES
EKG T AXIS: 53 DEGREES
EKG T AXIS: 66 DEGREES
EKG VENTRICULAR RATE: 121 BPM
EKG VENTRICULAR RATE: 88 BPM
EKG VENTRICULAR RATE: 89 BPM
EKG VENTRICULAR RATE: 99 BPM
EOSINOPHILS ABSOLUTE: 0 K/UL (ref 0–0.7)
EOSINOPHILS RELATIVE PERCENT: 0.3 %
GFR AFRICAN AMERICAN: 37.3
GFR NON-AFRICAN AMERICAN: 30.8
GLOBULIN: 2.5 G/DL (ref 2.3–3.5)
GLUCOSE BLD-MCNC: 105 MG/DL (ref 60–115)
GLUCOSE BLD-MCNC: 55 MG/DL (ref 60–115)
GLUCOSE BLD-MCNC: 70 MG/DL (ref 60–115)
GLUCOSE BLD-MCNC: 73 MG/DL (ref 60–115)
GLUCOSE BLD-MCNC: 77 MG/DL (ref 74–109)
GLUCOSE BLD-MCNC: 79 MG/DL (ref 60–115)
GLUCOSE BLD-MCNC: 82 MG/DL (ref 60–115)
HCT VFR BLD CALC: 23.8 % (ref 37–47)
HEMOGLOBIN: 7.6 G/DL (ref 12–16)
INR BLD: 1.5
LYMPHOCYTES ABSOLUTE: 1.2 K/UL (ref 1–4.8)
LYMPHOCYTES RELATIVE PERCENT: 17 %
MAGNESIUM: 2 MG/DL (ref 1.7–2.3)
MCH RBC QN AUTO: 30.5 PG (ref 27–31.3)
MCHC RBC AUTO-ENTMCNC: 31.9 % (ref 33–37)
MCV RBC AUTO: 95.5 FL (ref 82–100)
MONOCYTES ABSOLUTE: 1 K/UL (ref 0.2–0.8)
MONOCYTES RELATIVE PERCENT: 13.6 %
NEUTROPHILS ABSOLUTE: 5 K/UL (ref 1.4–6.5)
NEUTROPHILS RELATIVE PERCENT: 68.4 %
PDW BLD-RTO: 17.5 % (ref 11.5–14.5)
PERFORMED ON: ABNORMAL
PERFORMED ON: NORMAL
PLATELET # BLD: 159 K/UL (ref 130–400)
POTASSIUM SERPL-SCNC: 4.1 MEQ/L (ref 3.5–5.1)
PROTHROMBIN TIME: 15.4 SEC (ref 8.1–13.7)
RBC # BLD: 2.5 M/UL (ref 4.2–5.4)
SODIUM BLD-SCNC: 139 MEQ/L (ref 132–144)
TOTAL PROTEIN: 4.5 G/DL (ref 6.4–8.1)
WBC # BLD: 7.3 K/UL (ref 4.8–10.8)

## 2017-10-31 PROCEDURE — 83735 ASSAY OF MAGNESIUM: CPT

## 2017-10-31 PROCEDURE — 86901 BLOOD TYPING SEROLOGIC RH(D): CPT

## 2017-10-31 PROCEDURE — 2700000000 HC OXYGEN THERAPY PER DAY

## 2017-10-31 PROCEDURE — 94660 CPAP INITIATION&MGMT: CPT

## 2017-10-31 PROCEDURE — 1210000000 HC MED SURG R&B

## 2017-10-31 PROCEDURE — 6370000000 HC RX 637 (ALT 250 FOR IP): Performed by: INTERNAL MEDICINE

## 2017-10-31 PROCEDURE — 36592 COLLECT BLOOD FROM PICC: CPT

## 2017-10-31 PROCEDURE — 6360000002 HC RX W HCPCS: Performed by: HOSPITALIST

## 2017-10-31 PROCEDURE — 99222 1ST HOSP IP/OBS MODERATE 55: CPT | Performed by: INTERNAL MEDICINE

## 2017-10-31 PROCEDURE — 86900 BLOOD TYPING SEROLOGIC ABO: CPT

## 2017-10-31 PROCEDURE — 80053 COMPREHEN METABOLIC PANEL: CPT

## 2017-10-31 PROCEDURE — 85610 PROTHROMBIN TIME: CPT

## 2017-10-31 PROCEDURE — 86923 COMPATIBILITY TEST ELECTRIC: CPT

## 2017-10-31 PROCEDURE — 6370000000 HC RX 637 (ALT 250 FOR IP): Performed by: HOSPITALIST

## 2017-10-31 PROCEDURE — 6360000002 HC RX W HCPCS: Performed by: INTERNAL MEDICINE

## 2017-10-31 PROCEDURE — 2580000003 HC RX 258: Performed by: INTERNAL MEDICINE

## 2017-10-31 PROCEDURE — 93005 ELECTROCARDIOGRAM TRACING: CPT

## 2017-10-31 PROCEDURE — 94760 N-INVAS EAR/PLS OXIMETRY 1: CPT

## 2017-10-31 PROCEDURE — 93971 EXTREMITY STUDY: CPT

## 2017-10-31 PROCEDURE — 2580000003 HC RX 258: Performed by: HOSPITALIST

## 2017-10-31 PROCEDURE — 93010 ELECTROCARDIOGRAM REPORT: CPT | Performed by: INTERNAL MEDICINE

## 2017-10-31 PROCEDURE — 86850 RBC ANTIBODY SCREEN: CPT

## 2017-10-31 PROCEDURE — 92526 ORAL FUNCTION THERAPY: CPT

## 2017-10-31 PROCEDURE — 94640 AIRWAY INHALATION TREATMENT: CPT

## 2017-10-31 PROCEDURE — 85025 COMPLETE CBC W/AUTO DIFF WBC: CPT

## 2017-10-31 PROCEDURE — 36430 TRANSFUSION BLD/BLD COMPNT: CPT

## 2017-10-31 PROCEDURE — 99232 SBSQ HOSP IP/OBS MODERATE 35: CPT | Performed by: INTERNAL MEDICINE

## 2017-10-31 PROCEDURE — P9016 RBC LEUKOCYTES REDUCED: HCPCS

## 2017-10-31 RX ORDER — FUROSEMIDE 10 MG/ML
20 INJECTION INTRAMUSCULAR; INTRAVENOUS PRN
Status: DISCONTINUED | OUTPATIENT
Start: 2017-10-31 | End: 2017-11-01 | Stop reason: HOSPADM

## 2017-10-31 RX ORDER — 0.9 % SODIUM CHLORIDE 0.9 %
250 INTRAVENOUS SOLUTION INTRAVENOUS ONCE
Status: DISCONTINUED | OUTPATIENT
Start: 2017-10-31 | End: 2017-11-01 | Stop reason: HOSPADM

## 2017-10-31 RX ADMIN — HYPROMELLOSE 2 DROP: 4 SOLUTION/ DROPS OPHTHALMIC at 08:25

## 2017-10-31 RX ADMIN — CETIRIZINE HYDROCHLORIDE 5 MG: 10 TABLET, FILM COATED ORAL at 08:27

## 2017-10-31 RX ADMIN — DOCUSATE SODIUM 100 MG: 100 CAPSULE, LIQUID FILLED ORAL at 08:26

## 2017-10-31 RX ADMIN — OXYBUTYNIN CHLORIDE 5 MG: 5 TABLET ORAL at 20:49

## 2017-10-31 RX ADMIN — IPRATROPIUM BROMIDE AND ALBUTEROL SULFATE 1 AMPULE: 2.5; .5 SOLUTION RESPIRATORY (INHALATION) at 20:36

## 2017-10-31 RX ADMIN — Medication 15 G: at 04:29

## 2017-10-31 RX ADMIN — Medication 400 MG: at 08:27

## 2017-10-31 RX ADMIN — LEVOTHYROXINE SODIUM 200 MCG: 200 TABLET ORAL at 08:29

## 2017-10-31 RX ADMIN — ISOSORBIDE MONONITRATE 30 MG: 30 TABLET, EXTENDED RELEASE ORAL at 08:26

## 2017-10-31 RX ADMIN — FUROSEMIDE 20 MG: 10 INJECTION, SOLUTION INTRAVENOUS at 23:59

## 2017-10-31 RX ADMIN — PANTOPRAZOLE SODIUM 40 MG: 40 TABLET, DELAYED RELEASE ORAL at 08:26

## 2017-10-31 RX ADMIN — VANCOMYCIN HYDROCHLORIDE 1000 MG: 1 INJECTION, POWDER, LYOPHILIZED, FOR SOLUTION INTRAVENOUS at 15:33

## 2017-10-31 RX ADMIN — IPRATROPIUM BROMIDE AND ALBUTEROL SULFATE 1 AMPULE: 2.5; .5 SOLUTION RESPIRATORY (INHALATION) at 15:45

## 2017-10-31 RX ADMIN — LEVOFLOXACIN 750 MG: 5 INJECTION, SOLUTION INTRAVENOUS at 13:24

## 2017-10-31 RX ADMIN — GABAPENTIN 400 MG: 400 CAPSULE ORAL at 08:26

## 2017-10-31 RX ADMIN — IPRATROPIUM BROMIDE AND ALBUTEROL SULFATE 1 AMPULE: 2.5; .5 SOLUTION RESPIRATORY (INHALATION) at 07:52

## 2017-10-31 RX ADMIN — ALLOPURINOL 100 MG: 100 TABLET ORAL at 08:26

## 2017-10-31 RX ADMIN — ASPIRIN 81 MG 81 MG: 81 TABLET ORAL at 08:29

## 2017-10-31 RX ADMIN — HYDROCODONE BITARTRATE AND ACETAMINOPHEN 1 TABLET: 5; 325 TABLET ORAL at 08:29

## 2017-10-31 RX ADMIN — FOLIC ACID 1 MG: 1 TABLET ORAL at 08:29

## 2017-10-31 RX ADMIN — DEXTROSE MONOHYDRATE 12.5 G: 25 INJECTION, SOLUTION INTRAVENOUS at 04:35

## 2017-10-31 RX ADMIN — LACTULOSE 13.33 G: 20 SOLUTION ORAL at 08:25

## 2017-10-31 RX ADMIN — MEROPENEM 1 G: 1 INJECTION, POWDER, FOR SOLUTION INTRAVENOUS at 15:33

## 2017-10-31 ASSESSMENT — ENCOUNTER SYMPTOMS
VOMITING: 0
NAUSEA: 0
ABDOMINAL PAIN: 1
RESPIRATORY NEGATIVE: 1
EYES NEGATIVE: 1
DIARRHEA: 0

## 2017-10-31 ASSESSMENT — PAIN SCALES - GENERAL
PAINLEVEL_OUTOF10: 9
PAINLEVEL_OUTOF10: 7

## 2017-10-31 NOTE — PROGRESS NOTES
INPATIENT PROGRESS NOTES    PATIENT NAME: Awa Irizarry  MRN: [id number]  SERVICE DATE:  October 31, 2017   SERVICE TIME:  10:46 AM      PRIMARY SERVICE:  Pulmonary Medicine     INTERVAL HPI: Patient seen and examined at bedside, Interval Notes, orders reviewed. Nursing notes noted: Patient with worsening R upper extremity swelling. This is the arm with the pick line. Patient has a history of +DVT of Left upper extremity, but due to anemia anticoagulation was recently stopped. Patient denies any changes in her respiratory status. She is s/p sacral and left arm ulcer debridement per Dr. Aida Sandoval. Patient is compliant with BiPAP therapy. We are following her due to concern for aspiration pneumonia based on patient's debility. Review of Systems  Please see HPI above. All bolded are positive. OBJECTIVE    Body mass index is 35.19 kg/m². PHYSICAL EXAM:  Vitals:  BP 87/66   Pulse 102   Temp 97.9 °F (36.6 °C) (Oral)   Resp 18   Ht 5' 6\" (1.676 m) Comment: Last Admission  Wt 218 lb (98.9 kg)   SpO2 98%   BMI 35.19 kg/m²   General: Patient is comfortable in bed, No distress. Head: Atraumatic , Normocephalic   Eyes: PERRL. No sclera icterus. No conjunctival injection. No discharge   ENT: No nasal  discharge. Pharynx clear. Neck:  Trachea midline. No thyromegaly, no JVD, No cervical adenopathy. Resp : Diminished breath sounds bilaterally and scattered rhonchi at the bases no wheezing. CV: Normal  rate. Regular rhythm. No mumur ,  Rub or gallop  ABD: Non-tender. Non-distended. No masses. No organmegaly. Normal bowel sounds. No hernia. EXT: Worsening right upper extremity swelling, patient is neurovascularly intact, there is a small draining abrasion from the area, No Cyanosis No clubbing  Neuro: no focal weakness  Skin: Warm and dry. No erythema rash on exposed extremities.     DATA:   Recent Labs      10/30/17   0600  10/31/17   0548   WBC  8.2  7.3   HGB  7.9*  7.6*   HCT  25.2*  23.8*   MCV  96.7 95.5   PLT  179  159     Recent Labs      10/30/17   0600  10/31/17   0549   NA  139  139   K  4.2  4.1   CL  101  103   CO2  27  25   BUN  37*  38*   CREATININE  1.55*  1.61*   GLUCOSE  59*  77   CALCIUM  8.3*  8.0*   PROT   --   4.5*   LABALBU   --   2.0*   BILITOT   --   0.5   ALKPHOS   --   110   AST   --   24   ALT   --   13   LABGLOM  32.2*  30.8*   GFRAA  39.0*  37.3*   GLOB   --   2.5         No results for input(s): PHART, SGK7LJW, PO2ART, KXZ6KHZ, BEART, T5TVBMBY in the last 72 hours.   O2 Device: Nasal cannula  O2 Flow Rate (L/min): 2 L/min  Lab Results   Component Value Date    LACTA 0.8 08/18/2017        MEDICATIONS during current hospitalization:    Continuous Infusions:   sodium chloride 100 mL/hr at 10/30/17 2118    dextrose         Scheduled Meds:   sterile water  2.2 mL Other Once    alteplase  2 mg Other Once    And    sterile water  2.2 mL Other Once    sterile water  2.2 mL Other Once    insulin glargine  8 Units Subcutaneous Nightly    gabapentin  400 mg Oral BID    ipratropium-albuterol  1 ampule Inhalation 4x daily    levofloxacin  750 mg Intravenous Q24H    furosemide  20 mg Oral Once per day on Mon Wed Fri    metoprolol succinate  25 mg Oral BID    sodium chloride flush  10 mL Intravenous 2 times per day    folic acid  1 mg Oral Daily    lactulose  20 mL Oral Daily    cetirizine  5 mg Oral Daily    pantoprazole  40 mg Oral Daily    aspirin  81 mg Oral Daily    isosorbide mononitrate  30 mg Oral Daily    magnesium oxide  400 mg Oral Daily    allopurinol  100 mg Oral Daily    levothyroxine  200 mcg Oral Daily    hypromellose  2 drop Both Eyes BID    atorvastatin  40 mg Oral Nightly    oxybutynin  5 mg Oral Nightly    sodium chloride flush  10 mL Intravenous 2 times per day    sodium chloride flush  10 mL Intravenous 2 times per day    docusate sodium  100 mg Oral BID    pneumococcal 13-valent conjugate  0.5 mL Intramuscular Once       PRN Meds:morphine, HYDROcodone

## 2017-10-31 NOTE — PROGRESS NOTES
[facility] Occupational Therapy      Date: 10/31/2017  Patient Name: Brian Ortega        MRN: [id number]  Account: [de-identified]   : 1940  (76 y.o.)  Room: [place] [address]    Chart reviewed, attempted OT tx at 9:14 AM, but pt. unavailable 2° to:    [] Hold per nsg request    [x] Pt declined. Unable to encourage pt to sit up for breakfast. Pt is Dep to feed. [] Pt. . off floor for test/procedure. Will attempt again when able.     Electronically signed by MATILDA Mancia/L on 10/31/2017 at 9:14 AM

## 2017-10-31 NOTE — PROGRESS NOTES
2377 ml   Output              730 ml   Net             1647 ml       Constitutional:  Alert, awake, no apparent distress   Skin:normal, no rash  HEENT:sclera anicteric.   Head atraumatic normocephalic  Neck:supple with no thyromegally  Cardiovascular:  S1, S2 without m/r/g   Respiratory:  CTA B without w/r/r   Abdomen: +bs, soft, nt  Ext: 1+ LE edema  Musculoskeletal:Intact  Neuro:Alert and oriented with no deficit      Electronically signed by Vickey Aly MD on 10/31/2017 at 1:32 PM

## 2017-10-31 NOTE — PROGRESS NOTES
208 lb (94.3 kg) (10/20/16)  · Ideal Body Wt: 130 lb (59 kg), % Ideal Body 162%  · BMI Classification: BMI 30.0 - 34.9 Obese Class I (33.5)  · Comparative Standards (Estimated Nutrition Needs):  · Estimated Daily Total Kcal: 1880 to 2065  · Estimated Daily Protein (g): 77 to 89    Estimated Intake vs Estimated Needs: Intake Less Than Needs    Nutrition Risk Level: High    Nutrition Interventions:   Modify current ONS (Consider Intitiation of EN, possibe Peg if Aggressive measures desired.)  Continued Inpatient Monitoring, Coordination of Community Care    Nutrition Evaluation:   · Evaluation: No progress toward goals   · Goals: Improved Oral Intake of Meals/ONS'S > 50% to75%. Consideration for Nutrition Support. Improved Wound Healing. · Monitoring: Meal Intake, Supplement Intake, Weight, Pertinent Labs, Chewing/Swallowing, Wound Healing, Constipation    See Adult Nutrition Doc Flowsheet for more detail.      Electronically signed by Bird Santos RD, LAURA on 10/31/17 at 5:33 PM    Contact Number: [phone]

## 2017-10-31 NOTE — PROGRESS NOTES
Assumed care of pt. Assessment performed. Complained this AM of pain in buttocks and arms. Dressing on arm changed, new 4x4 and kerlix placed. Pt ate breakfast with family. Refused to eat lunch. Pt to US this AM for RUE. Pt left resting in bed with call light within reach and bed alarm on. Will continue to monitor.  Electronically signed by Beverly Andrew RN on 10/31/2017 at 1:53 PM

## 2017-10-31 NOTE — PROGRESS NOTES
cultures     CAD  CHF     Dispo: Await consultant clearance prior to D/C, likely in next 48 hours. Advised F/U with PCP 1 - 2 days of discharge. SUBJECTIVE  CHIEF COMPLAINT: SOB; fatigue    PRIMARY SERVICE: Medicine    INTERVAL HPI: Patient states she is tired, has no appetite  .   MEDICATIONS:    Current Facility-Administered Medications   Medication Dose Route Frequency Provider Last Rate Last Dose    0.9 % sodium chloride infusion 250 mL  250 mL Intravenous Once Drew Moralez MD        furosemide (LASIX) injection 20 mg  20 mg Intravenous PRN Drew Moralez MD        0.9 % sodium chloride infusion   Intravenous Continuous Ronnie Olmos,  mL/hr at 10/30/17 2118      sterile water injection 2.2 mL  2.2 mL Other Once Rhoda Madrid MD        alteplase (CATHFLO) injection 2 mg  2 mg Other Once Rhoda Madrid MD        And    sterile water injection 2.2 mL  2.2 mL Other Once Rhoda Madrid MD        sterile water injection 2.2 mL  2.2 mL Other Once Jelani Frazier MD        insulin glargine (LANTUS) injection vial 8 Units  8 Units Subcutaneous Nightly Jelani Frazier MD        gabapentin (NEURONTIN) capsule 400 mg  400 mg Oral BID Jelani Frazier MD   400 mg at 10/31/17 0826    morphine injection 0.5 mg  0.5 mg Intravenous Q4H PRN Jelani Frazier MD        HYDROcodone-acetaminophen (NORCO) 5-325 MG per tablet 1 tablet  1 tablet Oral Q6H PRN Jelani Frazier MD   1 tablet at 10/31/17 0829    heparin flush 100 UNIT/ML injection 100 Units  100 Units Intercatheter PRN Braeden Sharma MD   100 Units at 10/26/17 1250    acetaminophen (TYLENOL) tablet 650 mg  650 mg Oral Q4H PRN Tomás Grant MD   650 mg at 10/28/17 1111    ondansetron (ZOFRAN) injection 4 mg  4 mg Intravenous Q6H PRN Tomás Grant MD        albuterol (PROVENTIL) nebulizer solution 2.5 mg  2.5 mg Nebulization Q2H PRN Tomás Grant MD        ipratropium-albuterol (DUONEB) nebulizer solution 1 ampule  1 ampule Inhalation 4x daily Max Orr MD   1 ampule at 10/31/17 0752    levofloxacin (LEVAQUIN) 750 MG/150ML infusion 750 mg  750 mg Intravenous Q24H Rose Mary Proctor  mL/hr at 10/30/17 1158 0.1 mg at 10/30/17 1320    furosemide (LASIX) tablet 20 mg  20 mg Oral Once per day on Mon Wed Fri Heriberto Arm Oksana Barnes MD   20 mg at 10/30/17 1455    guaiFENesin-dextromethorphan (ROBITUSSIN DM) 100-10 MG/5ML syrup 5 mL  5 mL Oral Q4H PRN Rose Mary Proctor MD        metoprolol succinate (TOPROL XL) extended release tablet 25 mg  25 mg Oral BID Abby Bangura MD   25 mg at 10/30/17 2036    sodium chloride flush 0.9 % injection 10 mL  10 mL Intravenous 2 times per day Yaa Salomon PA-C   10 mL at 10/29/17 2140    sodium chloride flush 0.9 % injection 10 mL  10 mL Intravenous PRN Yaa Salomon PA-C        folic acid (FOLVITE) tablet 1 mg  1 mg Oral Daily Abby Bangura MD   1 mg at 10/31/17 0829    lactulose (CHRONULAC) 10 GM/15ML solution 13.3333 g  20 mL Oral Daily Abby Bangura MD   13.3333 g at 10/31/17 0825    cetirizine (ZYRTEC) tablet 5 mg  5 mg Oral Daily Abby Bangura MD   5 mg at 10/31/17 0827    pantoprazole (PROTONIX) tablet 40 mg  40 mg Oral Daily Abby Bangura MD   40 mg at 10/31/17 0826    simethicone (MYLICON) chewable tablet 80 mg  80 mg Oral Q6H PRN Abby Bangura MD        aspirin chewable tablet 81 mg  81 mg Oral Daily Abby Bangura MD   81 mg at 10/31/17 0829    isosorbide mononitrate (IMDUR) extended release tablet 30 mg  30 mg Oral Daily Abby Bangura MD   30 mg at 10/31/17 0826    magnesium oxide (MAG-OX) tablet 400 mg  400 mg Oral Daily Abby Bangura MD   400 mg at 10/31/17 0827    allopurinol (ZYLOPRIM) tablet 100 mg  100 mg Oral Daily Abby Bangura MD   100 mg at 10/31/17 2926    docusate sodium (COLACE) capsule 100 mg  100 mg Oral BID PRN Abby Bangura MD        levothyroxine (SYNTHROID) tablet 200 mcg  200 mcg Oral Daily Josefina Mcwilliams MD   200 mcg at 10/31/17 0829    hypromellose 0.4 % ophthalmic solution SOLN 2 drop  2 drop Both Eyes BID Josefina Mcwilliams MD   2 drop at 10/31/17 0825    atorvastatin (LIPITOR) tablet 40 mg  40 mg Oral Nightly Josefina Mcwilliams MD   40 mg at 10/30/17 2036    oxybutynin (DITROPAN) tablet 5 mg  5 mg Oral Nightly Josefina Mcwilliams MD   5 mg at 10/30/17 2036    sodium chloride flush 0.9 % injection 10 mL  10 mL Intravenous 2 times per day Josefina Mcwilliams MD   10 mL at 10/29/17 2140    sodium chloride flush 0.9 % injection 10 mL  10 mL Intravenous PRN Josefina Mcwilliams MD        magnesium hydroxide (MILK OF MAGNESIA) 400 MG/5ML suspension 30 mL  30 mL Oral Daily PRN Josefina Mcwilliams MD        ondansetron [facility]) injection 4 mg  4 mg Intravenous Q6H PRN Josefina Mcwilliams MD        sodium chloride flush 0.9 % injection 10 mL  10 mL Intravenous 2 times per day Emani Muniz MD   10 mL at 10/30/17 2036    sodium chloride flush 0.9 % injection 10 mL  10 mL Intravenous PRN Emani Muniz MD        docusate sodium (COLACE) capsule 100 mg  100 mg Oral BID Emani Muniz MD   100 mg at 10/31/17 0826    glucose (GLUTOSE) 40 % oral gel 15 g  15 g Oral PRN Emani Muniz MD   15 g at 10/31/17 0429    dextrose 50 % solution 12.5 g  12.5 g Intravenous PRN Emani Muniz MD   12.5 g at 10/31/17 0435    glucagon (rDNA) injection 1 mg  1 mg Intramuscular PRN Emani Muniz MD        dextrose 5 % solution  100 mL/hr Intravenous PRN Emani Muniz MD        pneumococcal 13-valent conjugate (PREVNAR) injection 0.5 mL  0.5 mL Intramuscular Once Emani Muniz MD           OBJECTIVE  PHYSICAL EXAM:   BP 87/66   Pulse 102   Temp 97.9 °F (36.6 °C) (Oral)   Resp 18   Ht 5' 6\" (1.676 m) Comment: Last Admission  Wt 218 lb (98.9 kg)   SpO2 98%   BMI 35.19 kg/m²   Body mass index is 35.19 kg/m².   CONSTITUTIONAL:  Patient is more lethargic and confused today  EYES:  Lids and lashes normal, pupils equal, round and reactive to light, extra ocular muscles intact, sclera clear, conjunctiva normal  LUNGS:  Diminished BS bilaterally,overall breath sounds are improving, no dyspnea,   CARDIOVASCULAR:  Irregular, irregular rate, and rhythm, tacycardia  ABDOMEN:  No scars, normal bowel sounds, soft, non-distended, non-tender, no masses palpated, no hepatosplenomegally  EXT: edema bilateral legs and upper extremities, wrap on left arm     DATA:   Diagnostic tests reviewed for today's visit:    Most recent labs and imaging results reviewed.      SIGNATURE: Claribel Clifton MD PATIENT NAME: Zunilda Naranjo   DATE: October 31, 2017 MRN: [id number]   TIME: 11:24 AM PAGER: [phone]

## 2017-10-31 NOTE — CARE COORDINATION
Pt is from [facility] and the plan remains to return. She does have a Bi-Pap at [facility].

## 2017-10-31 NOTE — PROGRESS NOTES
INPATIENT PROGRESS NOTE    SERVICE DATE:  10/31/2017   SERVICE TIME:  9:44 am    ASSESSMENT AND PLAN   77-year-old female with past medical history of endometrial cancer, coronary artery disease, chronic kidney disease stage III, diabetes type 2. Sent from nursing facility due to acute and chronic anemia, A. fib with RVR, and acute kidney injury and chronic kidney disease stage III.     Acute on chronic anemia: Multifactorial. Down to 6.3 on admission, now 7.9 s/p 3 units PRBC's since admission.   - Patient was recently on Coumadin, due to DVT of the left upper extremity, this was D/C'd by Hem/Onc reporting no longer needed. Use warm compresses instead. - Stool for occult blood ordered  - Anticoagulations held, defer to Hem/Onc for recs on restarting  - Hematology has been consulted. Per Dr. Lenka Hoyos, Anemia is due to multifactorial causes. Anemia, rule out myelodysplastic syndrome and suggested bone marrow biopsy, completed yesterday. Previous iron studies 8/2017 showed iron deficiency. Renal insufficiency is a contributing factor, Being on anticoagulation is another factor. History of uterine cancer s/p radiation therapy 2017. Check, ERVIN, retic count, Ca 125.  She would benfit from parenteral iron as well. Has a blood blister in left forearm. She can be discharged when ready and will follow up in our office in 1 month. - Continue to monitor  - hgb is 7.6, hct is 23.8     Atrial fibrillation with rapid ventricular rate: Rate to the 120's  - Patient placed on telemetry  - Cardiology is consulted. Per Dr. Ruthie Armendariz, AF with RVR in setting of acute blood loss anemia, with improvement following transfusion. VHD with moderately severe AS, no signs HF. BRIGIDA on CKD. Acute blood loss anemia s/p transfusion. Continue rate control with metoprolol, add prn IV doses for HR greater than 125. Supportive care including hydration, trnasfusion. Warfarin on hold until cleared by heme/onc to resume.  Cristhian further dispo recs     Acute Intravenous Continuous Trent Stains Amarilis,  mL/hr at 10/30/17 2118      sterile water injection 2.2 mL  2.2 mL Other Once Sea Real MD        alteplase (CATHFLO) injection 2 mg  2 mg Other Once Sea Real MD        And    sterile water injection 2.2 mL  2.2 mL Other Once Sea Real MD        sterile water injection 2.2 mL  2.2 mL Other Once Owen Harvey MD        insulin glargine (LANTUS) injection vial 8 Units  8 Units Subcutaneous Nightly Owen Harvey MD        gabapentin (NEURONTIN) capsule 400 mg  400 mg Oral BID Owen Harvey MD   400 mg at 10/31/17 0826    morphine injection 0.5 mg  0.5 mg Intravenous Q4H PRN Owen Harvey MD        HYDROcodone-acetaminophen (NORCO) 5-325 MG per tablet 1 tablet  1 tablet Oral Q6H PRN Owen Harvey MD   1 tablet at 10/31/17 0829    heparin flush 100 UNIT/ML injection 100 Units  100 Units Intercatheter PRN Pauly Lehman MD   100 Units at 10/26/17 1250    acetaminophen (TYLENOL) tablet 650 mg  650 mg Oral Q4H PRN Willey Hammans, MD   650 mg at 10/28/17 1111    ondansetron (ZOFRAN) injection 4 mg  4 mg Intravenous Q6H PRN Willey Hammans, MD        albuterol (PROVENTIL) nebulizer solution 2.5 mg  2.5 mg Nebulization Q2H PRN Willey Hammans, MD        ipratropium-albuterol (DUONEB) nebulizer solution 1 ampule  1 ampule Inhalation 4x daily Willey Hammans, MD   1 ampule at 10/31/17 0752    levofloxacin (LEVAQUIN) 750 MG/150ML infusion 750 mg  750 mg Intravenous Q24H Jan Colbert  mL/hr at 10/30/17 1158 0.1 mg at 10/30/17 1320    furosemide (LASIX) tablet 20 mg  20 mg Oral Once per day on Mon Wed Fri [place] Ring Wolf Patterson MD   20 mg at 10/30/17 1455    guaiFENesin-dextromethorphan (ROBITUSSIN DM) 100-10 MG/5ML syrup 5 mL  5 mL Oral Q4H PRN Jan Colbert MD        metoprolol succinate (TOPROL XL) extended release tablet 25 mg  25 mg Oral BID Owen Harvey MD   25 mg at 10/30/17 2036  sodium chloride flush 0.9 % injection 10 mL  10 mL Intravenous 2 times per day Laurita Patella, PA-C   10 mL at 10/29/17 2140    sodium chloride flush 0.9 % injection 10 mL  10 mL Intravenous PRN RAMU Mccabe-C        folic acid (FOLVITE) tablet 1 mg  1 mg Oral Daily Brigid Thomas MD   1 mg at 10/31/17 0829    lactulose (CHRONULAC) 10 GM/15ML solution 13.3333 g  20 mL Oral Daily Birgid Thomas MD   13.3333 g at 10/31/17 0825    cetirizine (ZYRTEC) tablet 5 mg  5 mg Oral Daily Brigid Thomas MD   5 mg at 10/31/17 0827    pantoprazole (PROTONIX) tablet 40 mg  40 mg Oral Daily Brigid Thomas MD   40 mg at 10/31/17 0826    simethicone (MYLICON) chewable tablet 80 mg  80 mg Oral Q6H PRN Brigid Thomas MD        aspirin chewable tablet 81 mg  81 mg Oral Daily Brigid Thomas MD   81 mg at 10/31/17 0829    isosorbide mononitrate (IMDUR) extended release tablet 30 mg  30 mg Oral Daily Brigid Thomas MD   30 mg at 10/31/17 0826    magnesium oxide (MAG-OX) tablet 400 mg  400 mg Oral Daily Brigid Thomas MD   400 mg at 10/31/17 0827    allopurinol (ZYLOPRIM) tablet 100 mg  100 mg Oral Daily Brigid Thomas MD   100 mg at 10/31/17 8924    docusate sodium (COLACE) capsule 100 mg  100 mg Oral BID PRN Brigid Thomas MD        levothyroxine (SYNTHROID) tablet 200 mcg  200 mcg Oral Daily Brigid Thomas MD   200 mcg at 10/31/17 0829    hypromellose 0.4 % ophthalmic solution SOLN 2 drop  2 drop Both Eyes BID Brigid Thomas MD   2 drop at 10/31/17 0825    atorvastatin (LIPITOR) tablet 40 mg  40 mg Oral Nightly Brigid Thomas MD   40 mg at 10/30/17 2036    oxybutynin (DITROPAN) tablet 5 mg  5 mg Oral Nightly Brigid Thomas MD   5 mg at 10/30/17 2036    sodium chloride flush 0.9 % injection 10 mL  10 mL Intravenous 2 times per day Brigid Thomas MD   10 mL at 10/29/17 2140    sodium chloride flush 0.9 % injection 10 mL  10 mL Intravenous PRN Reynaldo Clark MD        magnesium hydroxide (MILK OF MAGNESIA) 400 MG/5ML suspension 30 mL  30 mL Oral Daily PRN Reynaldo Clark MD        ondansetron [facility]) injection 4 mg  4 mg Intravenous Q6H PRN Reynaldo Clark MD        sodium chloride flush 0.9 % injection 10 mL  10 mL Intravenous 2 times per day Tevin Williamson MD   10 mL at 10/30/17 2036    sodium chloride flush 0.9 % injection 10 mL  10 mL Intravenous PRN Tevin Williamson MD        docusate sodium (COLACE) capsule 100 mg  100 mg Oral BID Tevin Williamson MD   100 mg at 10/31/17 0826    glucose (GLUTOSE) 40 % oral gel 15 g  15 g Oral PRN Tevin Williamson MD   15 g at 10/31/17 0429    dextrose 50 % solution 12.5 g  12.5 g Intravenous PRN Tevin Williamson MD   12.5 g at 10/31/17 0435    glucagon (rDNA) injection 1 mg  1 mg Intramuscular PRN Tevin Williamson MD        dextrose 5 % solution  100 mL/hr Intravenous PRN Tevin Williamson MD        pneumococcal 13-valent conjugate (PREVNAR) injection 0.5 mL  0.5 mL Intramuscular Once Tevin Williamson MD           OBJECTIVE  PHYSICAL EXAM:   BP 87/66   Pulse 102   Temp 97.9 °F (36.6 °C) (Oral)   Resp 18   Ht 5' 6\" (1.676 m) Comment: Last Admission  Wt 218 lb (98.9 kg)   SpO2 98%   BMI 35.19 kg/m²   Body mass index is 35.19 kg/m². CONSTITUTIONAL:  Patient is more lethargic and confused today  EYES:  Lids and lashes normal, pupils equal, round and reactive to light, extra ocular muscles intact, sclera clear, conjunctiva normal  LUNGS:  Diminished BS bilaterally,overall breath sounds are improving, no dyspnea,   CARDIOVASCULAR:  Irregular, irregular rate, and rhythm, tacycardia  ABDOMEN:  No scars, normal bowel sounds, soft, non-distended, non-tender, no masses palpated, no hepatosplenomegally  EXT: edema bilateral legs and upper extremities, wrap on left arm     DATA:   Diagnostic tests reviewed for today's visit:    Most recent labs and imaging results reviewed.      SIGNATURE:

## 2017-10-31 NOTE — CONSULTS
Consults     Infectious Disease     Patient Name: Valentine Noguera  Date: 10/31/2017  YOB: 1940  Medical Record Number: [id number]      Left forearm abscess  Osteomyelitis sacrum    History of Present Illness:    Patient with several wounds on the sacral region described as necrotic  along with a left forearm wound necrotic ecchymotic  Went for surgical debridement on 10/30/17 1 cultures growing MRSA the other is growing MRSA and a gram-negative froilan    Sacral wound was reported to be 4 x 3 cm    Patient presented on 1023 shortness of breath acute kidney injury anemia known endometrial cancer    Social History   Substance Use Topics    Smoking status: Former Smoker    Smokeless tobacco: Never Used    Alcohol use No         Past Medical History:   Diagnosis Date    CAD (coronary artery disease)     [facility] [facility]     Cerebral artery occlusion with cerebral infarction ([facility] [address] [facility]     multiple TIAs in past    CHF (congestive heart failure) (HCC)     Chronic kidney disease (CKD)     COPD (chronic obstructive pulmonary disease) ([address]     Diabetes mellitus ([facility] [address] [facility]     Diastolic dysfunction     Endometrial cancer ([facility] [facility]     History of Clostridium difficile     History of TIAs     Lung disease     Pneumonia     Thyroid disease            Past Surgical History:   Procedure Laterality Date    APPENDECTOMY      CHOLECYSTECTOMY      ENDOMETRIAL BIOPSY      INCISION AND DRAINAGE Left 10/30/2017    EXCISIONAL DEBRIDEMENT OF NECROTIC WOUNDS ON SACRAL AREA AND LEFT FOREARM performed by Anthony Pires MD at [facility]         No current facility-administered medications on file prior to encounter.       Current Outpatient Prescriptions on File Prior to Encounter   Medication Sig Dispense Refill    albuterol (PROVENTIL) (2.5 MG/3ML) 0.083% nebulizer solution Take 3 mLs by nebulization every 2 hours as needed for Wheezing 120 each 3    ipratropium-albuterol (DUONEB) 0.5-2.5 (3) MG/3ML SOLN nebulizer solution Inhale 3 mLs into the lungs 4 times daily 360 mL     gabapentin (NEURONTIN) 400 MG capsule Take 1 capsule by mouth 2 times daily 90 capsule 3    furosemide (LASIX) 20 MG tablet Take 20 mg by mouth daily      levothyroxine (SYNTHROID) 200 MCG tablet Take 200 mcg by mouth Daily      hypromellose (ARTIFICIAL TEARS) 0.4 % SOLN ophthalmic solution Place 2 drops into both eyes 2 times daily      diclofenac sodium 1 % GEL Apply 2 g topically 2 times daily as needed for Pain Apply to hands and knees as needed for pain      allopurinol (ZYLOPRIM) 100 MG tablet Take 100 mg by mouth daily      acetaminophen (TYLENOL) 325 MG tablet Take 2 tablets by mouth every 4 hours as needed for Pain or Fever 120 tablet 0    aspirin 81 MG chewable tablet Take 1 tablet by mouth daily 30 tablet 3    isosorbide mononitrate (IMDUR) 30 MG extended release tablet Take 1 tablet by mouth daily 30 tablet 3    metoprolol succinate (TOPROL XL) 25 MG extended release tablet Take 1 tablet by mouth daily 30 tablet 3    lactulose (CHRONULAC) 10 GM/15ML solution Take 20 mLs by mouth daily      loratadine (CLARITIN) 10 MG tablet Take 10 mg by mouth daily      simethicone (MYLICON) 80 MG chewable tablet Take 80 mg by mouth every 6 hours as needed for Flatulence      atorvastatin (LIPITOR) 40 MG tablet Take 40 mg by mouth nightly      ferrous sulfate 325 (65 Fe) MG tablet Take 325 mg by mouth daily (with breakfast)      oxybutynin (DITROPAN) 5 MG tablet Take 5 mg by mouth nightly      docusate sodium (COLACE) 100 MG capsule Take 100 mg by mouth 2 times daily as needed for Constipation      magnesium oxide (MAG-OX) 400 (241.3 MG) MG TABS tablet Take 1 tablet by mouth daily 30 tablet     folic acid (FOLVITE) 1 MG tablet Take 1 mg by mouth daily      insulin lispro (HUMALOG) 100 UNIT/ML injection vial Inject into the skin 3 times daily (before meals) Indications: sliding scale       insulin glargine (LANTUS) 100 UNIT/ML injection vial Inject 8 Obesity    Hemiparesis affecting left side as late effect of stroke (HCC)    Atrial fibrillation (HCC)    Acute on chronic respiratory failure with hypoxia and hypercapnia (HCC)    [facility] [address] [facility]    Hospice Care    Atrial fibrillation with RVR (HCC)    Acute hemolytic anemia (HCC)    BRIGIDA (acute kidney injury) (HCC)           ASSESSMENT:PLAN:    Left forearm abscess with MRSA infected sacral ulceration with MRSA probably with gram-negative rods as well    Antibiotic therapy with vancomycin and meropenem

## 2017-10-31 NOTE — PROGRESS NOTES
Patients blood sugar was 55. Pushed dextrose and rechecked 15 minutes later and she was 107. Will pass on to day shift.  Electronically signed by Curlene Kehr, RN on 10/31/2017 at 5:37 AM

## 2017-10-31 NOTE — PLAN OF CARE
Problem: Nutrition  Goal: Optimal nutrition therapy  Nutrition Problem: Inadequate oral intake, in context of acute illness or injury   Intervention: Food and/or Nutrient Delivery: Modify current ONS (Consider Intitiation of EN, possibe Peg if Aggressive measures desired.)  Nutritional Goals: Improved Oral Intake of Meals/ONS'S > 50% to75%. Consideration for Nutrition Support. Improved Wound Healing.     Outcome: Ongoing

## 2017-11-01 VITALS
RESPIRATION RATE: 16 BRPM | SYSTOLIC BLOOD PRESSURE: 83 MMHG | DIASTOLIC BLOOD PRESSURE: 40 MMHG | OXYGEN SATURATION: 98 % | BODY MASS INDEX: 35.03 KG/M2 | HEART RATE: 78 BPM | HEIGHT: 66 IN | TEMPERATURE: 97.1 F | WEIGHT: 218 LBS

## 2017-11-01 LAB
ANAEROBIC CULTURE: ABNORMAL
ANAEROBIC CULTURE: ABNORMAL
ANION GAP SERPL CALCULATED.3IONS-SCNC: 12 MEQ/L (ref 7–13)
ANISOCYTOSIS: ABNORMAL
BASOPHILIC STIPPLING: 1
BASOPHILS ABSOLUTE: 0 K/UL (ref 0–0.2)
BASOPHILS ABSOLUTE: 0 K/UL (ref 0–0.2)
BASOPHILS RELATIVE PERCENT: 0.4 %
BASOPHILS RELATIVE PERCENT: 1.2 %
BLOOD BANK DISPENSE STATUS: NORMAL
BLOOD BANK DISPENSE STATUS: NORMAL
BLOOD BANK PRODUCT CODE: NORMAL
BLOOD BANK PRODUCT CODE: NORMAL
BPU ID: NORMAL
BPU ID: NORMAL
BUN BLDV-MCNC: 37 MG/DL (ref 8–23)
CALCIUM SERPL-MCNC: 8 MG/DL (ref 8.6–10.2)
CHLORIDE BLD-SCNC: 103 MEQ/L (ref 98–107)
CO2: 25 MEQ/L (ref 22–29)
CREAT SERPL-MCNC: 1.69 MG/DL (ref 0.5–0.9)
CULTURE SURGICAL: ABNORMAL
DESCRIPTION BLOOD BANK: NORMAL
DESCRIPTION BLOOD BANK: NORMAL
EOSINOPHILS ABSOLUTE: 0 K/UL (ref 0–0.7)
EOSINOPHILS ABSOLUTE: 0.1 K/UL (ref 0–0.7)
EOSINOPHILS RELATIVE PERCENT: 0.3 %
EOSINOPHILS RELATIVE PERCENT: 1 %
GFR AFRICAN AMERICAN: 35.3
GFR NON-AFRICAN AMERICAN: 29.1
GLUCOSE BLD-MCNC: 102 MG/DL (ref 60–115)
GLUCOSE BLD-MCNC: 62 MG/DL (ref 74–109)
GLUCOSE BLD-MCNC: 63 MG/DL (ref 60–115)
GLUCOSE BLD-MCNC: 67 MG/DL (ref 60–115)
GLUCOSE BLD-MCNC: 74 MG/DL (ref 60–115)
GLUCOSE BLD-MCNC: 81 MG/DL (ref 60–115)
GRAM STAIN RESULT: ABNORMAL
GRAM STAIN RESULT: ABNORMAL
HCT VFR BLD CALC: 30.5 % (ref 37–47)
HCT VFR BLD CALC: 30.7 % (ref 37–47)
HEMOGLOBIN: 9.8 G/DL (ref 12–16)
HEMOGLOBIN: 9.8 G/DL (ref 12–16)
HYPOCHROMIA: ABNORMAL
LYMPHOCYTES ABSOLUTE: 0.3 K/UL (ref 1–4.8)
LYMPHOCYTES ABSOLUTE: 1.2 K/UL (ref 1–4.8)
LYMPHOCYTES RELATIVE PERCENT: 13 %
LYMPHOCYTES RELATIVE PERCENT: 3 %
MACROCYTES: ABNORMAL
MAGNESIUM: 2 MG/DL (ref 1.7–2.3)
MCH RBC QN AUTO: 30.6 PG (ref 27–31.3)
MCH RBC QN AUTO: 30.9 PG (ref 27–31.3)
MCHC RBC AUTO-ENTMCNC: 32 % (ref 33–37)
MCHC RBC AUTO-ENTMCNC: 32.2 % (ref 33–37)
MCV RBC AUTO: 94.8 FL (ref 82–100)
MCV RBC AUTO: 96.4 FL (ref 82–100)
MICROCYTES: 0
MONOCYTES ABSOLUTE: 0.7 K/UL (ref 0.2–0.8)
MONOCYTES ABSOLUTE: 0.9 K/UL (ref 0.2–0.8)
MONOCYTES RELATIVE PERCENT: 10.5 %
MONOCYTES RELATIVE PERCENT: 8.4 %
MYELOCYTE PERCENT: 1 %
NEUTROPHILS ABSOLUTE: 6.8 K/UL (ref 1.4–6.5)
NEUTROPHILS ABSOLUTE: 7.5 K/UL (ref 1.4–6.5)
NEUTROPHILS RELATIVE PERCENT: 75.8 %
NEUTROPHILS RELATIVE PERCENT: 87 %
ORGANISM: ABNORMAL
PDW BLD-RTO: 17 % (ref 11.5–14.5)
PDW BLD-RTO: 17.2 % (ref 11.5–14.5)
PERFORMED ON: NORMAL
PLATELET # BLD: 127 K/UL (ref 130–400)
PLATELET # BLD: 131 K/UL (ref 130–400)
PLATELET SLIDE REVIEW: NORMAL
POIKILOCYTES: ABNORMAL
POLYCHROMASIA: ABNORMAL
POTASSIUM SERPL-SCNC: 4 MEQ/L (ref 3.5–5.1)
RBC # BLD: 3.18 M/UL (ref 4.2–5.4)
RBC # BLD: 3.21 M/UL (ref 4.2–5.4)
SMUDGE CELLS: 3.7
SODIUM BLD-SCNC: 140 MEQ/L (ref 132–144)
STOMATOCYTES: ABNORMAL
VACUOLATED NEUTROPHILS: ABNORMAL
WBC # BLD: 8.5 K/UL (ref 4.8–10.8)
WBC # BLD: 9 K/UL (ref 4.8–10.8)

## 2017-11-01 PROCEDURE — 83735 ASSAY OF MAGNESIUM: CPT

## 2017-11-01 PROCEDURE — 2700000000 HC OXYGEN THERAPY PER DAY

## 2017-11-01 PROCEDURE — 6370000000 HC RX 637 (ALT 250 FOR IP): Performed by: INTERNAL MEDICINE

## 2017-11-01 PROCEDURE — 36592 COLLECT BLOOD FROM PICC: CPT

## 2017-11-01 PROCEDURE — 99232 SBSQ HOSP IP/OBS MODERATE 35: CPT | Performed by: INTERNAL MEDICINE

## 2017-11-01 PROCEDURE — 80048 BASIC METABOLIC PNL TOTAL CA: CPT

## 2017-11-01 PROCEDURE — 2580000003 HC RX 258: Performed by: HOSPITALIST

## 2017-11-01 PROCEDURE — 93010 ELECTROCARDIOGRAM REPORT: CPT | Performed by: INTERNAL MEDICINE

## 2017-11-01 PROCEDURE — 6370000000 HC RX 637 (ALT 250 FOR IP): Performed by: HOSPITALIST

## 2017-11-01 PROCEDURE — 6360000002 HC RX W HCPCS: Performed by: INTERNAL MEDICINE

## 2017-11-01 PROCEDURE — 2580000003 HC RX 258: Performed by: PHYSICIAN ASSISTANT

## 2017-11-01 PROCEDURE — 36430 TRANSFUSION BLD/BLD COMPNT: CPT

## 2017-11-01 PROCEDURE — 85025 COMPLETE CBC W/AUTO DIFF WBC: CPT

## 2017-11-01 PROCEDURE — 2580000003 HC RX 258: Performed by: INTERNAL MEDICINE

## 2017-11-01 PROCEDURE — 94640 AIRWAY INHALATION TREATMENT: CPT

## 2017-11-01 PROCEDURE — P9016 RBC LEUKOCYTES REDUCED: HCPCS

## 2017-11-01 RX ORDER — METOPROLOL SUCCINATE 50 MG/1
50 TABLET, EXTENDED RELEASE ORAL 2 TIMES DAILY
Status: DISCONTINUED | OUTPATIENT
Start: 2017-11-01 | End: 2017-11-01

## 2017-11-01 RX ORDER — SODIUM CHLORIDE 9 MG/ML
INJECTION, SOLUTION INTRAVENOUS
Status: DISPENSED
Start: 2017-11-01 | End: 2017-11-01

## 2017-11-01 RX ORDER — METOPROLOL SUCCINATE 25 MG/1
25 TABLET, EXTENDED RELEASE ORAL ONCE
Status: COMPLETED | OUTPATIENT
Start: 2017-11-01 | End: 2017-11-01

## 2017-11-01 RX ORDER — METOPROLOL SUCCINATE 50 MG/1
50 TABLET, EXTENDED RELEASE ORAL DAILY
Qty: 30 TABLET | Refills: 5
Start: 2017-11-01

## 2017-11-01 RX ORDER — METOPROLOL SUCCINATE 50 MG/1
50 TABLET, EXTENDED RELEASE ORAL DAILY
Status: DISCONTINUED | OUTPATIENT
Start: 2017-11-02 | End: 2017-11-01 | Stop reason: HOSPADM

## 2017-11-01 RX ADMIN — FUROSEMIDE 20 MG: 20 TABLET ORAL at 08:51

## 2017-11-01 RX ADMIN — ASPIRIN 81 MG 81 MG: 81 TABLET ORAL at 08:51

## 2017-11-01 RX ADMIN — ISOSORBIDE MONONITRATE 30 MG: 30 TABLET, EXTENDED RELEASE ORAL at 08:51

## 2017-11-01 RX ADMIN — METOPROLOL SUCCINATE 25 MG: 25 TABLET, FILM COATED, EXTENDED RELEASE ORAL at 12:39

## 2017-11-01 RX ADMIN — SODIUM CHLORIDE, PRESERVATIVE FREE 10 ML: 5 INJECTION INTRAVENOUS at 10:57

## 2017-11-01 RX ADMIN — Medication 400 MG: at 08:51

## 2017-11-01 RX ADMIN — ALLOPURINOL 100 MG: 100 TABLET ORAL at 08:51

## 2017-11-01 RX ADMIN — HYPROMELLOSE 2 DROP: 4 SOLUTION/ DROPS OPHTHALMIC at 08:50

## 2017-11-01 RX ADMIN — MEROPENEM 1 G: 1 INJECTION, POWDER, FOR SOLUTION INTRAVENOUS at 04:50

## 2017-11-01 RX ADMIN — LACTULOSE 13.33 G: 20 SOLUTION ORAL at 08:50

## 2017-11-01 RX ADMIN — IPRATROPIUM BROMIDE AND ALBUTEROL SULFATE 1 AMPULE: 2.5; .5 SOLUTION RESPIRATORY (INHALATION) at 16:10

## 2017-11-01 RX ADMIN — GABAPENTIN 400 MG: 400 CAPSULE ORAL at 08:51

## 2017-11-01 RX ADMIN — Medication 10 ML: at 10:56

## 2017-11-01 RX ADMIN — PANTOPRAZOLE SODIUM 40 MG: 40 TABLET, DELAYED RELEASE ORAL at 08:50

## 2017-11-01 RX ADMIN — DOCUSATE SODIUM 100 MG: 100 CAPSULE, LIQUID FILLED ORAL at 08:51

## 2017-11-01 RX ADMIN — FOLIC ACID 1 MG: 1 TABLET ORAL at 08:51

## 2017-11-01 RX ADMIN — CETIRIZINE HYDROCHLORIDE 5 MG: 10 TABLET, FILM COATED ORAL at 08:51

## 2017-11-01 RX ADMIN — IPRATROPIUM BROMIDE AND ALBUTEROL SULFATE 1 AMPULE: 2.5; .5 SOLUTION RESPIRATORY (INHALATION) at 07:53

## 2017-11-01 RX ADMIN — VANCOMYCIN HYDROCHLORIDE 1000 MG: 1 INJECTION, POWDER, LYOPHILIZED, FOR SOLUTION INTRAVENOUS at 15:34

## 2017-11-01 RX ADMIN — METOPROLOL SUCCINATE 25 MG: 25 TABLET, FILM COATED, EXTENDED RELEASE ORAL at 08:50

## 2017-11-01 RX ADMIN — MEROPENEM 1 G: 1 INJECTION, POWDER, FOR SOLUTION INTRAVENOUS at 17:10

## 2017-11-01 RX ADMIN — LEVOTHYROXINE SODIUM 200 MCG: 200 TABLET ORAL at 08:50

## 2017-11-01 RX ADMIN — Medication 10 ML: at 00:02

## 2017-11-01 ASSESSMENT — PAIN SCALES - GENERAL: PAINLEVEL_OUTOF10: 0

## 2017-11-01 NOTE — PROGRESS NOTES
LABALBU  2.0*   --    BILITOT  0.5   --    ALKPHOS  110   --    AST  24   --    ALT  13   --    LABGLOM  30.8*  29.1*   GFRAA  37.3*  35.3*   GLOB  2.5   --          No results for input(s): PHART, HXX6YIT, PO2ART, FVX0BDP, BEART, K8NWMBYH in the last 72 hours.   O2 Device: Nasal cannula  O2 Flow Rate (L/min): 2 L/min  Lab Results   Component Value Date    LACTA 0.8 08/18/2017        MEDICATIONS during current hospitalization:    Continuous Infusions:   sodium chloride      sodium chloride      dextrose         Scheduled Meds:   [START ON 11/2/2017] metoprolol succinate  50 mg Oral Daily    metoprolol succinate  25 mg Oral Once    0.9 % sodium chloride  250 mL Intravenous Once    vancomycin  1,000 mg Intravenous Q24H    meropenem  1 g Intravenous Q12H    sterile water  2.2 mL Other Once    alteplase  2 mg Other Once    And    sterile water  2.2 mL Other Once    sterile water  2.2 mL Other Once    insulin glargine  8 Units Subcutaneous Nightly    gabapentin  400 mg Oral BID    ipratropium-albuterol  1 ampule Inhalation 4x daily    furosemide  20 mg Oral Once per day on Mon Wed Fri    sodium chloride flush  10 mL Intravenous 2 times per day    folic acid  1 mg Oral Daily    lactulose  20 mL Oral Daily    cetirizine  5 mg Oral Daily    pantoprazole  40 mg Oral Daily    aspirin  81 mg Oral Daily    magnesium oxide  400 mg Oral Daily    allopurinol  100 mg Oral Daily    levothyroxine  200 mcg Oral Daily    hypromellose  2 drop Both Eyes BID    atorvastatin  40 mg Oral Nightly    oxybutynin  5 mg Oral Nightly    sodium chloride flush  10 mL Intravenous 2 times per day    sodium chloride flush  10 mL Intravenous 2 times per day    docusate sodium  100 mg Oral BID    pneumococcal 13-valent conjugate  0.5 mL Intramuscular Once       PRN Meds:furosemide, morphine, HYDROcodone 5 mg - acetaminophen, heparin flush, acetaminophen, ondansetron, albuterol, guaiFENesin-dextromethorphan, sodium chloride flush, simethicone, docusate sodium, sodium chloride flush, magnesium hydroxide, ondansetron, sodium chloride flush, glucose, dextrose, glucagon (rDNA), dextrose    Radiology    Xr Chest Portable    Result Date: 10/27/2017  PORTABLE CHEST: 10/27/2017 CLINICAL HISTORY:  follow up possible aspiration pneumonia . COMPARISON: 10/25/2017. A portable upright AP radiograph of the chest was obtained. FINDINGS: Infiltrate/consolidation of the mid to basilar left lung, with a small left pleural effusion appears unchanged. Probable mild right basilar atelectasis appears improved. There is no significant right pleural effusion, vascular congestion, pneumothorax, or displaced fractures identified. The right upper extremity PICC catheter is again noted     INFILTRATE/CONSOLIDATION OF THE MID TO BASILAR LEFT LUNG WITH A SMALL PLEURAL EFFUSION. NO SIGNIFICANT CHANGE FROM 10/25/2017    Xr Chest Portable    Result Date: 10/25/2017  EXAMINATION: XR CHEST PORTABLE REASON FOR EXAM: Shortness of breath. FINDINGS: Frontal view of the chest reveals inspiration somewhat shallow related to portable technique and positioning. There is a PICC catheter now in place in satisfactory position since previous studies. Opacification at the left base related to volume loss and pleural thickening is present. Increase infiltrate in the left perihilar region more prominent from previous studies and developing pneumonia superimposed on chronic disease should BE considered. Central pulmonary vascular congestion is present. Scarring in the right mid and lower lung field remain unchanged. MILD CENTRAL VASCULAR CONGESTION. ACUTE PNEUMONIA SUPERIMPOSED ON CHRONIC DISEASE LEFT BASE. Us Upper Extremity Left Venous Duplex    Result Date: 10/26/2017  LEFT UPPER EXTREMITY DEEP VENOUS DUPLEX SONOGRAM: 10/25/2017 CLINICAL HISTORY: Left upper extremity swelling. COMPARISON: None available.  Grayscale, compression, color and waveform Doppler analysis of

## 2017-11-01 NOTE — PROGRESS NOTES
Renal Progress Note    Assessment and Plan:      77 yo lady with CKD stage 3. Diabetic nephropathy. baseline cr 1.4-1.6. Mild BRIGIDA peak cr about 1.9. Has sig anemia. Being seen by hematology. has fluid retention. Had signed off but asked to see back due to the fluid retention. BP running low. Off all bp meds except metoprolol     Plan/  1- d/c'd NS   2- increased lasix to 20 daily  3- tolerate cr in mid to upper 1's  4- her risk of readmission is high as not eating, drinking, etc    Patient Active Problem List:     Chronic obstructive pulmonary disease with acute exacerbation (HCC)     Type 2 diabetes mellitus (HCC)     Anemia     Metabolic encephalopathy     Acute on chronic diastolic congestive heart failure (HCC)     Hypernatremia     Obesity     Hemiparesis affecting left side as late effect of stroke (HCC)     Atrial fibrillation (HCC)     Acute on chronic respiratory failure with hypercapnia (HCC)     Cerebral infarct [facility])     Hospice Care     Atrial fibrillation with RVR (HCC)     Acute hemolytic anemia (HCC)     BRIGIDA (acute kidney injury) ([facility]      Subjective:   Admit Date: 10/23/2017    Interval History: reconsulted for fluid retention. ivf stopped. Pt not eating much. Sleepy.   arousable      Medications:   Scheduled Meds:   [START ON 11/2/2017] metoprolol succinate  50 mg Oral Daily    0.9 % sodium chloride  250 mL Intravenous Once    vancomycin  1,000 mg Intravenous Q24H    meropenem  1 g Intravenous Q12H    sterile water  2.2 mL Other Once    alteplase  2 mg Other Once    And    sterile water  2.2 mL Other Once    sterile water  2.2 mL Other Once    insulin glargine  8 Units Subcutaneous Nightly    gabapentin  400 mg Oral BID    ipratropium-albuterol  1 ampule Inhalation 4x daily    furosemide  20 mg Oral Once per day on Mon Wed Fri    sodium chloride flush  10 mL Intravenous 2 times per day    folic acid  1 mg Oral Daily    lactulose  20 mL Oral Daily    cetirizine  5 mg Oral Daily    pantoprazole  40 mg Oral Daily    aspirin  81 mg Oral Daily    magnesium oxide  400 mg Oral Daily    allopurinol  100 mg Oral Daily    levothyroxine  200 mcg Oral Daily    hypromellose  2 drop Both Eyes BID    atorvastatin  40 mg Oral Nightly    oxybutynin  5 mg Oral Nightly    sodium chloride flush  10 mL Intravenous 2 times per day    sodium chloride flush  10 mL Intravenous 2 times per day    docusate sodium  100 mg Oral BID    pneumococcal 13-valent conjugate  0.5 mL Intramuscular Once     Continuous Infusions:   dextrose         CBC:   Recent Labs      10/31/17   0548  11/01/17   0600   WBC  7.3  8.5   HGB  7.6*  9.8*   PLT  159  131     CMP:    Recent Labs      10/30/17   0600  10/31/17   0549  11/01/17   0600   NA  139  139  140   K  4.2  4.1  4.0   CL  101  103  103   CO2  27  25  25   BUN  37*  38*  37*   CREATININE  1.55*  1.61*  1.69*   GLUCOSE  59*  77  62*   CALCIUM  8.3*  8.0*  8.0*   LABGLOM  32.2*  30.8*  29.1*     Troponin: No results for input(s): TROPONINI in the last 72 hours. BNP: No results for input(s): BNP in the last 72 hours. INR:   Recent Labs      10/31/17   0549   INR  1.5     Lipids: No results for input(s): CHOL, LDLDIRECT, TRIG, HDL, AMYLASE, LIPASE in the last 72 hours. Liver:   Recent Labs      10/31/17   0549   AST  24   ALT  13   ALKPHOS  110   PROT  4.5*   LABALBU  2.0*   BILITOT  0.5     Iron:  No results for input(s): IRONS, FERRITIN in the last 72 hours. Invalid input(s): LABIRONS  Urinalysis: No results for input(s): UA in the last 72 hours.     Objective:   Vitals: BP (!) 83/40   Pulse 73   Temp 97.1 °F (36.2 °C) (Oral)   Resp 17   Ht 5' 6\" (1.676 m) Comment: Last Admission  Wt 218 lb (98.9 kg)   SpO2 94%   BMI 35.19 kg/m²    Wt Readings from Last 3 Encounters:   10/31/17 218 lb (98.9 kg)   09/07/17 210 lb (95.3 kg)   08/26/17 212 lb 4.9 oz (96.3 kg)      24HR INTAKE/OUTPUT:      Intake/Output Summary (Last 24 hours) at 11/01/17 1259  Last data filed at 11/01/17 0553   Gross per 24 hour   Intake              795 ml   Output              450 ml   Net              345 ml       Constitutional:  Alert, awake, drowsy  Skin:normal, no rash  HEENT:sclera anicteric.   Head atraumatic normocephalic  Neck:supple with no thyromegally  Cardiovascular:  S1, S2 without m/r/g   Respiratory:  CTA B without w/r/r   Abdomen: +bs, soft, nt  Ext: 1+ LE edema  Musculoskeletal:Intact  Neuro:Alert and oriented with no deficit      Electronically signed by Aleena Covarrubias MD on 11/1/2017 at 12:59 PM

## 2017-11-01 NOTE — CARE COORDINATION
QUALITY CARE ROUNDS COMPLETE. PT HAVING INTERMITTENT LETHARGY. DC PLAN REMAINS Kindra Lentz. LSW FOLLOWING.  POTENTIAL DISCHARGE

## 2017-11-01 NOTE — PROGRESS NOTES
Pt very angry when I go into her room. She refused all of her meds and while I was assessing her she was screaming help. Explained that I was trying to help her but to no avail. Completed assessment. Blood infusing and once complete will call for 2nd unit of blood. Will continue to monitor patient.

## 2017-11-01 NOTE — CARE COORDINATION
Spoke to [facility] regarding updated POC. Transport scheduled for 7 pm and daughter Jinny Cifuentes notified. Also acknowledged IMM.

## 2017-11-01 NOTE — PROGRESS NOTES
closely  - Now on IV merrem and IV Vancomycin per ID    Right arm swelling: Concerning for possible DVT given recent PICC placement and recent DVT in LUE after PICC  - RUE US showed soft tissue swelling  - Continue to monitor closely    DVT of left upper extremity  - As mentioned above. Per Hem/Onc may D/C coumadin and just use warm compresses for UE DVT's     Atrial fibrillation with rapid ventricular rate: Rate to the 120's. Now improved to 90's. - Patient placed on telemetry  - Cardiology is consulted. Per Dr. Jeff Vega, AF with RVR in setting of acute blood loss anemia, with improvement following transfusion. VHD with moderately severe AS, no signs HF. BRGIIDA on CKD. Acute blood loss anemia s/p transfusion. Continue rate control with metoprolol, add prn IV doses for HR greater than 125. Supportive care including hydration, trnasfusion. Warfarin on hold until cleared by heme/onc to resume. Generalized swelling---assume poor oncotic pressure. OK to d/c this am---just on asa for now given anemia--received some lasix in between units of blood.      Acute kidney injury and chronic kidney disease stage III:   - IV fluids with caution, to avoid fluid overload  - Nephrology consulted. Per Dr. James Leyva, CKD stage 3. Diabetic nephropathy.  baseline cr 1.4-1.6. Mild BRIGIDA peak cr about 1.9.  On lasix at home.  Has sig anemia.  Being seen by hematology. off ivf and on lasix 3 x/ week. will sign off. F/u as outpt. - DC'd nephrotoxic medications  - Continue to monitor     Pneumonia: As per CXR. SOB with O2 down to 90%. Reports coughing up green phlegm. - Sputum culture  - Supplemental O2  - Pulm consulted. Per Dr. Lemond Rubinstein, acute on chronic hypoxemic and hypercarbic respiratory failure, possible aspiration pneumonia continue empiric treatment and follow-up x-ray remains stable.  Cristhian further dispo recs  - IV Levaquin continued per pulm  - Continue to monitor    VHD with mod severe AS:   - Cardio following  - Likely give prn lasix today given edema and follow  - Continue to monitor     Endometrial cancer  - receiving chemoradiation, oncology is consulted and following as above     Diabetes type 2  - Insulin on sliding scale     UDAY  - Continue BiPAP    Sacral Decubitus Ulcer: Stage IV, Necrotic. POA. Now s/p debridement. - Wound nurse consulted and following  - Consulted Gen Surgery, Dr. Doe Avealr, I & D of sacral ulcer along with wound of the left upper extremity done. - Follow cultures growing MRSA    Hypertension  -Uncontrolled, adjust medications accordingly     Severe protein calorie malnutrition - as per dietician  CAD  CHF     Dispo: Await consultant clearance prior to D/C, likely in next 24 hours. Advised F/U with PCP 1 - 2 days of discharge. SUBJECTIVE  CHIEF COMPLAINT: SOB; fatigue    PRIMARY SERVICE: Medicine    INTERVAL HPI: patient very sleepy, but can be alerted. Denies any current concerns/complaints. Pamela Line   MEDICATIONS:    Current Facility-Administered Medications   Medication Dose Route Frequency Provider Last Rate Last Dose    sodium chloride 0.9 % infusion             sodium chloride 0.9 % infusion             0.9 % sodium chloride infusion 250 mL  250 mL Intravenous Once Jinny Fabian MD        furosemide (LASIX) injection 20 mg  20 mg Intravenous PRN Jinny Fabian MD   20 mg at 10/31/17 2359    vancomycin 1000 mg IVPB in 250 mL D5W addavial  1,000 mg Intravenous Q24H Matthew Mack MD   Stopped at 10/31/17 1633    meropenem (MERREM) 1 g in sterile water 20 mL IV syringe  1 g Intravenous Q12H Matthew Mack MD   1 g at 11/01/17 0450    sterile water injection 2.2 mL  2.2 mL Other Once Erik Trevino MD        alteplase (CATHFLO) injection 2 mg  2 mg Other Once Erik Trevino MD        And    sterile water injection 2.2 mL  2.2 mL Other Once Erik Trevino MD        sterile water injection 2.2 mL  2.2 mL Other Once Vipul Mcnair MD        insulin glargine (LANTUS) injection vial Oral Daily Josefina Mcwilliams MD   40 mg at 10/31/17 0826    simethicone (MYLICON) chewable tablet 80 mg  80 mg Oral Q6H PRN Josefina Mcwilliams MD        aspirin chewable tablet 81 mg  81 mg Oral Daily Josefina Mcwilliams MD   81 mg at 10/31/17 8906    isosorbide mononitrate (IMDUR) extended release tablet 30 mg  30 mg Oral Daily Josefina Mcwilliams MD   30 mg at 10/31/17 0826    magnesium oxide (MAG-OX) tablet 400 mg  400 mg Oral Daily Josefina Mcwilliams MD   400 mg at 10/31/17 0827    allopurinol (ZYLOPRIM) tablet 100 mg  100 mg Oral Daily Josefina Mcwilliams MD   100 mg at 10/31/17 6003    docusate sodium (COLACE) capsule 100 mg  100 mg Oral BID PRN Josefina Mcwilliams MD        levothyroxine (SYNTHROID) tablet 200 mcg  200 mcg Oral Daily Josefina Mcwilliams MD   200 mcg at 10/31/17 0829    hypromellose 0.4 % ophthalmic solution SOLN 2 drop  2 drop Both Eyes BID Josefina Mcwilliams MD   2 drop at 10/31/17 0825    atorvastatin (LIPITOR) tablet 40 mg  40 mg Oral Nightly Josefina Mcwilliams MD   40 mg at 10/30/17 2036    oxybutynin (DITROPAN) tablet 5 mg  5 mg Oral Nightly Josefina Mcwilliams MD   5 mg at 10/31/17 2049    sodium chloride flush 0.9 % injection 10 mL  10 mL Intravenous 2 times per day Josefina Mcwilliams MD   10 mL at 10/29/17 2140    sodium chloride flush 0.9 % injection 10 mL  10 mL Intravenous PRN Josefina Mcwilliams MD        magnesium hydroxide (MILK OF MAGNESIA) 400 MG/5ML suspension 30 mL  30 mL Oral Daily PRN Josefina Mcwilliams MD        ondansetron [facility]) injection 4 mg  4 mg Intravenous Q6H PRN Josefina Mcwilliams MD        sodium chloride flush 0.9 % injection 10 mL  10 mL Intravenous 2 times per day Emani Muniz MD   10 mL at 11/01/17 0002    sodium chloride flush 0.9 % injection 10 mL  10 mL Intravenous PRN Emani Muniz MD        docusate sodium (COLACE) capsule 100 mg  100 mg Oral BID Emani Muniz MD   100 mg at 10/31/17 0826    glucose (GLUTOSE) 40 % oral gel 15 g  15 g Oral PRN Suni Clemons MD   15 g at 10/31/17 0429    dextrose 50 % solution 12.5 g  12.5 g Intravenous PRN Suni Clemons MD   12.5 g at 10/31/17 0435    glucagon (rDNA) injection 1 mg  1 mg Intramuscular PRN Suni Clemons MD        dextrose 5 % solution  100 mL/hr Intravenous PRN Suni Clemons MD        pneumococcal 13-valent conjugate (PREVNAR) injection 0.5 mL  0.5 mL Intramuscular Once Suni Clemons MD           OBJECTIVE  PHYSICAL EXAM:   BP (!) 158/128   Pulse 82   Temp 98.1 °F (36.7 °C) (Axillary)   Resp 16   Ht 5' 6\" (1.676 m) Comment: Last Admission  Wt 218 lb (98.9 kg)   SpO2 90%   BMI 35.19 kg/m²   Body mass index is 35.19 kg/m². CONSTITUTIONAL:  Patient is more lethargic And sleepy, can be alerted  EYES:  Lids and lashes normal  LUNGS:  Diminished BS bilaterally  CARDIOVASCULAR:  Irregular, irregular rate, and rhythm  ABDOMEN:  No scars, normal bowel sounds, soft, non-distended, non-tender, no masses palpated, no hepatosplenomegally  EXT: edema bilateral legs and upper extremities, wrap on left arm     DATA:   Diagnostic tests reviewed for today's visit:    Most recent labs and imaging results reviewed. SIGNATURE: Qing Calero PA-C PATIENT NAME: Kori Proctor   DATE: November 1, 2017 MRN: [id number]   TIME: 10:23 AM PAGER: [phone]     I have independently seen and examined this patient. I have reviewed and agree with the above documentation, assessment and plan.      Suni Clemons MD  11:33 AM  November 1, 2017

## 2017-11-01 NOTE — PROGRESS NOTES
Pt has a DVT in left arm so cannot do blood pressures there. Pt very swollen on right arm and has a PICC line upper right arm. Blood pressures must be taken on lower right arm and she tenses up and moves whenever blood pressure cuff inflates so very hard to get an accurate reading. Have to hold her hand and her arm but sometimes takes a few tries to get accurate reading.

## 2017-11-02 LAB
ANION GAP SERPL CALCULATED.3IONS-SCNC: 18 MEQ/L (ref 7–13)
ANISOCYTOSIS: ABNORMAL
BANDED NEUTROPHILS RELATIVE PERCENT: 1 % (ref 5–11)
BASOPHILS ABSOLUTE: 0 K/UL (ref 0–0.2)
BASOPHILS RELATIVE PERCENT: 1.6 %
BUN BLDV-MCNC: 41 MG/DL (ref 8–23)
CALCIUM SERPL-MCNC: 8.3 MG/DL (ref 8.6–10.2)
CHLORIDE BLD-SCNC: 105 MEQ/L (ref 98–107)
CO2: 16 MEQ/L (ref 22–29)
CREAT SERPL-MCNC: 1.57 MG/DL (ref 0.5–0.9)
EOSINOPHILS ABSOLUTE: 0.1 K/UL (ref 0–0.7)
EOSINOPHILS RELATIVE PERCENT: 1 %
GFR AFRICAN AMERICAN: 38.4
GFR NON-AFRICAN AMERICAN: 31.7
GLUCOSE BLD-MCNC: 74 MG/DL (ref 74–109)
HCT VFR BLD CALC: 40.3 % (ref 37–47)
HEMOGLOBIN: 12.7 G/DL (ref 12–16)
LYMPHOCYTES ABSOLUTE: 0.7 K/UL (ref 1–4.8)
LYMPHOCYTES RELATIVE PERCENT: 8 %
MCH RBC QN AUTO: 30.6 PG (ref 27–31.3)
MCHC RBC AUTO-ENTMCNC: 31.6 % (ref 33–37)
MCV RBC AUTO: 96.8 FL (ref 82–100)
MONOCYTES ABSOLUTE: 0.8 K/UL (ref 0.2–0.8)
MONOCYTES RELATIVE PERCENT: 9.4 %
MYELOCYTE PERCENT: 2 %
NEUTROPHILS ABSOLUTE: 7 K/UL (ref 1.4–6.5)
NEUTROPHILS RELATIVE PERCENT: 79 %
PDW BLD-RTO: 17.5 % (ref 11.5–14.5)
PLATELET # BLD: 98 K/UL (ref 130–400)
PLATELET SLIDE REVIEW: ABNORMAL
POTASSIUM SERPL-SCNC: 4.4 MEQ/L (ref 3.5–5.1)
RBC # BLD: 4.16 M/UL (ref 4.2–5.4)
SMUDGE CELLS: 1.9
SODIUM BLD-SCNC: 139 MEQ/L (ref 132–144)
VACUOLATED NEUTROPHILS: ABNORMAL
VANCOMYCIN TROUGH: 15.3 UG/ML (ref 5–10)
WBC # BLD: 8.5 K/UL (ref 4.8–10.8)

## 2017-11-04 ENCOUNTER — APPOINTMENT (OUTPATIENT)
Dept: ULTRASOUND IMAGING | Age: 77
End: 2017-11-04
Payer: COMMERCIAL

## 2017-11-04 ENCOUNTER — HOSPITAL ENCOUNTER (EMERGENCY)
Age: 77
Discharge: HOME | End: 2017-11-04
Attending: EMERGENCY MEDICINE
Payer: COMMERCIAL

## 2017-11-04 VITALS
OXYGEN SATURATION: 100 % | RESPIRATION RATE: 20 BRPM | HEIGHT: 66 IN | HEART RATE: 114 BPM | BODY MASS INDEX: 35.03 KG/M2 | DIASTOLIC BLOOD PRESSURE: 69 MMHG | SYSTOLIC BLOOD PRESSURE: 114 MMHG | WEIGHT: 218 LBS | TEMPERATURE: 97.3 F

## 2017-11-04 DIAGNOSIS — I82.621 ACUTE DEEP VEIN THROMBOSIS (DVT) OF BRACHIAL VEIN OF RIGHT UPPER EXTREMITY (HCC): Primary | ICD-10-CM

## 2017-11-04 LAB
ALBUMIN SERPL-MCNC: 2.1 G/DL (ref 3.9–4.9)
ALP BLD-CCNC: 134 U/L (ref 40–130)
ALT SERPL-CCNC: 12 U/L (ref 0–33)
ANION GAP SERPL CALCULATED.3IONS-SCNC: 16 MEQ/L (ref 7–13)
APTT: 41.3 SEC (ref 21.6–35.4)
AST SERPL-CCNC: 37 U/L (ref 0–35)
BILIRUB SERPL-MCNC: 0.4 MG/DL (ref 0–1.2)
BUN BLDV-MCNC: 48 MG/DL (ref 8–23)
CALCIUM SERPL-MCNC: 8.7 MG/DL (ref 8.6–10.2)
CHLORIDE BLD-SCNC: 104 MEQ/L (ref 98–107)
CO2: 22 MEQ/L (ref 22–29)
CREAT SERPL-MCNC: 1.9 MG/DL (ref 0.5–0.9)
GFR AFRICAN AMERICAN: 30.8
GFR NON-AFRICAN AMERICAN: 25.5
GLOBULIN: 3.2 G/DL (ref 2.3–3.5)
GLUCOSE BLD-MCNC: 55 MG/DL (ref 74–109)
HCT VFR BLD CALC: 38.9 % (ref 37–47)
HEMOGLOBIN: 12.4 G/DL (ref 12–16)
INR BLD: 1.3
MCH RBC QN AUTO: 30.2 PG (ref 27–31.3)
MCHC RBC AUTO-ENTMCNC: 31.8 % (ref 33–37)
MCV RBC AUTO: 95.2 FL (ref 82–100)
PDW BLD-RTO: 17.7 % (ref 11.5–14.5)
PLATELET # BLD: 107 K/UL (ref 130–400)
POTASSIUM SERPL-SCNC: 4.3 MEQ/L (ref 3.5–5.1)
PROTHROMBIN TIME: 13.8 SEC (ref 8.1–13.7)
RBC # BLD: 4.09 M/UL (ref 4.2–5.4)
SODIUM BLD-SCNC: 142 MEQ/L (ref 132–144)
TOTAL PROTEIN: 5.3 G/DL (ref 6.4–8.1)
WBC # BLD: 9.8 K/UL (ref 4.8–10.8)

## 2017-11-04 PROCEDURE — 99284 EMERGENCY DEPT VISIT MOD MDM: CPT

## 2017-11-04 PROCEDURE — 80053 COMPREHEN METABOLIC PANEL: CPT

## 2017-11-04 PROCEDURE — 85730 THROMBOPLASTIN TIME PARTIAL: CPT

## 2017-11-04 PROCEDURE — 96374 THER/PROPH/DIAG INJ IV PUSH: CPT

## 2017-11-04 PROCEDURE — 36415 COLL VENOUS BLD VENIPUNCTURE: CPT

## 2017-11-04 PROCEDURE — 85610 PROTHROMBIN TIME: CPT

## 2017-11-04 PROCEDURE — 6360000002 HC RX W HCPCS: Performed by: PHYSICIAN ASSISTANT

## 2017-11-04 PROCEDURE — 6370000000 HC RX 637 (ALT 250 FOR IP): Performed by: PHYSICIAN ASSISTANT

## 2017-11-04 PROCEDURE — 93971 EXTREMITY STUDY: CPT

## 2017-11-04 PROCEDURE — 85027 COMPLETE CBC AUTOMATED: CPT

## 2017-11-04 RX ORDER — MORPHINE SULFATE 4 MG/ML
4 INJECTION, SOLUTION INTRAMUSCULAR; INTRAVENOUS ONCE
Status: DISCONTINUED | OUTPATIENT
Start: 2017-11-04 | End: 2017-11-04 | Stop reason: HOSPADM

## 2017-11-04 RX ORDER — MORPHINE SULFATE 4 MG/ML
4 INJECTION, SOLUTION INTRAMUSCULAR; INTRAVENOUS ONCE
Status: DISCONTINUED | OUTPATIENT
Start: 2017-11-04 | End: 2017-11-04

## 2017-11-04 RX ORDER — ONDANSETRON 2 MG/ML
4 INJECTION INTRAMUSCULAR; INTRAVENOUS ONCE
Status: COMPLETED | OUTPATIENT
Start: 2017-11-04 | End: 2017-11-04

## 2017-11-04 RX ADMIN — ONDANSETRON 4 MG: 2 INJECTION INTRAMUSCULAR; INTRAVENOUS at 10:28

## 2017-11-04 RX ADMIN — APIXABAN 10 MG: 5 TABLET, FILM COATED ORAL at 14:25

## 2017-11-04 ASSESSMENT — ENCOUNTER SYMPTOMS
ABDOMINAL PAIN: 0
CONSTIPATION: 0
EYE DISCHARGE: 0
SORE THROAT: 0
ABDOMINAL DISTENTION: 0
SHORTNESS OF BREATH: 0
COLOR CHANGE: 0
RHINORRHEA: 0

## 2017-11-04 ASSESSMENT — PAIN SCALES - GENERAL: PAINLEVEL_OUTOF10: 7

## 2017-11-04 NOTE — ED NOTES
Ultrasound called to report they were unable to complete order pt was asking the tech to stop do to pain      Cynthia Marks RN  11/04/17 1374

## 2017-11-04 NOTE — ED PROVIDER NOTES
Except as noted above the remainder of the review of systems was reviewed and negative.        PAST MEDICAL HISTORY     Past Medical History:   Diagnosis Date    CAD (coronary artery disease)     [facility]     Cerebral artery occlusion with cerebral infarction ([facility]     multiple TIAs in past    CHF (congestive heart failure) (HCC)     Chronic kidney disease (CKD)     COPD [facility] obstructive [facility] disease[facility]     Diabetes mellitus [facility]     Diastolic dysfunction     [facility]ometrial cancer ([facility]     History of Clostridium difficile     History of TIAs     Lung disease     Pneumonia     Thyroid disease          SURGICAL HISTORY       Past Surgical History:   Procedure Laterality Date    APPENDECTOMY      CHOLECYSTECTOMY      ENDOMETRIAL BIOPSY      INCISION AND DRAINAGE Left 10/30/2017    EXCISIONAL DEBRIDEMENT OF NECROTIC WOUNDS ON SACRAL AREA AND LEFT FOREARM performed by Gregoria Singleton MD at [facility]       Previous Medications    ACETAMINOPHEN (TYLENOL) 325 MG TABLET    Take 2 tablets by mouth every 4 hours as needed for Pain or Fever    ALBUTEROL (PROVENTIL) (2.5 MG/3ML) 0.083% NEBULIZER SOLUTION    Take 3 mLs by nebulization every 2 hours as needed for Wheezing    ALLOPURINOL (ZYLOPRIM) 100 MG TABLET    Take 100 mg by mouth daily    ASPIRIN 81 MG CHEWABLE TABLET    Take 1 tablet by mouth daily    ATORVASTATIN (LIPITOR) 40 MG TABLET    Take 40 mg by mouth nightly    BISACODYL (DULCOLAX) 10 MG SUPPOSITORY    Place 10 mg rectally daily as needed for Constipation    DICLOFENAC SODIUM 1 % GEL    Apply 2 g topically 2 times daily as needed for Pain Apply to hands and knees as needed for pain    DOCUSATE SODIUM (COLACE) 100 MG CAPSULE    Take 100 mg by mouth 2 times daily as needed for Constipation    FERROUS SULFATE 325 (65 FE) MG TABLET    Take 325 mg by mouth daily (with breakfast)    FOLIC ACID (FOLVITE) 1 MG TABLET    Take 1 mg by mouth daily    FUROSEMIDE 1.90 (*)     GFR Non- 25.5 (*)     GFR  30.8 (*)     Total Protein 5.3 (*)     Alb 2.1 (*)     Alkaline Phosphatase 134 (*)     AST 37 (*)     All other components within normal limits       All other labs were within normal range or not returned as of this dictation. EMERGENCY DEPARTMENT COURSE and DIFFERENTIAL DIAGNOSIS/MDM:   Vitals:    Vitals:    11/04/17 1024 11/04/17 1030 11/04/17 1102 11/04/17 1232   BP:  (!) 93/55 (!) 102/49 118/71   Pulse: 117 106 114 111   Resp:   20 18   Temp:       TempSrc:       SpO2: 100%  100% 100%   Weight:       Height:           ED Course      MDM  Number of Diagnoses or Management Options  Acute deep vein thrombosis (DVT) of brachial vein of right upper extremity [facility]):   Diagnosis management comments: Nurse Practitioner from [facility] called on this patient, she states she was unaware the patient had been transferred to the emergency room for reevaluation for DVT of right upper extremity. She states that they do have a contingency plan if she is positive for DVT, and states the plan is that she will be started back on Eliquis, even though she has a recent history of GI bleeding. She asked that we complete the ultrasound,  In the emergency department and no matter if it is positive or negative she will be returned back to the nursing facility for further management. After reviewing ultrasound which does show DVT in the right upper extremity I did speak with the nurse practitioner Mariana, the plan is for this patient since she has such poor access the PICC line will remain in place at this point for the remaining 3 days for IV antibiotic treatment is still functioning of feeling well. She will also be started on Eliquis for her current DVT. She will be monitored closely at the nursing facility for any additional bleeding recurrent bleeding. At the end of her tremor for antibiotics PICC line will be removed.   The nurse

## 2017-11-04 NOTE — ED TRIAGE NOTES
Patient brought in by squad. Per report patient was found to have a DVT in her right arm and was given a blood thinner. Recently has been diagnosed with GI bleed and presents for evaluation. Denies pain, follows command, right arm edema 3+ with redness. Skin warm, dry, and full sensation.

## 2017-11-06 LAB
ANION GAP SERPL CALCULATED.3IONS-SCNC: 8 MEQ/L (ref 7–13)
BUN BLDV-MCNC: 89 MG/DL (ref 8–23)
CALCIUM SERPL-MCNC: 8.8 MG/DL (ref 8.6–10.2)
CHLORIDE BLD-SCNC: 108 MEQ/L (ref 98–107)
CHROMOSOME, BONE MARROW: NORMAL
CO2: 27 MEQ/L (ref 22–29)
CREAT SERPL-MCNC: 1.89 MG/DL (ref 0.5–0.9)
EER CHROMOSOME ANALYSIS BONE MARROW: NORMAL
GFR AFRICAN AMERICAN: 31
GFR NON-AFRICAN AMERICAN: 25.6
GLUCOSE BLD-MCNC: 78 MG/DL (ref 74–109)
POTASSIUM SERPL-SCNC: 4.6 MEQ/L (ref 3.5–5.1)
SODIUM BLD-SCNC: 143 MEQ/L (ref 132–144)

## 2017-11-07 ENCOUNTER — HOSPITAL ENCOUNTER (INPATIENT)
Age: 77
LOS: 11 days | Discharge: NURSING, REHAB OR LONG-TERM CARE | DRG: 871 | End: 2017-11-18
Attending: EMERGENCY MEDICINE | Admitting: HOSPITALIST
Payer: COMMERCIAL

## 2017-11-07 DIAGNOSIS — A49.02 MRSA INFECTION: ICD-10-CM

## 2017-11-07 DIAGNOSIS — D64.9 ANEMIA, UNSPECIFIED TYPE: Primary | ICD-10-CM

## 2017-11-07 DIAGNOSIS — K92.2 LOWER GI BLEED: ICD-10-CM

## 2017-11-07 PROBLEM — D59.9 HEMOLYTIC ANEMIA, ACUTE (HCC): Status: ACTIVE | Noted: 2017-11-07

## 2017-11-07 PROBLEM — I95.9 HYPOTENSION: Status: ACTIVE | Noted: 2017-11-07

## 2017-11-07 LAB
ABO/RH: NORMAL
ALBUMIN SERPL-MCNC: 1.7 G/DL (ref 3.9–4.9)
ALP BLD-CCNC: 101 U/L (ref 40–130)
ALT SERPL-CCNC: <5 U/L (ref 0–33)
ANION GAP SERPL CALCULATED.3IONS-SCNC: 9 MEQ/L (ref 7–13)
ANISOCYTOSIS: ABNORMAL
ANTIBODY SCREEN: NORMAL
AST SERPL-CCNC: 15 U/L (ref 0–35)
BASE EXCESS ARTERIAL: -4 (ref -3–3)
BASOPHILS ABSOLUTE: 0.1 K/UL (ref 0–0.2)
BASOPHILS RELATIVE PERCENT: 1.3 %
BILIRUB SERPL-MCNC: 0.4 MG/DL (ref 0–1.2)
BLOOD BANK DISPENSE STATUS: NORMAL
BLOOD BANK PRODUCT CODE: NORMAL
BPU ID: NORMAL
BUN BLDV-MCNC: 95 MG/DL (ref 8–23)
CALCIUM SERPL-MCNC: 8.9 MG/DL (ref 8.6–10.2)
CHLORIDE BLD-SCNC: 108 MEQ/L (ref 98–107)
CO2: 26 MEQ/L (ref 22–29)
CREAT SERPL-MCNC: 1.88 MG/DL (ref 0.5–0.9)
DESCRIPTION BLOOD BANK: NORMAL
EKG ATRIAL RATE: 136 BPM
EKG Q-T INTERVAL: 244 MS
EKG QRS DURATION: 78 MS
EKG QTC CALCULATION (BAZETT): 386 MS
EKG R AXIS: 3 DEGREES
EKG T AXIS: 170 DEGREES
EKG VENTRICULAR RATE: 151 BPM
EOSINOPHILS ABSOLUTE: 0.1 K/UL (ref 0–0.7)
EOSINOPHILS RELATIVE PERCENT: 1.1 %
GFR AFRICAN AMERICAN: 31.2
GFR NON-AFRICAN AMERICAN: 25.8
GLOBULIN: 2.7 G/DL (ref 2.3–3.5)
GLUCOSE BLD-MCNC: 83 MG/DL (ref 74–109)
HCO3 ARTERIAL: 23.8 MMOL/L (ref 21–29)
HCT VFR BLD CALC: 25.5 % (ref 37–47)
HCT VFR BLD CALC: 28.3 % (ref 37–47)
HEMOGLOBIN: 8 G/DL (ref 12–16)
HEMOGLOBIN: 9 G/DL (ref 12–16)
HYPOCHROMIA: ABNORMAL
LACTATE: 0.38 MMOL/L (ref 0.4–2)
LYMPHOCYTES ABSOLUTE: 1.7 K/UL (ref 1–4.8)
LYMPHOCYTES RELATIVE PERCENT: 15 %
MACROCYTES: 0
MCH RBC QN AUTO: 29.9 PG (ref 27–31.3)
MCH RBC QN AUTO: 30 PG (ref 27–31.3)
MCHC RBC AUTO-ENTMCNC: 31.3 % (ref 33–37)
MCHC RBC AUTO-ENTMCNC: 31.8 % (ref 33–37)
MCV RBC AUTO: 94.3 FL (ref 82–100)
MCV RBC AUTO: 95.6 FL (ref 82–100)
MICROCYTES: 0
MONOCYTES ABSOLUTE: 0.9 K/UL (ref 0.2–0.8)
MONOCYTES RELATIVE PERCENT: 7.8 %
NEUTROPHILS ABSOLUTE: 8.6 K/UL (ref 1.4–6.5)
NEUTROPHILS RELATIVE PERCENT: 74.8 %
O2 SAT, ARTERIAL: 99 % (ref 93–100)
PCO2 ARTERIAL: 56 MM HG (ref 35–45)
PDW BLD-RTO: 16.6 % (ref 11.5–14.5)
PDW BLD-RTO: 17.2 % (ref 11.5–14.5)
PERFORMED ON: ABNORMAL
PH ARTERIAL: 7.24 (ref 7.35–7.45)
PLATELET # BLD: 99 K/UL (ref 130–400)
PLATELET # BLD: 99 K/UL (ref 130–400)
PLATELET SLIDE REVIEW: ABNORMAL
PO2 ARTERIAL: 155 MM HG (ref 75–108)
POC SAMPLE TYPE: ABNORMAL
POIKILOCYTES: ABNORMAL
POLYCHROMASIA: ABNORMAL
POTASSIUM SERPL-SCNC: 4.7 MEQ/L (ref 3.5–5.1)
RBC # BLD: 2.67 M/UL (ref 4.2–5.4)
RBC # BLD: 3 M/UL (ref 4.2–5.4)
SLIDE REVIEW: ABNORMAL
SODIUM BLD-SCNC: 143 MEQ/L (ref 132–144)
TCO2 ARTERIAL: 26 (ref 22–29)
TOTAL PROTEIN: 4.4 G/DL (ref 6.4–8.1)
VANCOMYCIN TROUGH: 26 UG/ML (ref 5–10)
WBC # BLD: 11.4 K/UL (ref 4.8–10.8)
WBC # BLD: 13.9 K/UL (ref 4.8–10.8)

## 2017-11-07 PROCEDURE — 86900 BLOOD TYPING SEROLOGIC ABO: CPT

## 2017-11-07 PROCEDURE — 2580000003 HC RX 258: Performed by: INTERNAL MEDICINE

## 2017-11-07 PROCEDURE — 94640 AIRWAY INHALATION TREATMENT: CPT

## 2017-11-07 PROCEDURE — 85027 COMPLETE CBC AUTOMATED: CPT

## 2017-11-07 PROCEDURE — 99291 CRITICAL CARE FIRST HOUR: CPT | Performed by: ANESTHESIOLOGY

## 2017-11-07 PROCEDURE — C9113 INJ PANTOPRAZOLE SODIUM, VIA: HCPCS | Performed by: PHYSICIAN ASSISTANT

## 2017-11-07 PROCEDURE — 36556 INSERT NON-TUNNEL CV CATH: CPT

## 2017-11-07 PROCEDURE — 36430 TRANSFUSION BLD/BLD COMPNT: CPT

## 2017-11-07 PROCEDURE — 86923 COMPATIBILITY TEST ELECTRIC: CPT

## 2017-11-07 PROCEDURE — 83605 ASSAY OF LACTIC ACID: CPT

## 2017-11-07 PROCEDURE — 36415 COLL VENOUS BLD VENIPUNCTURE: CPT

## 2017-11-07 PROCEDURE — 93005 ELECTROCARDIOGRAM TRACING: CPT

## 2017-11-07 PROCEDURE — 94660 CPAP INITIATION&MGMT: CPT

## 2017-11-07 PROCEDURE — 2500000003 HC RX 250 WO HCPCS: Performed by: ANESTHESIOLOGY

## 2017-11-07 PROCEDURE — 86850 RBC ANTIBODY SCREEN: CPT

## 2017-11-07 PROCEDURE — 85025 COMPLETE CBC W/AUTO DIFF WBC: CPT

## 2017-11-07 PROCEDURE — 36592 COLLECT BLOOD FROM PICC: CPT

## 2017-11-07 PROCEDURE — 6360000002 HC RX W HCPCS: Performed by: INTERNAL MEDICINE

## 2017-11-07 PROCEDURE — 82803 BLOOD GASES ANY COMBINATION: CPT

## 2017-11-07 PROCEDURE — 87086 URINE CULTURE/COLONY COUNT: CPT

## 2017-11-07 PROCEDURE — 51702 INSERT TEMP BLADDER CATH: CPT

## 2017-11-07 PROCEDURE — 80053 COMPREHEN METABOLIC PANEL: CPT

## 2017-11-07 PROCEDURE — 2000000000 HC ICU R&B

## 2017-11-07 PROCEDURE — 87040 BLOOD CULTURE FOR BACTERIA: CPT

## 2017-11-07 PROCEDURE — 86901 BLOOD TYPING SEROLOGIC RH(D): CPT

## 2017-11-07 PROCEDURE — C1751 CATH, INF, PER/CENT/MIDLINE: HCPCS

## 2017-11-07 PROCEDURE — 36620 INSERTION CATHETER ARTERY: CPT

## 2017-11-07 PROCEDURE — 06HM33Z INSERTION OF INFUSION DEVICE INTO RIGHT FEMORAL VEIN, PERCUTANEOUS APPROACH: ICD-10-PCS | Performed by: INTERNAL MEDICINE

## 2017-11-07 PROCEDURE — 2580000003 HC RX 258: Performed by: EMERGENCY MEDICINE

## 2017-11-07 PROCEDURE — 2580000003 HC RX 258: Performed by: ANESTHESIOLOGY

## 2017-11-07 PROCEDURE — 99222 1ST HOSP IP/OBS MODERATE 55: CPT | Performed by: INTERNAL MEDICINE

## 2017-11-07 PROCEDURE — 6360000002 HC RX W HCPCS: Performed by: ANESTHESIOLOGY

## 2017-11-07 PROCEDURE — 6360000002 HC RX W HCPCS: Performed by: EMERGENCY MEDICINE

## 2017-11-07 PROCEDURE — C9113 INJ PANTOPRAZOLE SODIUM, VIA: HCPCS | Performed by: ANESTHESIOLOGY

## 2017-11-07 PROCEDURE — 6360000002 HC RX W HCPCS: Performed by: PHYSICIAN ASSISTANT

## 2017-11-07 PROCEDURE — 6370000000 HC RX 637 (ALT 250 FOR IP): Performed by: ANESTHESIOLOGY

## 2017-11-07 PROCEDURE — 2580000003 HC RX 258: Performed by: PHYSICIAN ASSISTANT

## 2017-11-07 PROCEDURE — 37799 UNLISTED PX VASCULAR SURGERY: CPT

## 2017-11-07 PROCEDURE — 99285 EMERGENCY DEPT VISIT HI MDM: CPT

## 2017-11-07 PROCEDURE — 80202 ASSAY OF VANCOMYCIN: CPT

## 2017-11-07 PROCEDURE — 99292 CRITICAL CARE ADDL 30 MIN: CPT | Performed by: ANESTHESIOLOGY

## 2017-11-07 PROCEDURE — 36600 WITHDRAWAL OF ARTERIAL BLOOD: CPT

## 2017-11-07 PROCEDURE — P9016 RBC LEUKOCYTES REDUCED: HCPCS

## 2017-11-07 RX ORDER — SODIUM CHLORIDE 9 MG/ML
INJECTION, SOLUTION INTRAVENOUS
Status: DISPENSED
Start: 2017-11-07 | End: 2017-11-07

## 2017-11-07 RX ORDER — IPRATROPIUM BROMIDE AND ALBUTEROL SULFATE 2.5; .5 MG/3ML; MG/3ML
1 SOLUTION RESPIRATORY (INHALATION) 4 TIMES DAILY
Status: DISCONTINUED | OUTPATIENT
Start: 2017-11-07 | End: 2017-11-18 | Stop reason: HOSPADM

## 2017-11-07 RX ORDER — SODIUM CHLORIDE 0.9 % (FLUSH) 0.9 %
10 SYRINGE (ML) INJECTION EVERY 12 HOURS SCHEDULED
Status: DISCONTINUED | OUTPATIENT
Start: 2017-11-07 | End: 2017-11-18 | Stop reason: HOSPADM

## 2017-11-07 RX ORDER — SODIUM CHLORIDE 0.9 % (FLUSH) 0.9 %
10 SYRINGE (ML) INJECTION PRN
Status: DISCONTINUED | OUTPATIENT
Start: 2017-11-07 | End: 2017-11-18 | Stop reason: HOSPADM

## 2017-11-07 RX ORDER — 0.9 % SODIUM CHLORIDE 0.9 %
1000 INTRAVENOUS SOLUTION INTRAVENOUS ONCE
Status: COMPLETED | OUTPATIENT
Start: 2017-11-07 | End: 2017-11-07

## 2017-11-07 RX ORDER — 0.9 % SODIUM CHLORIDE 0.9 %
250 INTRAVENOUS SOLUTION INTRAVENOUS ONCE
Status: DISCONTINUED | OUTPATIENT
Start: 2017-11-07 | End: 2017-11-18 | Stop reason: HOSPADM

## 2017-11-07 RX ORDER — DOPAMINE HYDROCHLORIDE 160 MG/100ML
5 INJECTION, SOLUTION INTRAVENOUS CONTINUOUS
Status: DISCONTINUED | OUTPATIENT
Start: 2017-11-07 | End: 2017-11-07

## 2017-11-07 RX ORDER — ACETAMINOPHEN 325 MG/1
650 TABLET ORAL EVERY 4 HOURS PRN
Status: DISCONTINUED | OUTPATIENT
Start: 2017-11-07 | End: 2017-11-18 | Stop reason: HOSPADM

## 2017-11-07 RX ORDER — VANCOMYCIN HYDROCHLORIDE 1 G/200ML
1000 INJECTION, SOLUTION INTRAVENOUS EVERY 24 HOURS
Status: DISCONTINUED | OUTPATIENT
Start: 2017-11-07 | End: 2017-11-08

## 2017-11-07 RX ORDER — DOPAMINE HYDROCHLORIDE 160 MG/100ML
INJECTION, SOLUTION INTRAVENOUS
Status: DISCONTINUED
Start: 2017-11-07 | End: 2017-11-07

## 2017-11-07 RX ORDER — 0.9 % SODIUM CHLORIDE 0.9 %
10 VIAL (ML) INJECTION DAILY
Status: DISCONTINUED | OUTPATIENT
Start: 2017-11-07 | End: 2017-11-18 | Stop reason: HOSPADM

## 2017-11-07 RX ORDER — PANTOPRAZOLE SODIUM 40 MG/10ML
40 INJECTION, POWDER, LYOPHILIZED, FOR SOLUTION INTRAVENOUS 2 TIMES DAILY
Status: DISCONTINUED | OUTPATIENT
Start: 2017-11-07 | End: 2017-11-18 | Stop reason: HOSPADM

## 2017-11-07 RX ORDER — DEXTROSE AND SODIUM CHLORIDE 5; .9 G/100ML; G/100ML
INJECTION, SOLUTION INTRAVENOUS CONTINUOUS
Status: DISCONTINUED | OUTPATIENT
Start: 2017-11-07 | End: 2017-11-10

## 2017-11-07 RX ORDER — 0.9 % SODIUM CHLORIDE 0.9 %
250 INTRAVENOUS SOLUTION INTRAVENOUS ONCE
Status: COMPLETED | OUTPATIENT
Start: 2017-11-07 | End: 2017-11-07

## 2017-11-07 RX ORDER — 0.9 % SODIUM CHLORIDE 0.9 %
500 INTRAVENOUS SOLUTION INTRAVENOUS ONCE
Status: DISCONTINUED | OUTPATIENT
Start: 2017-11-07 | End: 2017-11-07

## 2017-11-07 RX ADMIN — Medication 10 ML: at 20:44

## 2017-11-07 RX ADMIN — PIPERACILLIN, TAZOBACTAM 3.38 G: 3; .375 INJECTION, POWDER, LYOPHILIZED, FOR SOLUTION INTRAVENOUS at 11:29

## 2017-11-07 RX ADMIN — MEROPENEM 1 G: 1 INJECTION, POWDER, FOR SOLUTION INTRAVENOUS at 17:58

## 2017-11-07 RX ADMIN — HYDROCORTISONE SODIUM SUCCINATE 50 MG: 100 INJECTION, POWDER, FOR SOLUTION INTRAMUSCULAR; INTRAVENOUS at 13:34

## 2017-11-07 RX ADMIN — SODIUM CHLORIDE 250 ML: 9 INJECTION, SOLUTION INTRAVENOUS at 14:25

## 2017-11-07 RX ADMIN — Medication 5 MCG/MIN: at 08:47

## 2017-11-07 RX ADMIN — DEXTROSE AND SODIUM CHLORIDE: 5; 900 INJECTION, SOLUTION INTRAVENOUS at 14:29

## 2017-11-07 RX ADMIN — DOPAMINE HYDROCHLORIDE 5 MCG/KG/MIN: 160 INJECTION, SOLUTION INTRAVENOUS at 02:56

## 2017-11-07 RX ADMIN — PANTOPRAZOLE SODIUM 8 MG/HR: 40 INJECTION, POWDER, FOR SOLUTION INTRAVENOUS at 11:23

## 2017-11-07 RX ADMIN — DARBEPOETIN ALFA 40 MCG: 40 SOLUTION INTRAVENOUS; SUBCUTANEOUS at 20:43

## 2017-11-07 RX ADMIN — PANTOPRAZOLE SODIUM 40 MG: 40 INJECTION, POWDER, FOR SOLUTION INTRAVENOUS at 20:44

## 2017-11-07 RX ADMIN — SODIUM CHLORIDE 1000 ML: 9 INJECTION, SOLUTION INTRAVENOUS at 13:32

## 2017-11-07 RX ADMIN — IPRATROPIUM BROMIDE AND ALBUTEROL SULFATE 1 AMPULE: 2.5; .5 SOLUTION RESPIRATORY (INHALATION) at 15:41

## 2017-11-07 RX ADMIN — IPRATROPIUM BROMIDE AND ALBUTEROL SULFATE 1 AMPULE: 2.5; .5 SOLUTION RESPIRATORY (INHALATION) at 19:17

## 2017-11-07 RX ADMIN — HYDROCORTISONE SODIUM SUCCINATE 50 MG: 100 INJECTION, POWDER, FOR SOLUTION INTRAMUSCULAR; INTRAVENOUS at 20:43

## 2017-11-07 RX ADMIN — Medication 10 ML: at 08:50

## 2017-11-07 RX ADMIN — SODIUM CHLORIDE 1000 ML: 9 INJECTION, SOLUTION INTRAVENOUS at 03:48

## 2017-11-07 ASSESSMENT — PAIN SCALES - WONG BAKER

## 2017-11-07 ASSESSMENT — PAIN SCALES - GENERAL: PAINLEVEL_OUTOF10: 0

## 2017-11-07 NOTE — ED NOTES
Dopamine decreased to 3mcg/kg/min per verbal order Dr. Marion Price.      Jesus Prieto, ROZ  25/79/93 5346

## 2017-11-07 NOTE — ED PROVIDER NOTES
VIAL    Inject 8 Units into the skin nightly     INSULIN LISPRO (HUMALOG) 100 UNIT/ML INJECTION VIAL    Inject into the skin 3 times daily (before meals) Indications: sliding scale     IPRATROPIUM-ALBUTEROL (DUONEB) 0.5-2.5 (3) MG/3ML SOLN NEBULIZER SOLUTION    Inhale 3 mLs into the lungs 4 times daily    LACTULOSE (CHRONULAC) 10 GM/15ML SOLUTION    Take 20 mLs by mouth daily    LEVOTHYROXINE (SYNTHROID) 200 MCG TABLET    Take 200 mcg by mouth Daily    LORATADINE (CLARITIN) 10 MG TABLET    Take 10 mg by mouth daily    LORAZEPAM (ATIVAN) 0.5 MG TABLET    Take 0.5 mg by mouth every 4 hours as needed for Anxiety    MAGNESIUM HYDROXIDE (MILK OF MAGNESIA CONCENTRATE) 2400 MG/10ML SUSP    Take 30 mLs by mouth daily as needed    MAGNESIUM OXIDE (MAG-OX) 400 (241.3 MG) MG TABS TABLET    Take 1 tablet by mouth daily    MEROPENEM (MERREM) INFUSION    Infuse 1,000 mg intravenously every 12 hours Compound per protocol. METOPROLOL SUCCINATE (TOPROL XL) 50 MG EXTENDED RELEASE TABLET    Take 1 tablet by mouth daily    MORPHINE 10 MG/5ML SOLUTION    Take by mouth every 2 hours as needed for Pain . NYSTATIN (MYCOSTATIN) 433058 UNIT/ML SUSPENSION    Take 500,000 Units by mouth 4 times daily    OXYBUTYNIN (DITROPAN) 5 MG TABLET    Take 5 mg by mouth nightly    PANTOPRAZOLE (PROTONIX) 20 MG TABLET    Take 40 mg by mouth daily    PROMETHAZINE (PHENERGAN) 25 MG TABLET    Take 25 mg by mouth every 4 hours as needed for Nausea    SENNOSIDES-DOCUSATE SODIUM (SENOKOT-S) 8.6-50 MG TABLET    Take 1 tablet by mouth 2 times daily    SIMETHICONE (MYLICON) 80 MG CHEWABLE TABLET    Take 80 mg by mouth every 6 hours as needed for Flatulence    SODIUM PHOSPHATES (FLEET) 7-19 GM/118ML    Place 1 enema rectally daily as needed    VANCOMYCIN (VANCOCIN) INFUSION    Infuse 1,000 mg intravenously every 24 hours Compound per protocol. ALLERGIES     Review of patient's allergies indicates no known allergies.     FAMILY HISTORY       Family History rate of 151. Rate dependent ST changes. Normal axis. Abnormal EKG. RADIOLOGY:   Non-plain film images such as CT, Ultrasound and MRI are read by the radiologist. Plain radiographic images are visualized and preliminarily interpreted by the emergency physician with the below findings:        Interpretation per the Radiologist below, if available at the time of this note:    No orders to display         ED BEDSIDE ULTRASOUND:   Performed by ED Physician - none    LABS:  Labs Reviewed - No data to display    All other labs were within normal range or not returned as of this dictation. EMERGENCY DEPARTMENT COURSE and DIFFERENTIAL DIAGNOSIS/MDM:   Vitals:    Vitals:    11/07/17 0236   BP: (!) 62/32   Pulse: 72   Temp: 97.5 °F (36.4 °C)   TempSrc: Oral   SpO2: 96%   Weight: 220 lb (99.8 kg)         ED Course      MDM patient has a lower GI bleed based on guaiac positive stool and melanotic stool. Once again wearing. The Eliquis that was restarted the past 24 hours. She had significant hypotension on arrival at 62/32. She is improved now to a blood pressure around 713 systolic that is with supplemental dopamine. However, it also caused a secondary increased heart rate averaging about 130 right now. I am going to reduce the dopamine to 3 mcg/m. In attempts to keep her balance between normal blood pressure and minimally elevated heart rate. Patient is a DNR comfort care arrest.  Daughter is aware of the frail nature of the patient's and potential for death during hospitalization. CRITICAL CARE TIME   Total Critical Care time was 42 minutes, excluding separately reportable procedures. There was a high probability of clinically significant/life threatening deterioration in the patient's condition which required my urgent intervention. CONSULTS:  None    PROCEDURES:  Unless otherwise noted below, none     Procedures    FINAL IMPRESSION    No diagnosis found.       DISPOSITION/PLAN   DISPOSITION PATIENT REFERRED TO:  No follow-up provider specified.     DISCHARGE MEDICATIONS:  New Prescriptions    No medications on file          (Please note that portions of this note were completed with a voice recognition program.  Efforts were made to edit the dictations but occasionally words are mis-transcribed.)    Kendall Urena MD (electronically signed)  Attending Emergency Physician         Kendall Urena MD  11/07/17 4304

## 2017-11-07 NOTE — PROGRESS NOTES
Critical Care Staff-JESSI  [id number]/[id number]  Giles Huerta  11/7/2017    ASSESSMENT    Septic shock vs hemorrhagic shock from GI bleed in the setting of a severely debilitated patient with FTT. Patients MPOA states that the patient would not want a feeding tube, intubation, or CPR. PLAN    Ammonia level in am  ABG  Fluid including blood transfusion   F/u CBC tonight after transfusion  Wean levophed as tolerates  Add hydrocortisone  Abx as is, consult ID  Check coags  Palliative care consult      REASON FOR ICU ADMISSION    75 yo with MMP brought to ED due to concerns for increase lethargic and generalized malaise noted to be hypotensive and anemic in the ED with concern for a GI bleed as ED note states she had melanotic stools. She is in the ICU for further care. TODAY'S EVENTS    On exam she is essentially unresponsive to \"regular\" stimuli. She is on a good dose of levophed as I d/c'd her dopamine due to tachycardia. Her WBC is up a bit and she has a history of multiple infections so I ordered cultures this am and started her on zosyn and vancomycin and just added hydrocortisone due to her need for escalating levophed. Her Cr is up a bit to 1.88 as she is oliguric. Her Hg is 8 which is down from 10.7 drawn on 11/4 as she is on eliquis as an outpatient. Her Plt count is stable at 99 and coags are pending. Patient seen and examined with the ICU team and RN. I personally participated in the key components. I have discussed the case and management of the patient's care with the RN and other physicians involved with the care of the patient    I personally spent 75 minutes of critical care time involved in the care of this patient. This care required my full attention and direct personal management. CRITICAL CARE: 1. Septic shock 2. Hypovolemic shock 3. Anemia 2/2 abl 4.  UGI bleed 5 Metabolic encephalopathy      Restraints n/a  Sedation interruption n/a  Urinary catheter needed for accurate I's and O's and critical care management  Central access needed due to poor PIV access    Please see chart for further labs, medications and/or reports    Electronically signed by Erma Sánchez MD on 11/7/2017 at 1:08 PM

## 2017-11-07 NOTE — CONSULTS
[facility] CARDIOLOGY CONSULTATION NOTE    PATIENT NAME: Christiano Basurto  PATIENT MRN: [id number]  SERVICE DATE:  11/7/2017  SERVICE TIME: 11:02 AM    SHARED VISIT CNP: Dakota Landeros NP   PRIMARY CARE PHYSICIAN: Barbi Byrd MD  CONSULTING CARDIOLOGIST : Dr. Emani Gonzáles  PRIMARY CARDIOLOGIST: Dr. Emani Gonzáles  ===============================================================  Pt was seen by myself in terms of interviewing the pt's family and NP and RN. Pt has multiple areas of staph infections and was being cared for by nursing staff at the time so no contact with pt was performed at this time. 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 Reza Steve MD    REASON FOR CONSULTATION:    CHF/atrial fibrillation    IMPRESSIONS:  1. Acute blood loss anemia  2. Hypovolemic shock requiring vasopressor support  3. Permanent AF, rates controlled following discontinuation of dopamine  4. RUE DVT  5. VHD with moderately severe AS  6. Anasarca  7. Stage 3 CKD  8. Altered mental status  9. Protein calorie malnutrition    RECOMMENDATIONS:  1. Would avoid dopamine, favor levophed for BP support. Unable to tolerate any rate control agents due to hypotension.   2. No diastolic congestive heart failure (HCC)    Hypernatremia    Obesity    Hemiparesis affecting left side as late effect of stroke (HCC)    Atrial fibrillation (HCC)    Acute on chronic respiratory failure with hypoxia and hypercapnia (HCC)    [facility]erebral infarct ([facility]    Hospice Care    Atrial fibrillation with RVR (HCC)    Acute hemolytic anemia (HCC)    BRIGIDA (acute kidney injury) ([facility]    MRSA (methicillin resistant Staphylococcus aureus) infection    Decubitus ulcer of sacral region    Infected ulcer of skin (HCC)       PAST MEDICAL HISTORY:    Past Medical History:   Diagnosis Date    CAD (coronary artery disease)     Cancer ([facility]     Cerebral artery occlusion with cerebral infarction ([facility] [address] [facility]     multiple TIAs in past    CHF (congestive heart failure) (HCC)     Chronic kidney disease (CKD)     COPD (chronic obstructive pulmonary disease) (HCC)     Diabetes mellitus (HCC)     Diastolic dysfunction     Endometrial cancer (HCC)     History of Clostridium difficile     History of TIAs     Lung disease     Pneumonia     Thyroid disease        PAST SURGICAL HISTORY:  Past Surgical History:   Procedure Laterality Date    APPENDECTOMY      CHOLECYSTECTOMY      ENDOMETRIAL BIOPSY      INCISION AND DRAINAGE Left 10/30/2017    EXCISIONAL DEBRIDEMENT OF NECROTIC WOUNDS ON SACRAL AREA AND LEFT FOREARM performed by Yudy Kyle MD at [address] [place] [address]:  Current Facility-Administered Medications   Medication Dose Route Frequency Provider Last Rate Last Dose    DOPamine (INTROPIN) 1.6-5 MG/ML-% infusion             DOPamine (INTROPIN) 400 mg in dextrose 5 % 250 mL infusion  5 mcg/kg/min Intravenous Continuous Rodrickgarrett Traylor MD   Stopped at 11/07/17 0915    sodium chloride flush 0.9 % injection 10 mL  10 mL Intravenous 2 times per day Andie Dunlap MD   10 mL at 11/07/17 0850    sodium chloride flush 0.9 % injection 10 mL  10 mL Intravenous PRN Andie Dunlap MD        acetaminophen (TYLENOL) tablet 650 mg  650 mg Oral Q4H PRN Janae Noe MD        sodium chloride 0.9 % infusion             0.9 % sodium chloride bolus  250 mL Intravenous Once Jordan Muller PA-C        norepinephrine (LEVOPHED) 16 mg in dextrose 5% 250 mL infusion  2 mcg/min Intravenous Continuous Karmen Novak MD 9.4 mL/hr at 11/07/17 0938 10 mcg/min at 11/07/17 6988    pantoprazole (PROTONIX) 80 mg in sodium chloride 0.9 % 100 mL infusion  8 mg/hr Intravenous Continuous Jordan Muller PA-C        piperacillin-tazobactam (ZOSYN) 3.375 g in dextrose 5 % 50 mL IVPB (mini-bag)  3.375 g Intravenous Q8H Karmen Novak MD        vancomycin (VANCOCIN) 1000 mg in dextrose 5% 200 mL IVPB  1,000 mg Intravenous Q24H Karmen Novak MD        darbepoetin stephanie-polysorbate (ARANESP) injection 40 mcg  40 mcg Subcutaneous Weekly Duarte Bah MD             ALLERGIES AND DRUG INTOLERANCES: No Known Allergies    FAMILY HISTORY:    Family History   Problem Relation Age of Onset    Cancer Mother     Cancer Father        SOCIAL HISTORY:    Social History     Social History    Marital status: Single     Spouse name: N/A    Number of children: N/A    Years of education: N/A     Occupational History    Not on file. Social History Main Topics    Smoking status: Former Smoker    Smokeless tobacco: Never Used    Alcohol use No    Drug use: No    Sexual activity: Not on file     Other Topics Concern    Not on file     Social History Narrative    No narrative on file       ROS:   12 point review of systems was obtained in detail and is negative other than that noted above or below. Review of Systems   Unable to perform ROS: Patient unresponsive        PHYSICAL EXAM:  BP (!) 171/141   Pulse 91   Temp 98 °F (36.7 °C) (Oral)   Resp 14   Wt 215 lb 13.3 oz (97.9 kg)   SpO2 100%   BMI 34.84 kg/m²   Physical Exam:  Physical Exam   Constitutional: No distress. She appears chronically ill.    HENT:   Mouth/Throat: Oropharynx is ================================================================      Thank you for your consideration for this consultation.   This consultation was completed after chart review and bedside examination done by myself in conjunction with Dr. Preston Argueta      SIGNATURE: Clem Ibanez RN, MSN, CNP

## 2017-11-07 NOTE — PROCEDURES
Patient Name: Macrellus Cha   Medical Record Number: [id number]  Date: 11/7/2017   Time: 7:09 AM   Room/Bed: [place] [address]  Central Line Placement Procedure Note  Indication: vascular access    Consent: Unable to be obtained due to the emergent nature of this procedure. Procedure: The patient was positioned appropriately and the skin over the right femoral vein was prepped with betadine and draped in a sterile fashion. Local anesthesia was obtained by infiltration using 1% Lidocaine without epinephrine. A large bore needle was used to identify the vein Under US guidance. A guide wire was then inserted into the vein through the needle. Linear US probe used to identify the vein and proper placement of guidewire. A triple lumen catheter was then inserted into the vessel over the guide wire using the Seldinger technique. All ports showed good, free flowing blood return and were flushed with saline solution. The catheter was then securely fastened to the skin with sutures and covered with a sterile dressing. A post procedure X-ray was not indicated. The patient tolerated the procedure well.     Complications: None    Electronically Signed by: @[email or web address]@

## 2017-11-07 NOTE — CONSULTS
Consults   Infectious Disease     Patient Name: Garima Willams  Date: 11/7/2017  YOB: 1940  Medical Record Number: [id number]        MRSA abscess left arm  Sacral abscess MRSA [facility]      History of Present Illness:    Patient presents with hypotension progressive anemia placed on dopamine  had been hospitalized recently with abscesses on her forearm and sacral region growing MRSA and gram-negative rods  Which turned out to be PROVIDENCIA  Patient was mostly discharged and accommodation vancomycin and meropenem    She apparently came back to the emergency room several days ago because swelling in the right forearm some have a DVT return to the nursing home with continuing her antibiotics but some point the nurse practitioner if there is room decided to discontinue antibiotics    Review of Systems   Unable to perform ROS: Acuity of condition       Review of Systems: All 14 review of systems negative other than as stated above    Social History   Substance Use Topics    Smoking status: Former Smoker    Smokeless tobacco: Never Used    Alcohol use No         Past Medical History:   Diagnosis Date    CAD (coronary artery disease)     Cancer ([facility] [address]     Cerebral artery occlusion with cerebral infarction ([facility] [address]     multiple TIAs in past    CHF (congestive heart failure) ([address]     Chronic kidney disease (CKD)     COPD (chronic obstructive pulmonary disease) ([address]     Diabetes mellitus ([address]     Diastolic dysfunction     Endometrial cancer ([facility] [address]     History of Clostridium difficile     History of TIAs     Lung disease     Pneumonia     Thyroid disease            Past Surgical History:   Procedure Laterality Date    APPENDECTOMY      CHOLECYSTECTOMY      ENDOMETRIAL BIOPSY      INCISION AND DRAINAGE Left 10/30/2017    EXCISIONAL DEBRIDEMENT OF NECROTIC WOUNDS ON SACRAL AREA AND LEFT FOREARM performed by Helen Saldaña MD at [facility]         No current facility-administered medications on file prior to encounter. Current Outpatient Prescriptions on File Prior to Encounter   Medication Sig Dispense Refill    apixaban (ELIQUIS) 5 MG TABS tablet Take 2 tablets by mouth 2 times daily Take 10 mg every 12 hours for 7 days, then 5 mg twice daily 60 tablet 0    metoprolol succinate (TOPROL XL) 50 MG extended release tablet Take 1 tablet by mouth daily 30 tablet 5    vancomycin (VANCOCIN) infusion Infuse 1,000 mg intravenously every 24 hours Compound per protocol.  meropenem (MERREM) infusion Infuse 1,000 mg intravenously every 12 hours Compound per protocol. 1 each 0    LORazepam (ATIVAN) 0.5 MG tablet Take 0.5 mg by mouth every 4 hours as needed for Anxiety      bisacodyl (DULCOLAX) 10 MG suppository Place 10 mg rectally daily as needed for Constipation      Sodium Phosphates (FLEET) 7-19 GM/118ML Place 1 enema rectally daily as needed      haloperidol (HALDOL) 0.5 MG tablet Take 0.5 mg by mouth every 4 hours as needed      hyoscyamine (LEVSIN) 125 MCG/ML solution Take by mouth every 4 hours as needed      magnesium hydroxide (MILK OF MAGNESIA CONCENTRATE) 2400 MG/10ML SUSP Take 30 mLs by mouth daily as needed      morphine 10 MG/5ML solution Take by mouth every 2 hours as needed for Pain .  HYDROcodone-acetaminophen (NORCO) 5-325 MG per tablet Take 1 tablet by mouth every 6 hours as needed for Pain .       nystatin (MYCOSTATIN) 420547 UNIT/ML suspension Take 500,000 Units by mouth 4 times daily      promethazine (PHENERGAN) 25 MG tablet Take 25 mg by mouth every 4 hours as needed for Nausea      sennosides-docusate sodium (SENOKOT-S) 8.6-50 MG tablet Take 1 tablet by mouth 2 times daily      albuterol (PROVENTIL) (2.5 MG/3ML) 0.083% nebulizer solution Take 3 mLs by nebulization every 2 hours as needed for Wheezing 120 each 3    ipratropium-albuterol (DUONEB) 0.5-2.5 (3) MG/3ML SOLN nebulizer solution Inhale 3 mLs into the lungs 4 times daily 360 mL     gabapentin (NEURONTIN) 400 MG capsule Take 1 capsule by mouth 2 times daily 90 capsule 3    furosemide (LASIX) 20 MG tablet Take 20 mg by mouth daily      levothyroxine (SYNTHROID) 200 MCG tablet Take 200 mcg by mouth Daily      hypromellose (ARTIFICIAL TEARS) 0.4 % SOLN ophthalmic solution Place 2 drops into both eyes 2 times daily      atorvastatin (LIPITOR) 40 MG tablet Take 40 mg by mouth nightly      ferrous sulfate 325 (65 Fe) MG tablet Take 325 mg by mouth daily (with breakfast)      oxybutynin (DITROPAN) 5 MG tablet Take 5 mg by mouth nightly      diclofenac sodium 1 % GEL Apply 2 g topically 2 times daily as needed for Pain Apply to hands and knees as needed for pain      allopurinol (ZYLOPRIM) 100 MG tablet Take 100 mg by mouth daily      docusate sodium (COLACE) 100 MG capsule Take 100 mg by mouth 2 times daily as needed for Constipation      acetaminophen (TYLENOL) 325 MG tablet Take 2 tablets by mouth every 4 hours as needed for Pain or Fever 120 tablet 0    aspirin 81 MG chewable tablet Take 1 tablet by mouth daily 30 tablet 3    magnesium oxide (MAG-OX) 400 (241.3 MG) MG TABS tablet Take 1 tablet by mouth daily 30 tablet     folic acid (FOLVITE) 1 MG tablet Take 1 mg by mouth daily      insulin lispro (HUMALOG) 100 UNIT/ML injection vial Inject into the skin 3 times daily (before meals) Indications: sliding scale       lactulose (CHRONULAC) 10 GM/15ML solution Take 20 mLs by mouth daily      insulin glargine (LANTUS) 100 UNIT/ML injection vial Inject 8 Units into the skin nightly       loratadine (CLARITIN) 10 MG tablet Take 10 mg by mouth daily      pantoprazole (PROTONIX) 20 MG tablet Take 40 mg by mouth daily      simethicone (MYLICON) 80 MG chewable tablet Take 80 mg by mouth every 6 hours as needed for Flatulence         No Known Allergies      Family History   Problem Relation Age of Onset    Cancer Mother     Cancer Father          Physical Exam:     Blood pressure (!) 115/40, pulse 84, temperature 97.3 °F (36.3 °C), temperature source Axillary, resp. rate 14, height 5' 6\" (1.676 m), weight 215 lb 13.3 oz (97.9 kg), SpO2 100 %. Physical Exam   Constitutional:   Morbidly obese   HENT:   Head: Normocephalic. Neck: No tracheal deviation present. No thyromegaly present. Cardiovascular: Normal rate, normal heart sounds and intact distal pulses. Exam reveals no gallop. No murmur heard. Pulmonary/Chest: Effort normal. No respiratory distress. She has no wheezes. She has no rales. She exhibits no tenderness. Abdominal: Soft. Bowel sounds are normal. She exhibits no distension and no mass. There is no hepatosplenomegaly, splenomegaly or hepatomegaly. There is no tenderness. There is no rebound, no guarding and no CVA tenderness. Musculoskeletal: She exhibits edema. She exhibits no tenderness. Lymphadenopathy:     She has no cervical adenopathy. She has no axillary adenopathy. Right: No inguinal, no supraclavicular and no epitrochlear adenopathy present. Left: No inguinal, no supraclavicular and no epitrochlear adenopathy present. Skin: No rash noted. No erythema. No pallor.       Lab Results   Component Value Date    WBC 11.4 (H) 11/07/2017    HGB 8.0 (L) 11/07/2017    HCT 25.5 (L) 11/07/2017    MCV 95.6 11/07/2017    PLT 99 (L) 11/07/2017     Lab Results   Component Value Date     11/07/2017    K 4.7 11/07/2017     11/07/2017    CO2 26 11/07/2017    BUN 95 11/07/2017    CREATININE 1.88 11/07/2017    GLUCOSE 83 11/07/2017    CALCIUM 8.9 11/07/2017              Patient Active Problem List   Diagnosis    Pneumonia of left lower lobe due to infectious organism [facility])    Chronic obstructive pulmonary disease ([facility] [address]    Type 2 diabetes mellitus ([facility] [address]    Anemia    Metabolic encephalopathy    Acute on chronic diastolic congestive heart failure (HCC)    Hypernatremia    Obesity    Hemiparesis affecting left side as late effect of stroke (HCC)    Atrial

## 2017-11-07 NOTE — PROCEDURES
Patient Name: Awa Hogan   Medical Record Number: [id number]  Date: 11/7/2017   Time: 7:11 AM   [id number]  Arterial Line Placement Procedure Note                   Indication: severe hypotension    Consent: Unable to be obtained due to the emergent nature of this procedure. Heriberto's Test: Not indicated in this particular procedure    Procedure: The skin over the right femoral artery was prepped with betadine and draped in a sterile fashion. Local anesthesia was obtained by infiltration using 1% Lidocaine without epinephrine. A 20 gauge arterial line catheter was then inserted using US guidance, using a modified Seldinger technique, into the vessel. The transducer set was then attached and securely fastened to the skin with sutures. Waveforms on the monitor were observed and found to be adequate. The patient had good distal perfusion after the procedure. The site was then dressed in a sterile fashion. The patient tolerated the procedure well.      Complications: None    Electronically Signed by: @virgie@

## 2017-11-07 NOTE — H&P
Years of education: N/A     Occupational History    Not on file. Social History Main Topics    Smoking status: Former Smoker    Smokeless tobacco: Never Used    Alcohol use No    Drug use: No    Sexual activity: Not on file     Other Topics Concern    Not on file     Social History Narrative    No narrative on file     MEDICATIONS: reviewed    ALLERGIES: Review of patient's allergies indicates no known allergies. REVIEW OF SYSTEM:   ROS as noted in HPI, 12 point ROS reviewed and otherwise negative. OBJECTIVE  PHYSICAL EXAM: BP (!) 171/141   Pulse 135   Temp (P) 98 °F (36.7 °C) (Oral)   Resp 14   Wt 215 lb 13.3 oz (97.9 kg)   SpO2 (!) 89%   BMI 34.84 kg/m²   CONSTITUTIONAL: Patient is lethargic, can be alerted, pale  EYES:  Lids and lashes normal, pupils equal, round and reactive to light, extra ocular muscles intact, sclera clear, conjunctiva normal  LUNGS: Diminished breath sounds bilaterally  CARDIOVASCULAR:  Irregular,irregular, with tachycardia  ABDOMEN:  No scars, normal bowel sounds, soft, non-distended, non-tender, no masses palpated, no hepatosplenomegally  EXTREMITIES: Anasarca noted, edema bilateral arms bilateral legs    DATA:     Diagnostic tests reviewed for today's visit:    Most recent labs and imaging results reviewed. VTE Prophylaxis: SCDs    Plan of care discussed with: patient    SIGNATURE: Hernan oPtts PA-C  DATE: November 7, 2017  TIME: 8:10 AM     I have independently seen and examined this patient. I have reviewed and agree with the above documentation, assessment and plan.      Brett Abarca MD  12:21 PM  November 7, 2017

## 2017-11-07 NOTE — CONSULTS
[address]                     [address], [address] [facility]                                 CONSULTATION    PATIENT NAME: Jd Gomez                  :         1940  MED REC NO:   [id number]                            ROOM:       09  ACCOUNT NO:   [de-identified]                           ADMIT DATE:  2017  PROVIDER:     Zelda Mccormick MD    CONSULT DATE:  2017    HISTORY OF PRESENT ILLNESS:  This is a consult addendum. Please see  the consult performed by Sis Cortes, nurse practitioner for [facility]. This patient's care was discussed with the patient's  daughter, nurse practitioner, and nursing staff. In general, this is  one of multiple admissions for this unfortunate lady who has multiple  medical problems. This patient has a history of respiratory  insufficiency and CO2 retention and sleep apnea. The patient also has  invasive uterine cancer. The patient has chronic atrial fibrillation. This patient has chronic anemia. This patient also has got upper extremity DVT due to PICC line  placement. In general, the patient was brought in for hypotension,  malnutrition, and bleeding issues due to Eliquis therapy. This patient has had also multiple admissions for anemia and has  actually had a bone marrow biopsy which is pending. Her care is that  she is DNR CCA, i.e., no intubation or PEG tube. PHYSICAL EXAMINATION:  GENERAL:  On exam, the patient appears to be pale and lethargic. She  is presently on BiPAP. HEART:  She has got irregularly irregular rhythm. LUNGS:  Coarse breath sounds. ABDOMEN:  Distended. EXTREMITIES:  There is edema, particularly of the upper extremities. She has also got some stasis wounds of her upper extremities as well  as her back per the nursing staff. LABORATORY DATA:  She is somewhat somnolent, now is retaining CO2 with  a pCO2 of 56, the pH reflecting CO2 retention at 7.24. Her white  count is 17, her hemoglobin is 8.0, platelets are 56,632. The  patient's BUN and creatinine are elevated at 95 and 1.88. Potassium  is normal.  Her EKG shows the fact that she is in atrial fibrillation. The patient, in general, apparently presented to the emergency room on  an outpatient basis with increased swelling in the right upper  extremity and venous duplex did show some thrombus around her PICC  line site. She had a similar episode on her left arm which resolved  with taking out of her PICC line. The plan was to put her on couple doses of Eliquis, discontinue the  PICC line, but the patient apparently has some nosebleed and then  presented for the above-mentioned hypotension and generalized failure  to thrive. Also note the fact that her albumin is 1.7. ASSESSMENT:  1. Critically ill patient with overall poor prognosis due to  respiratory insufficiency and invasive uterine cancer. 2.  Chronic anemia, possibly a dual etiology, i.e., blood loss as well  as the patient not making the hemoglobin. 3.  History of upper extremity DVTs but no documented history of lower  extremity DVTs. 4.  Severe malnutrition and no further therapy for invasive uterine  cancer. PLAN:  1. In general, we initially thought perhaps IVC filter; however, the  patient has really no documented lower extremity thromboses, they are  upper extremities due to PICC line insertion. 2.  The care of the patient is extremely difficult, as the patient  wants everything done with the exception of being re-intubated or PEG  tube. However, no further therapy for invasive uterine cancer is  planned. 3.  No obvious reason for IVC filter at this time and upper extremity  IVC filters would be _____ with problems and set her up for superior  vena cava syndrome. 4.  Agree that the patient may have to get the IVC filter, but may  need an indwelling port.   5.  Consideration for hospice or at least palliative care could

## 2017-11-07 NOTE — ED TRIAGE NOTES
Pt arrived via [address] from [facility] with c/o gi bleed, hypotension and changed in mental status. Pt has 3+ generalized pitting edema. Pt has large hematoma on right side from shoulder down to hip. Pt has scattered skin tears.

## 2017-11-07 NOTE — CARE COORDINATION
Pt is from [facility] long term, no precert needed per Judi Kennedy. Pall Care ordered. .Electronically signed by TANK Blackwood on 11/7/2017 at 10:07 AM

## 2017-11-07 NOTE — PROGRESS NOTES
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 Wound has not been measured, but will be when the wound nurse comes to address pt skin issues. Pt also has skin tears and weeping from B upper arms with kerlex wrap on them. The pt also has a wound on the left ankle that is wrapped in kerlex. There is a large hematoma that is on the pt's entire left side from her breast down to her hip.

## 2017-11-07 NOTE — PROGRESS NOTES
1400 Blood gas done. Pt placed on BiPap 12/6 with 40% bleed. Pt has one unit of PRBC infusing. Pt is tolerating well. Pt now responding to voice by opening eyes. Shakes head yes and no to questions.

## 2017-11-07 NOTE — CONSULTS
then 5 mg twice daily 60 tablet 0    metoprolol succinate (TOPROL XL) 50 MG extended release tablet Take 1 tablet by mouth daily 30 tablet 5    vancomycin (VANCOCIN) infusion Infuse 1,000 mg intravenously every 24 hours Compound per protocol.  meropenem (MERREM) infusion Infuse 1,000 mg intravenously every 12 hours Compound per protocol. 1 each 0    LORazepam (ATIVAN) 0.5 MG tablet Take 0.5 mg by mouth every 4 hours as needed for Anxiety      bisacodyl (DULCOLAX) 10 MG suppository Place 10 mg rectally daily as needed for Constipation      Sodium Phosphates (FLEET) 7-19 GM/118ML Place 1 enema rectally daily as needed      haloperidol (HALDOL) 0.5 MG tablet Take 0.5 mg by mouth every 4 hours as needed      hyoscyamine (LEVSIN) 125 MCG/ML solution Take by mouth every 4 hours as needed      magnesium hydroxide (MILK OF MAGNESIA CONCENTRATE) 2400 MG/10ML SUSP Take 30 mLs by mouth daily as needed      morphine 10 MG/5ML solution Take by mouth every 2 hours as needed for Pain .  HYDROcodone-acetaminophen (NORCO) 5-325 MG per tablet Take 1 tablet by mouth every 6 hours as needed for Pain .       nystatin (MYCOSTATIN) 388240 UNIT/ML suspension Take 500,000 Units by mouth 4 times daily      promethazine (PHENERGAN) 25 MG tablet Take 25 mg by mouth every 4 hours as needed for Nausea      sennosides-docusate sodium (SENOKOT-S) 8.6-50 MG tablet Take 1 tablet by mouth 2 times daily      albuterol (PROVENTIL) (2.5 MG/3ML) 0.083% nebulizer solution Take 3 mLs by nebulization every 2 hours as needed for Wheezing 120 each 3    ipratropium-albuterol (DUONEB) 0.5-2.5 (3) MG/3ML SOLN nebulizer solution Inhale 3 mLs into the lungs 4 times daily 360 mL     gabapentin (NEURONTIN) 400 MG capsule Take 1 capsule by mouth 2 times daily 90 capsule 3    furosemide (LASIX) 20 MG tablet Take 20 mg by mouth daily      levothyroxine (SYNTHROID) 200 MCG tablet Take 200 mcg by mouth Daily      hypromellose (ARTIFICIAL TEARS) 0.4 % SOLN ophthalmic solution Place 2 drops into both eyes 2 times daily      atorvastatin (LIPITOR) 40 MG tablet Take 40 mg by mouth nightly      ferrous sulfate 325 (65 Fe) MG tablet Take 325 mg by mouth daily (with breakfast)      oxybutynin (DITROPAN) 5 MG tablet Take 5 mg by mouth nightly      diclofenac sodium 1 % GEL Apply 2 g topically 2 times daily as needed for Pain Apply to hands and knees as needed for pain      allopurinol (ZYLOPRIM) 100 MG tablet Take 100 mg by mouth daily      docusate sodium (COLACE) 100 MG capsule Take 100 mg by mouth 2 times daily as needed for Constipation      acetaminophen (TYLENOL) 325 MG tablet Take 2 tablets by mouth every 4 hours as needed for Pain or Fever 120 tablet 0    aspirin 81 MG chewable tablet Take 1 tablet by mouth daily 30 tablet 3    magnesium oxide (MAG-OX) 400 (241.3 MG) MG TABS tablet Take 1 tablet by mouth daily 30 tablet     folic acid (FOLVITE) 1 MG tablet Take 1 mg by mouth daily      insulin lispro (HUMALOG) 100 UNIT/ML injection vial Inject into the skin 3 times daily (before meals) Indications: sliding scale       lactulose (CHRONULAC) 10 GM/15ML solution Take 20 mLs by mouth daily      insulin glargine (LANTUS) 100 UNIT/ML injection vial Inject 8 Units into the skin nightly       loratadine (CLARITIN) 10 MG tablet Take 10 mg by mouth daily      pantoprazole (PROTONIX) 20 MG tablet Take 40 mg by mouth daily      simethicone (MYLICON) 80 MG chewable tablet Take 80 mg by mouth every 6 hours as needed for Flatulence         Allergies:  Review of patient's allergies indicates no known allergies. Social History:    Social History     Social History    Marital status: Single     Spouse name: N/A    Number of children: N/A    Years of education: N/A     Occupational History    Not on file.      Social History Main Topics    Smoking status: Former Smoker    Smokeless tobacco: Never Used    Alcohol use No    Drug use: No    Sexual activity: Not on file     Other Topics Concern    Not on file     Social History Narrative    No narrative on file       Family History:   Family History   Problem Relation Age of Onset    Cancer Mother     Cancer Father        Review of Systems:   10 point review of systems negative other than what is in HPI      Physical exam:   Constitutional:  VITALS:  BP (!) 171/141   Pulse 135   Temp 98 °F (36.7 °C) (Oral)   Resp 14   Wt 215 lb 13.3 oz (97.9 kg)   SpO2 (!) 89%   BMI 34.84 kg/m²   Gen: lethargic  Skin: no rash, turgor wnl  Heent:  eomi, mmm  Neck: no bruits or jvd noted, thyroid normal  Lungs:  coarse  Heart:  Heart sounds are normal.  Regular rate and rhythm without murmur, gallop or rub. Abdomen:  +bs, soft, nt, nd, no hepatosplenomegaly   Extremities: 1+ edema  Psychiatric: mood and affect appropriate; judgement and insight intact  Musculoskeletal:  Rom, muscular strength intact; digits, nails normal    Data/  CBC:   Lab Results   Component Value Date    WBC 11.4 11/07/2017    RBC 2.67 11/07/2017    HGB 8.0 11/07/2017    HCT 25.5 11/07/2017    MCV 95.6 11/07/2017    MCH 29.9 11/07/2017    MCHC 31.3 11/07/2017    RDW 17.2 11/07/2017    PLT 99 11/07/2017     BMP:    Lab Results   Component Value Date     11/07/2017    K 4.7 11/07/2017     11/07/2017    CO2 26 11/07/2017    BUN 95 11/07/2017    LABALBU 1.7 11/07/2017    CREATININE 1.88 11/07/2017    CALCIUM 8.9 11/07/2017    GFRAA 31.2 11/07/2017    LABGLOM 25.8 11/07/2017    GLUCOSE 83 11/07/2017     Ir Fluoro Guided Cva Device Placement    Result Date: 10/23/2017  EXAMINATION: IR PICC EQUAL OR GREATER THAN 5 YEARS, IR ULTRASOUND GUIDANCE VASCULAR ACCESS, IR FLUOROSCOPY GUIDED CENTRAL VENOUS ACCESS DEVICE PLACEMENT CLINICAL HISTORY:  low hemoglobin . Long-term venous access needed for medical therapy. Transfusion therapy. COMPARISONS: None available.  FINDINGS: Right UPPER EXTREMITY PICC INSERTION Complete, routine aseptic technique, including a sterile barrier, including participating providers wearing caps, masks, sterile gowns and sterile gloves was used. The treatment area underwent isolation with a sterile barrier following skin preparation. Using sonographic and fluoroscopic guidance, following appropriate preparation and local anesthesia, with micropuncture technique, the right basilic vein was accessed and soft tissue track developed, through which an insertion cannula was installed, through which a 5-Uzbek dual lumen PICC was placed. The tip of the catheter was placed in the superior vena cava. Single view, saved fluoroscopic image documentation of the final placement was performed. Sonographic image was recorded for the permanent record at the access site. The catheter was affixed to the skin in the usual fashion and the site dressed. There were no significant complications at the time of the procedure. Standard follow-up will have been performed. CONCLUSION SUCCESSFUL UNCOMPLICATED right UPPER EXTREMITY PICC INSTALLATION. X-RAY DOSE SUMMARY:    1 FLUOROSCOPIC IMAGES SAVED TO PACS. 0SPOT FILM WAS EXPOSED. 60.6SECOND FLUOROSCOPY TIME. 6.51MGY CUMULATIVE X-RAY DOSE. Xr Chest Portable    Result Date: 10/27/2017  PORTABLE CHEST: 10/27/2017 CLINICAL HISTORY:  follow up possible aspiration pneumonia . COMPARISON: 10/25/2017. A portable upright AP radiograph of the chest was obtained. FINDINGS: Infiltrate/consolidation of the mid to basilar left lung, with a small left pleural effusion appears unchanged. Probable mild right basilar atelectasis appears improved. There is no significant right pleural effusion, vascular congestion, pneumothorax, or displaced fractures identified. The right upper extremity PICC catheter is again noted     INFILTRATE/CONSOLIDATION OF THE MID TO BASILAR LEFT LUNG WITH A SMALL PLEURAL EFFUSION.  NO SIGNIFICANT CHANGE FROM 10/25/2017    Xr Chest Portable    Result Date: 10/25/2017  EXAMINATION: XR CHEST PORTABLE REASON FOR EXAM: Shortness of breath. FINDINGS: Frontal view of the chest reveals inspiration somewhat shallow related to portable technique and positioning. There is a PICC catheter now in place in satisfactory position since previous studies. Opacification at the left base related to volume loss and pleural thickening is present. Increase infiltrate in the left perihilar region more prominent from previous studies and developing pneumonia superimposed on chronic disease should BE considered. Central pulmonary vascular congestion is present. Scarring in the right mid and lower lung field remain unchanged. MILD CENTRAL VASCULAR CONGESTION. ACUTE PNEUMONIA SUPERIMPOSED ON CHRONIC DISEASE LEFT BASE. Ir Picc Wo Sq Port/pump > 5 Years    Result Date: 10/23/2017  EXAMINATION: IR PICC EQUAL OR GREATER THAN 5 YEARS, IR ULTRASOUND GUIDANCE VASCULAR ACCESS, IR FLUOROSCOPY GUIDED CENTRAL VENOUS ACCESS DEVICE PLACEMENT CLINICAL HISTORY:  low hemoglobin . Long-term venous access needed for medical therapy. Transfusion therapy. COMPARISONS: None available. FINDINGS: Right UPPER EXTREMITY PICC INSERTION Complete, routine aseptic technique, including a sterile barrier, including participating providers wearing caps, masks, sterile gowns and sterile gloves was used. The treatment area underwent isolation with a sterile barrier following skin preparation. Using sonographic and fluoroscopic guidance, following appropriate preparation and local anesthesia, with micropuncture technique, the right basilic vein was accessed and soft tissue track developed, through which an insertion cannula was installed, through which a 5-Frisian dual lumen PICC was placed. The tip of the catheter was placed in the superior vena cava. Single view, saved fluoroscopic image documentation of the final placement was performed. Sonographic image was recorded for the permanent record at the access site.  The catheter was affixed to the skin in the usual fashion and the site dressed. There were no significant complications at the time of the procedure. Standard follow-up will have been performed. CONCLUSION SUCCESSFUL UNCOMPLICATED right UPPER EXTREMITY PICC INSTALLATION. X-RAY DOSE SUMMARY:    1 FLUOROSCOPIC IMAGES SAVED TO PACS. 0SPOT FILM WAS EXPOSED. 60.6SECOND FLUOROSCOPY TIME. 6.51MGY CUMULATIVE X-RAY DOSE. Us Upper Extremity Right Venous Duplex    Result Date: 11/4/2017  US UPPER EXTREMITY RIGHT VENOUS DUPLEX : 11/4/2017 CLINICAL HISTORY: Right upper extremity swelling and right upper extremity PICC catheter placed 10/23/2017. COMPARISON: None available. TECHNIQUE: Grayscale, color and waveform Doppler analysis of the right upper extremity was performed. FINDINGS: The study is limited by the patient's cooperation, the study was incomplete. Recent-appearing occlusive thrombus is present around a PICC catheter within the right brachial and axillary veins, which appears to extend into the subclavian vein. No thrombus is identified within the visualized jugular vein. RECENT-APPEARING OCCLUSIVE VENOUS THROMBUS AROUND A PICC CATHETER WITHIN THE RIGHT BRACHIAL, AXILLARY AND SUBCLAVIAN VEINS. Us Upper Extremity Right Venous Duplex    Result Date: 10/31/2017  EXAMINATION: US UPPER EXTREMITY RIGHT VENOUS DUPLEX CLINICAL HISTORY:  ARM DVT SUSPECTED . COMPARISONS: None available. FINDINGS: Right upper extremity venous duplex sonogram was performed. There is a well depicted PICC within the patent right brachial vein, extending into the right axillary vein and right subclavian vein. There is abundant soft tissue swelling in the forearm. The right radial vein and the forearm is patent. The right cephalic vein is patent. The right basilic vein was never displayed. The ulnar vein in the forearm was not displayed. CONCLUSION: PATENT RIGHT BRACHIAL VEIN, RIGHT AXILLARY VEIN AND RIGHT SUBCLAVIAN VEIN CONTAINING AN UNREMARKABLE PICC.  THERE IS ABUNDANT There were no significant complications at the time of the procedure. Standard follow-up will have been performed. CONCLUSION SUCCESSFUL UNCOMPLICATED right UPPER EXTREMITY PICC INSTALLATION. X-RAY DOSE SUMMARY:    1 FLUOROSCOPIC IMAGES SAVED TO PACS. 0SPOT FILM WAS EXPOSED. 60.6SECOND FLUOROSCOPY TIME. 6.51MGY CUMULATIVE X-RAY DOSE. Us Retroperitoneal Limited    Result Date: 10/26/2017  US RETROPERITONEAL LIMITED : 10/25/2017 CLINICAL HISTORY: RENAL FAILURE, ACUTE (KIDNEY INJURY) . COMPARISON: None available. TECHNIQUE: Transabdominal ultrasound kidneys was performed. FINDINGS: There is sclerosis, abnormal perinephric collections, visualized nephrolithiasis or solid renal masses. Both kidneys are normal in length with mild to moderate diffuse cortical thinning and borderline increased cortical echogenicity. The right kidney measures approximately 12.1 cm in length. The left kidney approximately 10.2 cm. The average renal cortical thickness is approximately 7 mm. A small simple cyst is noted within the lower pole of the left kidney, measuring up to 2 cm. NO EVIDENCE OF URINARY TRACT OBSTRUCTION. MILD RENAL ATROPHY. 2 CM LOWER POLE LEFT RENAL CYST. Ir Removal Of Nontunneled Vascular Cath    Result Date: 10/30/2017  NO FILMS NO DICTATION      Assessment/  77 yo with ckd stage 3 due to diabetic nephropathy. She has multifactorial anemia, low bp, samm, fluid overload. Acid base status and lytes are ok. She has severe protein calorie malnutrition with alb of 1.7 making the edema harder to deal with. On pressors       Plan/  1- agree with ivf for low bp / shock  2- aranesp for anemia  3- poor prognosis long term    Thank you for the consultation. Please do not hesitate to call with questions.     Douglas Leon

## 2017-11-07 NOTE — CONSULTS
It seems the patient has swelling of her soft tissue of  the neck and abdomen. ABDOMEN:  Obese, soft, unable to appreciate any mass. EXTREMITIES:  Shows 1+ edema. LABS:  Shows sodium 143, potassium 4.7, chloride is 108, CO2 of 26,  BUN is 95, creatinine is 1.88. Albumin is 1.7. Liver enzymes were  unremarkable. White count is 11.4, hemoglobin and hematocrit 8 and  25.5, platelet count is 23,389. Pro time is 13.8 and INR is 1.3. IMPRESSION:  1.  GI bleeding, possibilities include rule out ulcer disease or  stress gastritis or any colonic pathology. No active GI bleeding at  this time. Plan is to continue the Protonix and when the patient is  medically stable, we will consider doing an upper endoscopy with high  BUN/creatinine ratio is more suggestive of upper GI source. I do not  think the patient is stable at this time to undergo endoscopic  evaluation.         Nivia Ramires MD    D: 11/07/2017 14:16:00       T: 11/07/2017 14:36:58     [id number]  Job#: [id number]     Doc#: [id number]    CC:  Bri Mon MD

## 2017-11-08 LAB
AMMONIA: 19 UMOL/L (ref 11–51)
ANION GAP SERPL CALCULATED.3IONS-SCNC: 11 MEQ/L (ref 7–13)
BUN BLDV-MCNC: 93 MG/DL (ref 8–23)
CALCIUM SERPL-MCNC: 8.5 MG/DL (ref 8.6–10.2)
CHLORIDE BLD-SCNC: 112 MEQ/L (ref 98–107)
CO2: 22 MEQ/L (ref 22–29)
CREAT SERPL-MCNC: 1.63 MG/DL (ref 0.5–0.9)
FERRITIN: 680.1 NG/ML (ref 13–150)
FOLATE: 16.4 NG/ML (ref 7.3–26.1)
GFR AFRICAN AMERICAN: 36.8
GFR NON-AFRICAN AMERICAN: 30.4
GLUCOSE BLD-MCNC: 222 MG/DL (ref 74–109)
HCT VFR BLD CALC: 26.1 % (ref 37–47)
HEMOGLOBIN: 8.5 G/DL (ref 12–16)
INR BLD: 1.2
IRON SATURATION: 79 % (ref 11–46)
IRON: 93 UG/DL (ref 37–145)
MAGNESIUM: 2.9 MG/DL (ref 1.7–2.3)
MCH RBC QN AUTO: 30.4 PG (ref 27–31.3)
MCHC RBC AUTO-ENTMCNC: 32.5 % (ref 33–37)
MCV RBC AUTO: 93.3 FL (ref 82–100)
PDW BLD-RTO: 16.8 % (ref 11.5–14.5)
PHOSPHORUS: 3.1 MG/DL (ref 2.5–4.5)
PLATELET # BLD: 105 K/UL (ref 130–400)
POTASSIUM SERPL-SCNC: 4.4 MEQ/L (ref 3.5–5.1)
PROTHROMBIN TIME: 12.4 SEC (ref 8.1–13.7)
RBC # BLD: 2.8 M/UL (ref 4.2–5.4)
RETICULOCYTE ABSOLUTE COUNT: 0.06 M/CUMM (ref 0.02–0.11)
RETICULOCYTE COUNT PCT: 2 % (ref 0.6–2.2)
SODIUM BLD-SCNC: 145 MEQ/L (ref 132–144)
TOTAL IRON BINDING CAPACITY: 117 UG/DL (ref 178–450)
VANCOMYCIN RANDOM: 26 UG/ML (ref 5–40)
VITAMIN B-12: 664 PG/ML (ref 211–946)
WBC # BLD: 13.8 K/UL (ref 4.8–10.8)

## 2017-11-08 PROCEDURE — 94640 AIRWAY INHALATION TREATMENT: CPT

## 2017-11-08 PROCEDURE — 84100 ASSAY OF PHOSPHORUS: CPT

## 2017-11-08 PROCEDURE — 83540 ASSAY OF IRON: CPT

## 2017-11-08 PROCEDURE — 93010 ELECTROCARDIOGRAM REPORT: CPT | Performed by: INTERNAL MEDICINE

## 2017-11-08 PROCEDURE — 2580000003 HC RX 258: Performed by: ANESTHESIOLOGY

## 2017-11-08 PROCEDURE — 83550 IRON BINDING TEST: CPT

## 2017-11-08 PROCEDURE — 99213 OFFICE O/P EST LOW 20 MIN: CPT

## 2017-11-08 PROCEDURE — 80048 BASIC METABOLIC PNL TOTAL CA: CPT

## 2017-11-08 PROCEDURE — 82728 ASSAY OF FERRITIN: CPT

## 2017-11-08 PROCEDURE — 6370000000 HC RX 637 (ALT 250 FOR IP): Performed by: ANESTHESIOLOGY

## 2017-11-08 PROCEDURE — 99223 1ST HOSP IP/OBS HIGH 75: CPT | Performed by: INTERNAL MEDICINE

## 2017-11-08 PROCEDURE — 6360000002 HC RX W HCPCS: Performed by: ANESTHESIOLOGY

## 2017-11-08 PROCEDURE — 2580000003 HC RX 258: Performed by: INTERNAL MEDICINE

## 2017-11-08 PROCEDURE — 80202 ASSAY OF VANCOMYCIN: CPT

## 2017-11-08 PROCEDURE — 82140 ASSAY OF AMMONIA: CPT

## 2017-11-08 PROCEDURE — C9113 INJ PANTOPRAZOLE SODIUM, VIA: HCPCS | Performed by: ANESTHESIOLOGY

## 2017-11-08 PROCEDURE — 99291 CRITICAL CARE FIRST HOUR: CPT | Performed by: ANESTHESIOLOGY

## 2017-11-08 PROCEDURE — 6360000002 HC RX W HCPCS: Performed by: INTERNAL MEDICINE

## 2017-11-08 PROCEDURE — 2500000003 HC RX 250 WO HCPCS: Performed by: ANESTHESIOLOGY

## 2017-11-08 PROCEDURE — 85610 PROTHROMBIN TIME: CPT

## 2017-11-08 PROCEDURE — 82607 VITAMIN B-12: CPT

## 2017-11-08 PROCEDURE — 99233 SBSQ HOSP IP/OBS HIGH 50: CPT | Performed by: INTERNAL MEDICINE

## 2017-11-08 PROCEDURE — 2000000000 HC ICU R&B

## 2017-11-08 PROCEDURE — P9046 ALBUMIN (HUMAN), 25%, 20 ML: HCPCS | Performed by: INTERNAL MEDICINE

## 2017-11-08 PROCEDURE — 94660 CPAP INITIATION&MGMT: CPT

## 2017-11-08 PROCEDURE — 82746 ASSAY OF FOLIC ACID SERUM: CPT

## 2017-11-08 PROCEDURE — 2700000000 HC OXYGEN THERAPY PER DAY

## 2017-11-08 PROCEDURE — 85027 COMPLETE CBC AUTOMATED: CPT

## 2017-11-08 PROCEDURE — 83735 ASSAY OF MAGNESIUM: CPT

## 2017-11-08 PROCEDURE — 85046 RETICYTE/HGB CONCENTRATE: CPT

## 2017-11-08 RX ORDER — ALBUMIN (HUMAN) 12.5 G/50ML
25 SOLUTION INTRAVENOUS ONCE
Status: COMPLETED | OUTPATIENT
Start: 2017-11-08 | End: 2017-11-08

## 2017-11-08 RX ORDER — DIGOXIN 0.25 MG/ML
250 INJECTION INTRAMUSCULAR; INTRAVENOUS EVERY 4 HOURS
Status: COMPLETED | OUTPATIENT
Start: 2017-11-08 | End: 2017-11-08

## 2017-11-08 RX ADMIN — IPRATROPIUM BROMIDE AND ALBUTEROL SULFATE 1 AMPULE: 2.5; .5 SOLUTION RESPIRATORY (INHALATION) at 21:10

## 2017-11-08 RX ADMIN — ALBUMIN (HUMAN) 25 G: 0.25 INJECTION, SOLUTION INTRAVENOUS at 10:03

## 2017-11-08 RX ADMIN — HYDROCORTISONE SODIUM SUCCINATE 50 MG: 100 INJECTION, POWDER, FOR SOLUTION INTRAMUSCULAR; INTRAVENOUS at 08:00

## 2017-11-08 RX ADMIN — DEXTROSE AND SODIUM CHLORIDE: 5; 900 INJECTION, SOLUTION INTRAVENOUS at 03:06

## 2017-11-08 RX ADMIN — IPRATROPIUM BROMIDE AND ALBUTEROL SULFATE 1 AMPULE: 2.5; .5 SOLUTION RESPIRATORY (INHALATION) at 04:32

## 2017-11-08 RX ADMIN — IPRATROPIUM BROMIDE AND ALBUTEROL SULFATE 1 AMPULE: 2.5; .5 SOLUTION RESPIRATORY (INHALATION) at 18:01

## 2017-11-08 RX ADMIN — IPRATROPIUM BROMIDE AND ALBUTEROL SULFATE 1 AMPULE: 2.5; .5 SOLUTION RESPIRATORY (INHALATION) at 13:03

## 2017-11-08 RX ADMIN — MEROPENEM 1 G: 1 INJECTION, POWDER, FOR SOLUTION INTRAVENOUS at 17:07

## 2017-11-08 RX ADMIN — PANTOPRAZOLE SODIUM 40 MG: 40 INJECTION, POWDER, FOR SOLUTION INTRAVENOUS at 07:58

## 2017-11-08 RX ADMIN — Medication 10 ML: at 21:30

## 2017-11-08 RX ADMIN — Medication 10 ML: at 08:01

## 2017-11-08 RX ADMIN — PANTOPRAZOLE SODIUM 40 MG: 40 INJECTION, POWDER, FOR SOLUTION INTRAVENOUS at 21:30

## 2017-11-08 RX ADMIN — HYDROCORTISONE SODIUM SUCCINATE 50 MG: 100 INJECTION, POWDER, FOR SOLUTION INTRAMUSCULAR; INTRAVENOUS at 18:26

## 2017-11-08 RX ADMIN — SODIUM CHLORIDE, PRESERVATIVE FREE 10 ML: 5 INJECTION INTRAVENOUS at 07:58

## 2017-11-08 RX ADMIN — DEXTROSE AND SODIUM CHLORIDE: 5; 900 INJECTION, SOLUTION INTRAVENOUS at 16:39

## 2017-11-08 RX ADMIN — DIGOXIN 250 MCG: 250 INJECTION, SOLUTION INTRAMUSCULAR; INTRAVENOUS; PARENTERAL at 10:02

## 2017-11-08 RX ADMIN — ALBUMIN (HUMAN) 25 G: 0.25 INJECTION, SOLUTION INTRAVENOUS at 12:01

## 2017-11-08 RX ADMIN — HYDROCORTISONE SODIUM SUCCINATE 50 MG: 100 INJECTION, POWDER, FOR SOLUTION INTRAMUSCULAR; INTRAVENOUS at 03:07

## 2017-11-08 RX ADMIN — HYDROCORTISONE SODIUM SUCCINATE 50 MG: 100 INJECTION, POWDER, FOR SOLUTION INTRAMUSCULAR; INTRAVENOUS at 14:51

## 2017-11-08 RX ADMIN — MEROPENEM 1 G: 1 INJECTION, POWDER, FOR SOLUTION INTRAVENOUS at 06:29

## 2017-11-08 RX ADMIN — DIGOXIN 250 MCG: 250 INJECTION, SOLUTION INTRAMUSCULAR; INTRAVENOUS; PARENTERAL at 14:50

## 2017-11-08 RX ADMIN — Medication 8 MCG/MIN: at 10:02

## 2017-11-08 ASSESSMENT — PAIN SCALES - WONG BAKER

## 2017-11-08 ASSESSMENT — PAIN SCALES - GENERAL
PAINLEVEL_OUTOF10: 0
PAINLEVEL_OUTOF10: 0
PAINLEVEL_OUTOF10: 2

## 2017-11-08 NOTE — PROGRESS NOTES
Daily    hydrocortisone sodium succinate PF  50 mg Intravenous Q6H    ipratropium-albuterol  1 ampule Inhalation 4x daily    meropenem  1 g Intravenous Q12H     Continuous Infusions:   norepinephrine 6 mcg/min (11/08/17 1153)    dextrose 5 % and 0.9 % NaCl 75 mL/hr at 11/08/17 0306       CBC:   Recent Labs      11/07/17   1900  11/08/17   0600   WBC  13.9*  13.8*   HGB  9.0*  8.5*   PLT  99*  105*     CMP:  Recent Labs      11/06/17   2105  11/07/17   0300  11/08/17   0600   NA  143  143  145*   K  4.6  4.7  4.4   CL  108*  108*  112*   CO2  27  26  22   BUN  89*  95*  93*   CREATININE  1.89*  1.88*  1.63*   GLUCOSE  78  83  222*   CALCIUM  8.8  8.9  8.5*   LABGLOM  25.6*  25.8*  30.4*     Troponin: No results for input(s): TROPONINI in the last 72 hours. BNP: No results for input(s): BNP in the last 72 hours. INR:   Recent Labs      11/08/17   0600   INR  1.2     Lipids: No results for input(s): CHOL, LDLDIRECT, TRIG, HDL, AMYLASE, LIPASE in the last 72 hours. Liver: Recent Labs      11/07/17   0300   AST  15   ALT  <5   ALKPHOS  101   PROT  4.4*   LABALBU  1.7*   BILITOT  0.4     Iron:    Recent Labs      11/08/17   0600   FERRITIN  680.1*     Urinalysis: No results for input(s): UA in the last 72 hours. Objective:   Vitals: BP (!) 132/34   Pulse 82   Temp 97.4 °F (36.3 °C) (Axillary)   Resp 20   Ht 5' 6\" (1.676 m) Comment: 10/2017  Wt 220 lb 7.4 oz (100 kg)   SpO2 100%   BMI 35.58 kg/m²    Wt Readings from Last 3 Encounters:   11/08/17 220 lb 7.4 oz (100 kg)   11/04/17 218 lb (98.9 kg)   10/31/17 218 lb (98.9 kg)      24HR INTAKE/OUTPUT:    Intake/Output Summary (Last 24 hours) at 11/08/17 1227  Last data filed at 11/08/17 9469   Gross per 24 hour   Intake             3492 ml   Output              490 ml   Net             3002 ml       Constitutional:  Ill appearing  Skin:normal, no rash  HEENT:sclera anicteric.   Head atraumatic normocephalic  Neck:supple with no thyromegally  Cardiovascular: S1, S2 without m/r/g   Respiratory:  CTA B without w/r/r   Abdomen: +bs, soft, nt  Ext: 2+ LE edema  Musculoskeletal:Intact  Neuro: lethargic      Electronically signed by Filipe Kraus MD on 11/8/2017 at 12:27 PM

## 2017-11-08 NOTE — PROGRESS NOTES
[facility] OF [facility] Heart Progress Note      Patient: Srini Leaver  Unit/Bed: VW88/GR22-17  YOB: 1940  MRN: [id number]  Admit Date:  11/7/2017  Hospital Day: 1    Subjective Complaint:   Pt is barely responsive. Physical Examination:     BP (!) 115/31   Pulse 89   Temp 97.4 °F (36.3 °C) (Axillary)   Resp 13   Ht 5' 6\" (1.676 m) Comment: 10/2017  Wt 220 lb 7.4 oz (100 kg)   SpO2 100%   BMI 35.58 kg/m²       Intake/Output Summary (Last 24 hours) at 11/08/17 0938  Last data filed at 11/08/17 [id number]   Gross per 24 hour   Intake             3492 ml   Output              490 ml   Net             3002 ml                  Srini Leaver examined at bedside in severely ill and cachectic. Focused exam reveals:     Cardiac: Heart irregular rate and rhythm     Lungs:  decreased breath sounds noted-     Extremities:   3+ edema    Telemetry:      atrial fibrillation - controlled         LABS:   CBC:   Recent Labs      11/07/17   0327  11/07/17   1900  11/08/17   0600   WBC  11.4*  13.9*  13.8*   HGB  8.0*  9.0*  8.5*   PLT  99*  99*  105*      BMP:  Recent Labs      11/06/17   2105  11/07/17   0300  11/08/17   0600   NA  143  143  145*   K  4.6  4.7  4.4   CL  108*  108*  112*   CO2  27  26  22   BUN  89*  95*  93*   CREATININE  1.89*  1.88*  1.63*   GLUCOSE  78  83  222*              Troponin: No results for input(s): TROPONINT in the last 72 hours. BNP: No results for input(s): PROBNP in the last 72 hours. INR:   Recent Labs      11/08/17   0600   INR  1.2      Mg:   Recent Labs      11/08/17   0600   MG  2.9*       Cardiac Testing:    none    Assessment:    Critically ill pt with multiple medical problems, not the least of which is metastatic CA  Severe cachexia    Plan:  1. Agree with ethics consult---they are not feeding pt even with ng  2. Albumin  3.  Ok to transfer when off inotropic support  Electronically signed by Jona Muniz MD on 11/8/2017 at 9:38 AM

## 2017-11-08 NOTE — CONSULTS
Consults   Pulmonary Medicine  Consult Note      Reason for consultation: respiratory failure on BIPAP    Consulting physician: dr. Prabhakar Ardon:      This is 64-year-old female who was presented to ER with GI bleed,  She has chronic DVT in the right upper extremity. Patient was on a Eliquis before to coming in ER. When patient came to the she was hypotensive , she has melenic stool and anemia. For hypotension patient was started initially on dopamine and now she is on Levophed. Patient is profoundly edematous, she is currently on a BiPAP therapy with full face mask and she is very sleepy. Pulmonary consult was done regarding respiratory failure requiring BiPAP therapy. Patient is DNR CCA and no intubation. Patient had a last ABG done yesterday which shows PH is 7.239 PCO2 56 PO2 155 saturation 99 bicarb 23.8 currently patient is on a BiPAP with IPAP of 12 EPAP of 6 with FiO2 40%. .  She is afebrile. She does not appear to be in any acute distress with her BiPAP use. She received 1 unit  PRBC and last hemoglobin is 8.5. Past Medical History:        Diagnosis Date    CAD (coronary artery disease)     [facility] ([facility] [address]     Cerebral artery occlusion with cerebral infarction ([facility] [address]     multiple TIAs in past    CHF (congestive heart failure) (HCC)     Chronic kidney disease (CKD)     COPD (chronic obstructive pulmonary disease) (HCC)     Diabetes mellitus ([address]     Diastolic dysfunction     Endometrial cancer (HCC)     History of Clostridium difficile     History of TIAs     Lung disease     Pneumonia     Thyroid disease        Past Surgical History:        Procedure Laterality Date    APPENDECTOMY      CHOLECYSTECTOMY      ENDOMETRIAL BIOPSY      INCISION AND DRAINAGE Left 10/30/2017    EXCISIONAL DEBRIDEMENT OF NECROTIC WOUNDS ON SACRAL AREA AND LEFT FOREARM performed by Stanton Khan MD at [facility] [address] History:     reports that she has quit smoking.  She has

## 2017-11-08 NOTE — PROGRESS NOTES
LABGLOM 30.4 11/08/2017    GLUCOSE 222 11/08/2017    CALCIUM 8.5 11/08/2017       Hepatic Function Panel:  Lab Results   Component Value Date    ALKPHOS 101 11/07/2017    ALT <5 11/07/2017    AST 15 11/07/2017    PROT 4.4 11/07/2017    BILITOT 0.4 11/07/2017    LABALBU 1.7 11/07/2017       Magnesium:    Lab Results   Component Value Date    MG 2.9 11/08/2017       PT/INR:    Lab Results   Component Value Date    PROTIME 12.4 11/08/2017    PROTIME 26.9 10/23/2017    INR 1.2 11/08/2017          Electronically signed by Mrytle Smyth NP on 11/8/2017 at 9:06 AM

## 2017-11-08 NOTE — CONSULTS
108 mm Hg    HCO3, Arterial 23.8 21.0 - 29.0 mmol/L    Base Excess, Arterial -4 (L) -3 - 3    O2 Sat, Arterial 99 (HH) 93 - 100 %    TCO2, Arterial 26 22 - 29    Lactate 0.38 (L) 0.40 - 2.00 mmol/L    Sample Type ART     Performed on SEE BELOW    CBC    Collection Time: 11/07/17  7:00 PM   Result Value Ref Range    WBC 13.9 (H) 4.8 - 10.8 K/uL    RBC 3.00 (L) 4.20 - 5.40 M/uL    Hemoglobin 9.0 (L) 12.0 - 16.0 g/dL    Hematocrit 28.3 (L) 37.0 - 47.0 %    MCV 94.3 82.0 - 100.0 fL    MCH 30.0 27.0 - 31.3 pg    MCHC 31.8 (L) 33.0 - 37.0 %    RDW 16.6 (H) 11.5 - 14.5 %    Platelets 99 (L) 701 - 400 K/uL   Vancomycin, Trough    Collection Time: 11/07/17  8:50 PM   Result Value Ref Range    Vancomycin Tr 26.0 (H) 5.0 - 10.0 ug/mL     Recent Labs      11/07/17   0300   GLUCOSE  83        RADIOLOGY RESULTS:  Ir Willem Contreras Cva Device Placement    Result Date: 10/23/2017  EXAMINATION: IR PICC EQUAL OR GREATER THAN 5 YEARS, IR ULTRASOUND GUIDANCE VASCULAR ACCESS, IR FLUOROSCOPY GUIDED CENTRAL VENOUS ACCESS DEVICE PLACEMENT CLINICAL HISTORY:  low hemoglobin . Long-term venous access needed for medical therapy. Transfusion therapy. COMPARISONS: None available. FINDINGS: Right UPPER EXTREMITY PICC INSERTION Complete, routine aseptic technique, including a sterile barrier, including participating providers wearing caps, masks, sterile gowns and sterile gloves was used. The treatment area underwent isolation with a sterile barrier following skin preparation. Using sonographic and fluoroscopic guidance, following appropriate preparation and local anesthesia, with micropuncture technique, the right basilic vein was accessed and soft tissue track developed, through which an insertion cannula was installed, through which a 5-[place] dual lumen PICC was placed. The tip of the catheter was placed in the superior vena cava. Single view, saved fluoroscopic image documentation of the final placement was performed.   Sonographic image was recorded for the permanent record at the access site. The catheter was affixed to the skin in the usual fashion and the site dressed. There were no significant complications at the time of the procedure. Standard follow-up will have been performed. CONCLUSION SUCCESSFUL UNCOMPLICATED right UPPER EXTREMITY PICC INSTALLATION. X-RAY DOSE SUMMARY:    1 FLUOROSCOPIC IMAGES SAVED TO PACS. 0SPOT FILM WAS EXPOSED. 60.6SECOND FLUOROSCOPY TIME. 6.51MGY CUMULATIVE X-RAY DOSE. Xr Chest Portable    Result Date: 10/27/2017  PORTABLE CHEST: 10/27/2017 CLINICAL HISTORY:  follow up possible aspiration pneumonia . COMPARISON: 10/25/2017. A portable upright AP radiograph of the chest was obtained. FINDINGS: Infiltrate/consolidation of the mid to basilar left lung, with a small left pleural effusion appears unchanged. Probable mild right basilar atelectasis appears improved. There is no significant right pleural effusion, vascular congestion, pneumothorax, or displaced fractures identified. The right upper extremity PICC catheter is again noted     INFILTRATE/CONSOLIDATION OF THE MID TO BASILAR LEFT LUNG WITH A SMALL PLEURAL EFFUSION. NO SIGNIFICANT CHANGE FROM 10/25/2017    Xr Chest Portable    Result Date: 10/25/2017  EXAMINATION: XR CHEST PORTABLE REASON FOR EXAM: Shortness of breath. FINDINGS: Frontal view of the chest reveals inspiration somewhat shallow related to portable technique and positioning. There is a PICC catheter now in place in satisfactory position since previous studies. Opacification at the left base related to volume loss and pleural thickening is present. Increase infiltrate in the left perihilar region more prominent from previous studies and developing pneumonia superimposed on chronic disease should BE considered. Central pulmonary vascular congestion is present. Scarring in the right mid and lower lung field remain unchanged. MILD CENTRAL VASCULAR CONGESTION.  ACUTE PNEUMONIA SUPERIMPOSED ON CHRONIC DISEASE LEFT BASE. Ir Picc Wo Sq Port/pump > 5 Years    Result Date: 10/23/2017  EXAMINATION: IR PICC EQUAL OR GREATER THAN 5 YEARS, IR ULTRASOUND GUIDANCE VASCULAR ACCESS, IR FLUOROSCOPY GUIDED CENTRAL VENOUS ACCESS DEVICE PLACEMENT CLINICAL HISTORY:  low hemoglobin . Long-term venous access needed for medical therapy. Transfusion therapy. COMPARISONS: None available. FINDINGS: Right UPPER EXTREMITY PICC INSERTION Complete, routine aseptic technique, including a sterile barrier, including participating providers wearing caps, masks, sterile gowns and sterile gloves was used. The treatment area underwent isolation with a sterile barrier following skin preparation. Using sonographic and fluoroscopic guidance, following appropriate preparation and local anesthesia, with micropuncture technique, the right basilic vein was accessed and soft tissue track developed, through which an insertion cannula was installed, through which a 5-Upper sor[place] dual lumen PICC was placed. The tip of the catheter was placed in the superior vena cava. Single view, saved fluoroscopic image documentation of the final placement was performed. Sonographic image was recorded for the permanent record at the access site. The catheter was affixed to the skin in the usual fashion and the site dressed. There were no significant complications at the time of the procedure. Standard follow-up will have been performed. CONCLUSION SUCCESSFUL UNCOMPLICATED right UPPER EXTREMITY PICC INSTALLATION. X-RAY DOSE SUMMARY:    1 FLUOROSCOPIC IMAGES SAVED TO PACS. 0SPOT FILM WAS EXPOSED. 60.6SECOND FLUOROSCOPY TIME. 6.51MGY CUMULATIVE X-RAY DOSE. Us Upper Extremity Right Venous Duplex    Result Date: 11/4/2017  US UPPER EXTREMITY RIGHT VENOUS DUPLEX : 11/4/2017 CLINICAL HISTORY: Right upper extremity swelling and right upper extremity PICC catheter placed 10/23/2017. COMPARISON: None available.  TECHNIQUE: Grayscale, color and waveform Doppler analysis of the right upper extremity was performed. FINDINGS: The study is limited by the patient's cooperation, the study was incomplete. Recent-appearing occlusive thrombus is present around a PICC catheter within the right brachial and axillary veins, which appears to extend into the subclavian vein. No thrombus is identified within the visualized jugular vein. RECENT-APPEARING OCCLUSIVE VENOUS THROMBUS AROUND A PICC CATHETER WITHIN THE RIGHT BRACHIAL, AXILLARY AND SUBCLAVIAN VEINS. Us Upper Extremity Right Venous Duplex    Result Date: 10/31/2017  EXAMINATION: US UPPER EXTREMITY RIGHT VENOUS DUPLEX CLINICAL HISTORY:  ARM DVT SUSPECTED . COMPARISONS: None available. FINDINGS: Right upper extremity venous duplex sonogram was performed. There is a well depicted PICC within the patent right brachial vein, extending into the right axillary vein and right subclavian vein. There is abundant soft tissue swelling in the forearm. The right radial vein and the forearm is patent. The right cephalic vein is patent. The right basilic vein was never displayed. The ulnar vein in the forearm was not displayed. CONCLUSION: PATENT RIGHT BRACHIAL VEIN, RIGHT AXILLARY VEIN AND RIGHT SUBCLAVIAN VEIN CONTAINING AN UNREMARKABLE PICC. THERE IS ABUNDANT GENERALIZED SOFT TISSUE SWELLING, ESPECIALLY IN THE FORM    Us Upper Extremity Left Venous Duplex    Result Date: 10/26/2017  LEFT UPPER EXTREMITY DEEP VENOUS DUPLEX SONOGRAM: 10/25/2017 CLINICAL HISTORY: Left upper extremity swelling. COMPARISON: None available. Grayscale, compression, color and waveform Doppler analysis of the left upper extremity venous system was performed with augmentation. FINDINGS: There is no deep venous thrombosis, abnormal masses, fluid collections or other findings of concern identified within the left upper extremity. The superficial basilic and cephalic veins are patent. NO LEFT UPPER EXTREMITY DEEP VENOUS THROMBOSIS IDENTIFIED.      Ir Ultrasound Guidance Vascular Access    Result Date: 10/23/2017  EXAMINATION: IR PICC EQUAL OR GREATER THAN 5 YEARS, IR ULTRASOUND GUIDANCE VASCULAR ACCESS, IR FLUOROSCOPY GUIDED CENTRAL VENOUS ACCESS DEVICE PLACEMENT CLINICAL HISTORY:  low hemoglobin . Long-term venous access needed for medical therapy. Transfusion therapy. COMPARISONS: None available. FINDINGS: Right UPPER EXTREMITY PICC INSERTION Complete, routine aseptic technique, including a sterile barrier, including participating providers wearing caps, masks, sterile gowns and sterile gloves was used. The treatment area underwent isolation with a sterile barrier following skin preparation. Using sonographic and fluoroscopic guidance, following appropriate preparation and local anesthesia, with micropuncture technique, the right basilic vein was accessed and soft tissue track developed, through which an insertion cannula was installed, through which a 5-[place] dual lumen PICC was placed. The tip of the catheter was placed in the superior vena cava. Single view, saved fluoroscopic image documentation of the final placement was performed. Sonographic image was recorded for the permanent record at the access site. The catheter was affixed to the skin in the usual fashion and the site dressed. There were no significant complications at the time of the procedure. Standard follow-up will have been performed. CONCLUSION SUCCESSFUL UNCOMPLICATED right UPPER EXTREMITY PICC INSTALLATION. X-RAY DOSE SUMMARY:    1 FLUOROSCOPIC IMAGES SAVED TO PACS. 0SPOT FILM WAS EXPOSED. 60.6SECOND FLUOROSCOPY TIME. 6.51MGY CUMULATIVE X-RAY DOSE. Us Retroperitoneal Limited    Result Date: 10/26/2017  US RETROPERITONEAL LIMITED : 10/25/2017 CLINICAL HISTORY: RENAL FAILURE, ACUTE (KIDNEY INJURY) . COMPARISON: None available. TECHNIQUE: Transabdominal ultrasound kidneys was performed. FINDINGS: There is sclerosis, abnormal perinephric collections, visualized nephrolithiasis or solid renal masses.  Both kidneys are normal in length with mild to moderate diffuse cortical thinning and borderline increased cortical echogenicity. The right kidney measures approximately 12.1 cm in length. The left kidney approximately 10.2 cm. The average renal cortical thickness is approximately 7 mm. A small simple cyst is noted within the lower pole of the left kidney, measuring up to 2 cm. NO EVIDENCE OF URINARY TRACT OBSTRUCTION. MILD RENAL ATROPHY. 2 CM LOWER POLE LEFT RENAL CYST. Ir Removal Of Nontunneled Vascular Cath    Result Date: 10/30/2017  NO FILMS NO DICTATION    ASSESSMENT AND PLAN:    1. The pt has acute on chronic anemia which is multifactorial  due to anemia related to CKD. She also has anemia due to occult GI blood loss. No evidence of malignancy on recent BM asp & Bx. Possibility of myelodysplastic syndrome could not be fully ruled/out on recent BM asp & Bx. Results of BM cytogenetics will be checked. Monitor CBC; consider RBC transfusion if pt's anemia decreases further. The pt's anticoagulant therapy may be contributing to the pt's anemia. B12 and folate levels as well as Fe, TIBC and ferritin levels , and reticulocyte count will also be ordered. 2. Thrombocytopenia may be due to pt's recent  infection as well as antibiotic effect r/o early MDS on recent BM asp & Bx  3. CKD  4. BRIGIDA  5.  Hx of endometrial CA      Electronically signed by Boris Wood MD on 11/7/2017 at 10:57 PM

## 2017-11-08 NOTE — PROGRESS NOTES
Critical Care Staff-JESSI  [id number]  Juan F Cardoso  11/8/2017    ASSESSMENT    Septic shock with MSOF. Patients MPOA states that the patient would not want a feeding tube, intubation, or CPR but wants to continue current supportive measures. PLAN    Wean levophed as tolerates  Fluids   Hydrocortisone as is  Abx as per ID  Ethics consult      REASON FOR ICU ADMISSION    77 yo with MMP brought to ED due to concerns for increase lethargic and generalized malaise noted to be hypotensive and anemic in the ED with concern for a GI bleed as ED note states she had melanotic stools. She is in the ICU for further care. TODAY'S EVENTS    She is clinically unchanged as she remains encephalopathic on levophed despite abx and steroids. Her Hg is stable at 8.5 s/p transfusion with one unit of PRBCs. Her Cr is a bit better at 1.63 as she is positive 3 litres since admission. Patient seen and examined with the ICU team and RN. I personally participated in the key components. I have discussed the case and management of the patient's care with the RN and other physicians involved with the care of the patient    I personally spent 30 minutes of critical care time involved in the care of this patient. This care required my full attention and direct personal management. CRITICAL CARE: 1. Septic shock 2. Anemia 2/2 abl 3.  UGI bleed 5 Metabolic encephalopathy      Restraints n/a  Sedation interruption n/a  Urinary catheter needed for accurate I's and O's and critical care management  Central access needed due to poor PIV access    Please see chart for further labs, medications and/or reports    Electronically signed by Dawson Austin MD on 11/8/2017 at 1:16 PM

## 2017-11-08 NOTE — PROGRESS NOTES
INPATIENT PROGRESS NOTE    SERVICE DATE:  11/8/2017   SERVICE TIME:  7:41 am     ASSESSMENT/PLAN: 38-year-old female with complicated medical history, recently admitted with abscesses of forearm and sacrococcyx area, patient has COPD, admitted for hypotension, and acute on chronic anemia related to blood loss. chronic heart failure, history of DVT, chronic anemia. Admitted with hypotension and acute on chronic anemia rule out GI bleed.     Septic Shock: improving  - Continues on pressor support, wean as tolerated  - Cardio consulted. Per NP and Dr. Leonardo Galarza, Persistent hypotension requiring pressor support, exacerbated by low oncotic pressure as evidenced by low albumin. Family at this point has not consented to tube feeding, may need to at least consider TPN in the interim until final decision is made. Give Albumin. Ok to transfer when off inotropic support. Cristhian further recs. - Nephrology consulted. Per Dr. Salima Martinez, ckd stage 3 due to diabetic nephropathy.  She has multifactorial anemia, low bp, samm, fluid overload.  Acid base status and lytes are ok. She has severe protein calorie malnutrition with alb of 1.7 making the edema harder to deal with. On pressors, agree with ivf for low bp / shock, aranesp for anemia, poor prognosis long term. Cristhian further recs.      Acute on chronic anemia: Occult stool was positive for blood  - Recheck CBC  - Typed and Screened  - Transfused 1 units PRBC's for hgb drop below 8 in a CAD pt  - DC'd anticoagulation   - Hem/Onc consulted. Per Dr. Luisito Freedman, The pt has acute on chronic anemia which is multifactorial  due to anemia related to CKD. She also has anemia due to occult GI blood loss. No evidence of malignancy on recent BM asp & Bx. Possibility of myelodysplastic syndrome could not be fully ruled/out on recent BM asp & Bx. Results of BM cytogenetics will be checked. Monitor CBC; consider RBC transfusion if pt's anemia decreases further.  The pt's anticoagulant therapy may be vancomycin and Merrem. Continue BiPAP therapy, if saturation 93% or above. Cristhian further recs.      CHF  COPD     Dispo: Ethics consult placed per intensivist. Palliative care consulted. Family refusing hospice at this point. Await consultant eval and clearance prior to transfer out of ICU and ultimately discharge. Advised early F/U with PCP once discharged.      SUBJECTIVE  CHIEF COMPLAINT: Hypotension, acute on chronic anemia    PRIMARY SERVICE: Internal medicine    INTERVAL HPI:  Patient remains lethargic, but she can be alerted. She had respiratory failure with acidosis and placed on BiPAP.     MEDICATIONS:    Current Facility-Administered Medications   Medication Dose Route Frequency Provider Last Rate Last Dose    sodium chloride flush 0.9 % injection 10 mL  10 mL Intravenous 2 times per day Janae Noe MD   10 mL at 11/07/17 2044    sodium chloride flush 0.9 % injection 10 mL  10 mL Intravenous PRN Janae Noe MD        acetaminophen (TYLENOL) tablet 650 mg  650 mg Oral Q4H PRN Janae Noe MD        0.9 % sodium chloride bolus  250 mL Intravenous Once Jordan Muller PA-C        norepinephrine (LEVOPHED) 16 mg in dextrose 5% 250 mL infusion  2 mcg/min Intravenous Continuous Karmen Novak MD 9.4 mL/hr at 11/07/17 0938 10 mcg/min at 11/07/17 0938    vancomycin (VANCOCIN) 1000 mg in dextrose 5% 200 mL IVPB  1,000 mg Intravenous Q24H Karmen Novak MD        darbepoetin stephanie-polysorbate [facility]) injection 40 mcg  40 mcg Subcutaneous Weekly Duarte Bah MD   40 mcg at 11/07/17 2043    pantoprazole (PROTONIX) injection 40 mg  40 mg Intravenous BID Karmen Novak MD   40 mg at 11/07/17 2044    And    sodium chloride (PF) 0.9 % injection 10 mL  10 mL Intravenous Daily Karmen Novak MD        hydrocortisone sodium succinate PF (SOLU-CORTEF) injection 50 mg  50 mg Intravenous Q6H Karmen Novak MD   50 mg at 11/08/17 0307    dextrose 5 % and 0.9 % sodium chloride infusion   Intravenous Continuous Karmen Novak MD 75 mL/hr at 11/08/17 0306      ipratropium-albuterol (DUONEB) nebulizer solution 1 ampule  1 ampule Inhalation 4x daily Erma Sánchez MD   1 ampule at 11/08/17 0432    meropenem (MERREM) 1 g in sterile water 20 mL IV syringe  1 g Intravenous Q12H Isaiah Michaels MD   1 g at 11/08/17 0629       OBJECTIVE  PHYSICAL EXAM:   BP (!) 117/42   Pulse 91   Temp 97.3 °F (36.3 °C) (Axillary)   Resp 14   Ht 5' 6\" (1.676 m) Comment: 10/2017  Wt 220 lb 7.4 oz (100 kg)   SpO2 100%   BMI 35.58 kg/m²   Body mass index is 35.58 kg/m². CONSTITUTIONAL:  Lethargic but patient does open her eyes but not answering questions  LUNGS:  No increased work of breathing, patient noted to have bilateral rhonchi, wearing BiPAP  CARDIOVASCULAR: Irregular, irregular, rate controlled  ABDOMEN:  No scars, normal bowel sounds, soft, non-distended, non-tender, no masses palpated, no hepatosplenomegally  EXTREMITIES: Edema bilateral upper and lower extremities, no cyanosis, no clubbing    DATA:   Diagnostic tests reviewed for today's visit:    Most recent labs and imaging results reviewed. SIGNATURE: Juan Diego Cruz PA-C PATIENT NAME: Nola Solares   DATE: November 8, 2017 MRN: [id number]   TIME: 7:42 AM PAGER: [phone]     I have independently seen and examined this patient. I have reviewed and agree with the above documentation, assessment and plan.      Drew Moralez MD  9:57 AM  November 8, 2017

## 2017-11-08 NOTE — PROGRESS NOTES
Wound Ostomy Continence Nurse  Consult Note       NAME:  Chivo Patel  MEDICAL RECORD NUMBER:  [id number]  AGE: 76 y.o. GENDER: female  : 1940  TODAY'S DATE:  2017    Subjective   [facility] [id number] [facility]: Sacrococcygeal pressure injury; Right heel pressure injury      Chivo Patel is a 76 y.o. female referred by:   [x] Physician  [] Nursing  [] Other:     Wound Identification:  Wound Type: pressure  Contributing Factors: chronic pressure, decreased mobility, shear force, obesity, malnutrition and incontinence of stool    Wound History: Patient known to this [id number] [facility] Nurse, patient recently seen 1 week ago for sacrococcygeal wound which was debrided by Dr. Kim Rainey last week. Patient returns and wound still present. Also, during this skin assessment, patient noted to have area of Deep tissue pressure injury. Current Wound Care Treatment:  Recommendin) pressure injury prevention interventions 2) Moist to dry dressing change BID 3) consultation to Dr. Kim Raieny 4) Liquid skin barrier wipe to right heel BID 5) keep bilateral heels offloaded at all times. Patient Goal of Care: unsure of how aggressive family wants care at this time, but will treat wounds to heal until otherwise advised.   [x] Wound Healing  [] Odor Control  [] Palliative Care  [] Pain Control   [] Other:         PAST MEDICAL HISTORY        Diagnosis Date    CAD (coronary artery disease)     [facility]     Cerebral artery occlusion with cerebral infarction ([facility] [facility]     multiple TIAs in past    CHF (congestive heart failure) (HCC)     Chronic kidney disease (CKD)     COPD (chronic obstructive pulmonary disease) (HCC)     Diabetes mellitus (HCC)     Diastolic dysfunction     Endometrial cancer (HCC)     History of Clostridium difficile     History of TIAs     Lung disease     Pneumonia     Thyroid disease        PAST SURGICAL HISTORY    Past Surgical History:   Procedure Laterality Date    APPENDECTOMY      CHOLECYSTECTOMY      ENDOMETRIAL BIOPSY      INCISION AND DRAINAGE Left 10/30/2017    EXCISIONAL DEBRIDEMENT OF NECROTIC WOUNDS ON SACRAL AREA AND LEFT FOREARM performed by Antonia Oliveira MD at [address] [place] [address] HISTORY    Family History   Problem Relation Age of Onset    Cancer Mother     Cancer Father        SOCIAL HISTORY    Social History   Substance Use Topics    Smoking status: Former Smoker    Smokeless tobacco: Never Used    Alcohol use No       ALLERGIES    No Known Allergies    MEDICATIONS    No current facility-administered medications on file prior to encounter. Current Outpatient Prescriptions on File Prior to Encounter   Medication Sig Dispense Refill    apixaban (ELIQUIS) 5 MG TABS tablet Take 2 tablets by mouth 2 times daily Take 10 mg every 12 hours for 7 days, then 5 mg twice daily 60 tablet 0    metoprolol succinate (TOPROL XL) 50 MG extended release tablet Take 1 tablet by mouth daily 30 tablet 5    vancomycin (VANCOCIN) infusion Infuse 1,000 mg intravenously every 24 hours Compound per protocol.  meropenem (MERREM) infusion Infuse 1,000 mg intravenously every 12 hours Compound per protocol. 1 each 0    LORazepam (ATIVAN) 0.5 MG tablet Take 0.5 mg by mouth every 4 hours as needed for Anxiety      bisacodyl (DULCOLAX) 10 MG suppository Place 10 mg rectally daily as needed for Constipation      Sodium Phosphates (FLEET) 7-19 GM/118ML Place 1 enema rectally daily as needed      haloperidol (HALDOL) 0.5 MG tablet Take 0.5 mg by mouth every 4 hours as needed      hyoscyamine (LEVSIN) 125 MCG/ML solution Take by mouth every 4 hours as needed      magnesium hydroxide (MILK OF MAGNESIA CONCENTRATE) 2400 MG/10ML SUSP Take 30 mLs by mouth daily as needed      morphine 10 MG/5ML solution Take by mouth every 2 hours as needed for Pain .  HYDROcodone-acetaminophen (NORCO) 5-325 MG per tablet Take 1 tablet by mouth every 6 hours as needed for Pain .       nystatin (MYCOSTATIN) 691267 UNIT/ML suspension Take 500,000 Units by mouth 4 times daily      promethazine (PHENERGAN) 25 MG tablet Take 25 mg by mouth every 4 hours as needed for Nausea      sennosides-docusate sodium (SENOKOT-S) 8.6-50 MG tablet Take 1 tablet by mouth 2 times daily      albuterol (PROVENTIL) (2.5 MG/3ML) 0.083% nebulizer solution Take 3 mLs by nebulization every 2 hours as needed for Wheezing 120 each 3    ipratropium-albuterol (DUONEB) 0.5-2.5 (3) MG/3ML SOLN nebulizer solution Inhale 3 mLs into the lungs 4 times daily 360 mL     gabapentin (NEURONTIN) 400 MG capsule Take 1 capsule by mouth 2 times daily 90 capsule 3    furosemide (LASIX) 20 MG tablet Take 20 mg by mouth daily      levothyroxine (SYNTHROID) 200 MCG tablet Take 200 mcg by mouth Daily      hypromellose (ARTIFICIAL TEARS) 0.4 % SOLN ophthalmic solution Place 2 drops into both eyes 2 times daily      atorvastatin (LIPITOR) 40 MG tablet Take 40 mg by mouth nightly      ferrous sulfate 325 (65 Fe) MG tablet Take 325 mg by mouth daily (with breakfast)      oxybutynin (DITROPAN) 5 MG tablet Take 5 mg by mouth nightly      diclofenac sodium 1 % GEL Apply 2 g topically 2 times daily as needed for Pain Apply to hands and knees as needed for pain      allopurinol (ZYLOPRIM) 100 MG tablet Take 100 mg by mouth daily      docusate sodium (COLACE) 100 MG capsule Take 100 mg by mouth 2 times daily as needed for Constipation      acetaminophen (TYLENOL) 325 MG tablet Take 2 tablets by mouth every 4 hours as needed for Pain or Fever 120 tablet 0    aspirin 81 MG chewable tablet Take 1 tablet by mouth daily 30 tablet 3    magnesium oxide (MAG-OX) 400 (241.3 MG) MG TABS tablet Take 1 tablet by mouth daily 30 tablet     folic acid (FOLVITE) 1 MG tablet Take 1 mg by mouth daily      insulin lispro (HUMALOG) 100 UNIT/ML injection vial Inject into the skin 3 times daily (before meals) Indications: sliding scale       lactulose Hypovolemic shock (HCC)    Septic shock (HCC)    UGI bleed    Acute blood loss anemia    Abscess of arm, left    MDRO (multiple drug resistant organisms) resistance       Measurements:  Pressure Ulcer 11/07/17 Coccyx edges well aproximated 6cm Long X 3 cm wide X 2.5 cm deep (Active)   Reina-wound Assessment Blanchable erythema 11/8/2017  2:20 PM   Pressure Ulcer Staging Stage IV 11/8/2017  2:20 PM   Non-staged Wound Description Full thickness 11/8/2017  2:20 PM   Wound Assessment Brown;Black; Yellow;Slough;Drainage 11/8/2017  2:20 PM   Shape oval 11/7/2017  8:30 AM   Wound Length (cm) 6.5 cm 11/8/2017  2:20 PM   Wound Width (cm) 4 cm 11/8/2017  2:20 PM   Wound Depth (cm)  2.5 11/8/2017  2:20 PM   Calculated Wound Size (cm^2) (l*w) 26 cm^2 11/8/2017  2:20 PM   Change in Wound Size % (l*w) -44.44 11/8/2017  2:20 PM   Culture Taken No 11/7/2017  8:30 AM   Dressing Status Changed 11/8/2017  2:20 PM   Dressing Changed Changed/New 11/8/2017  2:20 PM   Dressing/Treatment Moist to dry; Foam 11/8/2017  2:20 PM   Wound Cleansed Rinsed/Irrigated with saline 11/8/2017  2:20 PM   Dressing Change Due 11/09/17 11/8/2017  2:20 PM   Necrotic Type Yellow Fibrin/Slough 11/8/2017  2:20 PM   Necrotic Amount Large: % 11/8/2017  2:20 PM   Drainage Amount Moderate 11/8/2017  2:20 PM   Drainage Description Nicole;Yellow;Purulent 11/8/2017  2:20 PM   Odor Mild 11/8/2017  2:20 PM   Yellow%Wound Bed 65 11/8/2017  2:20 PM   Black%Wound Bed 10 11/8/2017  2:20 PM   Number of days: 1       Wound 11/08/17 Other (Comment) Heel Right (Active)   Wound Type Wound 11/8/2017  2:20 PM   Wound Deep tissue/Injury 11/8/2017  2:20 PM   Dressing Status Changed 11/8/2017  2:20 PM   Dressing Changed Changed/New 11/8/2017  2:20 PM   Dressing/Treatment Protective barrier 11/8/2017  2:20 PM   Dressing Change Due 11/08/17 11/8/2017  2:20 PM   Wound Length (cm) 2 cm 11/8/2017  2:20 PM   Wound Width (cm) 1 cm 11/8/2017  2:20 PM   Calculated Wound Size (cm^2) (l*w) 2 cm^2 2017  2:20 PM   Wound Assessment Dry; Intact; Light purple 2017  2:20 PM   Reina-wound Assessment Dry; Intact 2017  2:20 PM   Drainage Amount None 2017  2:20 PM   Odor None 2017  2:20 PM   Number of days: 0       Assessment:    Patient [facility] to this [id number] [facility] Nurse, patient recently seen 1 week ago for sacrococcygeal wound which was debrided by Dr. Pebbles Gaspar last week. Patient returns and wound still present. Also, during this skin assessment, patient noted to have area of Deep tissue pressure injury. Right heel deep tissue pressure injury measures 2x1cm - intact, deep purple skin with small amount of maroon at the edge, area is also non-blanchable. Please see LDA for further assessment details      Plan   Plan of Care: Pressure Ulcer 17 Coccyx edges well aproximated 6cm Long X 3 cm wide X 2.5 cm deep-Dressing/Treatment: Moist to dry, Foam  Wound 17 Skin tear Arm Distal;Left blister/skin tear Distal FA-Dressing/Treatment: Xeroform, Other (Comment) (kerlex wrapped)  Wound 17 Skin tear Arm Medial;Left blister/skin tear medial FA-Dressing/Treatment: Xeroform, Other (Comment) (kerlex wrapped)  Wound 17 Skin tear Arm Lower;Right R FA skin tear-Dressing/Treatment: Xeroform (kerlex)  Wound 17 Other (Comment) Heel Right-Dressing/Treatment: Protective barrier     Unsure of how aggressively patient's family would like care at this time, but will continue to treat wounds for healing until otherwise advised. Recommendin) pressure injury prevention interventions 2) Moist to dry dressing change BID 3) consultation to Dr. Pebbles Gaspar 4) Liquid skin barrier wipe to right heel BID 5) keep bilateral heels offloaded at all times.     Li Todd RN to obtain order for consultation to Dr. Pebbles Gaspar from the attending or intensivist.     Specialty Bed Required : Yes   [x] Low Air Loss   [] Pressure Redistribution  [] Fluid Immersion  [] Bariatric  [] Other:     Current Diet:    Dietician

## 2017-11-09 ENCOUNTER — APPOINTMENT (OUTPATIENT)
Dept: GENERAL RADIOLOGY | Age: 77
DRG: 871 | End: 2017-11-09
Payer: COMMERCIAL

## 2017-11-09 LAB
ANION GAP SERPL CALCULATED.3IONS-SCNC: 15 MEQ/L (ref 7–13)
BASOPHILS ABSOLUTE: 0 K/UL (ref 0–0.2)
BASOPHILS RELATIVE PERCENT: 0.4 %
BLOOD BANK DISPENSE STATUS: NORMAL
BLOOD BANK DISPENSE STATUS: NORMAL
BLOOD BANK PRODUCT CODE: NORMAL
BLOOD BANK PRODUCT CODE: NORMAL
BPU ID: NORMAL
BPU ID: NORMAL
BUN BLDV-MCNC: 90 MG/DL (ref 8–23)
CALCIUM SERPL-MCNC: 8.6 MG/DL (ref 8.6–10.2)
CHLORIDE BLD-SCNC: 116 MEQ/L (ref 98–107)
CO2: 20 MEQ/L (ref 22–29)
CREAT SERPL-MCNC: 1.5 MG/DL (ref 0.5–0.9)
DESCRIPTION BLOOD BANK: NORMAL
DESCRIPTION BLOOD BANK: NORMAL
DIGOXIN LEVEL: 1.7 NG/ML (ref 0.8–2)
EOSINOPHILS ABSOLUTE: 0 K/UL (ref 0–0.7)
EOSINOPHILS RELATIVE PERCENT: 0 %
GFR AFRICAN AMERICAN: 40.5
GFR NON-AFRICAN AMERICAN: 33.4
GLUCOSE BLD-MCNC: 242 MG/DL (ref 74–109)
HCT VFR BLD CALC: 21 % (ref 37–47)
HCT VFR BLD CALC: 28.5 % (ref 37–47)
HEMOGLOBIN: 6.7 G/DL (ref 12–16)
HEMOGLOBIN: 9.6 G/DL (ref 12–16)
LYMPHOCYTES ABSOLUTE: 0.3 K/UL (ref 1–4.8)
LYMPHOCYTES RELATIVE PERCENT: 2.6 %
MCH RBC QN AUTO: 29.6 PG (ref 27–31.3)
MCH RBC QN AUTO: 31.6 PG (ref 27–31.3)
MCHC RBC AUTO-ENTMCNC: 31.7 % (ref 33–37)
MCHC RBC AUTO-ENTMCNC: 33.6 % (ref 33–37)
MCV RBC AUTO: 93.5 FL (ref 82–100)
MCV RBC AUTO: 94.1 FL (ref 82–100)
MONOCYTES ABSOLUTE: 0.3 K/UL (ref 0.2–0.8)
MONOCYTES RELATIVE PERCENT: 2.8 %
NEUTROPHILS ABSOLUTE: 10.6 K/UL (ref 1.4–6.5)
NEUTROPHILS RELATIVE PERCENT: 94.2 %
PDW BLD-RTO: 15.1 % (ref 11.5–14.5)
PDW BLD-RTO: 16.6 % (ref 11.5–14.5)
PLATELET # BLD: 121 K/UL (ref 130–400)
PLATELET # BLD: 98 K/UL (ref 130–400)
PLATELET SLIDE REVIEW: ABNORMAL
POTASSIUM SERPL-SCNC: 4.4 MEQ/L (ref 3.5–5.1)
RBC # BLD: 2.25 M/UL (ref 4.2–5.4)
RBC # BLD: 3.03 M/UL (ref 4.2–5.4)
SODIUM BLD-SCNC: 151 MEQ/L (ref 132–144)
URINE CULTURE, ROUTINE: NORMAL
VANCOMYCIN RANDOM: 23.7 UG/ML (ref 5–40)
WBC # BLD: 10.6 K/UL (ref 4.8–10.8)
WBC # BLD: 11.3 K/UL (ref 4.8–10.8)

## 2017-11-09 PROCEDURE — P9016 RBC LEUKOCYTES REDUCED: HCPCS

## 2017-11-09 PROCEDURE — 99233 SBSQ HOSP IP/OBS HIGH 50: CPT | Performed by: INTERNAL MEDICINE

## 2017-11-09 PROCEDURE — 85027 COMPLETE CBC AUTOMATED: CPT

## 2017-11-09 PROCEDURE — 6360000002 HC RX W HCPCS: Performed by: ANESTHESIOLOGY

## 2017-11-09 PROCEDURE — 6360000002 HC RX W HCPCS: Performed by: INTERNAL MEDICINE

## 2017-11-09 PROCEDURE — 86923 COMPATIBILITY TEST ELECTRIC: CPT

## 2017-11-09 PROCEDURE — 2580000003 HC RX 258: Performed by: HOSPITALIST

## 2017-11-09 PROCEDURE — 2580000003 HC RX 258: Performed by: INTERNAL MEDICINE

## 2017-11-09 PROCEDURE — 36430 TRANSFUSION BLD/BLD COMPNT: CPT

## 2017-11-09 PROCEDURE — 71010 XR CHEST PORTABLE: CPT

## 2017-11-09 PROCEDURE — 94640 AIRWAY INHALATION TREATMENT: CPT

## 2017-11-09 PROCEDURE — 80162 ASSAY OF DIGOXIN TOTAL: CPT

## 2017-11-09 PROCEDURE — 80202 ASSAY OF VANCOMYCIN: CPT

## 2017-11-09 PROCEDURE — 6370000000 HC RX 637 (ALT 250 FOR IP): Performed by: ANESTHESIOLOGY

## 2017-11-09 PROCEDURE — 2580000003 HC RX 258: Performed by: ANESTHESIOLOGY

## 2017-11-09 PROCEDURE — 2700000000 HC OXYGEN THERAPY PER DAY

## 2017-11-09 PROCEDURE — C9113 INJ PANTOPRAZOLE SODIUM, VIA: HCPCS | Performed by: ANESTHESIOLOGY

## 2017-11-09 PROCEDURE — 2500000003 HC RX 250 WO HCPCS: Performed by: ANESTHESIOLOGY

## 2017-11-09 PROCEDURE — 94660 CPAP INITIATION&MGMT: CPT

## 2017-11-09 PROCEDURE — 80048 BASIC METABOLIC PNL TOTAL CA: CPT

## 2017-11-09 PROCEDURE — 85025 COMPLETE CBC W/AUTO DIFF WBC: CPT

## 2017-11-09 PROCEDURE — 2000000000 HC ICU R&B

## 2017-11-09 PROCEDURE — 36592 COLLECT BLOOD FROM PICC: CPT

## 2017-11-09 PROCEDURE — 94760 N-INVAS EAR/PLS OXIMETRY 1: CPT

## 2017-11-09 RX ORDER — 0.9 % SODIUM CHLORIDE 0.9 %
250 INTRAVENOUS SOLUTION INTRAVENOUS ONCE
Status: COMPLETED | OUTPATIENT
Start: 2017-11-09 | End: 2017-11-09

## 2017-11-09 RX ORDER — FUROSEMIDE 10 MG/ML
60 INJECTION INTRAMUSCULAR; INTRAVENOUS ONCE
Status: COMPLETED | OUTPATIENT
Start: 2017-11-09 | End: 2017-11-09

## 2017-11-09 RX ORDER — SODIUM CHLORIDE 0.9 % (FLUSH) 0.9 %
10 SYRINGE (ML) INJECTION PRN
Status: DISCONTINUED | OUTPATIENT
Start: 2017-11-09 | End: 2017-11-18 | Stop reason: HOSPADM

## 2017-11-09 RX ADMIN — IPRATROPIUM BROMIDE AND ALBUTEROL SULFATE 1 AMPULE: 2.5; .5 SOLUTION RESPIRATORY (INHALATION) at 10:13

## 2017-11-09 RX ADMIN — HYDROCORTISONE SODIUM SUCCINATE 50 MG: 100 INJECTION, POWDER, FOR SOLUTION INTRAMUSCULAR; INTRAVENOUS at 23:16

## 2017-11-09 RX ADMIN — IPRATROPIUM BROMIDE AND ALBUTEROL SULFATE 1 AMPULE: 2.5; .5 SOLUTION RESPIRATORY (INHALATION) at 20:27

## 2017-11-09 RX ADMIN — SODIUM CHLORIDE, PRESERVATIVE FREE 10 ML: 5 INJECTION INTRAVENOUS at 12:53

## 2017-11-09 RX ADMIN — MEROPENEM 1 G: 1 INJECTION, POWDER, FOR SOLUTION INTRAVENOUS at 04:33

## 2017-11-09 RX ADMIN — FUROSEMIDE 60 MG: 10 INJECTION, SOLUTION INTRAVENOUS at 10:16

## 2017-11-09 RX ADMIN — Medication 4.05 MCG/MIN: at 19:56

## 2017-11-09 RX ADMIN — HYDROCORTISONE SODIUM SUCCINATE 50 MG: 100 INJECTION, POWDER, FOR SOLUTION INTRAMUSCULAR; INTRAVENOUS at 16:55

## 2017-11-09 RX ADMIN — DEXTROSE AND SODIUM CHLORIDE: 5; 900 INJECTION, SOLUTION INTRAVENOUS at 05:50

## 2017-11-09 RX ADMIN — HYDROCORTISONE SODIUM SUCCINATE 50 MG: 100 INJECTION, POWDER, FOR SOLUTION INTRAMUSCULAR; INTRAVENOUS at 10:15

## 2017-11-09 RX ADMIN — HYDROCORTISONE SODIUM SUCCINATE 50 MG: 100 INJECTION, POWDER, FOR SOLUTION INTRAMUSCULAR; INTRAVENOUS at 01:34

## 2017-11-09 RX ADMIN — PANTOPRAZOLE SODIUM 40 MG: 40 INJECTION, POWDER, FOR SOLUTION INTRAVENOUS at 21:21

## 2017-11-09 RX ADMIN — IPRATROPIUM BROMIDE AND ALBUTEROL SULFATE 1 AMPULE: 2.5; .5 SOLUTION RESPIRATORY (INHALATION) at 17:07

## 2017-11-09 RX ADMIN — Medication 10 ML: at 19:57

## 2017-11-09 RX ADMIN — PANTOPRAZOLE SODIUM 40 MG: 40 INJECTION, POWDER, FOR SOLUTION INTRAVENOUS at 10:15

## 2017-11-09 RX ADMIN — Medication 10 ML: at 12:53

## 2017-11-09 RX ADMIN — MEROPENEM 1 G: 1 INJECTION, POWDER, FOR SOLUTION INTRAVENOUS at 16:56

## 2017-11-09 RX ADMIN — IPRATROPIUM BROMIDE AND ALBUTEROL SULFATE 1 AMPULE: 2.5; .5 SOLUTION RESPIRATORY (INHALATION) at 03:30

## 2017-11-09 RX ADMIN — SODIUM CHLORIDE 250 ML: 9 INJECTION, SOLUTION INTRAVENOUS at 08:29

## 2017-11-09 ASSESSMENT — PAIN SCALES - WONG BAKER

## 2017-11-09 ASSESSMENT — PAIN SCALES - GENERAL
PAINLEVEL_OUTOF10: 0

## 2017-11-09 NOTE — PROGRESS NOTES
nebulizer with DuoNeb 4 times a day. Patient is on vancomycin and Merrem. Continue BiPAP therapy, if saturation 93% or above. Cristhian further recs.      CHF  COPD     Dispo: Ethics consult placed per intensivist. Palliative care consulted. Family refusing hospice at this point. Await consultant eval and clearance prior to transfer out of ICU and ultimately discharge. Advised early F/U with PCP once discharged.      SUBJECTIVE  CHIEF COMPLAINT: Hypotension, acute on chronic anemia    PRIMARY SERVICE: Internal medicine    INTERVAL HPI:  Patient remains lethargic, but she can be alerted. Not answering any questions.        MEDICATIONS:    Current Facility-Administered Medications   Medication Dose Route Frequency Provider Last Rate Last Dose    0.9 % sodium chloride bolus  250 mL Intravenous Once Rose Mary Proctor MD        sodium chloride flush 0.9 % injection 10 mL  10 mL Intravenous PRN Rose Mary Proctor MD        sodium chloride flush 0.9 % injection 10 mL  10 mL Intravenous 2 times per day More Love MD   10 mL at 11/08/17 2130    sodium chloride flush 0.9 % injection 10 mL  10 mL Intravenous PRN More Love MD        acetaminophen (TYLENOL) tablet 650 mg  650 mg Oral Q4H PRN More Love MD        0.9 % sodium chloride bolus  250 mL Intravenous Once Cedrick Ro PA-C        norepinephrine (LEVOPHED) 16 mg in dextrose 5% 250 mL infusion  2 mcg/min Intravenous Continuous Gaetano Naranjo MD 13.1 mL/hr at 11/09/17 0520 14 mcg/min at 11/09/17 0520    darbepoetin stephanie-polysorbate (ARANESP) injection 40 mcg  40 mcg Subcutaneous Weekly Elmer Scott MD   40 mcg at 11/07/17 2043    pantoprazole (PROTONIX) injection 40 mg  40 mg Intravenous BID Gaetano Naranjo MD   40 mg at 11/08/17 2130    And    sodium chloride (PF) 0.9 % injection 10 mL  10 mL Intravenous Daily Gaetano Naranjo MD   10 mL at 11/08/17 0758    hydrocortisone sodium succinate PF (SOLU-CORTEF) injection 50 mg  50 mg Intravenous Q6H Gaetano Naranjo MD   50 mg at 11/09/17 0134    dextrose 5 % and 0.9 % sodium chloride infusion   Intravenous Continuous Kristian Lovelace MD 75 mL/hr at 11/09/17 0550      ipratropium-albuterol (DUONEB) nebulizer solution 1 ampule  1 ampule Inhalation 4x daily Kristian Lovelace MD   1 ampule at 11/09/17 0330    meropenem (MERREM) 1 g in sterile water 20 mL IV syringe  1 g Intravenous Q12H Ruiz Oliver MD   1 g at 11/09/17 0433       OBJECTIVE  PHYSICAL EXAM:   BP (!) 128/38   Pulse 91   Temp 97.3 °F (36.3 °C) (Axillary)   Resp 15   Ht 5' 6\" (1.676 m) Comment: 10/2017  Wt 220 lb 7.4 oz (100 kg)   SpO2 99%   BMI 35.58 kg/m²   Body mass index is 35.58 kg/m². CONSTITUTIONAL:  Lethargic but patient Can be alerted. not answering questions   LUNGS:  No increased work of breathing, diminished breath sounds bilaterally, wearing BiPAP  CARDIOVASCULAR: Irregular, irregular, rate controlled  ABDOMEN:  No scars, normal bowel sounds, soft, non-distended, non-tender, no masses palpated, no hepatosplenomegally  EXTREMITIES: Edema bilateral upper and lower extremities, no cyanosis, no clubbing    DATA:   Diagnostic tests reviewed for today's visit:    Most recent labs and imaging results reviewed. SIGNATURE: Fco Cabrera PA-C PATIENT NAME: Bolivar Perez   DATE: November 9, 2017 MRN: [id number]   TIME: 7:28 AM PAGER: [phone]     I have independently seen and examined this patient. I have reviewed and agree with the above documentation, assessment and plan.      Tevin Williamson MD  12:31 PM  November 9, 2017

## 2017-11-09 NOTE — PROGRESS NOTES
Renal Progress Note    Assessment and Plan:    77 yo with ckd stage 3 due to diabetic nephropathy. She has multifactorial anemia, low bp, brigida, fluid overload. Acid base status and lytes are ok. She has severe protein calorie malnutrition with alb of 1.7 making the edema harder to deal with. On pressors. worsening clinical status           Plan/  1- 1 dose of lasix 60 mg for progressive fluid retention  2- poor prognosis. Patient Active Problem List:     Pneumonia of left lower lobe due to infectious organism [facility])     Chronic obstructive pulmonary disease (HCC)     Type 2 diabetes mellitus (HCC)     Anemia     Metabolic encephalopathy     Acute on chronic diastolic congestive heart failure (HCC)     Hypernatremia     Obesity     Hemiparesis affecting left side as late effect of stroke ([facility])     Atrial fibrillation ([facility])     Acute on chronic respiratory failure with hypercapnia ([facility])     Cerebral infarct [facility])     Hospice Care     Atrial fibrillation with RVR (HCC)     Acute hemolytic anemia (HCC)     BRIGIDA (acute kidney injury) ([facility] [address]     MRSA infection     Decubitus ulcer of sacral region     Infected ulcer of skin (HCC)     Hemolytic anemia, acute (HCC)     Hypotension     Hypovolemic shock (HCC)     Septic shock (HCC)     UGI bleed     Acute blood loss anemia     Abscess of arm, left      Subjective:   Admit Date: 11/7/2017    Interval History: not doing well. Still on pressors. On bipap.   Minimally responsive      Medications:   Scheduled Meds:   sodium chloride  250 mL Intravenous Once    sodium chloride flush  10 mL Intravenous 2 times per day    sodium chloride  250 mL Intravenous Once    darbepoetin stephanie-polysorbate  40 mcg Subcutaneous Weekly    pantoprazole  40 mg Intravenous BID    And    sodium chloride (PF)  10 mL Intravenous Daily    hydrocortisone sodium succinate PF  50 mg Intravenous Q6H    ipratropium-albuterol  1 ampule Inhalation 4x daily    meropenem  1 g Intravenous Q12H jey      Electronically signed by Renetta Weldon MD on 11/9/2017 at 8:14 AM

## 2017-11-09 NOTE — PROGRESS NOTES
palpation, external ears without lesions,  NECK:  Supple, symmetrical, trachea midline, no adenopathy, thyroid symmetric, not enlarged and no tenderness, skin normal  LUNGS:  No increased work of breathing, good air exchange, clear to auscultation bilaterally, no crackles or wheezing  CARDIOVASCULAR:  Normal apical impulse, regular rate and rhythm, normal S1 and S2,  And 2/6 murmur noted  ABDOMEN:  Normal bowel sounds, soft, non-distended, non-tender, no masses palpated, no hepatosplenomegally  EXTREMITIES:  No edema, Pulses strong throughout. NEUROLOGIC:  Awake, alert, oriented to name, place and time.  Following all commands and moving all extremties  SKIN:  no bruising or bleeding, normal skin color, texture, turgor and no rashes     Data:        LABS:      Recent Results (from the past 24 hour(s))   VANCOMYCIN, RANDOM    Collection Time: 11/08/17 12:32 PM   Result Value Ref Range    Vancomycin Rm 26.0 5.0 - 40.0 ug/mL   CBC Auto Differential    Collection Time: 11/09/17  5:22 AM   Result Value Ref Range    WBC 11.3 (H) 4.8 - 10.8 K/uL    RBC 2.25 (L) 4.20 - 5.40 M/uL    Hemoglobin 6.7 (LL) 12.0 - 16.0 g/dL    Hematocrit 21.0 (LL) 37.0 - 47.0 %    MCV 93.5 82.0 - 100.0 fL    MCH 29.6 27.0 - 31.3 pg    MCHC 31.7 (L) 33.0 - 37.0 %    RDW 16.6 (H) 11.5 - 14.5 %    Platelets 552 (L) 156 - 400 K/uL    Neutrophils % 94.2 %    Lymphocytes % 2.6 %    Monocytes % 2.8 %    Eosinophils % 0.0 %    Basophils % 0.4 %    Neutrophils # 10.6 (H) 1.4 - 6.5 K/uL    Lymphocytes # 0.3 (L) 1.0 - 4.8 K/uL    Monocytes # 0.3 0.2 - 0.8 K/uL    Eosinophils # 0.0 0.0 - 0.7 K/uL    Basophils # 0.0 0.0 - 0.2 K/uL   Basic Metabolic Panel    Collection Time: 11/09/17  6:00 AM   Result Value Ref Range    Sodium 151 (H) 132 - 144 mEq/L    Potassium 4.4 3.5 - 5.1 mEq/L    Chloride 116 (H) 98 - 107 mEq/L    CO2 20 (L) 22 - 29 mEq/L    Anion Gap 15 (H) 7 - 13 mEq/L    Glucose 242 (H) 74 - 109 mg/dL    BUN 90 (HH) 8 - 23 mg/dL    CREATININE 1.50 (H)

## 2017-11-09 NOTE — PROGRESS NOTES
MCV 93.5 11/09/2017     (L) 11/09/2017     Lab Results   Component Value Date    CREATININE 1.50 (H) 11/09/2017    BUN 90 (HH) 11/09/2017     (H) 11/09/2017    K 4.4 11/09/2017     (H) 11/09/2017    CO2 20 (L) 11/09/2017       Hepatic Function Panel:   Lab Results   Component Value Date    ALKPHOS 101 11/07/2017    ALT <5 11/07/2017    AST 15 11/07/2017    PROT 4.4 11/07/2017    BILITOT 0.4 11/07/2017    LABALBU 1.7 11/07/2017       Microbiology:   Recent Labs      11/07/17   1204   BC  No Growth to date. Any change in status will be called. Recent Labs      11/07/17   1210   BLOODCULT2  No Growth to date. Any change in status will be called.      Recent Labs      11/07/17   1901   LABURIN  No growth 48 hours       IMPRESSION:    · Septic shock with persistent hypotension on Levofed  · Stage 4 sacral DU with MRSA and MDRO Providencia  · HCAP  · Altered mentation  · Critical Vanco levels with ARF on CKD    Patient Active Problem List   Diagnosis    Pneumonia of left lower lobe due to infectious organism ([facility] [address]    Chronic obstructive pulmonary disease (HCC)    Type 2 diabetes mellitus (HCC)    Anemia    Metabolic encephalopathy    Acute on chronic diastolic congestive heart failure (HCC)    Hypernatremia    Obesity    Hemiparesis affecting left side as late effect of stroke (HCC)    Atrial fibrillation (HCC)    Acute on chronic respiratory failure with hypercapnia (HCC)    Cerebral infarct ([address]    Hospice Care    Atrial fibrillation with RVR (HCC)    Acute hemolytic anemia (HCC)    BRIGIDA (acute kidney injury) ([address]    MRSA (methicillin resistant Staphylococcus aureus) infection    Decubitus ulcer of sacral region    Stage 4 skin ulcer of sacral region ([address]    Hemolytic anemia, acute (HCC)    Hypotension    Hypovolemic shock (HCC)    Septic shock (HCC)    UGI bleed    Acute blood loss anemia    Abscess of arm, left    MDRO (multiple drug resistant organisms) resistance CXR: CARDIOMEGALY AND MILD VASCULAR CONGESTION.  GROUNDGLASS AND STREAKY INFILTRATES IN THE LEFT MIDLUNG AND LEFT LUNG BASE, MILDLY IMPROVED FROM 10/27/2017; CHEST RADIOGRAPH IS OTHERWISE ESSENTIALLY UNCHANGED--SEE ABOVE.             PLAN:  · IV Meropenem  · F/U Blood Cx  · Wound care to sacral DU  · Vasopressors and O2 support  · Hold Vanco, random Vanco in am and then redose if level is less than 15    Discussed with adult child    Aisha Goodson MD

## 2017-11-09 NOTE — PLAN OF CARE
Problem: Risk for Impaired Skin Integrity  Goal: Tissue integrity - skin and mucous membranes  Structural intactness and normal physiological function of skin and  mucous membranes. Outcome: Ongoing      Problem: Falls - Risk of  Goal: Absence of falls  Outcome: Ongoing      Problem: Nutrition  Goal: Optimal nutrition therapy  Nutrition Problem: Inadequate oral intake, in context of chronic illness  Intervention: Food and/or Nutrient Delivery: Continue NPO (Consider Nutrition Support If Unable to Start Oral Feedings s/p GI Consult for Lower GI Bleed.)  Nutritional Goals: Nutrition Support If Unable To Start Oral Feedings.     Outcome: Ongoing

## 2017-11-09 NOTE — PROGRESS NOTES
INPATIENT PROGRESS NOTES    PATIENT NAME: Alia Painter  MRN: [id number]  SERVICE DATE:  November 9, 2017   SERVICE TIME:  4:57 PM      PRIMARY SERVICE: Pulmonary Disease    INTERVAL HPI: Patient seen and examined at bedside, Interval Notes, orders reviewed. Nursing notes noted  Patient is much more awake today. She is on O2 via NC. She has no c/o shortness of breath. No chest pain. No fever or chills. She is receiving blood transfusion. Hgb drop today to 6.7. No BM today. as per RN    OBJECTIVE    Body mass index is 35.58 kg/m². PHYSICAL EXAM:  Vitals:  BP (!) 121/41   Pulse 77   Temp 97.5 °F (36.4 °C)   Resp 11   Ht 5' 6\" (1.676 m) Comment: 10/2017  Wt 220 lb 7.4 oz (100 kg)   SpO2 99%   BMI 35.58 kg/m²   General: Alert, awake . comfortable in bed, No distress. Head: Atraumatic , Normocephalic   Eyes: PERRL. No sclera icterus. No conjunctival injection. No discharge   ENT: No nasal  discharge. Pharynx clear. Neck:  Trachea midline. No thyromegaly, no JVD, No cervical adenopathy. Chest : Bilaterally symmetrical ,Normal effort,  No accessory muscle use  Lung : . Fair BS bilateral, decreased BS at bases. No Rales. No wheezing. No rhonchi. No dullness on percussion. Heart[de-identified] Normal  rate. Regular rhythm. No mumur ,  Rub or gallop  ABD: Non-tender. Non-distended. No masses. No organmegaly. Normal bowel sounds. No hernia. ecchymotic area rt. lower chest and abdomen posteriorly.    EXT: 2+ Pitting all extremities  , No Cyanosis No clubbing  Neuro: no focal weakness        DATA:   Recent Labs      11/08/17   0600  11/09/17   0522   WBC  13.8*  11.3*   HGB  8.5*  6.7*   HCT  26.1*  21.0*   MCV  93.3  93.5   PLT  105*  121*     Recent Labs      11/07/17   0300  11/08/17   0600  11/09/17   0600   NA  143  145*  151*   K  4.7  4.4  4.4   CL  108*  112*  116*   CO2  26  22  20*   BUN  95*  93*  90*   CREATININE  1.88*  1.63*  1.50*   GLUCOSE  83  222*  242*   CALCIUM  8.9  8.5*  8.6   PROT  4.4*   --    -- LABALBU  1.7*   --    --    BILITOT  0.4   --    --    ALKPHOS  101   --    --    AST  15   --    --    ALT  <5   --    --    LABGLOM  25.8*  30.4*  33.4*   GFRAA  31.2*  36.8*  40.5*   GLOB  2.7   --    --        MV Settings:     FiO2 : 40 %    Recent Labs      11/07/17   1342   PHART  7.239*   [id number]  56*   PO2ART  155*   [id number]  23.8   BEART  -4*   [id number]  99*       O2 Device: Nasal cannula  O2 Flow Rate (L/min): 3 L/min          MEDICATIONS during current hospitalization:    Continuous Infusions:   norepinephrine 12 mcg/min (11/09/17 0848)    dextrose 5 % and 0.9 % NaCl 50 mL/hr at 11/09/17 0830       Scheduled Meds:   sodium chloride flush  10 mL Intravenous 2 times per day    sodium chloride  250 mL Intravenous Once    darbepoetin stephanie-polysorbate  40 mcg Subcutaneous Weekly    pantoprazole  40 mg Intravenous BID    And    sodium chloride (PF)  10 mL Intravenous Daily    hydrocortisone sodium succinate PF  50 mg Intravenous Q6H    ipratropium-albuterol  1 ampule Inhalation 4x daily    meropenem  1 g Intravenous Q12H       PRN Meds:sodium chloride flush, sodium chloride flush, acetaminophen    Radiology    Xr Chest Portable    Result Date: 11/9/2017  EXAMINATION: XR CHEST PORTABLE CLINICAL HISTORY: Shortness of breath COMPARISONS: 10/27/2017 FINDINGS: There is moderate cardiomegaly. Pulmonary vascularity is prominent. There are groundglass and streaky infiltrates in the left midlung and left lung base, mildly improved from 10/27/2017. There is blunting of the left costophrenic angle consistent with scarring or small effusion. Interstitial markings are prominent. There is mild elevation of the left hemidiaphragm. There is calcification of the mitral annulus. Right upper extremity PICC tip is in the right atrium. There is no pneumothorax. CARDIOMEGALY AND MILD VASCULAR CONGESTION.  GROUNDGLASS AND STREAKY INFILTRATES IN THE LEFT MIDLUNG AND LEFT LUNG BASE, MILDLY IMPROVED FROM 10/27/2017; CHEST RADIOGRAPH IS OTHERWISE ESSENTIALLY UNCHANGED--SEE ABOVE. Us Upper Extremity Right Venous Duplex    Result Date: 11/4/2017   UPPER EXTREMITY RIGHT VENOUS DUPLEX : 11/4/2017 CLINICAL HISTORY: Right upper extremity swelling and right upper extremity PICC catheter placed 10/23/2017. COMPARISON: None available. TECHNIQUE: Grayscale, color and waveform Doppler analysis of the right upper extremity was performed. FINDINGS: The study is limited by the patient's cooperation, the study was incomplete. Recent-appearing occlusive thrombus is present around a PICC catheter within the right brachial and axillary veins, which appears to extend into the subclavian vein. No thrombus is identified within the visualized jugular vein. RECENT-APPEARING OCCLUSIVE VENOUS THROMBUS AROUND A PICC CATHETER WITHIN THE RIGHT BRACHIAL, AXILLARY AND SUBCLAVIAN VEINS. IMPRESSION AND SUGGESTION:  1. Acute and chronic respiratory failure on BiPAP  2. Acute blood loss anemia  3. Hypovolemic shock on pressor agent  4. Right upper extremity DVT  5. Anasarca  6. Stage III chronic kidney disease  7. Altered mental status, metabolic encephalopathy  8. Uterine cancer    Patient is doing better, she is on 3lit O2 via NC, spo2 98%. Receiving PRBC for low Hgb. Patient has anasarca. She is on nebulizer with DuoNeb 4 times a day. Patient is on   [address] [place] [address]. BiPAP therapy with sleep and prn, keep saturation 93% or above. On levophed. Continue present treatment plan.          Electronically signed by Constantino Preston MD, [facility] on 11/9/2017 at 4:57 PM

## 2017-11-10 LAB
ANION GAP SERPL CALCULATED.3IONS-SCNC: 12 MEQ/L (ref 7–13)
BASOPHILS ABSOLUTE: 0 K/UL (ref 0–0.2)
BASOPHILS RELATIVE PERCENT: 0.2 %
BUN BLDV-MCNC: 83 MG/DL (ref 8–23)
CALCIUM SERPL-MCNC: 8.5 MG/DL (ref 8.6–10.2)
CHLORIDE BLD-SCNC: 119 MEQ/L (ref 98–107)
CO2: 23 MEQ/L (ref 22–29)
CREAT SERPL-MCNC: 1.23 MG/DL (ref 0.5–0.9)
DIGOXIN LEVEL: 1.5 NG/ML (ref 0.8–2)
EOSINOPHILS ABSOLUTE: 0 K/UL (ref 0–0.7)
EOSINOPHILS RELATIVE PERCENT: 0 %
GFR AFRICAN AMERICAN: 50.9
GFR NON-AFRICAN AMERICAN: 42
GLUCOSE BLD-MCNC: 197 MG/DL (ref 74–109)
HCT VFR BLD CALC: 28.3 % (ref 37–47)
HEMOGLOBIN: 9.5 G/DL (ref 12–16)
LYMPHOCYTES ABSOLUTE: 0.5 K/UL (ref 1–4.8)
LYMPHOCYTES RELATIVE PERCENT: 5.4 %
MCH RBC QN AUTO: 31.5 PG (ref 27–31.3)
MCHC RBC AUTO-ENTMCNC: 33.4 % (ref 33–37)
MCV RBC AUTO: 94.2 FL (ref 82–100)
MONOCYTES ABSOLUTE: 0.4 K/UL (ref 0.2–0.8)
MONOCYTES RELATIVE PERCENT: 3.9 %
NEUTROPHILS ABSOLUTE: 9.2 K/UL (ref 1.4–6.5)
NEUTROPHILS RELATIVE PERCENT: 90.5 %
PDW BLD-RTO: 15.4 % (ref 11.5–14.5)
PLATELET # BLD: 103 K/UL (ref 130–400)
POTASSIUM SERPL-SCNC: 3.8 MEQ/L (ref 3.5–5.1)
RBC # BLD: 3.01 M/UL (ref 4.2–5.4)
SODIUM BLD-SCNC: 154 MEQ/L (ref 132–144)
VANCOMYCIN TROUGH: 21.2 UG/ML (ref 5–10)
WBC # BLD: 10.1 K/UL (ref 4.8–10.8)

## 2017-11-10 PROCEDURE — 80048 BASIC METABOLIC PNL TOTAL CA: CPT

## 2017-11-10 PROCEDURE — 99233 SBSQ HOSP IP/OBS HIGH 50: CPT | Performed by: INTERNAL MEDICINE

## 2017-11-10 PROCEDURE — 6360000002 HC RX W HCPCS: Performed by: ANESTHESIOLOGY

## 2017-11-10 PROCEDURE — 2580000003 HC RX 258: Performed by: INTERNAL MEDICINE

## 2017-11-10 PROCEDURE — 6370000000 HC RX 637 (ALT 250 FOR IP): Performed by: ANESTHESIOLOGY

## 2017-11-10 PROCEDURE — 99232 SBSQ HOSP IP/OBS MODERATE 35: CPT | Performed by: INTERNAL MEDICINE

## 2017-11-10 PROCEDURE — 6360000002 HC RX W HCPCS: Performed by: INTERNAL MEDICINE

## 2017-11-10 PROCEDURE — 94760 N-INVAS EAR/PLS OXIMETRY 1: CPT

## 2017-11-10 PROCEDURE — G8997 SWALLOW GOAL STATUS: HCPCS

## 2017-11-10 PROCEDURE — 36592 COLLECT BLOOD FROM PICC: CPT

## 2017-11-10 PROCEDURE — 2000000000 HC ICU R&B

## 2017-11-10 PROCEDURE — 2500000003 HC RX 250 WO HCPCS: Performed by: ANESTHESIOLOGY

## 2017-11-10 PROCEDURE — 2580000003 HC RX 258: Performed by: ANESTHESIOLOGY

## 2017-11-10 PROCEDURE — 85025 COMPLETE CBC W/AUTO DIFF WBC: CPT

## 2017-11-10 PROCEDURE — 80162 ASSAY OF DIGOXIN TOTAL: CPT

## 2017-11-10 PROCEDURE — 94640 AIRWAY INHALATION TREATMENT: CPT

## 2017-11-10 PROCEDURE — 80202 ASSAY OF VANCOMYCIN: CPT

## 2017-11-10 PROCEDURE — 2700000000 HC OXYGEN THERAPY PER DAY

## 2017-11-10 PROCEDURE — 92610 EVALUATE SWALLOWING FUNCTION: CPT

## 2017-11-10 PROCEDURE — G8996 SWALLOW CURRENT STATUS: HCPCS

## 2017-11-10 PROCEDURE — C9113 INJ PANTOPRAZOLE SODIUM, VIA: HCPCS | Performed by: ANESTHESIOLOGY

## 2017-11-10 PROCEDURE — 99232 SBSQ HOSP IP/OBS MODERATE 35: CPT | Performed by: ANESTHESIOLOGY

## 2017-11-10 RX ORDER — VANCOMYCIN HYDROCHLORIDE 1 G/200ML
1000 INJECTION, SOLUTION INTRAVENOUS
Status: DISCONTINUED | OUTPATIENT
Start: 2017-11-10 | End: 2017-11-18 | Stop reason: HOSPADM

## 2017-11-10 RX ORDER — DEXTROSE MONOHYDRATE 50 MG/ML
INJECTION, SOLUTION INTRAVENOUS
Status: DISPENSED
Start: 2017-11-10 | End: 2017-11-10

## 2017-11-10 RX ORDER — DEXTROSE MONOHYDRATE 50 MG/ML
INJECTION, SOLUTION INTRAVENOUS CONTINUOUS
Status: DISCONTINUED | OUTPATIENT
Start: 2017-11-10 | End: 2017-11-12

## 2017-11-10 RX ORDER — LACTULOSE 10 G/15ML
20 SOLUTION ORAL 2 TIMES DAILY
Status: DISCONTINUED | OUTPATIENT
Start: 2017-11-10 | End: 2017-11-18 | Stop reason: HOSPADM

## 2017-11-10 RX ADMIN — DEXTROSE MONOHYDRATE: 50 INJECTION, SOLUTION INTRAVENOUS at 22:22

## 2017-11-10 RX ADMIN — HYDROCORTISONE SODIUM SUCCINATE 50 MG: 100 INJECTION, POWDER, FOR SOLUTION INTRAMUSCULAR; INTRAVENOUS at 17:22

## 2017-11-10 RX ADMIN — PANTOPRAZOLE SODIUM 40 MG: 40 INJECTION, POWDER, FOR SOLUTION INTRAVENOUS at 09:13

## 2017-11-10 RX ADMIN — PANTOPRAZOLE SODIUM 40 MG: 40 INJECTION, POWDER, FOR SOLUTION INTRAVENOUS at 21:06

## 2017-11-10 RX ADMIN — MEROPENEM 1 G: 1 INJECTION, POWDER, FOR SOLUTION INTRAVENOUS at 05:20

## 2017-11-10 RX ADMIN — HYDROCORTISONE SODIUM SUCCINATE 50 MG: 100 INJECTION, POWDER, FOR SOLUTION INTRAMUSCULAR; INTRAVENOUS at 05:20

## 2017-11-10 RX ADMIN — Medication 10 ML: at 09:15

## 2017-11-10 RX ADMIN — IPRATROPIUM BROMIDE AND ALBUTEROL SULFATE 1 AMPULE: 2.5; .5 SOLUTION RESPIRATORY (INHALATION) at 04:36

## 2017-11-10 RX ADMIN — SODIUM CHLORIDE, PRESERVATIVE FREE 10 ML: 5 INJECTION INTRAVENOUS at 09:15

## 2017-11-10 RX ADMIN — MEROPENEM 1 G: 1 INJECTION, POWDER, FOR SOLUTION INTRAVENOUS at 17:21

## 2017-11-10 RX ADMIN — HYDROCORTISONE SODIUM SUCCINATE 50 MG: 100 INJECTION, POWDER, FOR SOLUTION INTRAMUSCULAR; INTRAVENOUS at 11:46

## 2017-11-10 RX ADMIN — VANCOMYCIN HYDROCHLORIDE 1000 MG: 1 INJECTION, SOLUTION INTRAVENOUS at 22:41

## 2017-11-10 RX ADMIN — DEXTROSE MONOHYDRATE: 50 INJECTION, SOLUTION INTRAVENOUS at 09:06

## 2017-11-10 RX ADMIN — HYDROCORTISONE SODIUM SUCCINATE 50 MG: 100 INJECTION, POWDER, FOR SOLUTION INTRAMUSCULAR; INTRAVENOUS at 22:40

## 2017-11-10 RX ADMIN — IPRATROPIUM BROMIDE AND ALBUTEROL SULFATE 1 AMPULE: 2.5; .5 SOLUTION RESPIRATORY (INHALATION) at 11:02

## 2017-11-10 RX ADMIN — IPRATROPIUM BROMIDE AND ALBUTEROL SULFATE 1 AMPULE: 2.5; .5 SOLUTION RESPIRATORY (INHALATION) at 20:59

## 2017-11-10 RX ADMIN — LACTULOSE 20 G: 20 SOLUTION ORAL at 21:05

## 2017-11-10 RX ADMIN — Medication 4 MCG/MIN: at 23:47

## 2017-11-10 ASSESSMENT — PAIN SCALES - GENERAL
PAINLEVEL_OUTOF10: 0

## 2017-11-10 ASSESSMENT — PAIN SCALES - WONG BAKER
WONGBAKER_NUMERICALRESPONSE: 0

## 2017-11-10 NOTE — PROGRESS NOTES
[facility] OF [facility] Heart Progress Note      Patient: Brian Oretga  Unit/Bed: TM78/CZ17-55  YOB: 1940  MRN: [id number]  Admit Date:  11/7/2017  Hospital Day: 3    Subjective Complaint:   Pt is minimally responsive but apparently ate a few bites of food. Physical Examination:     BP (!) 100/23   Pulse 74   Temp 97.8 °F (36.6 °C) (Axillary)   Resp 8   Ht 5' 6\" (1.676 m) Comment: 10/2017  Wt 226 lb 6.6 oz (102.7 kg)   SpO2 100%   BMI 36.54 kg/m²       Intake/Output Summary (Last 24 hours) at 11/10/17 1525  Last data filed at 11/10/17 0537   Gross per 24 hour   Intake             2861 ml   Output             1425 ml   Net             1436 ml                  Brian Ortega examined at bedside in severely ill, cachectic and disoriented. Focused exam reveals:     Cardiac: Heart irregular rate and rhythm     Lungs:  clear to auscultation bilaterally- no wheezes, rales or rhonchi, normal air movement, no respiratory distress and decreased breath sounds noted-     Extremities:   4+ and non-pitting edema    Telemetry:      atrial fibrillation - controlled         LABS:   CBC:   Recent Labs      11/09/17   0522  11/09/17   2009  11/10/17   0600   WBC  11.3*  10.6  10.1   HGB  6.7*  9.6*  9.5*   PLT  121*  98*  103*      BMP:  Recent Labs      11/08/17   0600  11/09/17   0600  11/10/17   0600   NA  145*  151*  154*   K  4.4  4.4  3.8   CL  112*  116*  119*   CO2  22  20*  23   BUN  93*  90*  83*   CREATININE  1.63*  1.50*  1.23*   GLUCOSE  222*  242*  197*              Troponin: No results for input(s): TROPONINT in the last 72 hours. BNP: No results for input(s): PROBNP in the last 72 hours. INR:   Recent Labs      11/08/17   0600   INR  1.2      Mg:   Recent Labs      11/08/17   0600   MG  2.9*       Cardiac Testing:    none    Assessment:    Critically ill patient with metastatic ca  Severely malnurished  Prognosis poor   Plan:  1.  Supportive care and check dig levels---had two doses two days ago  Electronically signed by Annie Spears MD on 11/10/2017 at 3:25 PM

## 2017-11-10 NOTE — PROGRESS NOTES
norepinephrine 4.053 mcg/min (11/09/17 1956)    dextrose 5 % and 0.9 % NaCl 50 mL/hr at 11/09/17 0830       CBC:   Recent Labs      11/09/17   2009  11/10/17   0600   WBC  10.6  10.1   HGB  9.6*  9.5*   PLT  98*  103*     CMP:    Recent Labs      11/08/17   0600 11/09/17   0600  11/10/17   0600   NA  145*  151*  154*   K  4.4  4.4  3.8   CL  112*  116*  119*   CO2  22  20*  23   BUN  93*  90*  83*   CREATININE  1.63*  1.50*  1.23*   GLUCOSE  222*  242*  197*   CALCIUM  8.5*  8.6  8.5*   LABGLOM  30.4*  33.4*  42.0*     Troponin: No results for input(s): TROPONINI in the last 72 hours. BNP: No results for input(s): BNP in the last 72 hours. INR:   Recent Labs      11/08/17   0600   INR  1.2     Lipids: No results for input(s): CHOL, LDLDIRECT, TRIG, HDL, AMYLASE, LIPASE in the last 72 hours. Liver:   No results for input(s): AST, ALT, ALKPHOS, PROT, LABALBU, BILITOT in the last 72 hours. Invalid input(s): BILDIR  Iron:    Recent Labs      11/08/17   0600   FERRITIN  680.1*     Urinalysis: No results for input(s): UA in the last 72 hours. Objective:   Vitals: BP (!) 132/57   Pulse 83   Temp 96.8 °F (36 °C) (Axillary)   Resp 17   Ht 5' 6\" (1.676 m) Comment: 10/2017  Wt 226 lb 6.6 oz (102.7 kg)   SpO2 100%   BMI 36.54 kg/m²    Wt Readings from Last 3 Encounters:   11/10/17 226 lb 6.6 oz (102.7 kg)   11/04/17 218 lb (98.9 kg)   10/31/17 218 lb (98.9 kg)      24HR INTAKE/OUTPUT:      Intake/Output Summary (Last 24 hours) at 11/10/17 0833  Last data filed at 11/10/17 0537   Gross per 24 hour   Intake             2861 ml   Output             1425 ml   Net             1436 ml       Constitutional:  Ill appearing  Skin:normal, no rash  HEENT:sclera anicteric.   Head atraumatic normocephalic  Neck:supple with no thyromegally  Cardiovascular:  S1, S2 without m/r/g   Respiratory:  CTA B without w/r/r   Abdomen: +bs, soft, nt  Ext: 2+ LE edema  Musculoskeletal:Intact  Neuro: lethargic      Electronically signed

## 2017-11-10 NOTE — PROGRESS NOTES
Neurological:   No verbalizations  Obtunded  No commands   Skin: Skin is warm and dry. There is pallor. Medications:    lactulose  20 g Oral BID    collagenase   Topical Daily    sodium chloride flush  10 mL Intravenous 2 times per day    sodium chloride  250 mL Intravenous Once    darbepoetin stephanie-polysorbate  40 mcg Subcutaneous Weekly    pantoprazole  40 mg Intravenous BID    And    sodium chloride (PF)  10 mL Intravenous Daily    hydrocortisone sodium succinate PF  50 mg Intravenous Q6H    ipratropium-albuterol  1 ampule Inhalation 4x daily    meropenem  1 g Intravenous Q12H      dextrose 75 mL/hr at 11/10/17 0906    dextrose      norepinephrine 4.053 mcg/min (11/09/17 1956)       sodium chloride flush 10 mL PRN   sodium chloride flush 10 mL PRN   acetaminophen 650 mg Q4H PRN       Diagnostics:      Xr Chest Portable    Result Date: 11/9/2017  EXAMINATION: XR CHEST PORTABLE CLINICAL HISTORY: Shortness of breath COMPARISONS: 10/27/2017 FINDINGS: There is moderate cardiomegaly. Pulmonary vascularity is prominent. There are groundglass and streaky infiltrates in the left midlung and left lung base, mildly improved from 10/27/2017. There is blunting of the left costophrenic angle consistent with scarring or small effusion. Interstitial markings are prominent. There is mild elevation of the left hemidiaphragm. There is calcification of the mitral annulus. Right upper extremity PICC tip is in the right atrium. There is no pneumothorax. CARDIOMEGALY AND MILD VASCULAR CONGESTION. GROUNDGLASS AND STREAKY INFILTRATES IN THE LEFT MIDLUNG AND LEFT LUNG BASE, MILDLY IMPROVED FROM 10/27/2017; CHEST RADIOGRAPH IS OTHERWISE ESSENTIALLY UNCHANGED--SEE ABOVE. Lab Data:    Cardiac Enzymes:  No results for input(s): CKTOTAL, CKMB, CKMBINDEX, TROPONINI in the last 72 hours.   ProBNP:   Lab Results   Component Value Date    PROBNP 1,594 09/27/2017       CBC:   Lab Results   Component Value Date    WBC 10.1 11/10/2017    RBC 3.01 11/10/2017    HGB 9.5 11/10/2017    HCT 28.3 11/10/2017     11/10/2017       BMP: @(bmp:3)@    CMP:  Lab Results   Component Value Date     11/10/2017    K 3.8 11/10/2017     11/10/2017    CO2 23 11/10/2017    BUN 83 11/10/2017    CREATININE 1.23 11/10/2017    GFRAA 50.9 11/10/2017    LABGLOM 42.0 11/10/2017    GLUCOSE 197 11/10/2017    CALCIUM 8.5 11/10/2017       Hepatic Function Panel:  Lab Results   Component Value Date    ALKPHOS 101 11/07/2017    ALT <5 11/07/2017    AST 15 11/07/2017    PROT 4.4 11/07/2017    BILITOT 0.4 11/07/2017    LABALBU 1.7 11/07/2017       Magnesium:    Lab Results   Component Value Date    MG 2.9 11/08/2017       PT/INR:    Lab Results   Component Value Date    PROTIME 12.4 11/08/2017    PROTIME 26.9 10/23/2017    INR 1.2 11/08/2017          Electronically signed by Hector Lamar NP on 11/10/2017 at 1:31 PM

## 2017-11-10 NOTE — PROGRESS NOTES
Speech therapist Martina Mares advised she recommends a puree with honey thick liquids diet. She also recommends a MBS. Family has refused it in the past. Dr. Kyung Verduzco made aware of these recommendations. He went and spoke to patient daughter Denisse Trevizo and put orders in according to their conversation. Nadine Guerra RN aware.  Electronically signed by Leah Jerome RN on 11/10/2017 at 11:17 AM

## 2017-11-10 NOTE — PROGRESS NOTES
INPATIENT PROGRESS NOTE    SERVICE DATE:  11/10/2017   SERVICE TIME: 7:50 am     ASSESSMENT/PLAN: 75-year-old female with complicated medical history, recently admitted with abscesses of forearm and sacrococcyx area, patient has COPD, admitted for hypotension, and acute on chronic anemia related to blood loss. chronic heart failure, history of DVT, chronic anemia. Admitted with hypotension and acute on chronic anemia rule out GI bleed.     Septic Shock: persistent hypotension on Levophed  - Continues on pressor support, wean as tolerated  - Cardio consulted. Per NP and Dr. Marilin Gaitan, Persistent hypotension requiring pressor support, exacerbated by low oncotic pressure as evidenced by low albumin. Family at this point has not consented to tube feeding, may need to at least consider TPN in the interim until final decision is made. Give Albumin. Ok to transfer when off inotropic support. Cristhian further recs. - Nephrology consulted. Per Dr. Brigitte Aj, ckd stage 3 due to diabetic nephropathy.  She has multifactorial anemia, low bp, samm, fluid overload.  Acid base status and lytes are ok. She has severe protein calorie malnutrition with alb of 1.7 making the edema harder to deal with. On pressors, worsening clinical status. 1 dose of lasix 60 mg for progressive fluid retention. Cristhian further recs.      Acute on chronic anemia: Occult stool was positive for blood. - Rechecked CBC  - Typed and Screened  - Transfuse 2 units PRBC's for hgb drop below 8 in a CAD pt  - DC'd anticoagulation   - Hem/Onc consulted. Per Dr. Sae Hoover, The pt has acute on chronic anemia which is multifactorial  due to anemia related to CKD. She also has anemia due to occult GI blood loss. No evidence of malignancy on recent BM asp & Bx. Possibility of myelodysplastic syndrome could not be fully ruled/out on recent BM asp & Bx. Results of BM cytogenetics will be checked. Monitor CBC; consider RBC transfusion if pt's anemia decreases further.  The pt's anticoagulant therapy may be contributing to the pt's anemia. B12 and folate levels as well as Fe, TIBC and ferritin levels , and reticulocyte count will also be ordered. Thrombocytopenia may be due to pt's recent infection as well as antibiotic effect r/o early MDS on recent BM asp & Bx  - IV Protonix continued  - Continue to monitor closely  -Hemoglobin 9.5, hematocrit 28.3, status post blood transfusion     Uncontrolled hypertension: Now hypotensive on pressors in ICU  - Adjust blood pressure medications accordingly  - Cardio consulted. Per NP, Hypovolemic shock requiring vasopressor support. Permanent AF, rates controlled following discontinuation of dopamine. Would avoid dopamine, favor levophed for BP support. Unable to tolerate any rate control agents due to hypotension. No anticoagulation at this point. Volume management per nephrology. Overall prognosis poor. Cristhian further recs.      History of DVT  - No anticoagulation      Sacral decubitus ulcer, stage 4, POA and left forearm wound. Culture was MRSA positive on last admission 1 week prior  - contact precautions  - consult wound care  - ID following. Per Dr. Deanne Feng, Change patient back to vancomycin and meropenem combination. Check Blood cultures. Cristhian further recs  -Surgery was consulted, will follow wound, may need further debridement     Atrial fibrillation  - Continue telemetry  - Cardio consulted and following as above     Acute respiratory failure  -Patient has significant sleep apnea, also COPD  -Patient placed on BiPAP at this time  -Patient is also on IV steroids  -Pulmonology consulted. Per Dr. Marisa Milian, Acute and chronic respiratory failure on BiPAP, Patient is currently on a BiPAP therapy with IPAP of 12 EPAP of 6 with FiO2 40% she is very sleepy. Last ABG shows pH 7.239 PCO2 56 PO2 155 saturation 90 and bicarb 23.8. Patient did not have any chest x-ray done, I will request portable chest x-ray. Patient is DNR CC and no intubation.   She is on Levophed for hypotension. After 1 unit of PRBC transfusion last hemoglobin is 8.5. Patient has anasarca. She is on nebulizer with DuoNeb 4 times a day. Patient is on vancomycin and Merrem. Continue BiPAP therapy, if saturation 93% or above. Cristhian further recs.      CHF  COPD     Dispo: Ethics consult placed per intensivist. Palliative care consulted. Family refusing hospice at this point. Await consultant eval and clearance prior to transfer out of ICU and ultimately discharge if possible.  Prognosis guarded.        SUBJECTIVE  CHIEF COMPLAINT: Hypotension, acute on chronic anemia    PRIMARY SERVICE: Internal medicine    INTERVAL HPI:  Patient is actually more alert today, opening eyes, answering some questions      MEDICATIONS:    Current Facility-Administered Medications   Medication Dose Route Frequency Provider Last Rate Last Dose    sodium chloride flush 0.9 % injection 10 mL  10 mL Intravenous PRN Glynn Bauer MD        collagenase ointment   Topical Daily Kaur Eldridge MD        sodium chloride flush 0.9 % injection 10 mL  10 mL Intravenous 2 times per day Walker Mann MD   10 mL at 11/09/17 1957    sodium chloride flush 0.9 % injection 10 mL  10 mL Intravenous PRN Walker Mann MD        acetaminophen (TYLENOL) tablet 650 mg  650 mg Oral Q4H PRN Walker Mann MD        0.9 % sodium chloride bolus  250 mL Intravenous Once [facility]        norepinephrine (LEVOPHED) 16 mg in dextrose 5% 250 mL infusion  2 mcg/min Intravenous Continuous Shaq Jimenez MD 3.8 mL/hr at 11/09/17 1956 4.053 mcg/min at 11/09/17 1956    darbepoetin stephanie-polysorbate (ARANESP) injection 40 mcg  40 mcg Subcutaneous Weekly Rocco Connor MD   40 mcg at 11/07/17 2043    pantoprazole (PROTONIX) injection 40 mg  40 mg Intravenous BID Shaq Jimenez MD   40 mg at 11/09/17 2121    And    sodium chloride (PF) 0.9 % injection 10 mL  10 mL Intravenous Daily Shaq Jimenez MD   10 mL at 11/09/17 1253    hydrocortisone

## 2017-11-10 NOTE — PROGRESS NOTES
Cornelio Yepez stating that FiO2 failed, to do O2 calibration. Still failed. Pulled and replaced with a C-2 with same settings.

## 2017-11-10 NOTE — PROGRESS NOTES
poor/missing dentition  [x]  decreased lingual control  []  vertical munch    [x]  cognitive function    [x]  Delayed A-P transit due to [x]  decreased initiation   [x] reduced lingual strength         [x]  cognitive function     [x]  Oral residuals []  right buccal cavity  []  left buccal cavity []  sub lingually   []  on tongue base   [x]  throughout oral cavity     []  on superior tongue       []  on palate               []  on velum              []  anterior sulcus    Oral Stage Impression: Severe oral dysphagia characterized by generalized weakness resulting in inadequate labial seal at midline with anterior spillage of liquids from cup and spoon, moderate-severely delayed A-P transit, and oral residuals throughout oral cavity for puree. Pt demonstrated oral holding of each bolus, and pt often required cues to initiate swallow. Solids were not tested d/t pt's over weakness and inconsistency with following directions.      Pharyngeal Stage:  []  WNL []  WFL      [x]  Exceptions      [x]  Throat clearing present after presentation of:    []  thin  [x]  nectar  []  honey    []  pureed  []  solid    [x]  Immediate wet cough was noted after presentation of:    [x]  thin  []  nectar  []  honey   []  pureed  []  Solid    [x]  Latent wet cough was noted after presentation of :    []  thin  [x]  nectar  []  Honey  []  pureed  []  solid    []  Wet respirations were noted after presentation of:    []  thin  []  nectar  []  honey   []  pureed  []  solid    []  Wet/gurgly vocal quality was noted after presentation of:    []  thin  []  nectar  [] honey   []  pureed  []  solid    [x] Multiple swallows were noted after presentation of:    [x]  thin  [x]  nectar  []  Honey  []  pureed  [] solid    []  Consistent O2  desaturation after the swallow  []  Eye watering/flushing of skin  []  Congested cough throughout evaluation  [x] Delayed initiation of the pharyngeal swallow noted  []  Absent swallow    Pharyngeal Stage Impression:

## 2017-11-10 NOTE — PLAN OF CARE
Problem: Nausea/Vomiting:  Goal: Absence of nausea/vomiting  Absence of nausea/vomiting  Outcome: Met This Shift

## 2017-11-10 NOTE — FLOWSHEET NOTE
1915   Nurses in room with surgeon for skin assessment. Sacral                wound assessed, cleaned and re-dressed with wet to dry                per VO from surgeon. Patient repositioned. 2000   Blood draw from central line for CBC post blood transfusion. All lines flushed. No blood return noted from PICC line. Assessment completed. Patient yells \"ouch\" with care or                  Any movement. 2115   Patient lays in bed with eyes closed. 2130   Repositioned. 26   Daughter called. HIPPA code provided, update given. 2330   Repositioned. 0000   Respiratory in to place pt on Bipap.  0100   Complete bath, complete linen change, oral and janes care                performed. 0215   Resting with eyes closed.

## 2017-11-10 NOTE — PLAN OF CARE
Problem: Nutrition  Goal: Optimal nutrition therapy  Nutrition Problem: Inadequate oral intake, in context of chronic illness  Intervention: Food and/or Nutrient Delivery: Start oral diet, Start ONS  Nutritional Goals: Intake Of Meals/ ONS's Will Improve To > 75%. Improved Wound Healing.    Outcome: Ongoing

## 2017-11-10 NOTE — PLAN OF CARE
Problem: Risk for Impaired Skin Integrity  Goal: Tissue integrity - skin and mucous membranes  Structural intactness and normal physiological function of skin and  mucous membranes. Intervention: SKIN ASSESSMENT  Continue Q shift and prn assessments. Intervention: PRESSURE ULCER PREVENTION  Implemented and continued skin preventative measures; Q 2 hour turns, utilize bed turn assist, elevate heels. Intervention: TURN PATIENT  Q 2 hour turn, pillow support for positioning.       Problem: Falls - Risk of  Goal: Absence of falls  Outcome: Met This Shift

## 2017-11-11 LAB
ANION GAP SERPL CALCULATED.3IONS-SCNC: 13 MEQ/L (ref 7–13)
BASOPHILS ABSOLUTE: 0 K/UL (ref 0–0.2)
BASOPHILS RELATIVE PERCENT: 0.2 %
BUN BLDV-MCNC: 79 MG/DL (ref 8–23)
CALCIUM SERPL-MCNC: 8.7 MG/DL (ref 8.6–10.2)
CHLORIDE BLD-SCNC: 114 MEQ/L (ref 98–107)
CO2: 23 MEQ/L (ref 22–29)
CREAT SERPL-MCNC: 1.14 MG/DL (ref 0.5–0.9)
DIGOXIN LEVEL: 1.1 NG/ML (ref 0.8–2)
EOSINOPHILS ABSOLUTE: 0 K/UL (ref 0–0.7)
EOSINOPHILS RELATIVE PERCENT: 0.1 %
GFR AFRICAN AMERICAN: 55.5
GFR NON-AFRICAN AMERICAN: 45.9
GLUCOSE BLD-MCNC: 266 MG/DL (ref 74–109)
HCT VFR BLD CALC: 27.9 % (ref 37–47)
HEMOGLOBIN: 9.2 G/DL (ref 12–16)
LYMPHOCYTES ABSOLUTE: 0.6 K/UL (ref 1–4.8)
LYMPHOCYTES RELATIVE PERCENT: 4.8 %
MAGNESIUM: 2.9 MG/DL (ref 1.7–2.3)
MCH RBC QN AUTO: 31.4 PG (ref 27–31.3)
MCHC RBC AUTO-ENTMCNC: 33.1 % (ref 33–37)
MCV RBC AUTO: 94.8 FL (ref 82–100)
MONOCYTES ABSOLUTE: 0.5 K/UL (ref 0.2–0.8)
MONOCYTES RELATIVE PERCENT: 4.3 %
NEUTROPHILS ABSOLUTE: 11.5 K/UL (ref 1.4–6.5)
NEUTROPHILS RELATIVE PERCENT: 90.6 %
PDW BLD-RTO: 15.8 % (ref 11.5–14.5)
PHOSPHORUS: 2 MG/DL (ref 2.5–4.5)
PLATELET # BLD: 140 K/UL (ref 130–400)
POTASSIUM SERPL-SCNC: 3.6 MEQ/L (ref 3.5–5.1)
RBC # BLD: 2.94 M/UL (ref 4.2–5.4)
SODIUM BLD-SCNC: 150 MEQ/L (ref 132–144)
WBC # BLD: 12.6 K/UL (ref 4.8–10.8)

## 2017-11-11 PROCEDURE — 2700000000 HC OXYGEN THERAPY PER DAY

## 2017-11-11 PROCEDURE — 94760 N-INVAS EAR/PLS OXIMETRY 1: CPT

## 2017-11-11 PROCEDURE — 80162 ASSAY OF DIGOXIN TOTAL: CPT

## 2017-11-11 PROCEDURE — 2580000003 HC RX 258: Performed by: INTERNAL MEDICINE

## 2017-11-11 PROCEDURE — 99233 SBSQ HOSP IP/OBS HIGH 50: CPT | Performed by: INTERNAL MEDICINE

## 2017-11-11 PROCEDURE — 94762 N-INVAS EAR/PLS OXIMTRY CONT: CPT

## 2017-11-11 PROCEDURE — C9113 INJ PANTOPRAZOLE SODIUM, VIA: HCPCS | Performed by: ANESTHESIOLOGY

## 2017-11-11 PROCEDURE — 36592 COLLECT BLOOD FROM PICC: CPT

## 2017-11-11 PROCEDURE — 2580000003 HC RX 258: Performed by: HOSPITALIST

## 2017-11-11 PROCEDURE — 2000000000 HC ICU R&B

## 2017-11-11 PROCEDURE — 99232 SBSQ HOSP IP/OBS MODERATE 35: CPT | Performed by: INTERNAL MEDICINE

## 2017-11-11 PROCEDURE — 94640 AIRWAY INHALATION TREATMENT: CPT

## 2017-11-11 PROCEDURE — 84100 ASSAY OF PHOSPHORUS: CPT

## 2017-11-11 PROCEDURE — 85025 COMPLETE CBC W/AUTO DIFF WBC: CPT

## 2017-11-11 PROCEDURE — 6370000000 HC RX 637 (ALT 250 FOR IP): Performed by: ANESTHESIOLOGY

## 2017-11-11 PROCEDURE — 6360000002 HC RX W HCPCS: Performed by: INTERNAL MEDICINE

## 2017-11-11 PROCEDURE — 6360000002 HC RX W HCPCS: Performed by: ANESTHESIOLOGY

## 2017-11-11 PROCEDURE — 83735 ASSAY OF MAGNESIUM: CPT

## 2017-11-11 PROCEDURE — 2580000003 HC RX 258: Performed by: ANESTHESIOLOGY

## 2017-11-11 PROCEDURE — 80048 BASIC METABOLIC PNL TOTAL CA: CPT

## 2017-11-11 RX ADMIN — IPRATROPIUM BROMIDE AND ALBUTEROL SULFATE 1 AMPULE: 2.5; .5 SOLUTION RESPIRATORY (INHALATION) at 10:45

## 2017-11-11 RX ADMIN — HYDROCORTISONE SODIUM SUCCINATE 50 MG: 100 INJECTION, POWDER, FOR SOLUTION INTRAMUSCULAR; INTRAVENOUS at 23:18

## 2017-11-11 RX ADMIN — HYDROCORTISONE SODIUM SUCCINATE 50 MG: 100 INJECTION, POWDER, FOR SOLUTION INTRAMUSCULAR; INTRAVENOUS at 13:16

## 2017-11-11 RX ADMIN — Medication 10 ML: at 21:48

## 2017-11-11 RX ADMIN — MEROPENEM 1 G: 1 INJECTION, POWDER, FOR SOLUTION INTRAVENOUS at 04:57

## 2017-11-11 RX ADMIN — HYDROCORTISONE SODIUM SUCCINATE 50 MG: 100 INJECTION, POWDER, FOR SOLUTION INTRAMUSCULAR; INTRAVENOUS at 04:58

## 2017-11-11 RX ADMIN — IPRATROPIUM BROMIDE AND ALBUTEROL SULFATE 1 AMPULE: 2.5; .5 SOLUTION RESPIRATORY (INHALATION) at 15:32

## 2017-11-11 RX ADMIN — SODIUM CHLORIDE, PRESERVATIVE FREE 10 ML: 5 INJECTION INTRAVENOUS at 17:12

## 2017-11-11 RX ADMIN — LACTULOSE 20 G: 20 SOLUTION ORAL at 09:44

## 2017-11-11 RX ADMIN — PANTOPRAZOLE SODIUM 40 MG: 40 INJECTION, POWDER, FOR SOLUTION INTRAVENOUS at 21:47

## 2017-11-11 RX ADMIN — IPRATROPIUM BROMIDE AND ALBUTEROL SULFATE 1 AMPULE: 2.5; .5 SOLUTION RESPIRATORY (INHALATION) at 06:01

## 2017-11-11 RX ADMIN — MEROPENEM 1 G: 1 INJECTION, POWDER, FOR SOLUTION INTRAVENOUS at 17:12

## 2017-11-11 RX ADMIN — LACTULOSE 20 G: 20 SOLUTION ORAL at 21:47

## 2017-11-11 RX ADMIN — DEXTROSE MONOHYDRATE: 50 INJECTION, SOLUTION INTRAVENOUS at 15:38

## 2017-11-11 RX ADMIN — PANTOPRAZOLE SODIUM 40 MG: 40 INJECTION, POWDER, FOR SOLUTION INTRAVENOUS at 09:44

## 2017-11-11 RX ADMIN — IPRATROPIUM BROMIDE AND ALBUTEROL SULFATE 1 AMPULE: 2.5; .5 SOLUTION RESPIRATORY (INHALATION) at 19:39

## 2017-11-11 RX ADMIN — Medication 10 ML: at 09:00

## 2017-11-11 RX ADMIN — SODIUM CHLORIDE, PRESERVATIVE FREE 10 ML: 5 INJECTION INTRAVENOUS at 09:00

## 2017-11-11 RX ADMIN — HYDROCORTISONE SODIUM SUCCINATE 50 MG: 100 INJECTION, POWDER, FOR SOLUTION INTRAMUSCULAR; INTRAVENOUS at 17:11

## 2017-11-11 ASSESSMENT — PAIN SCALES - GENERAL
PAINLEVEL_OUTOF10: 0

## 2017-11-11 NOTE — CARE COORDINATION
Pt remains in ICU on 40% O2 BIPAP. She did not awaken when I rounded, but Dr had documented she was more alert, smiling, and arousable. Cont Levo gtt. Appetite remains poor and she is a feed. Nursing said the sacral/coccyx wound has bone showing and has a foul odor. Pt is bed bound at facility. C3 will follow for dc needs.

## 2017-11-11 NOTE — PROGRESS NOTES
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 Neck: No tracheal deviation present. No thyromegaly present. Cardiovascular: Normal rate, normal heart sounds and intact distal pulses. Exam reveals no gallop. No murmur heard. Pulmonary/Chest: Effort normal. No respiratory distress. She has no wheezes. She has no rales. She exhibits no tenderness. Abdominal: Soft. Bowel sounds are normal. She exhibits no distension and no mass. There is no hepatosplenomegaly, splenomegaly or hepatomegaly. There is no tenderness. There is no rebound, no guarding and no CVA tenderness. Musculoskeletal: She exhibits edema.  She exhibits no tenderness.                             Patient Active Problem List   Diagnosis    Pneumonia of left lower lobe due to infectious organism [facility])    Chronic obstructive pulmonary disease (HCC)    Type 2 diabetes mellitus ([facility]    Anemia    Metabolic encephalopathy    Acute on chronic diastolic congestive heart failure (HCC)    Hypernatremia    Obesity    Hemiparesis affecting left side as late effect of stroke (HCC)    [facility]rial fibrillation ([facility])    Acute on chronic respiratory failure with hypoxia and hypercapnia ([facility])    Cerebral infarct ([facility]    Hospice Care    Atrial fibrillation with RVR (HCC)    Acute hemolytic anemia (HCC)    [facility]    MRSA (methicillin resistant Staphylococcus aureus) infection    Decubitus ulcer of sacral region    Infected ulcer of skin (HCC)    Hemolytic anemia, acute (HCC)    Hypotension    Hypovolemic shock (HCC)    Septic shock (HCC)    UGI bleed    Acute blood loss anemia         CBC Auto Differential [[id number]] (Abnormal) Collected: 11/10/17 0600      Specimen: Blood Updated: 11/10/17 0730      WBC 10.1 K/uL       RBC 3.01 (L) M/uL       Hemoglobin 9.5 (L) g/dL       Hematocrit 28.3 (L) %       MCV 94.2 fL       MCH 31.5 (H) pg       MCHC 33.4 %       RDW 15.4 (H) %       Platelets 452 (L) K/uL       Neutrophils % 90.5 %       Lymphocytes % 5.4 %       Monocytes % 3.9 %       Eosinophils % 0.0 %       Basophils % 0.2 %       Neutrophils # 9.2 (H) K/uL       Lymphocytes # 0.5 (L) K/uL       Monocytes # 0.4 K/uL       Eosinophils # 0.0 K/uL       Basophils # 0.0 K/uL      Vancomycin, Trough [[id number]] (Abnormal) Collected: 11/10/17 0600     Specimen: Blood from Blood Updated: 11/10/17 0720      Vancomycin Tr 21.2 (H) ug/mL      Narrative:       CALL  [place]  [facility] tel. 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  Vanco   Resistant vancomycin 1 g every

## 2017-11-11 NOTE — PROGRESS NOTES
I&D in. New order  2345  BP running low intermittently. . Consistently low x last hour. Levophed resumed. 0450 Slight titration of Levophed. Repositioned at shift change, skin assessed, bedside report given. Patient remains unchanged from second half of shift. Levophed infusing at 6 mcs.

## 2017-11-11 NOTE — PROGRESS NOTES
mL/hr at 11/10/17 2222    norepinephrine 6 mcg/min (11/11/17 0450)       sodium chloride flush 10 mL PRN   sodium chloride flush 10 mL PRN   acetaminophen 650 mg Q4H PRN       Diagnostics:      Result Date: 11/9/2017  EXAMINATION: XR CHEST PORTABLE CLINICAL HISTORY: Shortness of breath COMPARISONS: 10/27/2017 FINDINGS: There is moderate cardiomegaly. Pulmonary vascularity is prominent. There are groundglass and streaky infiltrates in the left midlung and left lung base, mildly improved from 10/27/2017. There is blunting of the left costophrenic angle consistent with scarring or small effusion. Interstitial markings are prominent. There is mild elevation of the left hemidiaphragm. There is calcification of the mitral annulus. Right upper extremity PICC tip is in the right atrium. There is no pneumothorax. CARDIOMEGALY AND MILD VASCULAR CONGESTION. GROUNDGLASS AND STREAKY INFILTRATES IN THE LEFT MIDLUNG AND LEFT LUNG BASE, MILDLY IMPROVED FROM 10/27/2017; CHEST RADIOGRAPH IS OTHERWISE ESSENTIALLY UNCHANGED--SEE ABOVE.         Lab Data:    ProBNP:   Lab Results   Component Value Date    PROBNP 1,594 09/27/2017       CBC:   Lab Results   Component Value Date    WBC 12.6 11/11/2017    RBC 2.94 11/11/2017    HGB 9.2 11/11/2017    HCT 27.9 11/11/2017     11/11/2017       CMP:    Lab Results   Component Value Date     11/11/2017    K 3.6 11/11/2017     11/11/2017    CO2 23 11/11/2017    BUN 79 11/11/2017    CREATININE 1.14 11/11/2017    GFRAA 55.5 11/11/2017    LABGLOM 45.9 11/11/2017    GLUCOSE 266 11/11/2017    CALCIUM 8.7 11/11/2017       Hepatic Function Panel:    Lab Results   Component Value Date    ALKPHOS 101 11/07/2017    ALT <5 11/07/2017    AST 15 11/07/2017    PROT 4.4 11/07/2017    BILITOT 0.4 11/07/2017    LABALBU 1.7 11/07/2017       Magnesium:    Lab Results   Component Value Date    MG 2.9 11/11/2017       PT/INR:    Lab Results   Component Value Date    PROTIME 12.4 11/08/2017

## 2017-11-11 NOTE — PROGRESS NOTES
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  Vanco

## 2017-11-12 LAB
ANION GAP SERPL CALCULATED.3IONS-SCNC: 13 MEQ/L (ref 7–13)
BLOOD CULTURE, ROUTINE: NORMAL
BUN BLDV-MCNC: 74 MG/DL (ref 8–23)
CALCIUM SERPL-MCNC: 8.7 MG/DL (ref 8.6–10.2)
CHLORIDE BLD-SCNC: 109 MEQ/L (ref 98–107)
CO2: 21 MEQ/L (ref 22–29)
CREAT SERPL-MCNC: 1 MG/DL (ref 0.5–0.9)
CULTURE, BLOOD 2: NORMAL
DIGOXIN LEVEL: 0.8 NG/ML (ref 0.8–2)
GFR AFRICAN AMERICAN: >60
GFR NON-AFRICAN AMERICAN: 53.4
GLUCOSE BLD-MCNC: 320 MG/DL (ref 60–115)
GLUCOSE BLD-MCNC: 326 MG/DL (ref 60–115)
GLUCOSE BLD-MCNC: 327 MG/DL (ref 60–115)
GLUCOSE BLD-MCNC: 336 MG/DL (ref 74–109)
GLUCOSE BLD-MCNC: 363 MG/DL (ref 60–115)
PERFORMED ON: ABNORMAL
POTASSIUM SERPL-SCNC: 3.7 MEQ/L (ref 3.5–5.1)
SODIUM BLD-SCNC: 143 MEQ/L (ref 132–144)

## 2017-11-12 PROCEDURE — 80162 ASSAY OF DIGOXIN TOTAL: CPT

## 2017-11-12 PROCEDURE — 2000000000 HC ICU R&B

## 2017-11-12 PROCEDURE — 2580000003 HC RX 258: Performed by: ANESTHESIOLOGY

## 2017-11-12 PROCEDURE — 2580000003 HC RX 258: Performed by: INTERNAL MEDICINE

## 2017-11-12 PROCEDURE — 99232 SBSQ HOSP IP/OBS MODERATE 35: CPT | Performed by: INTERNAL MEDICINE

## 2017-11-12 PROCEDURE — 80048 BASIC METABOLIC PNL TOTAL CA: CPT

## 2017-11-12 PROCEDURE — 2580000003 HC RX 258

## 2017-11-12 PROCEDURE — 6360000002 HC RX W HCPCS: Performed by: ANESTHESIOLOGY

## 2017-11-12 PROCEDURE — 2500000003 HC RX 250 WO HCPCS: Performed by: INTERNAL MEDICINE

## 2017-11-12 PROCEDURE — 6360000002 HC RX W HCPCS: Performed by: INTERNAL MEDICINE

## 2017-11-12 PROCEDURE — 94640 AIRWAY INHALATION TREATMENT: CPT

## 2017-11-12 PROCEDURE — 94660 CPAP INITIATION&MGMT: CPT

## 2017-11-12 PROCEDURE — 6370000000 HC RX 637 (ALT 250 FOR IP): Performed by: INTERNAL MEDICINE

## 2017-11-12 PROCEDURE — C9113 INJ PANTOPRAZOLE SODIUM, VIA: HCPCS | Performed by: ANESTHESIOLOGY

## 2017-11-12 PROCEDURE — 2700000000 HC OXYGEN THERAPY PER DAY

## 2017-11-12 PROCEDURE — 99233 SBSQ HOSP IP/OBS HIGH 50: CPT | Performed by: INTERNAL MEDICINE

## 2017-11-12 PROCEDURE — 6370000000 HC RX 637 (ALT 250 FOR IP): Performed by: ANESTHESIOLOGY

## 2017-11-12 PROCEDURE — 2580000003 HC RX 258: Performed by: HOSPITALIST

## 2017-11-12 PROCEDURE — 6370000000 HC RX 637 (ALT 250 FOR IP): Performed by: SURGERY

## 2017-11-12 RX ORDER — LEVOTHYROXINE SODIUM ANHYDROUS 100 UG/5ML
12.5 INJECTION, POWDER, LYOPHILIZED, FOR SOLUTION INTRAVENOUS
Status: DISCONTINUED | OUTPATIENT
Start: 2017-11-12 | End: 2017-11-13 | Stop reason: SDUPTHER

## 2017-11-12 RX ORDER — DEXTROSE MONOHYDRATE 50 MG/ML
100 INJECTION, SOLUTION INTRAVENOUS PRN
Status: DISCONTINUED | OUTPATIENT
Start: 2017-11-12 | End: 2017-11-18 | Stop reason: HOSPADM

## 2017-11-12 RX ORDER — DEXTROSE MONOHYDRATE 25 G/50ML
12.5 INJECTION, SOLUTION INTRAVENOUS PRN
Status: DISCONTINUED | OUTPATIENT
Start: 2017-11-12 | End: 2017-11-18 | Stop reason: HOSPADM

## 2017-11-12 RX ORDER — FUROSEMIDE 10 MG/ML
40 INJECTION INTRAMUSCULAR; INTRAVENOUS ONCE
Status: COMPLETED | OUTPATIENT
Start: 2017-11-12 | End: 2017-11-12

## 2017-11-12 RX ORDER — NICOTINE POLACRILEX 4 MG
15 LOZENGE BUCCAL PRN
Status: DISCONTINUED | OUTPATIENT
Start: 2017-11-12 | End: 2017-11-18 | Stop reason: HOSPADM

## 2017-11-12 RX ADMIN — HYDROCORTISONE SODIUM SUCCINATE 50 MG: 100 INJECTION, POWDER, FOR SOLUTION INTRAMUSCULAR; INTRAVENOUS at 05:49

## 2017-11-12 RX ADMIN — LEVOTHYROXINE SODIUM ANHYDROUS 12.5 MCG: 100 INJECTION, POWDER, LYOPHILIZED, FOR SOLUTION INTRAVENOUS at 10:59

## 2017-11-12 RX ADMIN — INSULIN LISPRO 4 UNITS: 100 INJECTION, SOLUTION INTRAVENOUS; SUBCUTANEOUS at 10:00

## 2017-11-12 RX ADMIN — INSULIN LISPRO 4 UNITS: 100 INJECTION, SOLUTION INTRAVENOUS; SUBCUTANEOUS at 11:53

## 2017-11-12 RX ADMIN — VANCOMYCIN HYDROCHLORIDE 1000 MG: 1 INJECTION, SOLUTION INTRAVENOUS at 22:09

## 2017-11-12 RX ADMIN — MEROPENEM 1 G: 1 INJECTION, POWDER, FOR SOLUTION INTRAVENOUS at 18:30

## 2017-11-12 RX ADMIN — LACTULOSE 20 G: 20 SOLUTION ORAL at 09:15

## 2017-11-12 RX ADMIN — HYDROCORTISONE SODIUM SUCCINATE 50 MG: 100 INJECTION, POWDER, FOR SOLUTION INTRAMUSCULAR; INTRAVENOUS at 18:37

## 2017-11-12 RX ADMIN — PANTOPRAZOLE SODIUM 40 MG: 40 INJECTION, POWDER, FOR SOLUTION INTRAVENOUS at 09:14

## 2017-11-12 RX ADMIN — IPRATROPIUM BROMIDE AND ALBUTEROL SULFATE 1 AMPULE: 2.5; .5 SOLUTION RESPIRATORY (INHALATION) at 10:42

## 2017-11-12 RX ADMIN — LACTULOSE 20 G: 20 SOLUTION ORAL at 22:09

## 2017-11-12 RX ADMIN — COLLAGENASE SANTYL: 250 OINTMENT TOPICAL at 13:26

## 2017-11-12 RX ADMIN — SODIUM CHLORIDE, PRESERVATIVE FREE 10 ML: 5 INJECTION INTRAVENOUS at 09:19

## 2017-11-12 RX ADMIN — FUROSEMIDE 40 MG: 10 INJECTION, SOLUTION INTRAVENOUS at 15:06

## 2017-11-12 RX ADMIN — DEXTROSE MONOHYDRATE: 50 INJECTION, SOLUTION INTRAVENOUS at 05:49

## 2017-11-12 RX ADMIN — INSULIN LISPRO 4 UNITS: 100 INJECTION, SOLUTION INTRAVENOUS; SUBCUTANEOUS at 17:03

## 2017-11-12 RX ADMIN — IPRATROPIUM BROMIDE AND ALBUTEROL SULFATE 1 AMPULE: 2.5; .5 SOLUTION RESPIRATORY (INHALATION) at 15:33

## 2017-11-12 RX ADMIN — Medication 10 ML: at 09:15

## 2017-11-12 RX ADMIN — PANTOPRAZOLE SODIUM 40 MG: 40 INJECTION, POWDER, FOR SOLUTION INTRAVENOUS at 22:09

## 2017-11-12 RX ADMIN — WATER 20 ML: 1 INJECTION INTRAMUSCULAR; INTRAVENOUS; SUBCUTANEOUS at 06:23

## 2017-11-12 RX ADMIN — IPRATROPIUM BROMIDE AND ALBUTEROL SULFATE 1 AMPULE: 2.5; .5 SOLUTION RESPIRATORY (INHALATION) at 20:44

## 2017-11-12 RX ADMIN — Medication 10 ML: at 22:14

## 2017-11-12 RX ADMIN — MEROPENEM 1 G: 1 INJECTION, POWDER, FOR SOLUTION INTRAVENOUS at 05:49

## 2017-11-12 RX ADMIN — HYDROCORTISONE SODIUM SUCCINATE 50 MG: 100 INJECTION, POWDER, FOR SOLUTION INTRAMUSCULAR; INTRAVENOUS at 22:14

## 2017-11-12 RX ADMIN — IPRATROPIUM BROMIDE AND ALBUTEROL SULFATE 1 AMPULE: 2.5; .5 SOLUTION RESPIRATORY (INHALATION) at 06:02

## 2017-11-12 RX ADMIN — HYDROCORTISONE SODIUM SUCCINATE 50 MG: 100 INJECTION, POWDER, FOR SOLUTION INTRAMUSCULAR; INTRAVENOUS at 11:01

## 2017-11-12 RX ADMIN — ASCORBIC ACID, VITAMIN A PALMITATE, CHOLECALCIFEROL, THIAMINE HYDROCHLORIDE, RIBOFLAVIN-5 PHOSPHATE SODIUM, PYRIDOXINE HYDROCHLORIDE, NIACINAMIDE, DEXPANTHENOL, ALPHA-TOCOPHEROL ACETATE, VITAMIN K1, FOLIC ACID, BIOTIN, CYANOCOBALAMIN: 200; 3300; 200; 6; 3.6; 6; 40; 15; 10; 150; 600; 60; 5 INJECTION, SOLUTION INTRAVENOUS at 18:45

## 2017-11-12 RX ADMIN — SODIUM CHLORIDE, PRESERVATIVE FREE 10 ML: 5 INJECTION INTRAVENOUS at 15:07

## 2017-11-12 ASSESSMENT — PAIN SCALES - GENERAL
PAINLEVEL_OUTOF10: 0

## 2017-11-12 ASSESSMENT — PULMONARY FUNCTION TESTS
PIF_VALUE: 12
PIF_VALUE: 11
PIF_VALUE: 11
PIF_VALUE: 12
PIF_VALUE: 9
PIF_VALUE: 12

## 2017-11-12 NOTE — CARE COORDINATION
Pt remains in ICU and is on full face BIPAP at . Concern over her nutritional status and it taking a nurse over 50min to get pt to take a few bites and some oral nutrition. This is prompting her frequently to wake up. Spoke to Dr Mary Atkins about this and he ordered PPN to be started. Dr Rayshawn Landis came by and spoke w pt's nurse Mary Ford and said he was going to evaluate pt's decub tomorrow for any possible surgical intervention, but doubts she could tolerate a surgery. Presently having drsg changes w Santyl oint and the nurse said the eschar is starting to loosen on the edges. C3 clarissa follow.

## 2017-11-12 NOTE — PROGRESS NOTES
contributing to the pt's anemia. B12 and folate levels as well as Fe, TIBC and ferritin levels , and reticulocyte count will also be ordered. Thrombocytopenia may be due to pt's recent infection as well as antibiotic effect r/o early MDS on recent BM asp & Bx  - IV Protonix continued  - Continue to monitor closely     HTN: Now hypotensive but off pressors in ICU  - Adjust blood pressure medications accordingly  - Cardio consulted and following     History of DVT  - No anticoagulation      Sacral decubitus ulcer, stage 4, POA and left forearm wound. Culture was MRSA positive on last admission 1 week prior  - contact precautions  - consult wound care  - ID following. Per Dr. Lorraine Lei, MRSA abscess left arm. Sacral abscess MRSA PROVIDENCIA. Changed patient back to vancomycin and meropenem combination. Checked Blood cultures. Cristhian further dispo recs. - Surgery was consulted. Per Dr. Susan Maguire, stage IV pressure wound, no drainage and no redness around. We will start with a chemical debridement using Santyl and reevaluation in 48 to 72 hours with the possibility of surgical debridement. Cristhian further recs.      Atrial fibrillation  - Continue telemetry  - Cardio consulted and following as above     Acute and chronic respiratory failure: improving   - Pulmonology consulted. Per Dr. Srini Méndez, Acute and chronic respiratory failure, improving slowly patient is currently stable but remains on pressors. Acute blood loss anemia, status post PRBC. Hypovolemic shock, improved, still on low-dose norepinephrine drip. Cristhian further dispo recs.      CHF  COPD     Dispo: Ethics consult placed per intensivist. Palliative care consulted. Family refusing hospice at this point. Await consultant clearance prior to transfer out of ICU and ultimately discharge if possible.  Prognosis remains guarded although clinically pt has made improvement.       SUBJECTIVE  CHIEF COMPLAINT: Hypotension, acute on chronic anemia    PRIMARY SERVICE: Internal medicine    INTERVAL HPI:  Patient is more alert today, smiling and conversing but still sleepy at times     MEDICATIONS:    Current Facility-Administered Medications   Medication Dose Route Frequency Provider Last Rate Last Dose    insulin lispro (HUMALOG) injection vial 0-6 Units  0-6 Units Subcutaneous TID  Max Orr MD        insulin lispro (HUMALOG) injection vial 0-3 Units  0-3 Units Subcutaneous Nightly Max Orr MD        glucose (GLUTOSE) 40 % oral gel 15 g  15 g Oral PRN Max Orr MD        dextrose 50 % solution 12.5 g  12.5 g Intravenous PRN Max Orr MD        glucagon (rDNA) injection 1 mg  1 mg Intramuscular PRN Max Orr MD        dextrose 5 % solution  100 mL/hr Intravenous PRN Max Orr MD        levothyroxine (SYNTHROID) injection 12.5 mcg  12.5 mcg Intravenous QAM AC Max Orr MD        dextrose 5 % solution   Intravenous Continuous Elmer Scott MD 75 mL/hr at 11/12/17 0549      lactulose (CHRONULAC) 10 GM/15ML solution 20 g  20 g Oral BID Gaetano Nraanjo MD   20 g at 11/12/17 0915    vancomycin (VANCOCIN) 1000 mg in dextrose 5% 200 mL IVPB  1,000 mg Intravenous Q48H Анна Pacheco MD   Stopped at 11/10/17 2340    sodium chloride flush 0.9 % injection 10 mL  10 mL Intravenous PRN Rose Mary Proctor MD   10 mL at 11/11/17 1712    collagenase ointment   Topical Daily Cholo Wray MD        sodium chloride flush 0.9 % injection 10 mL  10 mL Intravenous 2 times per day More Love MD   10 mL at 11/12/17 0915    sodium chloride flush 0.9 % injection 10 mL  10 mL Intravenous PRN More Love MD        acetaminophen (TYLENOL) tablet 650 mg  650 mg Oral Q4H PRN More Love MD        0.9 % sodium chloride bolus  250 mL Intravenous Once [facility], AISHWARYA        norepinephrine (LEVOPHED) 16 mg in dextrose 5% 250 mL infusion  2 mcg/min Intravenous Continuous Gaetano Naranjo MD   Stopped at 11/12/17 5571    darbepoetin stephanie-polysorbate

## 2017-11-12 NOTE — PROGRESS NOTES
BID    And    sodium chloride (PF)  10 mL Intravenous Daily    hydrocortisone sodium succinate PF  50 mg Intravenous Q6H    ipratropium-albuterol  1 ampule Inhalation 4x daily    meropenem  1 g Intravenous Q12H      dextrose      dextrose 75 mL/hr at 11/12/17 0549    norepinephrine Stopped (11/12/17 0625)       glucose 15 g PRN   dextrose 12.5 g PRN   glucagon (rDNA) 1 mg PRN   dextrose 100 mL/hr PRN   sodium chloride flush 10 mL PRN   sodium chloride flush 10 mL PRN   acetaminophen 650 mg Q4H PRN       Diagnostics:    Result Date: 11/9/2017  EXAMINATION: XR CHEST PORTABLE CLINICAL HISTORY: Shortness of breath COMPARISONS: 10/27/2017 FINDINGS: There is moderate cardiomegaly. Pulmonary vascularity is prominent. There are groundglass and streaky infiltrates in the left midlung and left lung base, mildly improved from 10/27/2017. There is blunting of the left costophrenic angle consistent with scarring or small effusion. Interstitial markings are prominent. There is mild elevation of the left hemidiaphragm. There is calcification of the mitral annulus. Right upper extremity PICC tip is in the right atrium. There is no pneumothorax. CARDIOMEGALY AND MILD VASCULAR CONGESTION. GROUNDGLASS AND STREAKY INFILTRATES IN THE LEFT MIDLUNG AND LEFT LUNG BASE, MILDLY IMPROVED FROM 10/27/2017; CHEST RADIOGRAPH IS OTHERWISE ESSENTIALLY UNCHANGED--SEE ABOVE.         Lab Data:    ProBNP:   Lab Results   Component Value Date    PROBNP 1,594 09/27/2017       CBC:   Lab Results   Component Value Date    WBC 12.6 11/11/2017    RBC 2.94 11/11/2017    HGB 9.2 11/11/2017    HCT 27.9 11/11/2017     11/11/2017       CMP:    Lab Results   Component Value Date     11/12/2017    K 3.7 11/12/2017     11/12/2017    CO2 21 11/12/2017    BUN 74 11/12/2017    CREATININE 1.00 11/12/2017    GFRAA >60.0 11/12/2017    LABGLOM 53.4 11/12/2017    GLUCOSE 336 11/12/2017    CALCIUM 8.7 11/12/2017       Hepatic Function Panel:

## 2017-11-12 NOTE — PROGRESS NOTES
Inhalation 4x daily    meropenem  1 g Intravenous Q12H     Continuous Infusions:   dextrose      dextrose 75 mL/hr at 11/12/17 0549    norepinephrine Stopped (11/12/17 0625)       CBC:   Recent Labs      11/10/17   0600  11/11/17   0600   WBC  10.1  12.6*   HGB  9.5*  9.2*   PLT  103*  140     CMP:    Recent Labs      11/10/17   0600  11/11/17   0600  11/12/17   0600   NA  154*  150*  143   K  3.8  3.6  3.7   CL  119*  114*  109*   CO2  23  23  21*   BUN  83*  79*  74*   CREATININE  1.23*  1.14*  1.00*   GLUCOSE  197*  266*  336*   CALCIUM  8.5*  8.7  8.7   LABGLOM  42.0*  45.9*  53.4*     Troponin: No results for input(s): TROPONINI in the last 72 hours. BNP: No results for input(s): BNP in the last 72 hours. INR:   No results for input(s): INR in the last 72 hours. Lipids: No results for input(s): CHOL, LDLDIRECT, TRIG, HDL, AMYLASE, LIPASE in the last 72 hours. Liver:   No results for input(s): AST, ALT, ALKPHOS, PROT, LABALBU, BILITOT in the last 72 hours. Invalid input(s): BILDIR  Iron:    No results for input(s): IRONS, FERRITIN in the last 72 hours. Invalid input(s): LABIRONS  Urinalysis: No results for input(s): UA in the last 72 hours. Objective:   Vitals: BP (!) 114/55   Pulse 80   Temp 98.5 °F (36.9 °C) (Axillary)   Resp 12   Ht 5' 6\" (1.676 m) Comment: 10/2017  Wt 226 lb 6.6 oz (102.7 kg)   SpO2 100%   BMI 36.54 kg/m²    Wt Readings from Last 3 Encounters:   11/10/17 226 lb 6.6 oz (102.7 kg)   11/04/17 218 lb (98.9 kg)   10/31/17 218 lb (98.9 kg)      24HR INTAKE/OUTPUT:      Intake/Output Summary (Last 24 hours) at 11/12/17 1435  Last data filed at 11/12/17 0600   Gross per 24 hour   Intake          3150.56 ml   Output             1275 ml   Net          1875.56 ml       Constitutional:  Ill appearing  Skin:normal, no rash  HEENT:sclera anicteric.   Head atraumatic normocephalic  Neck:supple with no thyromegally  Cardiovascular:  S1, S2 without m/r/g   Respiratory:  CTA B without w/r/r   Abdomen: +bs, soft, nt  Ext: 2+ LE edema  Musculoskeletal:Intact  Neuro: lethargic      Electronically signed by Jamie Richards MD on 11/12/2017 at 2:35 PM

## 2017-11-12 NOTE — PROGRESS NOTES
INPATIENT PROGRESS NOTES    PATIENT NAME: Roxana Christensen  MRN: [id number]  SERVICE DATE:  November 12, 2017   SERVICE TIME:  12:26 PM      PRIMARY SERVICE: Pulmonary Disease    CHIEF COMPLAINTS: Shortness of breath and cough    INTERVAL HPI: Patient seen and examined at bedside, Interval Notes, orders reviewed. Nursing notes noted  Patient is somnolent but arousable appears in no distress. OBJECTIVE    Body mass index is 36.54 kg/m². PHYSICAL EXAM:  Vitals:  BP (!) 114/55   Pulse 80   Temp 98.5 °F (36.9 °C) (Axillary)   Resp 12   Ht 5' 6\" (1.676 m) Comment: 10/2017  Wt 226 lb 6.6 oz (102.7 kg)   SpO2 100%   BMI 36.54 kg/m²   General: Somnolent arousable to voice in no distress  Head: Atraumatic , Normocephalic   Eyes: PERRL. No sclera icterus. No conjunctival injection. No discharge   ENT: No nasal  discharge. Pharynx clear. Neck:  Trachea midline. No thyromegaly, no JVD, No cervical adenopathy. Chest : Bilaterally symmetrical ,Normal effort,  No accessory muscle use  Lung : Diminished breath sounds bilaterally with scattered rhonchi. Heart[de-identified] Normal  rate. Regular rhythm. No mumur ,  Rub or gallop  ABD: Non-tender. Non-distended. No masses. No organmegaly. Normal bowel sounds. No hernia. EXT: Generalized peripheral edema , No Cyanosis No clubbing  Neuro: no focal weakness  Skin: Warm and dry. No erythema rash on exposed extremities.       DATA:   Recent Labs      11/10/17   0600  11/11/17   0600   WBC  10.1  12.6*   HGB  9.5*  9.2*   HCT  28.3*  27.9*   MCV  94.2  94.8   PLT  103*  140     Recent Labs      11/11/17   0600  11/12/17   0600   NA  150*  143   K  3.6  3.7   CL  114*  109*   CO2  23  21*   BUN  79*  74*   CREATININE  1.14*  1.00*   GLUCOSE  266*  336*   CALCIUM  8.7  8.7   LABGLOM  45.9*  53.4*   GFRAA  55.5*  >60.0       MV Settings:     Rate Set: 12 bmp  FiO2 : 40 %  PEEP/CPAP: 6  Peak Inspiratory Pressure: 12 cmH2O  I:E Ratio: 1:1.50    No results for input(s): PHART, [id number],

## 2017-11-12 NOTE — FLOWSHEET NOTE
PT. RECEIVING CHRONULAC. CURRENTLY PT. HAS BEEN INVOL. OF 3 LG  LOOSE TARRY BLACK STOOLS. ORDER OBTAINED TO INSERT FLEXI SEAL. PT. HAS REMAINED ON N/C O2 THIS SHIFT. LEVAPHED. INFUSION HAS BEEN DECREASED TO 5MCG. ORAL APPETITE REMAINS POOR, PT. HAS BEEN TAKING SMALL AMTS OF MASHED POTATOES/GRAVY, ALL OF MAGIC CUPS, ICE CREAM.  LIQUIDS GIVEN  IN MEDICINE CUP, PT. TOLERATED WELL. RIGHT GROIN CVC  SITE CONTINUED TO DRAIN AT INSERTION SITE REQUIRING FOUR COMPLETE DRSG CHANGES THIS SHIFT.

## 2017-11-13 LAB
ANION GAP SERPL CALCULATED.3IONS-SCNC: 8 MEQ/L (ref 7–13)
ANISOCYTOSIS: ABNORMAL
BANDED NEUTROPHILS RELATIVE PERCENT: 2 % (ref 5–11)
BASOPHILIC STIPPLING: 1
BASOPHILS ABSOLUTE: 0 K/UL (ref 0–0.2)
BASOPHILS RELATIVE PERCENT: 0.2 %
BUN BLDV-MCNC: 77 MG/DL (ref 8–23)
CALCIUM SERPL-MCNC: 8.8 MG/DL (ref 8.6–10.2)
CHLORIDE BLD-SCNC: 110 MEQ/L (ref 98–107)
CO2: 25 MEQ/L (ref 22–29)
CREAT SERPL-MCNC: 1 MG/DL (ref 0.5–0.9)
EOSINOPHILS ABSOLUTE: 0 K/UL (ref 0–0.7)
EOSINOPHILS RELATIVE PERCENT: 0 %
GFR AFRICAN AMERICAN: >60
GFR NON-AFRICAN AMERICAN: 53.4
GLUCOSE BLD-MCNC: 346 MG/DL (ref 60–115)
GLUCOSE BLD-MCNC: 358 MG/DL (ref 74–109)
GLUCOSE BLD-MCNC: 362 MG/DL (ref 60–115)
GLUCOSE BLD-MCNC: 414 MG/DL (ref 60–115)
HCT VFR BLD CALC: 25.8 % (ref 37–47)
HEMOGLOBIN: 8.2 G/DL (ref 12–16)
HYPOCHROMIA: 0
LYMPHOCYTES ABSOLUTE: 0.5 K/UL (ref 1–4.8)
LYMPHOCYTES RELATIVE PERCENT: 4 %
MACROCYTES: 0
MCH RBC QN AUTO: 30.4 PG (ref 27–31.3)
MCHC RBC AUTO-ENTMCNC: 31.8 % (ref 33–37)
MCV RBC AUTO: 95.5 FL (ref 82–100)
MICROCYTES: ABNORMAL
MONOCYTES ABSOLUTE: 0.1 K/UL (ref 0.2–0.8)
MONOCYTES RELATIVE PERCENT: 0.9 %
MYELOCYTE PERCENT: 1 %
NEUTROPHILS ABSOLUTE: 12.6 K/UL (ref 1.4–6.5)
NEUTROPHILS RELATIVE PERCENT: 93 %
NUCLEATED RED BLOOD CELLS: 1 /100 WBC
PAPPENHEIMER BODIES: 0
PDW BLD-RTO: 16.2 % (ref 11.5–14.5)
PERFORMED ON: ABNORMAL
PLATELET # BLD: 138 K/UL (ref 130–400)
PLATELET SLIDE REVIEW: NORMAL
POIKILOCYTES: ABNORMAL
POLYCHROMASIA: ABNORMAL
POTASSIUM SERPL-SCNC: 4 MEQ/L (ref 3.5–5.1)
RBC # BLD: 2.7 M/UL (ref 4.2–5.4)
SODIUM BLD-SCNC: 143 MEQ/L (ref 132–144)
WBC # BLD: 13.1 K/UL (ref 4.8–10.8)

## 2017-11-13 PROCEDURE — 2500000003 HC RX 250 WO HCPCS: Performed by: INTERNAL MEDICINE

## 2017-11-13 PROCEDURE — 94664 DEMO&/EVAL PT USE INHALER: CPT

## 2017-11-13 PROCEDURE — 94640 AIRWAY INHALATION TREATMENT: CPT

## 2017-11-13 PROCEDURE — 2700000000 HC OXYGEN THERAPY PER DAY

## 2017-11-13 PROCEDURE — 6370000000 HC RX 637 (ALT 250 FOR IP): Performed by: ANESTHESIOLOGY

## 2017-11-13 PROCEDURE — 85025 COMPLETE CBC W/AUTO DIFF WBC: CPT

## 2017-11-13 PROCEDURE — 2580000003 HC RX 258: Performed by: INTERNAL MEDICINE

## 2017-11-13 PROCEDURE — 2580000003 HC RX 258: Performed by: ANESTHESIOLOGY

## 2017-11-13 PROCEDURE — 6360000002 HC RX W HCPCS: Performed by: ANESTHESIOLOGY

## 2017-11-13 PROCEDURE — 80048 BASIC METABOLIC PNL TOTAL CA: CPT

## 2017-11-13 PROCEDURE — 1210000000 HC MED SURG R&B

## 2017-11-13 PROCEDURE — 99232 SBSQ HOSP IP/OBS MODERATE 35: CPT | Performed by: INTERNAL MEDICINE

## 2017-11-13 PROCEDURE — 6360000002 HC RX W HCPCS: Performed by: INTERNAL MEDICINE

## 2017-11-13 PROCEDURE — C9113 INJ PANTOPRAZOLE SODIUM, VIA: HCPCS | Performed by: ANESTHESIOLOGY

## 2017-11-13 PROCEDURE — 6370000000 HC RX 637 (ALT 250 FOR IP): Performed by: HOSPITALIST

## 2017-11-13 PROCEDURE — 94660 CPAP INITIATION&MGMT: CPT

## 2017-11-13 RX ORDER — PROMETHAZINE HYDROCHLORIDE 25 MG/1
25 TABLET ORAL EVERY 4 HOURS PRN
Status: DISCONTINUED | OUTPATIENT
Start: 2017-11-13 | End: 2017-11-18 | Stop reason: HOSPADM

## 2017-11-13 RX ORDER — FUROSEMIDE 10 MG/ML
40 INJECTION INTRAMUSCULAR; INTRAVENOUS ONCE
Status: COMPLETED | OUTPATIENT
Start: 2017-11-13 | End: 2017-11-13

## 2017-11-13 RX ORDER — LEVOTHYROXINE SODIUM 0.2 MG/1
200 TABLET ORAL DAILY
Status: DISCONTINUED | OUTPATIENT
Start: 2017-11-13 | End: 2017-11-18 | Stop reason: HOSPADM

## 2017-11-13 RX ORDER — OXYBUTYNIN CHLORIDE 5 MG/1
5 TABLET ORAL NIGHTLY
Status: DISCONTINUED | OUTPATIENT
Start: 2017-11-13 | End: 2017-11-18 | Stop reason: HOSPADM

## 2017-11-13 RX ORDER — DOCUSATE SODIUM 100 MG/1
100 CAPSULE, LIQUID FILLED ORAL 2 TIMES DAILY PRN
Status: DISCONTINUED | OUTPATIENT
Start: 2017-11-13 | End: 2017-11-18 | Stop reason: HOSPADM

## 2017-11-13 RX ORDER — ALBUTEROL SULFATE 2.5 MG/3ML
2.5 SOLUTION RESPIRATORY (INHALATION)
Status: DISCONTINUED | OUTPATIENT
Start: 2017-11-13 | End: 2017-11-18 | Stop reason: HOSPADM

## 2017-11-13 RX ORDER — METOPROLOL SUCCINATE 50 MG/1
50 TABLET, EXTENDED RELEASE ORAL DAILY
Status: DISCONTINUED | OUTPATIENT
Start: 2017-11-13 | End: 2017-11-18 | Stop reason: HOSPADM

## 2017-11-13 RX ORDER — SENNA AND DOCUSATE SODIUM 50; 8.6 MG/1; MG/1
1 TABLET, FILM COATED ORAL 2 TIMES DAILY
Status: DISCONTINUED | OUTPATIENT
Start: 2017-11-13 | End: 2017-11-18 | Stop reason: HOSPADM

## 2017-11-13 RX ORDER — PANTOPRAZOLE SODIUM 40 MG/1
40 TABLET, DELAYED RELEASE ORAL DAILY
Status: DISCONTINUED | OUTPATIENT
Start: 2017-11-13 | End: 2017-11-13 | Stop reason: SDUPTHER

## 2017-11-13 RX ORDER — ALLOPURINOL 100 MG/1
100 TABLET ORAL DAILY
Status: DISCONTINUED | OUTPATIENT
Start: 2017-11-13 | End: 2017-11-18 | Stop reason: HOSPADM

## 2017-11-13 RX ORDER — LACTULOSE 10 G/15ML
20 SOLUTION ORAL DAILY
Status: DISCONTINUED | OUTPATIENT
Start: 2017-11-13 | End: 2017-11-13 | Stop reason: SDUPTHER

## 2017-11-13 RX ORDER — GABAPENTIN 400 MG/1
400 CAPSULE ORAL 2 TIMES DAILY
Status: DISCONTINUED | OUTPATIENT
Start: 2017-11-13 | End: 2017-11-18 | Stop reason: HOSPADM

## 2017-11-13 RX ORDER — FOLIC ACID 1 MG/1
1 TABLET ORAL DAILY
Status: DISCONTINUED | OUTPATIENT
Start: 2017-11-13 | End: 2017-11-18 | Stop reason: HOSPADM

## 2017-11-13 RX ORDER — ATORVASTATIN CALCIUM 40 MG/1
40 TABLET, FILM COATED ORAL NIGHTLY
Status: DISCONTINUED | OUTPATIENT
Start: 2017-11-13 | End: 2017-11-18 | Stop reason: HOSPADM

## 2017-11-13 RX ORDER — FERROUS SULFATE 325(65) MG
325 TABLET ORAL
Status: DISCONTINUED | OUTPATIENT
Start: 2017-11-14 | End: 2017-11-18 | Stop reason: HOSPADM

## 2017-11-13 RX ORDER — IPRATROPIUM BROMIDE AND ALBUTEROL SULFATE 2.5; .5 MG/3ML; MG/3ML
3 SOLUTION RESPIRATORY (INHALATION) 4 TIMES DAILY
Status: DISCONTINUED | OUTPATIENT
Start: 2017-11-13 | End: 2017-11-14

## 2017-11-13 RX ORDER — INSULIN GLARGINE 100 [IU]/ML
8 INJECTION, SOLUTION SUBCUTANEOUS NIGHTLY
Status: DISCONTINUED | OUTPATIENT
Start: 2017-11-13 | End: 2017-11-18 | Stop reason: HOSPADM

## 2017-11-13 RX ORDER — SIMETHICONE 80 MG
80 TABLET,CHEWABLE ORAL EVERY 6 HOURS PRN
Status: DISCONTINUED | OUTPATIENT
Start: 2017-11-13 | End: 2017-11-18 | Stop reason: HOSPADM

## 2017-11-13 RX ORDER — LORAZEPAM 0.5 MG/1
0.5 TABLET ORAL EVERY 4 HOURS PRN
Status: DISCONTINUED | OUTPATIENT
Start: 2017-11-13 | End: 2017-11-18 | Stop reason: HOSPADM

## 2017-11-13 RX ORDER — FUROSEMIDE 20 MG/1
20 TABLET ORAL DAILY
Status: DISCONTINUED | OUTPATIENT
Start: 2017-11-13 | End: 2017-11-18 | Stop reason: HOSPADM

## 2017-11-13 RX ORDER — BISACODYL 10 MG
10 SUPPOSITORY, RECTAL RECTAL DAILY PRN
Status: DISCONTINUED | OUTPATIENT
Start: 2017-11-13 | End: 2017-11-18 | Stop reason: HOSPADM

## 2017-11-13 RX ORDER — ASPIRIN 81 MG/1
81 TABLET, CHEWABLE ORAL DAILY
Status: DISCONTINUED | OUTPATIENT
Start: 2017-11-13 | End: 2017-11-18 | Stop reason: HOSPADM

## 2017-11-13 RX ADMIN — SODIUM CHLORIDE, PRESERVATIVE FREE 10 ML: 5 INJECTION INTRAVENOUS at 08:32

## 2017-11-13 RX ADMIN — IPRATROPIUM BROMIDE AND ALBUTEROL SULFATE 1 AMPULE: 2.5; .5 SOLUTION RESPIRATORY (INHALATION) at 10:39

## 2017-11-13 RX ADMIN — MEROPENEM 1 G: 1 INJECTION, POWDER, FOR SOLUTION INTRAVENOUS at 18:39

## 2017-11-13 RX ADMIN — Medication 10 ML: at 08:34

## 2017-11-13 RX ADMIN — SENNOSIDES AND DOCUSATE SODIUM 1 TABLET: 8.6; 5 TABLET ORAL at 23:08

## 2017-11-13 RX ADMIN — INSULIN GLARGINE 8 UNITS: 100 INJECTION, SOLUTION SUBCUTANEOUS at 23:09

## 2017-11-13 RX ADMIN — OXYBUTYNIN CHLORIDE 5 MG: 5 TABLET ORAL at 23:08

## 2017-11-13 RX ADMIN — IPRATROPIUM BROMIDE AND ALBUTEROL SULFATE 1 AMPULE: 2.5; .5 SOLUTION RESPIRATORY (INHALATION) at 16:15

## 2017-11-13 RX ADMIN — PANTOPRAZOLE SODIUM 40 MG: 40 INJECTION, POWDER, FOR SOLUTION INTRAVENOUS at 23:07

## 2017-11-13 RX ADMIN — HYDROCORTISONE SODIUM SUCCINATE 50 MG: 100 INJECTION, POWDER, FOR SOLUTION INTRAMUSCULAR; INTRAVENOUS at 18:39

## 2017-11-13 RX ADMIN — HYPROMELLOSE 2 DROP: 4 SOLUTION/ DROPS OPHTHALMIC at 23:08

## 2017-11-13 RX ADMIN — LEVOTHYROXINE SODIUM ANHYDROUS 12.5 MCG: 100 INJECTION, POWDER, LYOPHILIZED, FOR SOLUTION INTRAVENOUS at 05:48

## 2017-11-13 RX ADMIN — LACTULOSE 20 G: 20 SOLUTION ORAL at 23:07

## 2017-11-13 RX ADMIN — GABAPENTIN 400 MG: 400 CAPSULE ORAL at 23:07

## 2017-11-13 RX ADMIN — HYDROCORTISONE SODIUM SUCCINATE 50 MG: 100 INJECTION, POWDER, FOR SOLUTION INTRAMUSCULAR; INTRAVENOUS at 04:14

## 2017-11-13 RX ADMIN — ASCORBIC ACID, VITAMIN A PALMITATE, CHOLECALCIFEROL, THIAMINE HYDROCHLORIDE, RIBOFLAVIN-5 PHOSPHATE SODIUM, PYRIDOXINE HYDROCHLORIDE, NIACINAMIDE, DEXPANTHENOL, ALPHA-TOCOPHEROL ACETATE, VITAMIN K1, FOLIC ACID, BIOTIN, CYANOCOBALAMIN: 200; 3300; 200; 6; 3.6; 6; 40; 15; 10; 150; 600; 60; 5 INJECTION, SOLUTION INTRAVENOUS at 23:31

## 2017-11-13 RX ADMIN — ATORVASTATIN CALCIUM 40 MG: 40 TABLET, FILM COATED ORAL at 23:08

## 2017-11-13 RX ADMIN — MEROPENEM 1 G: 1 INJECTION, POWDER, FOR SOLUTION INTRAVENOUS at 04:13

## 2017-11-13 RX ADMIN — Medication 10 ML: at 23:08

## 2017-11-13 RX ADMIN — NYSTATIN 500000 UNITS: 100000 SUSPENSION ORAL at 23:07

## 2017-11-13 RX ADMIN — FUROSEMIDE 40 MG: 10 INJECTION, SOLUTION INTRAVENOUS at 11:47

## 2017-11-13 RX ADMIN — LACTULOSE 20 G: 20 SOLUTION ORAL at 08:49

## 2017-11-13 RX ADMIN — PANTOPRAZOLE SODIUM 40 MG: 40 INJECTION, POWDER, FOR SOLUTION INTRAVENOUS at 08:31

## 2017-11-13 RX ADMIN — COLLAGENASE SANTYL: 250 OINTMENT TOPICAL at 10:30

## 2017-11-13 RX ADMIN — IPRATROPIUM BROMIDE AND ALBUTEROL SULFATE 1 AMPULE: 2.5; .5 SOLUTION RESPIRATORY (INHALATION) at 20:11

## 2017-11-13 RX ADMIN — IPRATROPIUM BROMIDE AND ALBUTEROL SULFATE 1 AMPULE: 2.5; .5 SOLUTION RESPIRATORY (INHALATION) at 04:29

## 2017-11-13 RX ADMIN — HYDROCORTISONE SODIUM SUCCINATE 50 MG: 100 INJECTION, POWDER, FOR SOLUTION INTRAMUSCULAR; INTRAVENOUS at 11:47

## 2017-11-13 ASSESSMENT — PAIN SCALES - PAIN ASSESSMENT IN ADVANCED DEMENTIA (PAINAD)
CONSOLABILITY: 0
NEGVOCALIZATION: 0
FACIALEXPRESSION: 0
TOTALSCORE: 0
BODYLANGUAGE: 0
FACIALEXPRESSION: 0
CONSOLABILITY: 0
CONSOLABILITY: 0
BODYLANGUAGE: 0
TOTALSCORE: 0
TOTALSCORE: 0
BREATHING: 0
BODYLANGUAGE: 0
BREATHING: 0
BREATHING: 0
FACIALEXPRESSION: 0
BODYLANGUAGE: 0
TOTALSCORE: 0
FACIALEXPRESSION: 0
BREATHING: 0
NEGVOCALIZATION: 0
BREATHING: 0
NEGVOCALIZATION: 0
TOTALSCORE: 0
BODYLANGUAGE: 0
NEGVOCALIZATION: 0
CONSOLABILITY: 0
FACIALEXPRESSION: 0
CONSOLABILITY: 0
NEGVOCALIZATION: 0

## 2017-11-13 ASSESSMENT — PAIN SCALES - GENERAL: PAINLEVEL_OUTOF10: 0

## 2017-11-13 ASSESSMENT — PULMONARY FUNCTION TESTS
PIF_VALUE: 13
PIF_VALUE: 12

## 2017-11-13 NOTE — CARE COORDINATION
ELDERW met with Dtr and leatha alejo and Dr Soo Torres and Dr Shelby Rider, Plan is for Specialty to start precert today. Dtr said plan B would be [facility] vs returning back to [facility].   .Electronically signed by TANK Miller on 11/13/2017 at 10:45 AM

## 2017-11-13 NOTE — PROGRESS NOTES
Dr. Hailey Leon stated to recheck BS in 2 hrs. If over 400 call him back.  Electronically signed by Angeli Shipman RN on 11/13/2017 at 6:17 PM

## 2017-11-13 NOTE — PROGRESS NOTES
[facility] Respiratory Therapy Evaluation   Current Order:  QID duoneb     Home Regimen: QID duoneb     Ordering Physician:Beryl  Re-evaluation Date:  11/16    Diagnosis:copd    Patient Status: Stable / Unstable + Physician notified    The following MDI Criteria must be met in order to convert aerosol to MDI with spacer. If unable to meet, MDI will be converted to aerosol:  []  Patient able to demonstrate the ability to use MDI effectively  []  Patient alert and cooperative  []  Patient able to take deep breath with 5-10 second hold  []  Medication(s) available in this delivery method   []  Peak flow greater than or equal to 200 ml/min      QID duoneb      Current Order Substituted To  (same drug, same frequency)   Aerosol to MDI [] Albuterol Sulfate 0.083% unit dose by aerosol Albuterol Sulfate MDI 2 puffs by inhalation with spacer    [] Levalbuterol 1.25 mg unit dose by aerosol Levalbuterol MDI 2 puffs by inhalation with spacer    [] Levalbuterol 0.63 mg unit dose by aerosol Levalbuterol MDI 2 puffs by inhalation with spacer    [] Ipratropium Bromide 0.02% unit dose by aerosol Ipratropium Bromide MDI 2 puffs by inhalation with spacer    [] Duoneb (Ipratropium + Albuterol) unit dose by aerosol Ipratropium MDI + Albuterol MDI 2 puffs by inhalation w/spacer   MDI to Aerosol [] Albuterol Sulfate MDI Albuterol Sulfate 0.083% unit dose by aerosol    [] Levalbuterol MDI 2 puffs by inhalation Levalbuterol 1.25 mg unit dose by aerosol    [] Ipratropium Bromide MDI by inhalation Ipratropium Bromide 0.02% unit dose by aerosol    [] Combivent (Ipratropium + Albuterol) MDI by inhalation Duoneb (Ipratropium + Albuterol) unit dose by aerosol   Treatment Assessment [Frequency/Schedule]:  Change frequency to: ____qid duoneb______________________________________________per Protocol, P&T, MEC      Points 0 1 2 3 4   Pulmonary Status  Non-Smoker  []   Smoking history   < 20 pack years  []   Smoking history  ?  20 pack years  []

## 2017-11-13 NOTE — PROGRESS NOTES
INPATIENT PROGRESS NOTE    SERVICE DATE:  11/13/2017   SERVICE TIME: 10:06 am     ASSESSMENT/PLAN: 53-year-old female with complicated medical history, recently admitted with abscesses of forearm and sacrococcyx area, patient has COPD, admitted for hypotension, and acute on chronic anemia related to blood loss. chronic heart failure, history of DVT, chronic anemia. Admitted with hypotension and acute on chronic anemia rule out GI bleed.     Septic Shock: persistent hypotension on Levophed, now weaned off over 24 hours  - ID consulted. Per Dr. Renae Carson, MRSA abscess left arm. Sacral abscess MRSA PROVIDENCIA sepsis, stage 4 sacral DU with MRSA and MDRO Providencia, on IV Meropenem. Vanco. Cristhian further recs. - Cardio consulted and following. Cristhian further recs. - Nephrology consulted. Per Dr. Igor Whiteside, ckd stage 3 due to diabetic nephropathy.  She has multifactorial anemia, low bp, samm, fluid overload.  hypernatremia resolved. Off pressors. 1 dose lasix 40 mg. cont d5w 1 more day. nutrition is an issue if she isn't taking more po. Cristhian further recs. - Has been taking thickened liquids but feeding process is challenging due to labile LOC. Presently on clinamix for supplementation     Acute on chronic anemia: Occult stool was positive for blood. Now stable at 9.2 s/p 2 units.   - Recheck CBC  - Typed and Screened  - Transfused 2 units PRBC's for hgb drop below 8 in a CAD pt  - DC'd anticoagulation   - Hem/Onc consulted. Per Dr. Ladonna Brantley, The pt has acute on chronic anemia which is multifactorial  due to anemia related to CKD. She also has anemia due to occult GI blood loss. No evidence of malignancy on recent BM asp & Bx. Possibility of myelodysplastic syndrome could not be fully ruled/out on recent BM asp & Bx. Results of BM cytogenetics will be checked. Monitor CBC; consider RBC transfusion if pt's anemia decreases further. The pt's anticoagulant therapy may be contributing to the pt's anemia.  B12 and folate levels as lethargic at times.       MEDICATIONS:    Current Facility-Administered Medications   Medication Dose Route Frequency Provider Last Rate Last Dose    glucose (GLUTOSE) 40 % oral gel 15 g  15 g Oral PRN Erik Puga MD        dextrose 50 % solution 12.5 g  12.5 g Intravenous PRN Erik Puga MD        glucagon (rDNA) injection 1 mg  1 mg Intramuscular PRN Erik Puga MD        dextrose 5 % solution  100 mL/hr Intravenous PRN Erik Puga MD        levothyroxine (SYNTHROID) injection 12.5 mcg  12.5 mcg Intravenous QAM AC Erik Puga MD   12.5 mcg at 11/13/17 0548    PN-Adult Premix (4.25/10) Peripheral Line with electrolytes   Intravenous Continuous TPN Rocco Connor MD 75 mL/hr at 11/12/17 1845      insulin lispro (HUMALOG) injection vial 0-18 Units  0-18 Units Subcutaneous TID WC Mo Rhodes MD   15 Units at 11/13/17 0835    insulin lispro (HUMALOG) injection vial 0-9 Units  0-9 Units Subcutaneous Nightly Mo Rhodes MD   7 Units at 11/12/17 2210    lactulose (CHRONULAC) 10 GM/15ML solution 20 g  20 g Oral BID Shaq Jimenez MD   20 g at 11/13/17 0849    vancomycin (VANCOCIN) 1000 mg in dextrose 5% 200 mL IVPB  1,000 mg Intravenous Q48H Thelma Ghotra MD   Stopped at 11/12/17 2308    sodium chloride flush 0.9 % injection 10 mL  10 mL Intravenous PRN Glynn Bauer MD   10 mL at 11/12/17 1507    collagenase ointment   Topical Daily Kaur Eldridge MD        sodium chloride flush 0.9 % injection 10 mL  10 mL Intravenous 2 times per day Walker Mann MD   10 mL at 11/13/17 0834    sodium chloride flush 0.9 % injection 10 mL  10 mL Intravenous PRN Walker Mann MD        acetaminophen (TYLENOL) tablet 650 mg  650 mg Oral Q4H PRN Walker Mann MD        0.9 % sodium chloride bolus  250 mL Intravenous Once [facility], PA-C        norepinephrine (LEVOPHED) 16 mg in dextrose 5% 250 mL infusion  2 mcg/min Intravenous Continuous Shaq Jimenez MD   Stopped at 11/12/17 7054    darbepoetin stephanie-polysorbate (ARANESP) injection 40 mcg  40 mcg Subcutaneous Weekly Pradeep Escoto MD   40 mcg at 11/07/17 2043    pantoprazole (PROTONIX) injection 40 mg  40 mg Intravenous BID Denton Castellanos MD   40 mg at 11/13/17 0831    And    sodium chloride (PF) 0.9 % injection 10 mL  10 mL Intravenous Daily Denton Castellanos MD   10 mL at 11/13/17 0832    hydrocortisone sodium succinate PF (SOLU-CORTEF) injection 50 mg  50 mg Intravenous Q6H Denton Castellanos MD   50 mg at 11/13/17 0414    ipratropium-albuterol (DUONEB) nebulizer solution 1 ampule  1 ampule Inhalation 4x daily Denton Castellanos MD   1 ampule at 11/13/17 0429    meropenem (MERREM) 1 g in sterile water 20 mL IV syringe  1 g Intravenous Q12H Amina Sandoval MD   1 g at 11/13/17 0413       OBJECTIVE  PHYSICAL EXAM:   /68   Pulse 64   Temp 97.5 °F (36.4 °C) (Oral)   Resp 18   Ht 5' 6\" (1.676 m) Comment: 10/2017  Wt 218 lb 14.7 oz (99.3 kg)   SpO2 100%   BMI 35.33 kg/m²   Body mass index is 35.33 kg/m². CONSTITUTIONAL:  More alert today, opening eyes, answering questions  LUNGS:  No increased work of breathing, diminished breath sounds bilaterally, wearing BiPAP  CARDIOVASCULAR: Irregular, irregular, rate controlled  ABDOMEN:  No scars, normal bowel sounds, soft, non-distended, non-tender, no masses palpated, no hepatosplenomegally  EXTREMITIES: Edema bilateral upper and lower extremities, no cyanosis, no clubbing    DATA:   Diagnostic tests reviewed for today's visit:    Most recent labs and imaging results reviewed.      SIGNATURE: Madison Norton MD PATIENT NAME: Giles Huerta   DATE: November 13, 2017 MRN: [id number]   TIME: 10:06 AM PAGER: [phone]

## 2017-11-13 NOTE — PROGRESS NOTES
Cardiology Progress Note      Patient:  Bolivar Perez  YOB: 1940  MRN: [id number]   Acct: [de-identified]  Admit Date:  11/7/2017      SHARED VISIT CNP: Hemanth Bolton NP  PRIMARY CARE PHYSICIAN: Alex Fitzgerald MD  CARDIOLOGIST:Dr. Osiris Weldon        Impression/Plan:  1. Sepsis with shock, has remained off pressors  2. Stage 4 decubitus to coccyx and left upper extremity abscess with MRSA, ID following, on meropenem and vanco  3. Acute on chronic respiratory failure, intermittent BiPAP  4. Acute blood loss anemia, HgB stable, has had significant bleeding on both warfarin and eliquis, remains off anticoagulation  5. Altered mental status/encephalopathy, improving  6. Anasarca, multifactorial but exacerbated by protein calorie malnutrition. Has been taking thickened liquids but feeding process is challenging due to labile LOC. Presently on clinamix for supplementation  7. Atrial fibrillation, rates well controlled, no anticoagulation due to severe bleeding  8. Moderately severe AS, conservative management  9. Endometrial cancer    Chief Complaint/Active Symptoms: opens eye, but minimal verbalizations    Objective:   /68   Pulse 64   Temp 97.8 °F (36.6 °C) (Axillary)   Resp 18   Ht 5' 6\" (1.676 m) Comment: 10/2017  Wt 218 lb 14.7 oz (99.3 kg)   SpO2 100%   BMI 35.33 kg/m²    Wt Readings from Last 3 Encounters:   11/13/17 218 lb 14.7 oz (99.3 kg)   11/04/17 218 lb (98.9 kg)   10/31/17 218 lb (98.9 kg)       TELEMETRY: atrial fibrillation - controlled                            Rhythm Strip: AF 60's-80s  NYHA Class: III    Physical Exam:  Physical Exam   Constitutional: No distress. Chronically ill   HENT:   Head: Normocephalic. Eyes: Pupils are equal, round, and reactive to light. Neck: No JVD present. Cardiovascular:   Irregular rhythm, normal rate  2/6 systolic murmur   Pulmonary/Chest:   Poor inspiratory effort, diminished mid to lower   Abdominal: Soft.  Bowel sounds are normal. She exhibits no distension. Musculoskeletal:   Contractures to both upper extremities  3+ weeping anasarca   Neurological:   Opens eyes, lethargic, follows short commands, no spontaneous movement of extremities   Skin: Skin is warm and dry. There is pallor. Medications:    levothyroxine  12.5 mcg Intravenous QAM AC    insulin lispro  0-18 Units Subcutaneous TID WC    insulin lispro  0-9 Units Subcutaneous Nightly    lactulose  20 g Oral BID    vancomycin  1,000 mg Intravenous Q48H    collagenase   Topical Daily    sodium chloride flush  10 mL Intravenous 2 times per day    sodium chloride  250 mL Intravenous Once    darbepoetin stephanie-polysorbate  40 mcg Subcutaneous Weekly    pantoprazole  40 mg Intravenous BID    And    sodium chloride (PF)  10 mL Intravenous Daily    hydrocortisone sodium succinate PF  50 mg Intravenous Q6H    ipratropium-albuterol  1 ampule Inhalation 4x daily    meropenem  1 g Intravenous Q12H      dextrose      PN-Adult premixed (4.25/10) Peripheral solution with electrolytes 75 mL/hr at 11/12/17 1845    norepinephrine Stopped (11/12/17 0625)       glucose 15 g PRN   dextrose 12.5 g PRN   glucagon (rDNA) 1 mg PRN   dextrose 100 mL/hr PRN   sodium chloride flush 10 mL PRN   sodium chloride flush 10 mL PRN   acetaminophen 650 mg Q4H PRN       Diagnostics:      Xr Chest Portable    Result Date: 11/9/2017  EXAMINATION: XR CHEST PORTABLE CLINICAL HISTORY: Shortness of breath COMPARISONS: 10/27/2017 FINDINGS: There is moderate cardiomegaly. Pulmonary vascularity is prominent. There are groundglass and streaky infiltrates in the left midlung and left lung base, mildly improved from 10/27/2017. There is blunting of the left costophrenic angle consistent with scarring or small effusion. Interstitial markings are prominent. There is mild elevation of the left hemidiaphragm. There is calcification of the mitral annulus. Right upper extremity PICC tip is in the right atrium.  There is no pneumothorax. CARDIOMEGALY AND MILD VASCULAR CONGESTION. GROUNDGLASS AND STREAKY INFILTRATES IN THE LEFT MIDLUNG AND LEFT LUNG BASE, MILDLY IMPROVED FROM 10/27/2017; CHEST RADIOGRAPH IS OTHERWISE ESSENTIALLY UNCHANGED--SEE ABOVE. Lab Data:    ProBNP:   Lab Results   Component Value Date    PROBNP 1,594 09/27/2017       CBC:   Lab Results   Component Value Date    WBC 12.6 11/11/2017    RBC 2.94 11/11/2017    HGB 9.2 11/11/2017    HCT 27.9 11/11/2017     11/11/2017         CMP:  Lab Results   Component Value Date     11/13/2017    K 4.0 11/13/2017     11/13/2017    CO2 25 11/13/2017    BUN 77 11/13/2017    CREATININE 1.00 11/13/2017    GFRAA >60.0 11/13/2017    LABGLOM 53.4 11/13/2017    GLUCOSE 358 11/13/2017    CALCIUM 8.8 11/13/2017       Hepatic Function Panel:  Lab Results   Component Value Date    ALKPHOS 101 11/07/2017    ALT <5 11/07/2017    AST 15 11/07/2017    PROT 4.4 11/07/2017    BILITOT 0.4 11/07/2017    LABALBU 1.7 11/07/2017       Magnesium:    Lab Results   Component Value Date    MG 2.9 11/11/2017       PT/INR:    Lab Results   Component Value Date    PROTIME 12.4 11/08/2017    PROTIME 26.9 10/23/2017    INR 1.2 11/08/2017          Electronically signed by Enedelia Maki NP on 11/13/2017 at 8:35 AM    I have personally interviewed and examined the patient. I have personally and independently reviewed labs and diagnostic testing. I have personally verified the elements of the history and physical listed above and changes, if any, are noted. I have personally reviewed the assessment and plan as documented by Loulou Blackwell RN, CNP and concur with her. Alert but essentially nonverbal.  She is without any specific cardiovascular complaints. She does not have any angina pectoris or CHF symptoms. Her examination is remarkable for an irregularly irregular rhythm, 2-3/6 RASHEED RUSB, and 3+ bilateral edema. Her blood pressures are well controlled.     Pressors

## 2017-11-13 NOTE — PLAN OF CARE
Problem: Fluid Volume - Imbalance:  Goal: Will show no signs and symptoms of excessive bleeding  Will show no signs and symptoms of excessive bleeding   Outcome: Ongoing

## 2017-11-13 NOTE — DISCHARGE SUMMARY
Physician Discharge Summary     Patient ID:  Manish Sanchez  [id number]  76 y.o.  1940    Admit date: 10/23/2017    Discharge date and time: 11/1/2017  7:30 PM     Admitting Physician: Ping Cooln MD     Discharge Physician: Dr. Elliot Vega    Admission Diagnoses: GI bleed [K92.2]    Discharge Diagnoses: Acute on chronic anemia, sepsis, cellulitis    Admission Condition: fair    Discharged Condition: good    Indication for Admission: sepsis    Hospital Course: 57-year-old female with past medical history of endometrial cancer, coronary artery disease, chronic kidney disease stage III, diabetes type 2.  Sent from nursing facility due to acute and chronic anemia, A. fib with RVR, and acute kidney injury and chronic kidney disease stage III.     Acute on chronic anemia: Multifactorial. Down to 6.3 on admission, now 7.6 s/p 3 units PRBC's since admission.   - Type and screen  - Transfuse an additional 2 units PRBC today given hgb is less than 8 in a pt with CAD. - Patient was recently on Coumadin, due to DVT of the left upper extremity, this was D/C'd by Hem/Onc reporting no longer needed. Use warm compresses instead. - Stool for occult blood ordered  - Anticoagulations held, defer to Hem/Onc for recs on restarting  - Hematology has been consulted. Per Dr. Rosa Maria Rodríguez, Anemia is due to multifactorial causes. Anemia, rule out myelodysplastic syndrome and suggested bone marrow biopsy, completed yesterday. Previous iron studies 8/2017 showed iron deficiency. Renal insufficiency is a contributing factor, Being on anticoagulation is another factor. History of uterine cancer s/p radiation therapy 2017. Check, ERVIN, retic count, Ca 125.  She would benfit from parenteral iron as well. Has a blood blister in left forearm. She can be discharged when ready and will follow up in our office in 1 month.     - Continue to monitor  - hgb is 7.6, hct is 23.8     Sepsis: with tachypnea, tachycardia, poss aspiration PNA and MRSA growing from wound culture  - Remains on IV Levaquin  - Follow culture sensitivities  - ID consulted, cristhian recs  - Continue to monitor closely     Right arm swelling: Concerning for possible DVT given recent PICC placement and recent DVT in LUE after PICC  - RUE US  - Continue to monitor closely     DVT of left upper extremity  - As mentioned above. Per Hem/Onc may D/C coumadin and just use warm compresses for UE DVT's     Atrial fibrillation with rapid ventricular rate: Rate to the 120's  - Patient placed on telemetry  - Cardiology is consulted. Per Dr. Zee Enamorado, AF with RVR in setting of acute blood loss anemia, with improvement following transfusion. VHD with moderately severe AS, no signs HF. BRIGIDA on CKD. Acute blood loss anemia s/p transfusion. Continue rate control with metoprolol, add prn IV doses for HR greater than 125. Supportive care including hydration, trnasfusion. Warfarin on hold until cleared by heme/onc to resume. Cristhian further dispo recs     Acute kidney injury and chronic kidney disease stage III:   - IV fluids with caution, to avoid fluid overload  - Nephrology consulted. Per Dr. Rebecca Middleton, CKD stage 3. Diabetic nephropathy.  baseline cr 1.4-1.6. Mild BRIGIDA peak cr about 1.9.  On lasix at home.  Has sig anemia.  Being seen by hematology. off ivf and on lasix 3 x/ week. will sign off. F/u as outpt. - DC'd nephrotoxic medications  - Continue to monitor     Pneumonia: As per CXR. SOB with O2 down to 90%. Reports coughing up green phlegm. - Sputum culture  - Supplemental O2  - Pulm consulted. Per Dr. Christiano Beal, acute on chronic hypoxemic and hypercarbic respiratory failure, possible aspiration pneumonia continue empiric treatment and follow-up x-ray remains stable.  Cristhian further dispo recs  - IV Levaquin continued per pulm  - Continue to monitor     VHD with mod severe AS:   - Cardio following  - Likely give prn lasix today given edema and follow  - Continue to monitor     Endometrial cancer  - receiving chemoradiation, oncology is consulted and following as above     Diabetes type 2  - Insulin on sliding scale     UDAY  - Continue BiPAP     Stage III Sacral Decubitus Ulcer: S/p debridement. - Wound nurse consulted and following  - Consulted Gen Surgery, Dr. Hilton Lawson, I & D of sacral ulcer along with wound of the left upper extremity done. - Follow cultures     CAD  CHF     D/C'd to SNF. Advised F/U with PCP 1 - 2 days of discharge.      Consults: cardiology, pulmonary/intensive care, ID, hematology/oncology and general surgery    Disposition: SNF    Patient Instructions:   [unfilled]  Activity: activity as tolerated  Diet: cardiac diet  Wound Care: none needed    Follow-up with PCP in 2 days.     Terra Gomez  11/13/2017  1:45 AM

## 2017-11-13 NOTE — FLOWSHEET NOTE
PT. MORE RESPONSIVE  DURING DAUGHTER, GABRIEL'S VISIT. PT. EVEN HAD EYES OPENED FOR LONG PERIODS AND RESPONDED TO SOME OF HER QUESTIONS.  PT.'S APPETITE HAS BEEN FAIR TO POOR. PT. REQUIRES A PERIOD TIME, WHEN BEING FED; MUCH DIRECTION TO CHEW AND SWALLOW. NOTED FLUIDS TAKEN BETTER WHEN ADMINISTERED, SLOWLY USING A 10 CC SYRINGE. AT DINNER, PT. ATE THE ENTIRE SERVING OF [employer]'S PIE, 1/2 OF MAGIC CUP AND ICE CREAM.  PT.S SKIN INTEGITY REMAINS A PROBLEM. WOUND GIVEN  AS ORDERED.   BODY EDEMA CONTINUES. DOSE OF LASIX GIVEN AS ORDERED, TODAY. URINARY  CC. HAS REMAINED OFF OF THE LEVOPHED, THIS SHIFT.

## 2017-11-13 NOTE — PROGRESS NOTES
Renal Progress Note    Assessment and Plan:    75 yo with ckd stage 3 due to diabetic nephropathy. She has multifactorial anemia, low bp, brigida, fluid overload.  hypernatremia resolved. Off pressors  . Plan/  1- 1 dose lasix 40 mg again today  2- on clinimix  3- ok for ltac      Patient Active Problem List:     Pneumonia of left lower lobe due to infectious organism [facility])     Chronic obstructive pulmonary disease (HCC)     Type 2 diabetes mellitus ([facility] [address]     Anemia     Metabolic encephalopathy     Acute on chronic diastolic congestive heart failure (HCC)     Hypernatremia     Obesity     Hemiparesis affecting left side as late effect of stroke (HCC)     Atrial fibrillation (HCC)     Acute on chronic respiratory failure with hypercapnia ([facility])     Cerebral infarct [facility])     Hospice Care     Atrial fibrillation with RVR (HCC)     Acute hemolytic anemia ([facility])     BRIGIDA (acute kidney injury) ([facility] [address]     MRSA infection     Decubitus ulcer of sacral region     Infected ulcer of skin (HCC)     Hemolytic anemia, acute (HCC)     Hypotension     Hypovolemic shock (HCC)     Septic shock (HCC)     UGI bleed     Acute blood loss anemia     Abscess of arm, left      Subjective:   Admit Date: 11/7/2017    Interval History: off bipap. Off pressors. Po intake poor but improving.   Plan for ltac      Medications:   Scheduled Meds:   levothyroxine  12.5 mcg Intravenous QAM AC    insulin lispro  0-18 Units Subcutaneous TID WC    insulin lispro  0-9 Units Subcutaneous Nightly    lactulose  20 g Oral BID    vancomycin  1,000 mg Intravenous Q48H    collagenase   Topical Daily    sodium chloride flush  10 mL Intravenous 2 times per day    sodium chloride  250 mL Intravenous Once    darbepoetin stephanie-polysorbate  40 mcg Subcutaneous Weekly    pantoprazole  40 mg Intravenous BID    And    sodium chloride (PF)  10 mL Intravenous Daily    hydrocortisone sodium succinate PF  50 mg Intravenous Q6H    ipratropium-albuterol  1 ampule

## 2017-11-13 NOTE — CARE COORDINATION
Dtr called and ask if LSW could contact [facility]. LSW notified [facility] and they do not take Pts insurance. Plan Specialty. Dtr is aware that precert was started for LTAC  If not approved then [facility]. Dtr said she would [place] just in case. .Electronically signed by TANK Cohen on 11/13/2017 at 2:42 PM

## 2017-11-13 NOTE — PROGRESS NOTES
abscess MRSA AND PROVIDENCIA    stage 4 sacral DU with MRSA and MDRO Providencia     on IV Meropenem.  Vanco

## 2017-11-14 LAB
ANION GAP SERPL CALCULATED.3IONS-SCNC: 11 MEQ/L (ref 7–13)
ANISOCYTOSIS: ABNORMAL
BANDED NEUTROPHILS RELATIVE PERCENT: 3 % (ref 5–11)
BASOPHILS ABSOLUTE: 0 K/UL (ref 0–0.2)
BASOPHILS RELATIVE PERCENT: 0.3 %
BUN BLDV-MCNC: 81 MG/DL (ref 8–23)
CALCIUM SERPL-MCNC: 9.7 MG/DL (ref 8.6–10.2)
CHLORIDE BLD-SCNC: 110 MEQ/L (ref 98–107)
CO2: 23 MEQ/L (ref 22–29)
CREAT SERPL-MCNC: 0.94 MG/DL (ref 0.5–0.9)
EOSINOPHILS ABSOLUTE: 0 K/UL (ref 0–0.7)
EOSINOPHILS RELATIVE PERCENT: 0 %
GFR AFRICAN AMERICAN: >60
GFR NON-AFRICAN AMERICAN: 57.3
GLUCOSE BLD-MCNC: 211 MG/DL (ref 74–109)
GLUCOSE BLD-MCNC: 259 MG/DL (ref 60–115)
GLUCOSE BLD-MCNC: 266 MG/DL (ref 60–115)
GLUCOSE BLD-MCNC: 289 MG/DL (ref 60–115)
GLUCOSE BLD-MCNC: 296 MG/DL (ref 60–115)
HCT VFR BLD CALC: 25.7 % (ref 37–47)
HEMOGLOBIN: 8.1 G/DL (ref 12–16)
HYPOCHROMIA: 0
LYMPHOCYTES ABSOLUTE: 0.3 K/UL (ref 1–4.8)
LYMPHOCYTES RELATIVE PERCENT: 2 %
MACROCYTES: 0
MCH RBC QN AUTO: 30 PG (ref 27–31.3)
MCHC RBC AUTO-ENTMCNC: 31.7 % (ref 33–37)
MCV RBC AUTO: 94.6 FL (ref 82–100)
METAMYELOCYTES RELATIVE PERCENT: 2 %
MICROCYTES: 0
MONOCYTES ABSOLUTE: 0.5 K/UL (ref 0.2–0.8)
MONOCYTES RELATIVE PERCENT: 2.8 %
MYELOCYTE PERCENT: 1 %
NEUTROPHILS ABSOLUTE: 15.2 K/UL (ref 1.4–6.5)
NEUTROPHILS RELATIVE PERCENT: 90 %
NUCLEATED RED BLOOD CELLS: 2 /100 WBC
PDW BLD-RTO: 15.8 % (ref 11.5–14.5)
PERFORMED ON: ABNORMAL
PLATELET # BLD: 150 K/UL (ref 130–400)
PLATELET SLIDE REVIEW: ADEQUATE
POIKILOCYTES: 0
POLYCHROMASIA: 0
POTASSIUM SERPL-SCNC: 4.1 MEQ/L (ref 3.5–5.1)
RBC # BLD: 2.71 M/UL (ref 4.2–5.4)
SMUDGE CELLS: 4.7
SODIUM BLD-SCNC: 144 MEQ/L (ref 132–144)
WBC # BLD: 15.8 K/UL (ref 4.8–10.8)

## 2017-11-14 PROCEDURE — 94640 AIRWAY INHALATION TREATMENT: CPT

## 2017-11-14 PROCEDURE — 80048 BASIC METABOLIC PNL TOTAL CA: CPT

## 2017-11-14 PROCEDURE — 85025 COMPLETE CBC W/AUTO DIFF WBC: CPT

## 2017-11-14 PROCEDURE — 6370000000 HC RX 637 (ALT 250 FOR IP): Performed by: HOSPITALIST

## 2017-11-14 PROCEDURE — 2580000003 HC RX 258: Performed by: INTERNAL MEDICINE

## 2017-11-14 PROCEDURE — 6360000002 HC RX W HCPCS: Performed by: INTERNAL MEDICINE

## 2017-11-14 PROCEDURE — 80202 ASSAY OF VANCOMYCIN: CPT

## 2017-11-14 PROCEDURE — 2580000003 HC RX 258: Performed by: ANESTHESIOLOGY

## 2017-11-14 PROCEDURE — 6370000000 HC RX 637 (ALT 250 FOR IP): Performed by: ANESTHESIOLOGY

## 2017-11-14 PROCEDURE — 36591 DRAW BLOOD OFF VENOUS DEVICE: CPT

## 2017-11-14 PROCEDURE — 1210000000 HC MED SURG R&B

## 2017-11-14 PROCEDURE — 6360000002 HC RX W HCPCS: Performed by: ANESTHESIOLOGY

## 2017-11-14 PROCEDURE — C9113 INJ PANTOPRAZOLE SODIUM, VIA: HCPCS | Performed by: ANESTHESIOLOGY

## 2017-11-14 PROCEDURE — 6370000000 HC RX 637 (ALT 250 FOR IP): Performed by: INTERNAL MEDICINE

## 2017-11-14 PROCEDURE — 94660 CPAP INITIATION&MGMT: CPT

## 2017-11-14 PROCEDURE — 99232 SBSQ HOSP IP/OBS MODERATE 35: CPT | Performed by: INTERNAL MEDICINE

## 2017-11-14 PROCEDURE — 36592 COLLECT BLOOD FROM PICC: CPT

## 2017-11-14 PROCEDURE — 2700000000 HC OXYGEN THERAPY PER DAY

## 2017-11-14 RX ADMIN — HYDROCORTISONE SODIUM SUCCINATE 50 MG: 100 INJECTION, POWDER, FOR SOLUTION INTRAMUSCULAR; INTRAVENOUS at 21:49

## 2017-11-14 RX ADMIN — SODIUM CHLORIDE, PRESERVATIVE FREE 10 ML: 5 INJECTION INTRAVENOUS at 01:21

## 2017-11-14 RX ADMIN — FOLIC ACID 1 MG: 1 TABLET ORAL at 09:38

## 2017-11-14 RX ADMIN — INSULIN GLARGINE 8 UNITS: 100 INJECTION, SOLUTION SUBCUTANEOUS at 21:48

## 2017-11-14 RX ADMIN — Medication 10 ML: at 21:52

## 2017-11-14 RX ADMIN — SODIUM CHLORIDE, PRESERVATIVE FREE 10 ML: 5 INJECTION INTRAVENOUS at 09:40

## 2017-11-14 RX ADMIN — FUROSEMIDE 20 MG: 20 TABLET ORAL at 09:38

## 2017-11-14 RX ADMIN — ATORVASTATIN CALCIUM 40 MG: 40 TABLET, FILM COATED ORAL at 21:49

## 2017-11-14 RX ADMIN — PANTOPRAZOLE SODIUM 40 MG: 40 INJECTION, POWDER, FOR SOLUTION INTRAVENOUS at 09:40

## 2017-11-14 RX ADMIN — PANTOPRAZOLE SODIUM 40 MG: 40 INJECTION, POWDER, FOR SOLUTION INTRAVENOUS at 21:48

## 2017-11-14 RX ADMIN — HYDROCORTISONE SODIUM SUCCINATE 50 MG: 100 INJECTION, POWDER, FOR SOLUTION INTRAMUSCULAR; INTRAVENOUS at 14:06

## 2017-11-14 RX ADMIN — IPRATROPIUM BROMIDE AND ALBUTEROL SULFATE 1 AMPULE: 2.5; .5 SOLUTION RESPIRATORY (INHALATION) at 19:42

## 2017-11-14 RX ADMIN — IPRATROPIUM BROMIDE AND ALBUTEROL SULFATE 1 AMPULE: 2.5; .5 SOLUTION RESPIRATORY (INHALATION) at 12:19

## 2017-11-14 RX ADMIN — GABAPENTIN 400 MG: 400 CAPSULE ORAL at 21:49

## 2017-11-14 RX ADMIN — NYSTATIN 500000 UNITS: 100000 SUSPENSION ORAL at 18:36

## 2017-11-14 RX ADMIN — Medication 10 ML: at 09:42

## 2017-11-14 RX ADMIN — MEROPENEM 1 G: 1 INJECTION, POWDER, FOR SOLUTION INTRAVENOUS at 06:17

## 2017-11-14 RX ADMIN — IPRATROPIUM BROMIDE AND ALBUTEROL SULFATE 3 ML: .5; 3 SOLUTION RESPIRATORY (INHALATION) at 08:13

## 2017-11-14 RX ADMIN — MEROPENEM 1 G: 1 INJECTION, POWDER, FOR SOLUTION INTRAVENOUS at 21:48

## 2017-11-14 RX ADMIN — VANCOMYCIN HYDROCHLORIDE 1000 MG: 1 INJECTION, SOLUTION INTRAVENOUS at 23:52

## 2017-11-14 RX ADMIN — ASPIRIN 81 MG 81 MG: 81 TABLET ORAL at 09:38

## 2017-11-14 RX ADMIN — LEVOTHYROXINE SODIUM 200 MCG: 200 TABLET ORAL at 06:17

## 2017-11-14 RX ADMIN — NYSTATIN 500000 UNITS: 100000 SUSPENSION ORAL at 14:06

## 2017-11-14 RX ADMIN — MEROPENEM 1 G: 1 INJECTION, POWDER, FOR SOLUTION INTRAVENOUS at 14:06

## 2017-11-14 RX ADMIN — HYDROCORTISONE SODIUM SUCCINATE 50 MG: 100 INJECTION, POWDER, FOR SOLUTION INTRAMUSCULAR; INTRAVENOUS at 06:56

## 2017-11-14 RX ADMIN — IPRATROPIUM BROMIDE AND ALBUTEROL SULFATE 1 AMPULE: 2.5; .5 SOLUTION RESPIRATORY (INHALATION) at 16:13

## 2017-11-14 RX ADMIN — NYSTATIN 500000 UNITS: 100000 SUSPENSION ORAL at 09:38

## 2017-11-14 RX ADMIN — COLLAGENASE SANTYL: 250 OINTMENT TOPICAL at 09:41

## 2017-11-14 RX ADMIN — FERROUS SULFATE TAB 325 MG (65 MG ELEMENTAL FE) 325 MG: 325 (65 FE) TAB at 09:38

## 2017-11-14 RX ADMIN — HYDROCORTISONE SODIUM SUCCINATE 50 MG: 100 INJECTION, POWDER, FOR SOLUTION INTRAMUSCULAR; INTRAVENOUS at 01:21

## 2017-11-14 RX ADMIN — LACTULOSE 20 G: 20 SOLUTION ORAL at 21:49

## 2017-11-14 RX ADMIN — GABAPENTIN 400 MG: 400 CAPSULE ORAL at 09:39

## 2017-11-14 RX ADMIN — METOPROLOL SUCCINATE 50 MG: 50 TABLET, FILM COATED, EXTENDED RELEASE ORAL at 09:38

## 2017-11-14 RX ADMIN — SENNOSIDES AND DOCUSATE SODIUM 1 TABLET: 8.6; 5 TABLET ORAL at 21:49

## 2017-11-14 RX ADMIN — NYSTATIN 500000 UNITS: 100000 SUSPENSION ORAL at 21:49

## 2017-11-14 RX ADMIN — ALLOPURINOL 100 MG: 100 TABLET ORAL at 09:38

## 2017-11-14 RX ADMIN — LACTULOSE 20 G: 20 SOLUTION ORAL at 09:38

## 2017-11-14 RX ADMIN — ACETAMINOPHEN 650 MG: 325 TABLET, FILM COATED ORAL at 09:39

## 2017-11-14 RX ADMIN — HYPROMELLOSE 2 DROP: 4 SOLUTION/ DROPS OPHTHALMIC at 09:41

## 2017-11-14 RX ADMIN — Medication 400 MG: at 09:39

## 2017-11-14 ASSESSMENT — PAIN SCALES - GENERAL: PAINLEVEL_OUTOF10: 1

## 2017-11-14 NOTE — PROGRESS NOTES
[facility] Heart Progress Note      Patient: Garima Willams  Unit/Bed: R434/M099-23  YOB: 1940  MRN: [id number]  Admit Date:  11/7/2017  Hospital Day: 7    Subjective Complaint:   Pt is a little more alert today. Physical Examination:     BP (!) 120/32   Pulse 50   Temp 97.2 °F (36.2 °C) (Oral)   Resp 16   Ht 5' 6\" (1.676 m) Comment: 10/2017  Wt 218 lb 14.7 oz (99.3 kg)   SpO2 100%   BMI 35.33 kg/m²       Intake/Output Summary (Last 24 hours) at 11/14/17 1420  Last data filed at 11/14/17 0938   Gross per 24 hour   Intake              280 ml   Output             1150 ml   Net             -870 ml                  Garima Willams examined at bedside in severely ill. Focused exam reveals:     Cardiac: Heart regular rate and rhythm with 1/6 prince and ectopy     Lungs:  rales present- dry    Extremities:   2+ edema    Telemetry:      normal sinus  and atrial tachycardia         LABS:   CBC:   Recent Labs      11/13/17   0557  11/14/17   0600   WBC  13.1*  15.8*   HGB  8.2*  8.1*   PLT  138  150      BMP:  Recent Labs      11/12/17   0600  11/13/17   0557  11/14/17   0600   NA  143  143  144   K  3.7  4.0  4.1   CL  109*  110*  110*   CO2  21*  25  23   BUN  74*  77*  81*   CREATININE  1.00*  1.00*  0.94*   GLUCOSE  336*  358*  211*              Troponin: No results for input(s): TROPONINT in the last 72 hours. BNP: No results for input(s): PROBNP in the last 72 hours. INR: No results for input(s): INR in the last 72 hours. Mg: No results for input(s): MG in the last 72 hours. Cardiac Testing:    none    Assessment:  Multiple med problems with paf---converted to sinus with pac's   Overall prognosis is very poor  Amazingly pt converted to normal sinus    Plan:  1. OK to d/c but will need vasc cath and d/c of femoral line  2.  Review meds and labs  Electronically signed by Deedee Jacobson MD on 11/14/2017 at 2:20 PM

## 2017-11-14 NOTE — PROGRESS NOTES
levels , and reticulocyte count will also be ordered. Thrombocytopenia may be due to pt's recent infection as well as antibiotic effect r/o early MDS on recent BM asp & Bx  - IV Protonix continued  - Continue to monitor closely     HTN: Now hypotensive but off pressors in ICU  - Adjust blood pressure medications accordingly  - Cardio consulted and following     History of DVT  - No anticoagulation      Sacral decubitus ulcer, stage 4, POA and left forearm wound. Culture was MRSA positive on last admission 1 week prior  - contact precautions  - consult wound care  - ID following. Per Dr. Angel Atkinson, MRSA abscess left arm. Sacral abscess MRSA PROVIDENCIA. Changed patient back to vancomycin and meropenem combination. Checked Blood cultures. Cristhian further dispo recs. - Surgery was consulted. Per Dr. Dyan Michaud, stage IV pressure wound, no drainage and no redness around. We will start with a chemical debridement using Santyl and reevaluation in 48 to 72 hours with the possibility of surgical debridement. Cristhian further recs.      Atrial fibrillation  - Continue telemetry  - Cardio consulted and following as above     Acute and chronic respiratory failure: improving   - Pulmonology consulted. Per Dr. Roberto Navarro, Acute and chronic respiratory failure, improving slowly patient is currently stable, finally off pressor. Acute blood loss anemia, status post PRBC. Hypovolemic shock, improved, still on low-dose norepinephrine drip. Cristhian further dispo recs.      CHF  COPD     Dispo: Ethics consult placed per intensivist. Palliative care consulted. Family refusing hospice at this point. TRansfer out of ICU today. Prognosis remains guarded although clinically pt has made improvement. Will begin discharge process to SNF vs LTAC.        SUBJECTIVE  CHIEF COMPLAINT: Hypotension, acute on chronic anemia    PRIMARY SERVICE: Internal medicine    INTERVAL HPI:  Patient is more alert today, smiling and conversing.        MEDICATIONS:    Current Facility-Administered Medications   Medication Dose Route Frequency Provider Last Rate Last Dose    folic acid (FOLVITE) tablet 1 mg  1 mg Oral Daily Jerad Curran MD   1 mg at 11/14/17 0938    insulin glargine (LANTUS) injection vial 8 Units  8 Units Subcutaneous Nightly Jerad Curran MD   8 Units at 11/13/17 2309    simethicone (MYLICON) chewable tablet 80 mg  80 mg Oral Q6H PRN Jerad Curran MD        aspirin chewable tablet 81 mg  81 mg Oral Daily Jerad Curran MD   81 mg at 11/14/17 0938    magnesium oxide (MAG-OX) tablet 400 mg  400 mg Oral Daily Jerad Curran MD   400 mg at 11/14/17 0939    allopurinol (ZYLOPRIM) tablet 100 mg  100 mg Oral Daily Jerad Curran MD   100 mg at 11/14/17 4124    docusate sodium (COLACE) capsule 100 mg  100 mg Oral BID PRN Jerad Curran MD        furosemide (LASIX) tablet 20 mg  20 mg Oral Daily Jerad Curran MD   20 mg at 11/14/17 2527    levothyroxine (SYNTHROID) tablet 200 mcg  200 mcg Oral Daily Jerad Curran MD   200 mcg at 11/14/17 0617    hypromellose 0.4 % ophthalmic solution SOLN 2 drop  2 drop Both Eyes BID Jerad Curran MD   2 drop at 11/14/17 0941    atorvastatin (LIPITOR) tablet 40 mg  40 mg Oral Nightly Jerad Curran MD   40 mg at 11/13/17 2308    ferrous sulfate tablet 325 mg  325 mg Oral Daily with breakfast Jerad Curran MD   325 mg at 11/14/17 0938    oxybutynin (DITROPAN) tablet 5 mg  5 mg Oral Nightly Jerad Curran MD   5 mg at 11/13/17 2308    albuterol (PROVENTIL) nebulizer solution 2.5 mg  2.5 mg Nebulization Q2H PRN Jerad Curran MD        gabapentin (NEURONTIN) capsule 400 mg  400 mg Oral BID Jerad Curran MD   400 mg at 11/14/17 0939    LORazepam (ATIVAN) tablet 0.5 mg  0.5 mg Oral Q4H PRN Jerad Curran MD        bisacodyl (DULCOLAX) suppository 10 mg  10 mg Rectal Daily PRN Jerad Curran MD        nystatin (MYCOSTATIN) 066552 UNIT/ML suspension 500,000 Units  500,000 Units Oral 4x Daily Liya Cordoba MD   500,000 Units at 11/14/17 0938    promethazine (PHENERGAN) tablet 25 mg  25 mg Oral Q4H PRN Maisha Delacruz MD        sennosides-docusate sodium (SENOKOT-S) 8.6-50 MG tablet 1 tablet  1 tablet Oral BID Maisha Delacruz MD   1 tablet at 11/13/17 2308    metoprolol succinate (TOPROL XL) extended release tablet 50 mg  50 mg Oral Daily Maisha Delacruz MD   50 mg at 11/14/17 0938    glucose (GLUTOSE) 40 % oral gel 15 g  15 g Oral PRN Baylee Magana MD        dextrose 50 % solution 12.5 g  12.5 g Intravenous PRN Baylee Magana MD        glucagon (rDNA) injection 1 mg  1 mg Intramuscular PRN Baylee Magana MD        dextrose 5 % solution  100 mL/hr Intravenous PRN Baylee Magana MD        PN-Adult Premix (4.25/10) Peripheral Line with electrolytes   Intravenous Continuous TPN Bryant Cabot, MD 75 mL/hr at 11/13/17 2331      insulin lispro (HUMALOG) injection vial 0-18 Units  0-18 Units Subcutaneous TID WC Mo Rhodes MD   9 Units at 11/14/17 1023    insulin lispro (HUMALOG) injection vial 0-9 Units  0-9 Units Subcutaneous Nightly Mo Rhodes MD   7 Units at 11/13/17 2309    lactulose (CHRONULAC) 10 GM/15ML solution 20 g  20 g Oral BID Mary Landry MD   20 g at 11/14/17 5657    vancomycin (VANCOCIN) 1000 mg in dextrose 5% 200 mL IVPB  1,000 mg Intravenous Q48H Reese Recio MD   Stopped at 11/12/17 2308    sodium chloride flush 0.9 % injection 10 mL  10 mL Intravenous PRN Maisha Delacruz MD   10 mL at 11/12/17 1507    collagenase ointment   Topical Daily Shonda Torres MD        sodium chloride flush 0.9 % injection 10 mL  10 mL Intravenous 2 times per day Anant Waggoner MD   10 mL at 11/14/17 0942    sodium chloride flush 0.9 % injection 10 mL  10 mL Intravenous PRN Anant Wgagoner MD   10 mL at 11/14/17 0121    acetaminophen (TYLENOL) tablet 650 mg  650 mg Oral Q4H PRN Anant Waggoner MD   650 mg at 11/14/17 0939    0.9 % sodium chloride bolus  250 mL Intravenous Once Danyelle Singleton PA-C

## 2017-11-14 NOTE — PROGRESS NOTES
INPATIENT PROGRESS NOTE    SERVICE DATE:  11/14/2017   SERVICE TIME: 10:46 am     ASSESSMENT/PLAN: 64-year-old female with complicated medical history, recently admitted with abscesses of forearm and sacrococcyx area, patient has COPD, admitted for hypotension, and acute on chronic anemia related to blood loss. chronic heart failure, history of DVT, chronic anemia. Admitted with hypotension and acute on chronic anemia rule out GI bleed.     Septic Shock: persistent hypotension requiring Levophed support, now weaned off over 48 hours  - ID consulted. Per Dr. Kaylee Morales, MRSA abscess left arm. Sacral abscess MRSA PROVIDENCIA sepsis, stage 4 sacral DU with MRSA and MDRO Providencia, on IV Meropenem. Vanco. Cristhian further recs. - Cardio consulted and following. Cristhian further dispo recs. - Nephrology consulted. Per Dr. Haja Nunn, ckd stage 3 due to diabetic nephropathy.  She has multifactorial anemia, low bp, samm, fluid overload.  hypernatremia resolved. Off pressors. 1 dose lasix 40 mg. cont d5w 1 more day. nutrition is an issue if she isn't taking more po. Cristhian further dispo recs. - Has been taking thickened liquids but feeding process is challenging due to labile LOC. Presently on clinamix for supplementation     Acute on chronic anemia: Occult stool was positive for blood. - Recheck CBC  - Typed and Screened  - Transfused 2 units PRBC's for hgb drop below 8 in a CAD pt  - DC'd anticoagulation   - Hem/Onc consulted. Per Dr. Feliciano Castro, The pt has acute on chronic anemia which is multifactorial  due to anemia related to CKD. She also has anemia due to occult GI blood loss. No evidence of malignancy on recent BM asp & Bx. Possibility of myelodysplastic syndrome could not be fully ruled/out on recent BM asp & Bx. Results of BM cytogenetics will be checked. Monitor CBC; consider RBC transfusion if pt's anemia decreases further. The pt's anticoagulant therapy may be contributing to the pt's anemia.  B12 and folate levels as conversing.        MEDICATIONS:    Current Facility-Administered Medications   Medication Dose Route Frequency Provider Last Rate Last Dose    folic acid (FOLVITE) tablet 1 mg  1 mg Oral Daily Jan Colbert MD   1 mg at 11/14/17 0938    insulin glargine (LANTUS) injection vial 8 Units  8 Units Subcutaneous Nightly Jan Colbert MD   8 Units at 11/13/17 2309    simethicone (MYLICON) chewable tablet 80 mg  80 mg Oral Q6H PRN Jan Colbert MD        aspirin chewable tablet 81 mg  81 mg Oral Daily Jan Colbert MD   81 mg at 11/14/17 0938    magnesium oxide (MAG-OX) tablet 400 mg  400 mg Oral Daily Jan Colbert MD   400 mg at 11/14/17 0939    allopurinol (ZYLOPRIM) tablet 100 mg  100 mg Oral Daily Jan Colbert MD   100 mg at 11/14/17 3228    docusate sodium (COLACE) capsule 100 mg  100 mg Oral BID PRN Jan Colbert MD        furosemide (LASIX) tablet 20 mg  20 mg Oral Daily Jan Colbert MD   20 mg at 11/14/17 6987    levothyroxine (SYNTHROID) tablet 200 mcg  200 mcg Oral Daily Jan Colbert MD   200 mcg at 11/14/17 0617    hypromellose 0.4 % ophthalmic solution SOLN 2 drop  2 drop Both Eyes BID Jan Colbert MD   2 drop at 11/14/17 0941    atorvastatin (LIPITOR) tablet 40 mg  40 mg Oral Nightly Jan Colbert MD   40 mg at 11/13/17 2308    ferrous sulfate tablet 325 mg  325 mg Oral Daily with breakfast Jan Colbert MD   325 mg at 11/14/17 0938    oxybutynin (DITROPAN) tablet 5 mg  5 mg Oral Nightly Jan Colbert MD   5 mg at 11/13/17 2308    albuterol (PROVENTIL) nebulizer solution 2.5 mg  2.5 mg Nebulization Q2H PRN Jan Colbert MD        gabapentin (NEURONTIN) capsule 400 mg  400 mg Oral BID Jan Colbert MD   400 mg at 11/14/17 0939    LORazepam (ATIVAN) tablet 0.5 mg  0.5 mg Oral Q4H PRN Jan Colbert MD        bisacodyl (DULCOLAX) suppository 10 mg  10 mg Rectal Daily PRN Jan Colbert MD        nystatin (MYCOSTATIN) 121995 UNIT/ML suspension 500,000 Units 500,000 Units Oral 4x Daily Mauricio Noe MD   500,000 Units at 11/14/17 0938    promethazine (PHENERGAN) tablet 25 mg  25 mg Oral Q4H PRN Mauricio Noe MD        sennosides-docusate sodium (SENOKOT-S) 8.6-50 MG tablet 1 tablet  1 tablet Oral BID Mauricio Noe MD   1 tablet at 11/13/17 2308    metoprolol succinate (TOPROL XL) extended release tablet 50 mg  50 mg Oral Daily Mauricio Noe MD   50 mg at 11/14/17 0938    glucose (GLUTOSE) 40 % oral gel 15 g  15 g Oral PRN Malini Hernandez MD        dextrose 50 % solution 12.5 g  12.5 g Intravenous PRN Malini Hernandez MD        glucagon (rDNA) injection 1 mg  1 mg Intramuscular PRN Malini Hernandez MD        dextrose 5 % solution  100 mL/hr Intravenous PRN Malini Hernandez MD        PN-Adult Premix (4.25/10) Peripheral Line with electrolytes   Intravenous Continuous TPN Arsalan Buenrostro MD 75 mL/hr at 11/13/17 2331      insulin lispro (HUMALOG) injection vial 0-18 Units  0-18 Units Subcutaneous TID WC Wandy Rhodes MD   9 Units at 11/14/17 1023    insulin lispro (HUMALOG) injection vial 0-9 Units  0-9 Units Subcutaneous Nightly Mo Rhodes MD   7 Units at 11/13/17 2309    lactulose (CHRONULAC) 10 GM/15ML solution 20 g  20 g Oral BID Anshul Celaya MD   20 g at 11/14/17 1832    vancomycin (VANCOCIN) 1000 mg in dextrose 5% 200 mL IVPB  1,000 mg Intravenous Q48H Chitra Novak MD   Stopped at 11/12/17 2308    sodium chloride flush 0.9 % injection 10 mL  10 mL Intravenous PRN Mauricio Noe MD   10 mL at 11/12/17 1507    collagenase ointment   Topical Daily Kim Lane MD        sodium chloride flush 0.9 % injection 10 mL  10 mL Intravenous 2 times per day Elaine Flynn MD   10 mL at 11/14/17 0942    sodium chloride flush 0.9 % injection 10 mL  10 mL Intravenous PRN Elaine Flynn MD   10 mL at 11/14/17 0121    acetaminophen (TYLENOL) tablet 650 mg  650 mg Oral Q4H PRN Elaine Flynn MD   650 mg at 11/14/17 0939    0.9 % sodium chloride bolus  250 mL Intravenous Once [facility], AISHWARYA        darbepoetin stephanie-polysorbate (ARANESP) injection 40 mcg  40 mcg Subcutaneous Weekly Vannesa Bustillo MD   40 mcg at 11/07/17 2043    pantoprazole (PROTONIX) injection 40 mg  40 mg Intravenous BID Marien Schirmer, MD   40 mg at 11/14/17 0940    And    sodium chloride (PF) 0.9 % injection 10 mL  10 mL Intravenous Daily Marien Schirmer, MD   10 mL at 11/14/17 0940    hydrocortisone sodium succinate PF (SOLU-CORTEF) injection 50 mg  50 mg Intravenous Q6H Marien Schirmer, MD   50 mg at 11/14/17 0656    ipratropium-albuterol (DUONEB) nebulizer solution 1 ampule  1 ampule Inhalation 4x daily Marien Schirmer, MD   1 ampule at 11/13/17 2011    meropenem (MERREM) 1 g in sterile water 20 mL IV syringe  1 g Intravenous Q12H Josh Johnson MD   1 g at 11/14/17 0617       OBJECTIVE  PHYSICAL EXAM:   BP (!) 124/41   Pulse 62   Temp 97.2 °F (36.2 °C) (Axillary)   Resp 16   Ht 5' 6\" (1.676 m) Comment: 10/2017  Wt 218 lb 14.7 oz (99.3 kg)   SpO2 99%   BMI 35.33 kg/m²   Body mass index is 35.33 kg/m². CONSTITUTIONAL: Alert, being fed by daughter  LUNGS:  No increased work of breathing, diminished breath sounds bilaterally  CARDIOVASCULAR: Irregular, irregular, rate controlled  ABDOMEN:  No scars, normal bowel sounds, soft, non-distended, non-tender, no masses palpated, no hepatosplenomegally  EXTREMITIES: Edema bilateral upper and lower extremities, no cyanosis, no clubbing    DATA:   Diagnostic tests reviewed for today's visit:    Most recent labs and imaging results reviewed.      SIGNATURE: Aby Butterfield MD PATIENT NAME: Garima Willams   DATE: November 14, 2017 MRN: [id number]   TIME: 10:46 AM PAGER: [phone]

## 2017-11-14 NOTE — PROGRESS NOTES
(1.676 m) Comment: 10/2017  Wt 218 lb 14.7 oz (99.3 kg)   SpO2 100%   BMI 35.33 kg/m²    Wt Readings from Last 3 Encounters:   11/13/17 218 lb 14.7 oz (99.3 kg)   11/04/17 218 lb (98.9 kg)   10/31/17 218 lb (98.9 kg)      24HR INTAKE/OUTPUT:      Intake/Output Summary (Last 24 hours) at 11/14/17 1304  Last data filed at 11/14/17 0938   Gross per 24 hour   Intake              280 ml   Output             1150 ml   Net             -870 ml       Constitutional:  Ill appearing  Skin:normal, no rash  HEENT:sclera anicteric.   Head atraumatic normocephalic  Neck:supple with no thyromegally  Cardiovascular:  S1, S2 without m/r/g   Respiratory:  CTA B without w/r/r   Abdomen: +bs, soft, nt  Ext: 2+ LE edema  Musculoskeletal:Intact  Neuro: lethargic      Electronically signed by Elmer Scott MD on 11/14/2017 at 1:04 PM

## 2017-11-14 NOTE — PROGRESS NOTES
Parenteral Nutrition initiated 11/12  · Anthropometric Measures:  · Ht: 5' 6\" (167.6 cm) (10/2017)   · Current Body Wt: 218 lb 14.7 oz (99.3 kg) (11-13)  · Admission Body Wt: 215 lb (97.5 kg)  · Usual Body Wt: 208 lb (94.3 kg) (10/20/16)  · Ideal Body Wt: 130 lb (59 kg), % Ideal Body 168%  · BMI Classification: BMI 35.0 - 39.9 Obese Class II (35.4)  · Comparative Standards (Estimated Nutrition Needs):  · Estimated Daily Total Kcal: 9369-9808  · Estimated Daily Protein (g): 77 to 89  · Estimated Daily Total Fluid (ml/day): As Ordered By Physician. Estimated Intake vs Estimated Needs: Intake Less Than Needs    Nutrition Risk Level: High    Nutrition Interventions:   Continue current diet, Continue current ONS  Continued Inpatient Monitoring    Nutrition Evaluation:   · Evaluation: No progress toward goals   · Goals: Intake of >75% of meals/supplements. Improved wound status. · Monitoring: Meal Intake, Supplement Intake, Weight, Pertinent Labs, Chewing/Swallowing, Wound Healing    See Adult Nutrition Doc Flowsheet for more detail.      Electronically signed by Ca Jacinto RD, LD on 11/14/17 at 2:44 PM

## 2017-11-14 NOTE — FLOWSHEET NOTE
Called lab to see if they are able to draw her labs peripherally because she has clinimax infusing through triple lumen port

## 2017-11-14 NOTE — PROGRESS NOTES
Spoke with patients daughter, who provided hipaa code.  Informed of current plan to wait on precert for speciality will discuss with  in am.

## 2017-11-14 NOTE — PROGRESS NOTES
INPATIENT PROGRESS NOTES    PATIENT NAME: Cuba Almanza  MRN: [id number]  SERVICE DATE:  November 14, 2017   SERVICE TIME:  12:19 PM      PRIMARY SERVICE:  Pulmonary Medicine     INTERVAL HPI: Patient seen and examined at bedside, Interval Notes, orders reviewed. Nursing notes noted: patient is somnolent but arousable and appears in no acute distress. She opens her eyes to verbal stimuli. She has been using BiPAP at bedtime and as needed. OBJECTIVE    Body mass index is 35.33 kg/m². PHYSICAL EXAM:  Vitals:  BP (!) 120/32   Pulse 79   Temp 97.2 °F (36.2 °C) (Oral)   Resp 17   Ht 5' 6\" (1.676 m) Comment: 10/2017  Wt 218 lb 14.7 oz (99.3 kg)   SpO2 100%   BMI 35.33 kg/m²   General: Somnolent but arousable to voice and appears in no acute distress. Head: Atraumatic , Normocephalic   Eyes: PERRL. No sclera icterus. No conjunctival injection. No discharge   ENT: No nasal  discharge. Pharynx clear. Neck:  Trachea midline. No thyromegaly, no JVD, No cervical adenopathy. Resp : Diminished breath sounds bilaterally with scattered rhonchi throughout. No wheezing, no rales. Normal effort with no accessory muscle use. CV: Normal  rate. Regular rhythm. No mumur ,  Rub or gallop  ABD: Non-tender. Non-distended. No masses. No organmegaly. Normal bowel sounds. No hernia. EXT: generalized peripheral edema, pt is +DVT right side, No Cyanosis No clubbing  Neuro: no focal weakness  Skin: Warm and dry. No erythema rash on exposed extremities.         DATA:   Recent Labs      11/13/17   0557  11/14/17   0600   WBC  13.1*  15.8*   HGB  8.2*  8.1*   HCT  25.8*  25.7*   MCV  95.5  94.6   PLT  138  150     Recent Labs      11/13/17   0557  11/14/17   0600   NA  143  144   K  4.0  4.1   CL  110*  110*   CO2  25  23   BUN  77*  81*   CREATININE  1.00*  0.94*   GLUCOSE  358*  211*   CALCIUM  8.8  9.7   LABGLOM  53.4*  57.3*   GFRAA  >60.0  >60.0         No results for input(s): PHART, TBI7FEW, PO2ART, AEY6HWS, BEART, B2RNUMWN in the last 72 hours.   O2 Device: Nasal cannula  O2 Flow Rate (L/min): 3 L/min  Lab Results   Component Value Date    LACTA 0.8 08/18/2017        MEDICATIONS during current hospitalization:    Continuous Infusions:   dextrose      PN-Adult premixed (4.25/10) Peripheral solution with electrolytes 75 mL/hr at 11/13/17 2331       Scheduled Meds:   meropenem  1 g Intravenous N3T    folic acid  1 mg Oral Daily    insulin glargine  8 Units Subcutaneous Nightly    aspirin  81 mg Oral Daily    magnesium oxide  400 mg Oral Daily    allopurinol  100 mg Oral Daily    furosemide  20 mg Oral Daily    levothyroxine  200 mcg Oral Daily    hypromellose  2 drop Both Eyes BID    atorvastatin  40 mg Oral Nightly    ferrous sulfate  325 mg Oral Daily with breakfast    oxybutynin  5 mg Oral Nightly    gabapentin  400 mg Oral BID    nystatin  500,000 Units Oral 4x Daily    sennosides-docusate sodium  1 tablet Oral BID    metoprolol succinate  50 mg Oral Daily    insulin lispro  0-18 Units Subcutaneous TID WC    insulin lispro  0-9 Units Subcutaneous Nightly    lactulose  20 g Oral BID    vancomycin  1,000 mg Intravenous Q48H    collagenase   Topical Daily    sodium chloride flush  10 mL Intravenous 2 times per day    sodium chloride  250 mL Intravenous Once    darbepoetin stephanie-polysorbate  40 mcg Subcutaneous Weekly    pantoprazole  40 mg Intravenous BID    And    sodium chloride (PF)  10 mL Intravenous Daily    hydrocortisone sodium succinate PF  50 mg Intravenous Q6H    ipratropium-albuterol  1 ampule Inhalation 4x daily       PRN Meds:simethicone, docusate sodium, albuterol, LORazepam, bisacodyl, promethazine, glucose, dextrose, glucagon (rDNA), dextrose, sodium chloride flush, sodium chloride flush, acetaminophen    Radiology  Xr Chest Portable    Result Date: 11/9/2017  EXAMINATION: XR CHEST PORTABLE CLINICAL HISTORY: Shortness of breath COMPARISONS: 10/27/2017 FINDINGS: There is moderate

## 2017-11-14 NOTE — CARE COORDINATION
LSW spoke with pt's daughter, Marisel Silvestre. She is aware of the referral to LTAC for her mom and that is still her first choice but if that is denied then she is agreeable to her returning to [facility]. Carrillo from [facility] was here today and gathered some updated information for precert. ELDERW to follow.

## 2017-11-14 NOTE — PROGRESS NOTES
Renal Adjustment Per Protocol: Merropenem 1 g IV Q12H changed to Merropenem 1 g IV Q8H based on      Recent Labs      11/13/17   0557   CREATININE  1.00*    Estimated Creatinine Clearance: 54 mL/min (based on SCr of 1 mg/dL). Thank you, will continue to monitor for any further changes in renal function  Wendi West, PharmD  PGY-1 Pharmacy Resident  11/14/2017 11:44 AM

## 2017-11-14 NOTE — FLOWSHEET NOTE
Page to Dr. Argentina Valentine per Dr. Walsh Beverage request to see if he would be able to place infusaport for patient IV access.

## 2017-11-14 NOTE — PLAN OF CARE
Problem: Risk for Impaired Skin Integrity  Goal: Tissue integrity - skin and mucous membranes  Structural intactness and normal physiological function of skin and  mucous membranes. Outcome: Ongoing      Problem: Falls - Risk of  Goal: Absence of falls  Outcome: Ongoing      Problem: Nutrition  Goal: Optimal nutrition therapy  Nutrition Problem: Inadequate oral intake, in context of chronic illness  Intervention: Food and/or Nutrient Delivery: Start oral diet, Start ONS  Nutritional Goals: Intake Of Meals/ ONS's Will Improve To > 75%. Improved Wound Healing. Outcome: Ongoing      Problem: Discharge Planning:  Goal: Discharged to appropriate level of care  Discharged to appropriate level of care   Outcome: Ongoing      Problem:  Bowel Function - Altered:  Goal: Bowel elimination is within specified parameters  Bowel elimination is within specified parameters   Outcome: Ongoing      Problem: Fluid Volume - Imbalance:  Goal: Will show no signs and symptoms of excessive bleeding  Will show no signs and symptoms of excessive bleeding   Outcome: Ongoing    Goal: Absence of imbalanced fluid volume signs and symptoms  Absence of imbalanced fluid volume signs and symptoms   Outcome: Ongoing      Problem: Nausea/Vomiting:  Goal: Absence of nausea/vomiting  Absence of nausea/vomiting   Outcome: Ongoing    Goal: Able to drink  Able to drink   Outcome: Ongoing    Goal: Able to eat  Able to eat   Outcome: Ongoing    Goal: Ability to achieve adequate nutritional intake will improve  Ability to achieve adequate nutritional intake will improve   Outcome: Ongoing      Problem: IP SWALLOWING  Goal: LTG - patient will tolerate the least restrictive diet consistency to allow for safe consumption of daily meals  Outcome: Ongoing

## 2017-11-15 LAB
ABO/RH: NORMAL
ANION GAP SERPL CALCULATED.3IONS-SCNC: 7 MEQ/L (ref 7–13)
ANISOCYTOSIS: ABNORMAL
ANTIBODY SCREEN: NORMAL
BANDED NEUTROPHILS RELATIVE PERCENT: 7 % (ref 5–11)
BASOPHILIC STIPPLING: 1
BASOPHILS ABSOLUTE: 0 K/UL (ref 0–0.2)
BASOPHILS RELATIVE PERCENT: 0.2 %
BLOOD BANK DISPENSE STATUS: NORMAL
BLOOD BANK DISPENSE STATUS: NORMAL
BLOOD BANK PRODUCT CODE: NORMAL
BLOOD BANK PRODUCT CODE: NORMAL
BPU ID: NORMAL
BPU ID: NORMAL
BUN BLDV-MCNC: 80 MG/DL (ref 8–23)
CALCIUM SERPL-MCNC: 9.1 MG/DL (ref 8.6–10.2)
CHLORIDE BLD-SCNC: 109 MEQ/L (ref 98–107)
CO2: 26 MEQ/L (ref 22–29)
CREAT SERPL-MCNC: 0.79 MG/DL (ref 0.5–0.9)
DESCRIPTION BLOOD BANK: NORMAL
DESCRIPTION BLOOD BANK: NORMAL
EOSINOPHILS ABSOLUTE: 0 K/UL (ref 0–0.7)
EOSINOPHILS RELATIVE PERCENT: 0 %
GFR AFRICAN AMERICAN: >60
GFR NON-AFRICAN AMERICAN: >60
GLUCOSE BLD-MCNC: 261 MG/DL (ref 60–115)
GLUCOSE BLD-MCNC: 273 MG/DL (ref 74–109)
GLUCOSE BLD-MCNC: 315 MG/DL (ref 60–115)
GLUCOSE BLD-MCNC: 360 MG/DL (ref 60–115)
HCT VFR BLD CALC: 22.8 % (ref 37–47)
HEMOGLOBIN: 7.4 G/DL (ref 12–16)
HYPOCHROMIA: ABNORMAL
LYMPHOCYTES ABSOLUTE: 0.5 K/UL (ref 1–4.8)
LYMPHOCYTES RELATIVE PERCENT: 3 %
MACROCYTES: 0
MCH RBC QN AUTO: 30.6 PG (ref 27–31.3)
MCHC RBC AUTO-ENTMCNC: 32.3 % (ref 33–37)
MCV RBC AUTO: 94.6 FL (ref 82–100)
MICROCYTES: 0
MONOCYTES ABSOLUTE: 0.2 K/UL (ref 0.2–0.8)
MONOCYTES RELATIVE PERCENT: 0.7 %
MYELOCYTE PERCENT: 3 %
NEUTROPHILS ABSOLUTE: 15 K/UL (ref 1.4–6.5)
NEUTROPHILS RELATIVE PERCENT: 87 %
PDW BLD-RTO: 15.8 % (ref 11.5–14.5)
PERFORMED ON: ABNORMAL
PLATELET # BLD: 162 K/UL (ref 130–400)
PLATELET SLIDE REVIEW: ADEQUATE
POIKILOCYTES: 0
POLYCHROMASIA: 0
POTASSIUM SERPL-SCNC: 4.5 MEQ/L (ref 3.5–5.1)
RBC # BLD: 2.41 M/UL (ref 4.2–5.4)
SLIDE REVIEW: ABNORMAL
SODIUM BLD-SCNC: 142 MEQ/L (ref 132–144)
VANCOMYCIN TROUGH: 21.4 UG/ML (ref 5–10)
WBC # BLD: 15.5 K/UL (ref 4.8–10.8)

## 2017-11-15 PROCEDURE — 85025 COMPLETE CBC W/AUTO DIFF WBC: CPT

## 2017-11-15 PROCEDURE — C9113 INJ PANTOPRAZOLE SODIUM, VIA: HCPCS | Performed by: ANESTHESIOLOGY

## 2017-11-15 PROCEDURE — 36430 TRANSFUSION BLD/BLD COMPNT: CPT

## 2017-11-15 PROCEDURE — 6370000000 HC RX 637 (ALT 250 FOR IP): Performed by: ANESTHESIOLOGY

## 2017-11-15 PROCEDURE — 86901 BLOOD TYPING SEROLOGIC RH(D): CPT

## 2017-11-15 PROCEDURE — 6360000002 HC RX W HCPCS: Performed by: ANESTHESIOLOGY

## 2017-11-15 PROCEDURE — 51702 INSERT TEMP BLADDER CATH: CPT

## 2017-11-15 PROCEDURE — 2580000003 HC RX 258: Performed by: INTERNAL MEDICINE

## 2017-11-15 PROCEDURE — 1210000000 HC MED SURG R&B

## 2017-11-15 PROCEDURE — 80048 BASIC METABOLIC PNL TOTAL CA: CPT

## 2017-11-15 PROCEDURE — 99232 SBSQ HOSP IP/OBS MODERATE 35: CPT | Performed by: INTERNAL MEDICINE

## 2017-11-15 PROCEDURE — 36592 COLLECT BLOOD FROM PICC: CPT

## 2017-11-15 PROCEDURE — 86900 BLOOD TYPING SEROLOGIC ABO: CPT

## 2017-11-15 PROCEDURE — 6370000000 HC RX 637 (ALT 250 FOR IP): Performed by: HOSPITALIST

## 2017-11-15 PROCEDURE — P9016 RBC LEUKOCYTES REDUCED: HCPCS

## 2017-11-15 PROCEDURE — 94640 AIRWAY INHALATION TREATMENT: CPT

## 2017-11-15 PROCEDURE — 2700000000 HC OXYGEN THERAPY PER DAY

## 2017-11-15 PROCEDURE — 86850 RBC ANTIBODY SCREEN: CPT

## 2017-11-15 PROCEDURE — 86923 COMPATIBILITY TEST ELECTRIC: CPT

## 2017-11-15 PROCEDURE — 6360000002 HC RX W HCPCS: Performed by: INTERNAL MEDICINE

## 2017-11-15 PROCEDURE — 94660 CPAP INITIATION&MGMT: CPT

## 2017-11-15 RX ORDER — 0.9 % SODIUM CHLORIDE 0.9 %
250 INTRAVENOUS SOLUTION INTRAVENOUS ONCE
Status: DISCONTINUED | OUTPATIENT
Start: 2017-11-15 | End: 2017-11-18 | Stop reason: HOSPADM

## 2017-11-15 RX ADMIN — IPRATROPIUM BROMIDE AND ALBUTEROL SULFATE 1 AMPULE: 2.5; .5 SOLUTION RESPIRATORY (INHALATION) at 11:33

## 2017-11-15 RX ADMIN — MEROPENEM 1 G: 1 INJECTION, POWDER, FOR SOLUTION INTRAVENOUS at 06:07

## 2017-11-15 RX ADMIN — LEVOTHYROXINE SODIUM 200 MCG: 200 TABLET ORAL at 05:07

## 2017-11-15 RX ADMIN — HYDROCORTISONE SODIUM SUCCINATE 50 MG: 100 INJECTION, POWDER, FOR SOLUTION INTRAMUSCULAR; INTRAVENOUS at 13:47

## 2017-11-15 RX ADMIN — IPRATROPIUM BROMIDE AND ALBUTEROL SULFATE 1 AMPULE: 2.5; .5 SOLUTION RESPIRATORY (INHALATION) at 16:41

## 2017-11-15 RX ADMIN — METOPROLOL SUCCINATE 50 MG: 50 TABLET, FILM COATED, EXTENDED RELEASE ORAL at 11:11

## 2017-11-15 RX ADMIN — MEROPENEM 1 G: 1 INJECTION, POWDER, FOR SOLUTION INTRAVENOUS at 14:30

## 2017-11-15 RX ADMIN — NYSTATIN 500000 UNITS: 100000 SUSPENSION ORAL at 11:09

## 2017-11-15 RX ADMIN — NYSTATIN 500000 UNITS: 100000 SUSPENSION ORAL at 13:48

## 2017-11-15 RX ADMIN — FOLIC ACID 1 MG: 1 TABLET ORAL at 11:10

## 2017-11-15 RX ADMIN — PANTOPRAZOLE SODIUM 40 MG: 40 INJECTION, POWDER, FOR SOLUTION INTRAVENOUS at 11:09

## 2017-11-15 RX ADMIN — DARBEPOETIN ALFA 40 MCG: 40 SOLUTION INTRAVENOUS; SUBCUTANEOUS at 13:47

## 2017-11-15 RX ADMIN — ASPIRIN 81 MG 81 MG: 81 TABLET ORAL at 11:10

## 2017-11-15 RX ADMIN — HYDROCORTISONE SODIUM SUCCINATE 50 MG: 100 INJECTION, POWDER, FOR SOLUTION INTRAMUSCULAR; INTRAVENOUS at 04:56

## 2017-11-15 RX ADMIN — Medication 10 ML: at 11:42

## 2017-11-15 RX ADMIN — HYPROMELLOSE 2 DROP: 4 SOLUTION/ DROPS OPHTHALMIC at 23:48

## 2017-11-15 RX ADMIN — IPRATROPIUM BROMIDE AND ALBUTEROL SULFATE 1 AMPULE: 2.5; .5 SOLUTION RESPIRATORY (INHALATION) at 08:13

## 2017-11-15 RX ADMIN — ALLOPURINOL 100 MG: 100 TABLET ORAL at 11:10

## 2017-11-15 RX ADMIN — IPRATROPIUM BROMIDE AND ALBUTEROL SULFATE 1 AMPULE: 2.5; .5 SOLUTION RESPIRATORY (INHALATION) at 19:40

## 2017-11-15 RX ADMIN — FUROSEMIDE 20 MG: 20 TABLET ORAL at 11:10

## 2017-11-15 RX ADMIN — FERROUS SULFATE TAB 325 MG (65 MG ELEMENTAL FE) 325 MG: 325 (65 FE) TAB at 11:10

## 2017-11-15 RX ADMIN — HYPROMELLOSE 2 DROP: 4 SOLUTION/ DROPS OPHTHALMIC at 11:12

## 2017-11-15 RX ADMIN — NYSTATIN 500000 UNITS: 100000 SUSPENSION ORAL at 16:38

## 2017-11-15 RX ADMIN — HYDROCORTISONE SODIUM SUCCINATE 50 MG: 100 INJECTION, POWDER, FOR SOLUTION INTRAMUSCULAR; INTRAVENOUS at 23:48

## 2017-11-15 RX ADMIN — LACTULOSE 20 G: 20 SOLUTION ORAL at 11:09

## 2017-11-15 RX ADMIN — COLLAGENASE SANTYL: 250 OINTMENT TOPICAL at 11:12

## 2017-11-15 RX ADMIN — Medication 400 MG: at 11:10

## 2017-11-15 RX ADMIN — GABAPENTIN 400 MG: 400 CAPSULE ORAL at 11:09

## 2017-11-15 RX ADMIN — SENNOSIDES AND DOCUSATE SODIUM 1 TABLET: 8.6; 5 TABLET ORAL at 11:10

## 2017-11-15 NOTE — PROGRESS NOTES
80*   CREATININE  0.94*  0.79   GLUCOSE  211*  273*   CALCIUM  9.7  9.1   LABGLOM  57.3*  >60.0   GFRAA  >60.0  >60.0         No results for input(s): PHART, BAG7KKL, PO2ART, MLH0HKB, BEART, I1TKWMUQ in the last 72 hours.   O2 Device: Bi-PAP  O2 Flow Rate (L/min): 2 L/min  Lab Results   Component Value Date    LACTA 0.8 08/18/2017        MEDICATIONS during current hospitalization:    Continuous Infusions:   dextrose         Scheduled Meds:   meropenem  1 g Intravenous M2D    folic acid  1 mg Oral Daily    insulin glargine  8 Units Subcutaneous Nightly    aspirin  81 mg Oral Daily    magnesium oxide  400 mg Oral Daily    allopurinol  100 mg Oral Daily    furosemide  20 mg Oral Daily    levothyroxine  200 mcg Oral Daily    hypromellose  2 drop Both Eyes BID    atorvastatin  40 mg Oral Nightly    ferrous sulfate  325 mg Oral Daily with breakfast    oxybutynin  5 mg Oral Nightly    gabapentin  400 mg Oral BID    nystatin  500,000 Units Oral 4x Daily    sennosides-docusate sodium  1 tablet Oral BID    metoprolol succinate  50 mg Oral Daily    insulin lispro  0-18 Units Subcutaneous TID WC    insulin lispro  0-9 Units Subcutaneous Nightly    lactulose  20 g Oral BID    vancomycin  1,000 mg Intravenous Q48H    collagenase   Topical Daily    sodium chloride flush  10 mL Intravenous 2 times per day    sodium chloride  250 mL Intravenous Once    darbepoetin stephanie-polysorbate  40 mcg Subcutaneous Weekly    pantoprazole  40 mg Intravenous BID    And    sodium chloride (PF)  10 mL Intravenous Daily    hydrocortisone sodium succinate PF  50 mg Intravenous Q6H    ipratropium-albuterol  1 ampule Inhalation 4x daily       PRN Meds:simethicone, docusate sodium, albuterol, LORazepam, bisacodyl, promethazine, glucose, dextrose, glucagon (rDNA), dextrose, sodium chloride flush, sodium chloride flush, acetaminophen    Radiology  Xr Chest Portable    Result Date: 11/9/2017  EXAMINATION: XR CHEST PORTABLE CLINICAL HISTORY: Shortness of breath COMPARISONS: 10/27/2017 FINDINGS: There is moderate cardiomegaly. Pulmonary vascularity is prominent. There are groundglass and streaky infiltrates in the left midlung and left lung base, mildly improved from 10/27/2017. There is blunting of the left costophrenic angle consistent with scarring or small effusion. Interstitial markings are prominent. There is mild elevation of the left hemidiaphragm. There is calcification of the mitral annulus. Right upper extremity PICC tip is in the right atrium. There is no pneumothorax. CARDIOMEGALY AND MILD VASCULAR CONGESTION. GROUNDGLASS AND STREAKY INFILTRATES IN THE LEFT MIDLUNG AND LEFT LUNG BASE, MILDLY IMPROVED FROM 10/27/2017; CHEST RADIOGRAPH IS OTHERWISE ESSENTIALLY UNCHANGED--SEE ABOVE. Xr Chest Portable    Result Date: 10/27/2017  PORTABLE CHEST: 10/27/2017 CLINICAL HISTORY:  follow up possible aspiration pneumonia . COMPARISON: 10/25/2017. A portable upright AP radiograph of the chest was obtained. FINDINGS: Infiltrate/consolidation of the mid to basilar left lung, with a small left pleural effusion appears unchanged. Probable mild right basilar atelectasis appears improved. There is no significant right pleural effusion, vascular congestion, pneumothorax, or displaced fractures identified. The right upper extremity PICC catheter is again noted     INFILTRATE/CONSOLIDATION OF THE MID TO BASILAR LEFT LUNG WITH A SMALL PLEURAL EFFUSION. NO SIGNIFICANT CHANGE FROM 10/25/2017    Xr Chest Portable    Result Date: 10/25/2017  EXAMINATION: XR CHEST PORTABLE REASON FOR EXAM: Shortness of breath. FINDINGS: Frontal view of the chest reveals inspiration somewhat shallow related to portable technique and positioning. There is a PICC catheter now in place in satisfactory position since previous studies. Opacification at the left base related to volume loss and pleural thickening is present.  Increase infiltrate in the left perihilar region more prominent from previous studies and developing pneumonia superimposed on chronic disease should BE considered. Central pulmonary vascular congestion is present. Scarring in the right mid and lower lung field remain unchanged. MILD CENTRAL VASCULAR CONGESTION. ACUTE PNEUMONIA SUPERIMPOSED ON CHRONIC DISEASE LEFT BASE. Us Upper Extremity Right Venous Duplex    Result Date: 11/4/2017  US UPPER EXTREMITY RIGHT VENOUS DUPLEX : 11/4/2017 CLINICAL HISTORY: Right upper extremity swelling and right upper extremity PICC catheter placed 10/23/2017. COMPARISON: None available. TECHNIQUE: Grayscale, color and waveform Doppler analysis of the right upper extremity was performed. FINDINGS: The study is limited by the patient's cooperation, the study was incomplete. Recent-appearing occlusive thrombus is present around a PICC catheter within the right brachial and axillary veins, which appears to extend into the subclavian vein. No thrombus is identified within the visualized jugular vein. RECENT-APPEARING OCCLUSIVE VENOUS THROMBUS AROUND A PICC CATHETER WITHIN THE RIGHT BRACHIAL, AXILLARY AND SUBCLAVIAN VEINS. ASSESSMENT /Plan:  1.  Acute and chronic respiratory failure, on BiPAP therapy, improving   2.  Acute blood loss anemia, status post PRBC  3.  Hypovolemic shock, improved  4.  Right upper extremity DVT  5.  Anasarca  6.  Stage III chronic kidney disease  7.  Altered mental status, metabolic encephalopathy  8.  Uterine cancer    Discussed with daughter at bedside. Gave update. Continue BiPAP therapy with sleeping and when needed. Continue bronchodilator therapy with duoneb and oxygen therapy to keep saturation >93%. Patient is on Merrem and vancomycin. Continue current treatment plan.       Electronically signed by Ranjana Jaquez MD on 11/15/2017 at 11:37 AM

## 2017-11-15 NOTE — CARE COORDINATION
LSW placed calls to both of pt's sons to discuss DC plan. Messages were left for both. LSW to follow.

## 2017-11-15 NOTE — PROGRESS NOTES
Pt. Comfortable and alert. Discussed with the pt. And her Daughter   The recommendation for placement of a venous port in view of the  Difficulty and swelling to obtain a peripheral vein on the arms. The Daughter understood and stated that will be in tomorrow and will sign  The consent.

## 2017-11-15 NOTE — PROGRESS NOTES
INPATIENT PROGRESS NOTE    SERVICE DATE:  11/15/2017   SERVICE TIME: 11:36 am     ASSESSMENT/PLAN: 57-year-old female with complicated medical history, recently admitted with abscesses of forearm and sacrococcyx area, patient has COPD, admitted for hypotension, and acute on chronic anemia related to blood loss. chronic heart failure, history of DVT, chronic anemia. Admitted with hypotension and acute on chronic anemia rule out GI bleed.     Septic Shock: persistent hypotension requiring Levophed support, now weaned off over 48 hours  - ID consulted. Per  [place], MRSA abscess left arm. Sacral abscess MRSA PROVIDENCIA sepsis, stage 4 sacral DU with MRSA and MDRO Providencia, on IV Meropenem. Vanco. Cristhian further recs. - Cardio consulted and following. For debridement and vasc cath today. Cristhian further dispo recs. - Nephrology consulted. Per Dr. Joann Sanchez, ckd stage 3 due to diabetic nephropathy.  She has multifactorial anemia, low bp, samm, fluid overload.  hypernatremia resolved. Off pressors. 1 dose lasix 40 mg. cont d5w 1 more day. nutrition is an issue if she isn't taking more po. Cristhian further dispo recs. - Has been taking thickened liquids but feeding process is challenging due to labile LOC. Presently on clinamix for supplementation     Acute on chronic anemia: Occult stool was positive for blood. hgb 7.4 this AM. Will transfuse another 2 units PRBC's  - Recheck CBC  - Typed and Screened  - Transfused 2 units PRBC's for hgb drop below 8 in a CAD pt  - DC'd anticoagulation   - Hem/Onc consulted. Per Dr. Jn Collins, The pt has acute on chronic anemia which is multifactorial  due to anemia related to CKD. She also has anemia due to occult GI blood loss. No evidence of malignancy on recent BM asp & Bx. Possibility of myelodysplastic syndrome could not be fully ruled/out on recent BM asp & Bx. Results of BM cytogenetics will be checked. Monitor CBC; consider RBC transfusion if pt's anemia decreases further.  The pt's anticoagulant therapy may be contributing to the pt's anemia. B12 and folate levels as well as Fe, TIBC and ferritin levels , and reticulocyte count will also be ordered. Thrombocytopenia may be due to pt's recent infection as well as antibiotic effect r/o early MDS on recent BM asp & Bx. Cristhian further dispo recs  - IV Protonix continued  - Continue to monitor closely     HTN: Now hypotensive but off pressors   - Adjust blood pressure medications accordingly  - Cardio consulted and following     History of DVT  - No anticoagulation      Sacral decubitus ulcer, stage 4, POA and left forearm wound. Culture was MRSA positive on last admission 1 week prior  - contact precautions  - consult wound care  - ID following. Per Dr. Lorraine Lei, MRSA abscess left arm. Sacral abscess MRSA PROVIDENCIA. Changed patient back to vancomycin and meropenem combination. Checked Blood cultures. Cristhian further dispo recs. - Surgery was consulted. Per Dr. Susan Maguire, stage IV pressure wound, no drainage and no redness around. We will start with a chemical debridement using Santyl and reevaluation in 48 to 72 hours with the possibility of surgical debridement. Cristhian further dispo recs.      Atrial fibrillation  - Continue telemetry  - Cardio consulted and following as above     Acute and chronic respiratory failure: improving   - Pulmonology consulted. Per Dr. Srini Méndez, Acute and chronic respiratory failure, improving slowly patient is currently stable, finally off pressor. Acute blood loss anemia, status post PRBC. Hypovolemic shock, improved, still on low-dose norepinephrine drip. Cristhian further dispo recs.      CHF  COPD     Dispo: Ethics consult placed. Palliative care consulted. Family refusing hospice at this point. Transferred out of ICU now on RNF with tele. Prognosis remains guarded although clinically pt has made improvement. Transfuse PRBC's today to keep hgb above 8.  Awaiting pre-cert for LTAC placement.        SUBJECTIVE  CHIEF COMPLAINT: Hypotension, acute on chronic anemia    PRIMARY SERVICE: Internal medicine    INTERVAL HPI:  Patient is alert, does have episodes where she is sleepy. No complaints today.       MEDICATIONS:    Current Facility-Administered Medications   Medication Dose Route Frequency Provider Last Rate Last Dose    meropenem (MERREM) 1 g in sterile water 20 mL IV syringe  1 g Intravenous Q8H Amina Sandoval MD   1 g at 79/04/62 3617    folic acid (FOLVITE) tablet 1 mg  1 mg Oral Daily Madison Norton MD   1 mg at 11/15/17 1110    insulin glargine (LANTUS) injection vial 8 Units  8 Units Subcutaneous Nightly Madison Norton MD   8 Units at 11/14/17 2148    simethicone (MYLICON) chewable tablet 80 mg  80 mg Oral Q6H PRN Madison Norton MD        aspirin chewable tablet 81 mg  81 mg Oral Daily Madison Norton MD   81 mg at 11/15/17 1110    magnesium oxide (MAG-OX) tablet 400 mg  400 mg Oral Daily Madison Norton MD   400 mg at 11/15/17 1110    allopurinol (ZYLOPRIM) tablet 100 mg  100 mg Oral Daily Madison Norton MD   100 mg at 11/15/17 1110    docusate sodium (COLACE) capsule 100 mg  100 mg Oral BID PRN Madison Norton MD        furosemide (LASIX) tablet 20 mg  20 mg Oral Daily Madison Norton MD   20 mg at 11/15/17 1110    levothyroxine (SYNTHROID) tablet 200 mcg  200 mcg Oral Daily Madison Norton MD   200 mcg at 11/15/17 0507    hypromellose 0.4 % ophthalmic solution SOLN 2 drop  2 drop Both Eyes BID Madison Norton MD   2 drop at 11/15/17 1112    atorvastatin (LIPITOR) tablet 40 mg  40 mg Oral Nightly Madison Norton MD   40 mg at 11/14/17 2149    ferrous sulfate tablet 325 mg  325 mg Oral Daily with breakfast Madison Norton MD   325 mg at 11/15/17 1110    oxybutynin (DITROPAN) tablet 5 mg  5 mg Oral Nightly Madison Norton MD   5 mg at 11/13/17 2308    albuterol (PROVENTIL) nebulizer solution 2.5 mg  2.5 mg Nebulization Q2H PRN Madison Norton MD        gabapentin (NEURONTIN) capsule 400 mg  400 mg Oral BID Meana acetaminophen (TYLENOL) tablet 650 mg  650 mg Oral Q4H PRN Ingrid Lee MD   650 mg at 11/14/17 0939    0.9 % sodium chloride bolus  250 mL Intravenous Once [facility]        darbepoetin stephanie-polysorbate (ARANESP) injection 40 mcg  40 mcg Subcutaneous Weekly Pradeep Escoto MD   40 mcg at 11/07/17 2043    pantoprazole (PROTONIX) injection 40 mg  40 mg Intravenous BID Denton Castellanos MD   40 mg at 11/15/17 1109    And    sodium chloride (PF) 0.9 % injection 10 mL  10 mL Intravenous Daily Denton Castellanos MD   10 mL at 11/14/17 0940    hydrocortisone sodium succinate PF (SOLU-CORTEF) injection 50 mg  50 mg Intravenous Q6H Denton Castellanos MD   50 mg at 11/15/17 0456    ipratropium-albuterol (DUONEB) nebulizer solution 1 ampule  1 ampule Inhalation 4x daily Denton Castellanos MD   1 ampule at 11/15/17 1133       OBJECTIVE  PHYSICAL EXAM:   BP (!) 101/34   Pulse 65   Temp 97.3 °F (36.3 °C) (Oral)   Resp 17   Ht 5' 6\" (1.676 m) Comment: 10/2017  Wt 218 lb 14.7 oz (99.3 kg)   SpO2 98%   BMI 35.33 kg/m²   Body mass index is 35.33 kg/m². CONSTITUTIONAL: Alert, afebrile, pale  LUNGS:  No increased work of breathing, diminished breath sounds bilaterally  CARDIOVASCULAR: Irregular, irregular, rate controlled  ABDOMEN:  No scars, normal bowel sounds, soft, non-distended, non-tender, no masses palpated, no hepatosplenomegally  EXTREMITIES: Edema bilateral upper and lower extremities, no cyanosis, no clubbing    DATA:   Diagnostic tests reviewed for today's visit:    Most recent labs and imaging results reviewed. [facility] PATIENT NAME: Giles Huerta   DATE: November 15, 2017 MRN: [id number]   TIME: 11:36 AM PAGER: [phone]     I have independently seen and examined this patient. I have reviewed and agree with the above documentation, assessment and plan.      Madison Norton MD  12:30 PM  November 15, 2017

## 2017-11-15 NOTE — PLAN OF CARE
Problem: Risk for Impaired Skin Integrity  Goal: Tissue integrity - skin and mucous membranes  Structural intactness and normal physiological function of skin and  mucous membranes. Outcome: Ongoing      Problem: Falls - Risk of  Goal: Absence of falls  Outcome: Ongoing      Problem: Nutrition  Goal: Optimal nutrition therapy    . Outcome: Ongoing      Problem: Discharge Planning:  Goal: Discharged to appropriate level of care  Discharged to appropriate level of care   Outcome: Ongoing      Problem:  Bowel Function - Altered:  Goal: Bowel elimination is within specified parameters  Bowel elimination is within specified parameters   Outcome: Ongoing      Problem: Fluid Volume - Imbalance:  Goal: Will show no signs and symptoms of excessive bleeding  Will show no signs and symptoms of excessive bleeding   Outcome: Ongoing    Goal: Absence of imbalanced fluid volume signs and symptoms  Absence of imbalanced fluid volume signs and symptoms   Outcome: Ongoing      Problem: Nausea/Vomiting:  Goal: Absence of nausea/vomiting  Absence of nausea/vomiting   Outcome: Ongoing    Goal: Able to drink  Able to drink   Outcome: Ongoing    Goal: Able to eat  Able to eat   Outcome: Ongoing    Goal: Ability to achieve adequate nutritional intake will improve  Ability to achieve adequate nutritional intake will improve   Outcome: Ongoing      Problem: IP SWALLOWING  Goal: LTG - patient will tolerate the least restrictive diet consistency to allow for safe consumption of daily meals  Outcome: Ongoing      Problem: Pain:  Goal: Pain level will decrease  Pain level will decrease   Outcome: Ongoing    Goal: Control of acute pain  Control of acute pain   Outcome: Ongoing    Goal: Control of chronic pain  Control of chronic pain   Outcome: Ongoing

## 2017-11-15 NOTE — PROGRESS NOTES
[facility] OF [facility] Heart Progress Note      Patient: Roxana Christensen  Unit/Bed: U835/Z138-70  YOB: 1940  MRN: [id number]  Admit Date:  11/7/2017  Hospital Day: 8    Subjective Complaint:   Pt is sleeping and difficult to arouse . Physical Examination:     BP (!) 101/34   Pulse 65   Temp 97.3 °F (36.3 °C) (Oral)   Resp 17   Ht 5' 6\" (1.676 m) Comment: 10/2017  Wt 218 lb 14.7 oz (99.3 kg)   SpO2 98%   BMI 35.33 kg/m²       Intake/Output Summary (Last 24 hours) at 11/15/17 0942  Last data filed at 11/15/17 1001   Gross per 24 hour   Intake             2155 ml   Output              475 ml   Net             1680 ml                  Roxana Christensen examined at bedside in severely ill. Focused exam reveals:     Cardiac: Heart regular rate and rhythm     Lungs:  rhonchi present-  and decreased breath sounds noted    Extremities:   2+ edema    Telemetry:      normal sinus  and PAC's         LABS:   CBC:   Recent Labs      11/13/17   0557  11/14/17   0600  11/15/17   0555   WBC  13.1*  15.8*  15.5*   HGB  8.2*  8.1*  7.4*   PLT  138  150  162      BMP:  Recent Labs      11/13/17   0557  11/14/17   0600  11/15/17   0557   NA  143  144  142   K  4.0  4.1  4.5   CL  110*  110*  109*   CO2  25  23  26   BUN  77*  81*  80*   CREATININE  1.00*  0.94*  0.79   GLUCOSE  358*  211*  273*              Troponin: No results for input(s): TROPONINT in the last 72 hours. BNP: No results for input(s): PROBNP in the last 72 hours. INR: No results for input(s): INR in the last 72 hours. Mg: No results for input(s): MG in the last 72 hours. Cardiac Testing:    none    Assessment:    Overall poor prognosis with multiple medical problems  PAF   Mild to moderate aortic stenosis  Metastatic ovarian CA  Hypoventilatory Syndorme  Prognosis ---poor  Plan:  1.  For debridement and vasc cath today  Electronically signed by Vinita Stewart MD on 11/15/2017 at 9:42 AM

## 2017-11-16 LAB
ANISOCYTOSIS: ABNORMAL
BASOPHILIC STIPPLING: 1
BASOPHILS ABSOLUTE: 0 K/UL (ref 0–0.2)
BASOPHILS RELATIVE PERCENT: 0.3 %
EOSINOPHILS ABSOLUTE: 0 K/UL (ref 0–0.7)
EOSINOPHILS RELATIVE PERCENT: 0 %
GLUCOSE BLD-MCNC: 133 MG/DL (ref 60–115)
GLUCOSE BLD-MCNC: 177 MG/DL (ref 60–115)
GLUCOSE BLD-MCNC: 188 MG/DL (ref 60–115)
GLUCOSE BLD-MCNC: 214 MG/DL (ref 60–115)
GLUCOSE BLD-MCNC: 229 MG/DL (ref 60–115)
HCT VFR BLD CALC: 29.4 % (ref 37–47)
HEMOGLOBIN: 9.4 G/DL (ref 12–16)
HYPOCHROMIA: ABNORMAL
LYMPHOCYTES ABSOLUTE: 0.5 K/UL (ref 1–4.8)
LYMPHOCYTES RELATIVE PERCENT: 3 %
MACROCYTES: 0
MCH RBC QN AUTO: 29.6 PG (ref 27–31.3)
MCHC RBC AUTO-ENTMCNC: 31.8 % (ref 33–37)
MCV RBC AUTO: 92.9 FL (ref 82–100)
METAMYELOCYTES RELATIVE PERCENT: 3 %
MICROCYTES: 0
MONOCYTES ABSOLUTE: 0.3 K/UL (ref 0.2–0.8)
MONOCYTES RELATIVE PERCENT: 1.9 %
MYELOCYTE PERCENT: 5 %
NEUTROPHILS ABSOLUTE: 16.3 K/UL (ref 1.4–6.5)
NEUTROPHILS RELATIVE PERCENT: 88 %
NUCLEATED RED BLOOD CELLS: 3 /100 WBC
PDW BLD-RTO: 17.5 % (ref 11.5–14.5)
PERFORMED ON: ABNORMAL
PLATELET # BLD: 175 K/UL (ref 130–400)
PLATELET SLIDE REVIEW: ADEQUATE
POIKILOCYTES: 0
POLYCHROMASIA: 0
RBC # BLD: 3.17 M/UL (ref 4.2–5.4)
SLIDE REVIEW: ABNORMAL
WBC # BLD: 17 K/UL (ref 4.8–10.8)

## 2017-11-16 PROCEDURE — 2580000003 HC RX 258: Performed by: ANESTHESIOLOGY

## 2017-11-16 PROCEDURE — 6370000000 HC RX 637 (ALT 250 FOR IP): Performed by: ANESTHESIOLOGY

## 2017-11-16 PROCEDURE — 6360000002 HC RX W HCPCS: Performed by: ANESTHESIOLOGY

## 2017-11-16 PROCEDURE — 94664 DEMO&/EVAL PT USE INHALER: CPT

## 2017-11-16 PROCEDURE — C9113 INJ PANTOPRAZOLE SODIUM, VIA: HCPCS | Performed by: ANESTHESIOLOGY

## 2017-11-16 PROCEDURE — 99232 SBSQ HOSP IP/OBS MODERATE 35: CPT | Performed by: INTERNAL MEDICINE

## 2017-11-16 PROCEDURE — 6370000000 HC RX 637 (ALT 250 FOR IP): Performed by: HOSPITALIST

## 2017-11-16 PROCEDURE — 2580000003 HC RX 258: Performed by: INTERNAL MEDICINE

## 2017-11-16 PROCEDURE — 1210000000 HC MED SURG R&B

## 2017-11-16 PROCEDURE — 94660 CPAP INITIATION&MGMT: CPT

## 2017-11-16 PROCEDURE — 6360000002 HC RX W HCPCS: Performed by: INTERNAL MEDICINE

## 2017-11-16 PROCEDURE — 2700000000 HC OXYGEN THERAPY PER DAY

## 2017-11-16 PROCEDURE — 94640 AIRWAY INHALATION TREATMENT: CPT

## 2017-11-16 PROCEDURE — 85025 COMPLETE CBC W/AUTO DIFF WBC: CPT

## 2017-11-16 RX ADMIN — VANCOMYCIN HYDROCHLORIDE 1000 MG: 1 INJECTION, SOLUTION INTRAVENOUS at 23:15

## 2017-11-16 RX ADMIN — SODIUM CHLORIDE, PRESERVATIVE FREE 10 ML: 5 INJECTION INTRAVENOUS at 09:25

## 2017-11-16 RX ADMIN — IPRATROPIUM BROMIDE AND ALBUTEROL SULFATE 1 AMPULE: 2.5; .5 SOLUTION RESPIRATORY (INHALATION) at 11:59

## 2017-11-16 RX ADMIN — INSULIN GLARGINE 8 UNITS: 100 INJECTION, SOLUTION SUBCUTANEOUS at 21:37

## 2017-11-16 RX ADMIN — PANTOPRAZOLE SODIUM 40 MG: 40 INJECTION, POWDER, FOR SOLUTION INTRAVENOUS at 09:25

## 2017-11-16 RX ADMIN — HYDROCORTISONE SODIUM SUCCINATE 50 MG: 100 INJECTION, POWDER, FOR SOLUTION INTRAMUSCULAR; INTRAVENOUS at 17:30

## 2017-11-16 RX ADMIN — HYDROCORTISONE SODIUM SUCCINATE 50 MG: 100 INJECTION, POWDER, FOR SOLUTION INTRAMUSCULAR; INTRAVENOUS at 06:53

## 2017-11-16 RX ADMIN — IPRATROPIUM BROMIDE AND ALBUTEROL SULFATE 1 AMPULE: 2.5; .5 SOLUTION RESPIRATORY (INHALATION) at 16:42

## 2017-11-16 RX ADMIN — PANTOPRAZOLE SODIUM 40 MG: 40 INJECTION, POWDER, FOR SOLUTION INTRAVENOUS at 23:15

## 2017-11-16 RX ADMIN — MEROPENEM 1 G: 1 INJECTION, POWDER, FOR SOLUTION INTRAVENOUS at 09:25

## 2017-11-16 RX ADMIN — IPRATROPIUM BROMIDE AND ALBUTEROL SULFATE 1 AMPULE: 2.5; .5 SOLUTION RESPIRATORY (INHALATION) at 20:41

## 2017-11-16 RX ADMIN — HYPROMELLOSE 2 DROP: 4 SOLUTION/ DROPS OPHTHALMIC at 09:26

## 2017-11-16 RX ADMIN — HYPROMELLOSE 2 DROP: 4 SOLUTION/ DROPS OPHTHALMIC at 21:44

## 2017-11-16 RX ADMIN — MEROPENEM 1 G: 1 INJECTION, POWDER, FOR SOLUTION INTRAVENOUS at 00:28

## 2017-11-16 RX ADMIN — NYSTATIN 500000 UNITS: 100000 SUSPENSION ORAL at 17:28

## 2017-11-16 RX ADMIN — INSULIN GLARGINE 8 UNITS: 100 INJECTION, SOLUTION SUBCUTANEOUS at 00:31

## 2017-11-16 RX ADMIN — HYDROCORTISONE SODIUM SUCCINATE 50 MG: 100 INJECTION, POWDER, FOR SOLUTION INTRAMUSCULAR; INTRAVENOUS at 12:10

## 2017-11-16 RX ADMIN — Medication 10 ML: at 23:16

## 2017-11-16 RX ADMIN — Medication 10 ML: at 00:30

## 2017-11-16 RX ADMIN — Medication 10 ML: at 09:25

## 2017-11-16 RX ADMIN — COLLAGENASE SANTYL: 250 OINTMENT TOPICAL at 09:26

## 2017-11-16 RX ADMIN — MEROPENEM 1 G: 1 INJECTION, POWDER, FOR SOLUTION INTRAVENOUS at 13:33

## 2017-11-16 RX ADMIN — PANTOPRAZOLE SODIUM 40 MG: 40 INJECTION, POWDER, FOR SOLUTION INTRAVENOUS at 00:29

## 2017-11-16 RX ADMIN — IPRATROPIUM BROMIDE AND ALBUTEROL SULFATE 1 AMPULE: 2.5; .5 SOLUTION RESPIRATORY (INHALATION) at 08:09

## 2017-11-16 ASSESSMENT — PAIN SCALES - GENERAL: PAINLEVEL_OUTOF10: 0

## 2017-11-16 NOTE — CARE COORDINATION
ELDERW spoke with [facility] at [facility] per the request of Dr. Richard Leblanc.  They will review information and if they feel pt is appropriate for them they will start a precert for admission today. Daughter is agreeable to this plan.

## 2017-11-16 NOTE — PROGRESS NOTES
[facility] OF [facility] Heart [address] Note      Patient: Morteza Tanner  Unit/Bed: H893/S988-11  YOB: 1940  MRN: [id number]  Admit Date:  11/7/2017  Hospital Day: 9    Subjective Complaint:   Denies any chest pain with exertion or at rest, palpitations, syncope, or edema. .     Physical Examination:     BP (!) 129/90   Pulse 67   Temp 97.2 °F (36.2 °C) (Axillary)   Resp 16   Ht 5' 6\" (1.676 m) Comment: 10/2017  Wt 218 lb 14.7 oz (99.3 kg)   SpO2 100%   BMI 35.33 kg/m²       Intake/Output Summary (Last 24 hours) at 11/16/17 1824  Last data filed at 11/16/17 1028   Gross per 24 hour   Intake              410 ml   Output              850 ml   Net             -440 ml                  Morteza Tanner examined at bedside in severely ill. Focused exam reveals:     Cardiac: Heart regular rate and rhythm     Lungs:  rhonchi present-     Extremities:   2+ edema    Telemetry:      normal sinus          LABS:   CBC:   Recent Labs      11/14/17   0600  11/15/17   0555  11/16/17   0600   WBC  15.8*  15.5*  17.0*   HGB  8.1*  7.4*  9.4*   PLT  150  162  175      BMP:  Recent Labs      11/14/17   0600  11/15/17   0557   NA  144  142   K  4.1  4.5   CL  110*  109*   CO2  23  26   BUN  81*  80*   CREATININE  0.94*  0.79   GLUCOSE  211*  273*              Troponin: No results for input(s): TROPONINT in the last 72 hours. BNP: No results for input(s): PROBNP in the last 72 hours. INR: No results for input(s): INR in the last 72 hours. Mg: No results for input(s): MG in the last 72 hours. Cardiac Testing:    none    Assessment:    cv stable   Plan:  1.  [facility] for rehab---no med changes for now  Electronically signed by Jignesh Morales MD on 11/16/2017 at 6:24 PM

## 2017-11-16 NOTE — PROGRESS NOTES
-patients POA requesting to put patient on nasal cannula, I called respiratory and they came up and changed it.  Electronically signed by Manuel Bangura RN on 11/16/2017 at 10:23 AM     -changed patients dressing on coccyx per dr delong, changed the xeroform on the patients arms and put towels under her arms, changed the patients bed pads and put on a new gown Electronically signed by Manuel Bangura RN on 11/16/2017 at 10:24 AM     -patients POA signed the consent form, she stated she already discussed everything with dr Michael Kirkpatrick Electronically signed by Manuel Bangura RN on 11/16/2017 at 10:26 AM    -patient wasn't alert enough to take medications this morning or eat breakfast Electronically signed by Manuel Bangura RN on 11/16/2017 at 10:26 AM

## 2017-11-16 NOTE — CARE COORDINATION
11/16/17 PT DTR CALLED ME. PT IS CONFUSED. DTR AGNES STATED ASKED IF PT WAS IMPROVED BY INSURANCE FOR LTAC WHICH I TOLD HER IT WAS DENIED. WITH FREEDOM OF CHOICE AGNES STATED [facility] AND WOULD LIKE HER TO GO THERE. I TOLD THE [facility]. BY PHONE I ALSO DISCUSSED IMM PAGE 2 WITH AGNES AND SHE HAD GOOD TEACH BACK AND [facility] STAY WITNESSED THIS.

## 2017-11-16 NOTE — PROGRESS NOTES
INPATIENT PROGRESS NOTES    PATIENT NAME: Jayde Guzman  MRN: [id number]  SERVICE DATE:  November 16, 2017   SERVICE TIME:  10:48 AM      PRIMARY SERVICE:Internal Medicine    INTERVAL HPI: Patient seen and examined at bedside, Interval Notes, orders reviewed. Nursing notes noted    SUBJECTIVE    CHIEF COMPLAINT: Patient was lethargic early in the morning but somewhat more alert at this time. Patient denies any chest pain or shortness of breath. Denies any nausea vomiting or diarrhea. Review of Systems  Please see HPI above. All bolded are positive. All un-bolded are negative. Gen: fever, chills, fatigue, weakness, diaphoresis, increase in thirst, unintentional weight change, loss of appetite  Head: headache, vision change, hearing loss  Chest: chest pain, chest heaviness, palpitations, orthopnea, PND, COMBS  Lungs: shortness of breath, wheezing, coughing  Abdomen: abdominal pain, nausea, vomiting, diarrhea, constipation, melena, hematochezia, hematemesis  Extremities: lower extremity edema, myalgias, arthralgias  Urinary: dysuria, hematuria, or increase in frequency  Neurologic: lightheadedness, dizziness, confusion, syncope  Psychiatric: depression, suicidal ideation, or anxiety        OBJECTIVE    Body mass index is 35.33 kg/m². PHYSICAL EXAM:  Vitals:  BP (!) 129/90   Pulse 58   Temp 97.2 °F (36.2 °C) (Axillary)   Resp 19   Ht 5' 6\" (1.676 m) Comment: 10/2017  Wt 218 lb 14.7 oz (99.3 kg)   SpO2 96%   BMI 35.33 kg/m²   General: comfortable in bed, No distress. Head: Atraumatic , Normocephalic   Eyes: PERRL. No sclera icterus. No conjunctival injection. No discharge   ENT: No nasal  discharge. Pharynx clear. Neck:  Trachea midline. No thyromegaly, no JVD, No cervical adenopathy. Resp : Normal effort,  No accessory muscle use. No Rales. No wheezing. No rhonchi. No dullness on percussion. CV: Normal  rate. Regular rhythm. No mumur ,  Rub or gallop  ABD: Non-tender. Non-distended. No masses.  No FLUOROSCOPIC IMAGES SAVED TO PACS. 0SPOT FILM WAS EXPOSED. 60.6SECOND FLUOROSCOPY TIME. 6.51MGY CUMULATIVE X-RAY DOSE. Xr Chest Portable    Result Date: 11/9/2017  EXAMINATION: XR CHEST PORTABLE CLINICAL HISTORY: Shortness of breath COMPARISONS: 10/27/2017 FINDINGS: There is moderate cardiomegaly. Pulmonary vascularity is prominent. There are groundglass and streaky infiltrates in the left midlung and left lung base, mildly improved from 10/27/2017. There is blunting of the left costophrenic angle consistent with scarring or small effusion. Interstitial markings are prominent. There is mild elevation of the left hemidiaphragm. There is calcification of the mitral annulus. Right upper extremity PICC tip is in the right atrium. There is no pneumothorax. CARDIOMEGALY AND MILD VASCULAR CONGESTION. GROUNDGLASS AND STREAKY INFILTRATES IN THE LEFT MIDLUNG AND LEFT LUNG BASE, MILDLY IMPROVED FROM 10/27/2017; CHEST RADIOGRAPH IS OTHERWISE ESSENTIALLY UNCHANGED--SEE ABOVE. Xr Chest Portable    Result Date: 10/27/2017  PORTABLE CHEST: 10/27/2017 CLINICAL HISTORY:  follow up possible aspiration pneumonia . COMPARISON: 10/25/2017. A portable upright AP radiograph of the chest was obtained. FINDINGS: Infiltrate/consolidation of the mid to basilar left lung, with a small left pleural effusion appears unchanged. Probable mild right basilar atelectasis appears improved. There is no significant right pleural effusion, vascular congestion, pneumothorax, or displaced fractures identified. The right upper extremity PICC catheter is again noted     INFILTRATE/CONSOLIDATION OF THE MID TO BASILAR LEFT LUNG WITH A SMALL PLEURAL EFFUSION. NO SIGNIFICANT CHANGE FROM 10/25/2017    Xr Chest Portable    Result Date: 10/25/2017  EXAMINATION: XR CHEST PORTABLE REASON FOR EXAM: Shortness of breath. FINDINGS: Frontal view of the chest reveals inspiration somewhat shallow related to portable technique and positioning.  There is a PICC UNCOMPLICATED right UPPER EXTREMITY PICC INSTALLATION. X-RAY DOSE SUMMARY:    1 FLUOROSCOPIC IMAGES SAVED TO PACS. 0SPOT FILM WAS EXPOSED. 60.6SECOND FLUOROSCOPY TIME. 6.51MGY CUMULATIVE X-RAY DOSE. Us Upper Extremity Right Venous Duplex    Result Date: 11/4/2017  US UPPER EXTREMITY RIGHT VENOUS DUPLEX : 11/4/2017 CLINICAL HISTORY: Right upper extremity swelling and right upper extremity PICC catheter placed 10/23/2017. COMPARISON: None available. TECHNIQUE: Grayscale, color and waveform Doppler analysis of the right upper extremity was performed. FINDINGS: The study is limited by the patient's cooperation, the study was incomplete. Recent-appearing occlusive thrombus is present around a PICC catheter within the right brachial and axillary veins, which appears to extend into the subclavian vein. No thrombus is identified within the visualized jugular vein. RECENT-APPEARING OCCLUSIVE VENOUS THROMBUS AROUND A PICC CATHETER WITHIN THE RIGHT BRACHIAL, AXILLARY AND SUBCLAVIAN VEINS. Us Upper Extremity Right Venous Duplex    Result Date: 10/31/2017  EXAMINATION: US UPPER EXTREMITY RIGHT VENOUS DUPLEX CLINICAL HISTORY:  ARM DVT SUSPECTED . COMPARISONS: None available. FINDINGS: Right upper extremity venous duplex sonogram was performed. There is a well depicted PICC within the patent right brachial vein, extending into the right axillary vein and right subclavian vein. There is abundant soft tissue swelling in the forearm. The right radial vein and the forearm is patent. The right cephalic vein is patent. The right basilic vein was never displayed. The ulnar vein in the forearm was not displayed. CONCLUSION: PATENT RIGHT BRACHIAL VEIN, RIGHT AXILLARY VEIN AND RIGHT SUBCLAVIAN VEIN CONTAINING AN UNREMARKABLE PICC.  THERE IS ABUNDANT GENERALIZED SOFT TISSUE SWELLING, ESPECIALLY IN THE FORM    Us Upper Extremity Left Venous Duplex    Result Date: 10/26/2017  LEFT UPPER EXTREMITY DEEP VENOUS DUPLEX SONOGRAM: 10/25/2017 CLINICAL HISTORY: Left upper extremity swelling. COMPARISON: None available. Grayscale, compression, color and waveform Doppler analysis of the left upper extremity venous system was performed with augmentation. FINDINGS: There is no deep venous thrombosis, abnormal masses, fluid collections or other findings of concern identified within the left upper extremity. The superficial basilic and cephalic veins are patent. NO LEFT UPPER EXTREMITY DEEP VENOUS THROMBOSIS IDENTIFIED. Ir Ultrasound Guidance Vascular Access    Result Date: 10/23/2017  EXAMINATION: IR PICC EQUAL OR GREATER THAN 5 YEARS, IR ULTRASOUND GUIDANCE VASCULAR ACCESS, IR FLUOROSCOPY GUIDED CENTRAL VENOUS ACCESS DEVICE PLACEMENT CLINICAL HISTORY:  low hemoglobin . Long-term venous access needed for medical therapy. Transfusion therapy. COMPARISONS: None available. FINDINGS: Right UPPER EXTREMITY PICC INSERTION Complete, routine aseptic technique, including a sterile barrier, including participating providers wearing caps, masks, sterile gowns and sterile gloves was used. The treatment area underwent isolation with a sterile barrier following skin preparation. Using sonographic and fluoroscopic guidance, following appropriate preparation and local anesthesia, with micropuncture technique, the right basilic vein was accessed and soft tissue track developed, through which an insertion cannula was installed, through which a 5-Slovenian dual lumen PICC was placed. The tip of the catheter was placed in the superior vena cava. Single view, saved fluoroscopic image documentation of the final placement was performed. Sonographic image was recorded for the permanent record at the access site. The catheter was affixed to the skin in the usual fashion and the site dressed. There were no significant complications at the time of the procedure. Standard follow-up will have been performed.  CONCLUSION SUCCESSFUL UNCOMPLICATED right UPPER EXTREMITY PICC INSTALLATION. X-RAY DOSE SUMMARY:    1 FLUOROSCOPIC IMAGES SAVED TO PACS. 0SPOT FILM WAS EXPOSED. 60.6SECOND FLUOROSCOPY TIME. 6.51MGY CUMULATIVE X-RAY DOSE. Us Retroperitoneal Limited    Result Date: 10/26/2017  US RETROPERITONEAL LIMITED : 10/25/2017 CLINICAL HISTORY: RENAL FAILURE, ACUTE (KIDNEY INJURY) . COMPARISON: None available. TECHNIQUE: Transabdominal ultrasound kidneys was performed. FINDINGS: There is sclerosis, abnormal perinephric collections, visualized nephrolithiasis or solid renal masses. Both kidneys are normal in length with mild to moderate diffuse cortical thinning and borderline increased cortical echogenicity. The right kidney measures approximately 12.1 cm in length. The left kidney approximately 10.2 cm. The average renal cortical thickness is approximately 7 mm. A small simple cyst is noted within the lower pole of the left kidney, measuring up to 2 cm. NO EVIDENCE OF URINARY TRACT OBSTRUCTION. MILD RENAL ATROPHY. 2 CM LOWER POLE LEFT RENAL CYST.       ASSESSMENT AND PLAN     Principal Problem:    Hypotension  Active Problems:    Hemolytic anemia, acute (HCC)    Hypovolemic shock (HCC)    Septic shock (HCC)    UGI bleed    Acute blood loss anemia    Abscess of arm, left    MDRO (multiple drug resistant organisms) resistance    Somewhat lethargic at the present time. Patient will be a good candidate for [facility]. Case discussed with discharge staff and nursing staff.           Tierra Templeton MD

## 2017-11-16 NOTE — CARE COORDINATION
Spoke with Fawn Muñoz to notify of acceptance at [facility]. She informed me that patient is having a venous port placed Friday and then will be ready for transfer.

## 2017-11-16 NOTE — PROGRESS NOTES
INPATIENT PROGRESS NOTES    PATIENT NAME: Irasema Jeronmio  MRN: [id number]  SERVICE DATE:  November 16, 2017   SERVICE TIME:  11:07 AM      PRIMARY SERVICE: Pulmonary Disease    CHIEF COMPLAINTS: Weakness and shortness of breath    INTERVAL HPI: Patient seen and examined at bedside, Interval Notes, orders reviewed. Nursing notes noted  Patient is very somnolent but arousable to voice, responds to a few simple questions with yes or no otherwise dozing easily 1 not stimulated. She appears comfortable in no respiratory distress at this point. She has not had any significant respiratory issues since transfer out of ICU      OBJECTIVE    Body mass index is 35.33 kg/m². PHYSICAL EXAM:  Vitals:  BP (!) 129/90   Pulse 58   Temp 97.2 °F (36.2 °C) (Axillary)   Resp 19   Ht 5' 6\" (1.676 m) Comment: 10/2017  Wt 218 lb 14.7 oz (99.3 kg)   SpO2 96%   BMI 35.33 kg/m²   General: Patient is somnolent but arousable to voice    Head: Atraumatic , Normocephalic   Eyes: PERRL. No sclera icterus. No conjunctival injection. No discharge   ENT: No nasal  discharge. Pharynx clear. Neck:  Trachea midline. No thyromegaly, no JVD, No cervical adenopathy. Chest : Bilaterally symmetrical ,Normal effort,  No accessory muscle use  Lung : Decreased breath sounds bilaterally with few scattered coarse crackles in the bases   Heart[de-identified] Normal  rate. Regular rhythm. No mumur ,  Rub or gallop  ABD: Non-tender. Non-distended. No masses. No organmegaly. Normal bowel sounds. No hernia. EXT: No Pitting both leg , No Cyanosis No clubbing  Neuro: no focal weakness  Skin: Warm and dry. No erythema rash on exposed extremities.       DATA:   Recent Labs      11/15/17   0555  11/16/17   0600   WBC  15.5*  17.0*   HGB  7.4*  9.4*   HCT  22.8*  29.4*   MCV  94.6  92.9   PLT  162  175     Recent Labs      11/14/17   0600  11/15/17   0557   NA  144  142   K  4.1  4.5   CL  110*  109*   CO2  23  26   BUN  81*  80*   CREATININE  0.94*  0.79   GLUCOSE  211*

## 2017-11-16 NOTE — PROGRESS NOTES
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  Vanco   renal function improves increased dosing meropenem and vancomycin

## 2017-11-17 ENCOUNTER — APPOINTMENT (OUTPATIENT)
Dept: GENERAL RADIOLOGY | Age: 77
DRG: 871 | End: 2017-11-17
Payer: COMMERCIAL

## 2017-11-17 LAB
BASOPHILS ABSOLUTE: 0.1 K/UL (ref 0–0.2)
BASOPHILS RELATIVE PERCENT: 0.4 %
EOSINOPHILS ABSOLUTE: 0 K/UL (ref 0–0.7)
EOSINOPHILS RELATIVE PERCENT: 0.1 %
GLUCOSE BLD-MCNC: 115 MG/DL (ref 60–115)
GLUCOSE BLD-MCNC: 162 MG/DL (ref 60–115)
HCT VFR BLD CALC: 30.6 % (ref 37–47)
HEMOGLOBIN: 9.8 G/DL (ref 12–16)
LYMPHOCYTES ABSOLUTE: 0.7 K/UL (ref 1–4.8)
LYMPHOCYTES RELATIVE PERCENT: 4.1 %
MCH RBC QN AUTO: 29.9 PG (ref 27–31.3)
MCHC RBC AUTO-ENTMCNC: 32.2 % (ref 33–37)
MCV RBC AUTO: 92.6 FL (ref 82–100)
MONOCYTES ABSOLUTE: 0.8 K/UL (ref 0.2–0.8)
MONOCYTES RELATIVE PERCENT: 4.3 %
NEUTROPHILS ABSOLUTE: 16.6 K/UL (ref 1.4–6.5)
NEUTROPHILS RELATIVE PERCENT: 91.1 %
PDW BLD-RTO: 17.1 % (ref 11.5–14.5)
PERFORMED ON: ABNORMAL
PERFORMED ON: NORMAL
PLATELET # BLD: 182 K/UL (ref 130–400)
RBC # BLD: 3.3 M/UL (ref 4.2–5.4)
WBC # BLD: 18.2 K/UL (ref 4.8–10.8)

## 2017-11-17 PROCEDURE — 0JH60XZ INSERTION OF TUNNELED VASCULAR ACCESS DEVICE INTO CHEST SUBCUTANEOUS TISSUE AND FASCIA, OPEN APPROACH: ICD-10-PCS | Performed by: SURGERY

## 2017-11-17 PROCEDURE — 6370000000 HC RX 637 (ALT 250 FOR IP): Performed by: ANESTHESIOLOGY

## 2017-11-17 PROCEDURE — 94640 AIRWAY INHALATION TREATMENT: CPT

## 2017-11-17 PROCEDURE — 02HV33Z INSERTION OF INFUSION DEVICE INTO SUPERIOR VENA CAVA, PERCUTANEOUS APPROACH: ICD-10-PCS | Performed by: SURGERY

## 2017-11-17 PROCEDURE — 99232 SBSQ HOSP IP/OBS MODERATE 35: CPT | Performed by: INTERNAL MEDICINE

## 2017-11-17 PROCEDURE — 2700000000 HC OXYGEN THERAPY PER DAY

## 2017-11-17 PROCEDURE — 3600000003 HC SURGERY LEVEL 3 BASE: Performed by: SURGERY

## 2017-11-17 PROCEDURE — A6258 TRANSPARENT FILM >16<=48 IN: HCPCS | Performed by: SURGERY

## 2017-11-17 PROCEDURE — 3209999900 FLUORO FOR SURGICAL PROCEDURES

## 2017-11-17 PROCEDURE — 3600000013 HC SURGERY LEVEL 3 ADDTL 15MIN: Performed by: SURGERY

## 2017-11-17 PROCEDURE — C9113 INJ PANTOPRAZOLE SODIUM, VIA: HCPCS | Performed by: ANESTHESIOLOGY

## 2017-11-17 PROCEDURE — 6360000002 HC RX W HCPCS: Performed by: SURGERY

## 2017-11-17 PROCEDURE — 2580000003 HC RX 258: Performed by: INTERNAL MEDICINE

## 2017-11-17 PROCEDURE — 6360000002 HC RX W HCPCS: Performed by: INTERNAL MEDICINE

## 2017-11-17 PROCEDURE — 36592 COLLECT BLOOD FROM PICC: CPT

## 2017-11-17 PROCEDURE — 1210000000 HC MED SURG R&B

## 2017-11-17 PROCEDURE — 2580000003 HC RX 258: Performed by: SURGERY

## 2017-11-17 PROCEDURE — 6360000002 HC RX W HCPCS: Performed by: ANESTHESIOLOGY

## 2017-11-17 PROCEDURE — 2580000003 HC RX 258: Performed by: ANESTHESIOLOGY

## 2017-11-17 PROCEDURE — 94660 CPAP INITIATION&MGMT: CPT

## 2017-11-17 PROCEDURE — 2500000003 HC RX 250 WO HCPCS: Performed by: SURGERY

## 2017-11-17 PROCEDURE — 85025 COMPLETE CBC W/AUTO DIFF WBC: CPT

## 2017-11-17 DEVICE — PORT PWR INJ TI SIL ATTCH SIL CATHETER 96FRX66CM L W 10FR: Type: IMPLANTABLE DEVICE | Site: CHEST | Status: FUNCTIONAL

## 2017-11-17 RX ORDER — HEPARIN SODIUM,PORCINE/PF 1 UNIT/ML
SYRINGE (ML) INTRAVENOUS PRN
Status: DISCONTINUED | OUTPATIENT
Start: 2017-11-17 | End: 2017-11-17 | Stop reason: HOSPADM

## 2017-11-17 RX ORDER — SENNA AND DOCUSATE SODIUM 50; 8.6 MG/1; MG/1
1 TABLET, FILM COATED ORAL 2 TIMES DAILY
Status: CANCELLED | OUTPATIENT
Start: 2017-11-17

## 2017-11-17 RX ORDER — LORAZEPAM 0.5 MG/1
0.5 TABLET ORAL EVERY 4 HOURS PRN
Status: CANCELLED | OUTPATIENT
Start: 2017-11-17

## 2017-11-17 RX ORDER — SIMETHICONE 80 MG
80 TABLET,CHEWABLE ORAL EVERY 6 HOURS PRN
Status: CANCELLED | OUTPATIENT
Start: 2017-11-17

## 2017-11-17 RX ORDER — METOPROLOL SUCCINATE 50 MG/1
50 TABLET, EXTENDED RELEASE ORAL DAILY
Status: CANCELLED | OUTPATIENT
Start: 2017-11-18

## 2017-11-17 RX ORDER — SODIUM CHLORIDE 0.9 % (FLUSH) 0.9 %
10 SYRINGE (ML) INJECTION PRN
Status: CANCELLED | OUTPATIENT
Start: 2017-11-17

## 2017-11-17 RX ORDER — PROMETHAZINE HYDROCHLORIDE 25 MG/1
25 TABLET ORAL EVERY 4 HOURS PRN
Status: CANCELLED | OUTPATIENT
Start: 2017-11-17

## 2017-11-17 RX ORDER — NICOTINE POLACRILEX 4 MG
15 LOZENGE BUCCAL PRN
Status: CANCELLED | OUTPATIENT
Start: 2017-11-17

## 2017-11-17 RX ORDER — DEXTROSE MONOHYDRATE 50 MG/ML
100 INJECTION, SOLUTION INTRAVENOUS PRN
Status: CANCELLED | OUTPATIENT
Start: 2017-11-17

## 2017-11-17 RX ORDER — VANCOMYCIN HYDROCHLORIDE 1 G/200ML
1000 INJECTION, SOLUTION INTRAVENOUS
Status: CANCELLED | OUTPATIENT
Start: 2017-11-18

## 2017-11-17 RX ORDER — GABAPENTIN 400 MG/1
400 CAPSULE ORAL 2 TIMES DAILY
Status: CANCELLED | OUTPATIENT
Start: 2017-11-17

## 2017-11-17 RX ORDER — ALBUTEROL SULFATE 2.5 MG/3ML
2.5 SOLUTION RESPIRATORY (INHALATION)
Status: CANCELLED | OUTPATIENT
Start: 2017-11-17

## 2017-11-17 RX ORDER — FOLIC ACID 1 MG/1
1 TABLET ORAL DAILY
Status: CANCELLED | OUTPATIENT
Start: 2017-11-18

## 2017-11-17 RX ORDER — FUROSEMIDE 20 MG/1
20 TABLET ORAL DAILY
Status: CANCELLED | OUTPATIENT
Start: 2017-11-18

## 2017-11-17 RX ORDER — ATORVASTATIN CALCIUM 40 MG/1
40 TABLET, FILM COATED ORAL NIGHTLY
Status: CANCELLED | OUTPATIENT
Start: 2017-11-17

## 2017-11-17 RX ORDER — LACTULOSE 10 G/15ML
20 SOLUTION ORAL 2 TIMES DAILY
Status: CANCELLED | OUTPATIENT
Start: 2017-11-17

## 2017-11-17 RX ORDER — DIMETHICONE, CAMPHOR (SYNTHETIC), MENTHOL, AND PHENOL 1.1; .5; .625; .5 G/100G; G/100G; G/100G; G/100G
OINTMENT TOPICAL
Status: DISPENSED
Start: 2017-11-17 | End: 2017-11-17

## 2017-11-17 RX ORDER — INSULIN GLARGINE 100 [IU]/ML
8 INJECTION, SOLUTION SUBCUTANEOUS NIGHTLY
Status: CANCELLED | OUTPATIENT
Start: 2017-11-17

## 2017-11-17 RX ORDER — LEVOTHYROXINE SODIUM 0.2 MG/1
200 TABLET ORAL DAILY
Status: CANCELLED | OUTPATIENT
Start: 2017-11-18

## 2017-11-17 RX ORDER — ALLOPURINOL 100 MG/1
100 TABLET ORAL DAILY
Status: CANCELLED | OUTPATIENT
Start: 2017-11-18

## 2017-11-17 RX ORDER — DEXTROSE MONOHYDRATE 25 G/50ML
12.5 INJECTION, SOLUTION INTRAVENOUS PRN
Status: CANCELLED | OUTPATIENT
Start: 2017-11-17

## 2017-11-17 RX ORDER — ASPIRIN 81 MG/1
81 TABLET, CHEWABLE ORAL DAILY
Status: CANCELLED | OUTPATIENT
Start: 2017-11-18

## 2017-11-17 RX ORDER — PANTOPRAZOLE SODIUM 40 MG/10ML
40 INJECTION, POWDER, LYOPHILIZED, FOR SOLUTION INTRAVENOUS 2 TIMES DAILY
Status: CANCELLED | OUTPATIENT
Start: 2017-11-17

## 2017-11-17 RX ORDER — PREDNISONE 1 MG/1
5 TABLET ORAL 2 TIMES DAILY
Qty: 10 TABLET | Refills: 0 | DISCHARGE
Start: 2017-11-17 | End: 2017-12-01

## 2017-11-17 RX ORDER — DOCUSATE SODIUM 100 MG/1
100 CAPSULE, LIQUID FILLED ORAL 2 TIMES DAILY PRN
Status: CANCELLED | OUTPATIENT
Start: 2017-11-17

## 2017-11-17 RX ORDER — MAGNESIUM HYDROXIDE 1200 MG/15ML
LIQUID ORAL CONTINUOUS PRN
Status: DISCONTINUED | OUTPATIENT
Start: 2017-11-17 | End: 2017-11-17 | Stop reason: HOSPADM

## 2017-11-17 RX ORDER — 0.9 % SODIUM CHLORIDE 0.9 %
10 VIAL (ML) INJECTION DAILY
Status: CANCELLED | OUTPATIENT
Start: 2017-11-18

## 2017-11-17 RX ORDER — FERROUS SULFATE 325(65) MG
325 TABLET ORAL
Status: CANCELLED | OUTPATIENT
Start: 2017-11-18

## 2017-11-17 RX ORDER — ACETAMINOPHEN 325 MG/1
650 TABLET ORAL EVERY 4 HOURS PRN
Status: CANCELLED | OUTPATIENT
Start: 2017-11-17

## 2017-11-17 RX ORDER — BISACODYL 10 MG
10 SUPPOSITORY, RECTAL RECTAL DAILY PRN
Status: CANCELLED | OUTPATIENT
Start: 2017-11-17

## 2017-11-17 RX ORDER — IPRATROPIUM BROMIDE AND ALBUTEROL SULFATE 2.5; .5 MG/3ML; MG/3ML
1 SOLUTION RESPIRATORY (INHALATION) 4 TIMES DAILY
Status: CANCELLED | OUTPATIENT
Start: 2017-11-17

## 2017-11-17 RX ORDER — SODIUM CHLORIDE 0.9 % (FLUSH) 0.9 %
10 SYRINGE (ML) INJECTION EVERY 12 HOURS SCHEDULED
Status: CANCELLED | OUTPATIENT
Start: 2017-11-17

## 2017-11-17 RX ORDER — OXYBUTYNIN CHLORIDE 5 MG/1
5 TABLET ORAL NIGHTLY
Status: CANCELLED | OUTPATIENT
Start: 2017-11-17

## 2017-11-17 RX ADMIN — IPRATROPIUM BROMIDE AND ALBUTEROL SULFATE 1 AMPULE: 2.5; .5 SOLUTION RESPIRATORY (INHALATION) at 08:45

## 2017-11-17 RX ADMIN — HYDROCORTISONE SODIUM SUCCINATE 50 MG: 100 INJECTION, POWDER, FOR SOLUTION INTRAMUSCULAR; INTRAVENOUS at 17:54

## 2017-11-17 RX ADMIN — Medication 10 ML: at 08:29

## 2017-11-17 RX ADMIN — MEROPENEM 1 G: 1 INJECTION, POWDER, FOR SOLUTION INTRAVENOUS at 14:37

## 2017-11-17 RX ADMIN — HYDROCORTISONE SODIUM SUCCINATE 50 MG: 100 INJECTION, POWDER, FOR SOLUTION INTRAMUSCULAR; INTRAVENOUS at 03:00

## 2017-11-17 RX ADMIN — HYDROCORTISONE SODIUM SUCCINATE 50 MG: 100 INJECTION, POWDER, FOR SOLUTION INTRAMUSCULAR; INTRAVENOUS at 08:28

## 2017-11-17 RX ADMIN — IPRATROPIUM BROMIDE AND ALBUTEROL SULFATE 1 AMPULE: 2.5; .5 SOLUTION RESPIRATORY (INHALATION) at 21:54

## 2017-11-17 RX ADMIN — PANTOPRAZOLE SODIUM 40 MG: 40 INJECTION, POWDER, FOR SOLUTION INTRAVENOUS at 08:28

## 2017-11-17 RX ADMIN — MEROPENEM 1 G: 1 INJECTION, POWDER, FOR SOLUTION INTRAVENOUS at 06:21

## 2017-11-17 RX ADMIN — SODIUM CHLORIDE, PRESERVATIVE FREE 10 ML: 5 INJECTION INTRAVENOUS at 08:28

## 2017-11-17 RX ADMIN — MEROPENEM 1 G: 1 INJECTION, POWDER, FOR SOLUTION INTRAVENOUS at 01:30

## 2017-11-17 RX ADMIN — IPRATROPIUM BROMIDE AND ALBUTEROL SULFATE 1 AMPULE: 2.5; .5 SOLUTION RESPIRATORY (INHALATION) at 16:12

## 2017-11-17 ASSESSMENT — PAIN SCALES - GENERAL
PAINLEVEL_OUTOF10: 0
PAINLEVEL_OUTOF10: 0

## 2017-11-17 ASSESSMENT — PAIN SCALES - WONG BAKER: WONGBAKER_NUMERICALRESPONSE: 0

## 2017-11-17 ASSESSMENT — PAIN - FUNCTIONAL ASSESSMENT: PAIN_FUNCTIONAL_ASSESSMENT: 0-10

## 2017-11-17 NOTE — PROGRESS NOTES
Nutrition Assessment    Type and Reason for Visit: Reassess    Nutrition Recommendations: Continue current diet, Continue current ONS (RD available if EN is opted per pt/family wishes)    Malnutrition Assessment:  · Malnutrition Status: Meets the criteria for moderate malnutrition  · Context: Chronic illness  · Findings of the 6 clinical characteristics of malnutrition (Minimum of 2 out of 6 clinical characteristics is required to make the diagnosis of moderate or severe Protein Calorie Malnutrition based on AND/ASPEN Guidelines):  1. Energy Intake-Less than or equal to 50%, greater than or equal to 1 month    2. Weight Loss-Unable to assess,    3. Fat Loss-Unable to assess,   4. Muscle Loss-Unable to assess,    5. Fluid Accumulation-Severe fluid accumulation, Generalized  6.  Strength-Not measured    Nutrition Diagnosis:   · Problem: Inadequate oral intake  · Etiology: related to Difficulty swallowing, Insufficient energy/nutrient consumption (Chronic illness)     Signs and symptoms:  as evidenced by Intake 0-25%, Diet history of poor intake, Presence of wounds    Nutrition Assessment:  · Subjective Assessment: Per nursing and dietary , po intake remains poor. Per last discussion with dtr, PEG was not wanted. · Nutrition-Focused Physical Findings: +4 BLE & GEN edema noted  · Wound Type: Stage IV (sacral ulcer noted)  · Current Nutrition Therapies:  · Oral Diet Orders: Dysphagia 2   · Oral Diet intake: 1-25%  · Oral Nutrition Supplement (ONS) Orders: Frozen Oral Supplement, Standard High Calorie Oral Supplement, Wound Healing Supplement  · ONS intake: 1-25%  · Anthropometric Measures:  · Ht: 5' 6\" (167.6 cm) (10/2017)   · Current Body Wt: 218 lb 14.7 oz (99.3 kg) (11-13).   · Admission Body Wt: 215 lb (97.5 kg)  · Usual Body Wt: 208 lb (94.3 kg) (10/20/16)  · Ideal Body Wt: 130 lb (59 kg), % Ideal Body 168%  · BMI Classification: BMI 35.0 - 39.9 Obese Class II (35.4)  · Comparative Standards

## 2017-11-17 NOTE — BRIEF OP NOTE
Brief Postoperative Note  ______________________________________________________________    Patient: Lydia Segundo  YOB: 1940  MRN: [id number]  Date of Procedure: 11/17/2017    Pre-Op Diagnosis: POOR ACCESS    Post-Op Diagnosis: Same       Procedure(s):  PLACEMENT VENOUS PORT    Anesthesia: [unfilled]    Surgeon(s):  Cholo Wray MD    Staff:  First Assistant: Sabra Muñiz  Scrub Person First: Loreda Severe; Carole Sorenson     Estimated Blood Loss: * No values recorded between 11/17/2017 12:22 PM and 11/17/2017  1:30 PM * None    Complications: None    Specimens:   * No specimens in log *    Implants:    Implant Name Type Inv. Item Serial No.  Lot No. LRB No. Used   SMART PORT VORTEX Port PORT SMARTPORT TI ATTACH JEET 9.6FR   [employer] [id number] Right 1         Drains:   Urethral Catheter Latex 16 fr (Active)   $ Urethral catheter insertion $ Not inserted for procedure 11/15/2017  9:30 PM   Catheter Indications Stage III or IV perineal and sacral wound OR full thickness perineal/lower extremity burns in incontinent patients; Need for fluid management in critically ill patients in a critical care setting not able to be managed by other means such as BSC with hat, bedpan, urinal, condom catheter, or short term intermittent urethral catherization 11/15/2017  9:30 PM   Securement Device Date Changed 11/07/17 11/11/2017  7:47 PM   Site Assessment No urethral drainage 11/15/2017  9:30 PM   Urine Color Evelyn 11/17/2017  8:33 AM   Urine Appearance Sediment 11/14/2017  9:29 PM   Output (mL) 475 mL 11/15/2017  6:07 AM   Provider Notified to Remove No 11/9/2017  4:00 PM       [REMOVED] Urethral Catheter Non-latex 16 fr (Removed)   Removed 11/07/17 1000   Catheter Indications Need for fluid management in critically ill patients in a critical care setting not able to be managed by other means such as BSC with hat, bedpan, urinal, condom catheter, or short term intermittent urethral catherization 11/7/2017  7:30 AM   Site Assessment No urethral drainage 11/7/2017  7:30 AM   Urine Color Yellow 11/7/2017  7:30 AM   Urine Appearance Sediment 11/7/2017  7:30 AM   Urine Odor Malodorous 10/31/2017  1:50 PM   Output (mL) 450 mL 10/31/2017  5:10 AM       Findings: none    Lauroy MD Oleg  Date: 11/17/2017  Time: 1:30 PM

## 2017-11-17 NOTE — PROGRESS NOTES
INPATIENT PROGRESS NOTES    PATIENT NAME: Liya Bosch  MRN: [id number]  SERVICE DATE:  November 17, 2017   SERVICE TIME:  6:36 PM      PRIMARY SERVICE: Pulmonary Disease    CHIEF COMPLAINTS: Weakness and shortness of breath    INTERVAL HPI: Patient seen and examined at bedside, Interval Notes, orders reviewed. Nursing notes noted  Patient  is doing okay, alert awake and eating. She denies shortness of breath or chest pain. She is going to [facility] instead of an [employer]. OBJECTIVE    Body mass index is 35.33 kg/m². PHYSICAL EXAM:  Vitals:  BP (!) 99/43   Pulse 70   Temp 97 °F (36.1 °C) (Temporal)   Resp 16   Ht 5' 6\" (1.676 m) Comment: 10/2017  Wt 218 lb 14.7 oz (99.3 kg)   SpO2 100%   BMI 35.33 kg/m²   General: Patient is somnolent but arousable to voice    Head: Atraumatic , Normocephalic   Eyes: PERRL. No sclera icterus. No conjunctival injection. No discharge   ENT: No nasal  discharge. Pharynx clear. Neck:  Trachea midline. No thyromegaly, no JVD, No cervical adenopathy. Chest : Bilaterally symmetrical ,Normal effort,  No accessory muscle use  Lung : Decreased breath sounds bilaterally with few scattered coarse crackles in the bases   Heart[de-identified] Normal  rate. Regular rhythm. No mumur ,  Rub or gallop  ABD: Non-tender. Non-distended. No masses. No organmegaly. Normal bowel sounds. No hernia. EXT: No Pitting both leg , No Cyanosis No clubbing  Neuro: no focal weakness  Skin: Warm and dry. No erythema rash on exposed extremities.       DATA:   Recent Labs      11/16/17   0600  11/17/17   0600   WBC  17.0*  18.2*   HGB  9.4*  9.8*   HCT  29.4*  30.6*   MCV  92.9  92.6   PLT  175  182     Recent Labs      11/15/17   0557   NA  142   K  4.5   CL  109*   CO2  26   BUN  80*   CREATININE  0.79   GLUCOSE  273*   CALCIUM  9.1   LABGLOM  >60.0   GFRAA  >60.0       MV Settings:     Rate Set: 12 bmp  FiO2 : 22 %  PEEP/CPAP: 6  Peak Inspiratory Pressure: 12 cmH2O  I:E Ratio: 1:2.60    No results for input(s): PHART, NWY7UIF, PO2ART, TFK6TSM, BEART, L1XLHOUW in the last 72 hours.     O2 Device: Nasal cannula  O2 Flow Rate (L/min): 4 L/min    DIET GENERAL; Dysphagia II Mechanically Altered  Dietary Nutrition Supplements: Frozen Oral Supplement  Dietary Nutrition Supplements: Standard High Calorie Oral Supplement  Dietary Nutrition Supplements: Wound Healing Oral Supplement     MEDICATIONS during current hospitalization:    Continuous Infusions:   dextrose         Scheduled Meds:   medicated lip ointment        sodium chloride  250 mL Intravenous Once    sodium chloride  250 mL Intravenous Once    meropenem  1 g Intravenous L4O    folic acid  1 mg Oral Daily    insulin glargine  8 Units Subcutaneous Nightly    aspirin  81 mg Oral Daily    magnesium oxide  400 mg Oral Daily    allopurinol  100 mg Oral Daily    furosemide  20 mg Oral Daily    levothyroxine  200 mcg Oral Daily    hypromellose  2 drop Both Eyes BID    atorvastatin  40 mg Oral Nightly    ferrous sulfate  325 mg Oral Daily with breakfast    oxybutynin  5 mg Oral Nightly    gabapentin  400 mg Oral BID    nystatin  500,000 Units Oral 4x Daily    sennosides-docusate sodium  1 tablet Oral BID    metoprolol succinate  50 mg Oral Daily    insulin lispro  0-18 Units Subcutaneous TID WC    insulin lispro  0-9 Units Subcutaneous Nightly    lactulose  20 g Oral BID    vancomycin  1,000 mg Intravenous Q48H    collagenase   Topical Daily    sodium chloride flush  10 mL Intravenous 2 times per day    sodium chloride  250 mL Intravenous Once    darbepoetin stephanie-polysorbate  40 mcg Subcutaneous Weekly    pantoprazole  40 mg Intravenous BID    And    sodium chloride (PF)  10 mL Intravenous Daily    hydrocortisone sodium succinate PF  50 mg Intravenous Q6H    ipratropium-albuterol  1 ampule Inhalation 4x daily       PRN Meds:simethicone, docusate sodium, albuterol, LORazepam, bisacodyl, promethazine, glucose, dextrose, glucagon

## 2017-11-17 NOTE — PROGRESS NOTES
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 Exam reveals no gallop. No murmur heard. Pulmonary/Chest: Effort normal. No respiratory distress. She has no wheezes. She has no rales. She exhibits no tenderness. Abdominal: Soft. Bowel sounds are normal. She exhibits no distension and no mass. There is no hepatosplenomegaly, splenomegaly or hepatomegaly. There is no tenderness. There is no rebound, no guarding and no CVA tenderness. Musculoskeletal: She exhibits edema.  She exhibits no tenderness.                      Patient Active Problem List   Diagnosis    Pneumonia of left lower lobe due to infectious organism [facility])    [facility] obstructive pulmonary disease ([facility]    Type 2 diabetes mellitus ([facility]    Anemia    Metabolic encephalopathy    Acute on chronic diastolic congestive heart failure (HCC)    Hypernatremia    Obesity    Hemiparesis affecting left side as late effect of stroke (HCC)    Atrial fibrillation (HCC)    Acute on chronic respiratory failure with hypoxia and hypercapnia (HCC)    Cerebral infarct ([facility]    Hospice Care    Atrial fibrillation with RVR (HCC)    Acute hemolytic anemia (HCC)    BRIGIDA (acute kidney injury) ([facility]    MRSA (methicillin resistant Staphylococcus aureus) infection    Decubitus ulcer of sacral region    Infected ulcer of skin (HCC)    Hemolytic anemia, acute (HCC)    Hypotension    Hypovolemic shock (HCC)    Septic shock (HCC)    UGI bleed    Acute blood loss anemia                ASSESSMENT:PLAN:  Sepsis  MRSA abscess left arm  Sacral

## 2017-11-17 NOTE — FLOWSHEET NOTE
Pt very lethargic all evening. Moved pt from room 283 to 293 because there is a lift and the patient needs this for dressing changes, turning, etc., Pt's daughter called to check in on her, update given. Pt remains NPO in anticipation of venus port tomm, also remains a 1:1 through the night due to bi-pap.  Will continue to monitor Electronically signed by Yaritza Morales RN on 11/17/2017 at 2:48 AM

## 2017-11-17 NOTE — PROGRESS NOTES
[facility] [place] Note      Patient: Srini Leaver  Unit/Bed: Z856/F170-70  YOB: 1940  MRN: [id number]  Admit Date:  11/7/2017  Hospital Day: 10    Subjective Complaint:   Pt is sleeping post venous port insertion  . Physical Examination:     BP (!) 99/43   Pulse 64   Temp 97 °F (36.1 °C) (Temporal)   Resp 20   Ht 5' 6\" (1.676 m) Comment: 10/2017  Wt 218 lb 14.7 oz (99.3 kg)   SpO2 100%   BMI 35.33 kg/m²       Intake/Output Summary (Last 24 hours) at 11/17/17 1608  Last data filed at 11/17/17 0710   Gross per 24 hour   Intake              250 ml   Output              200 ml   Net               50 ml                  Srini Leaver examined at bedside in severely ill. Focused exam reveals:     Cardiac: Heart regular rate and rhythm     Lungs:  decreased breath sounds noted-     Extremities:   2+ edema    Telemetry:      normal sinus  with frequent pac's         LABS:   CBC:   Recent Labs      11/15/17   0555  11/16/17   0600  11/17/17   0600   WBC  15.5*  17.0*  18.2*   HGB  7.4*  9.4*  9.8*   PLT  162  175  182      BMP:  Recent Labs      11/15/17   0557   NA  142   K  4.5   CL  109*   CO2  26   BUN  80*   CREATININE  0.79   GLUCOSE  273*              Troponin: No results for input(s): TROPONINT in the last 72 hours. BNP: No results for input(s): PROBNP in the last 72 hours. INR: No results for input(s): INR in the last 72 hours. Mg: No results for input(s): MG in the last 72 hours.     Cardiac Testing:   none    Assessment:    Multiple med problems ---see previous list  OK to d/c  Plan:  1. OK to d/c   Electronically signed by Jona Muniz MD on 11/17/2017 at 4:08 PM

## 2017-11-18 VITALS
DIASTOLIC BLOOD PRESSURE: 59 MMHG | SYSTOLIC BLOOD PRESSURE: 115 MMHG | HEIGHT: 66 IN | RESPIRATION RATE: 18 BRPM | WEIGHT: 218.92 LBS | BODY MASS INDEX: 35.18 KG/M2 | OXYGEN SATURATION: 100 % | TEMPERATURE: 97.8 F | HEART RATE: 62 BPM

## 2017-11-18 LAB
GLUCOSE BLD-MCNC: 186 MG/DL (ref 60–115)
GLUCOSE BLD-MCNC: 188 MG/DL (ref 60–115)
PERFORMED ON: ABNORMAL
PERFORMED ON: ABNORMAL

## 2017-11-18 PROCEDURE — 6360000002 HC RX W HCPCS: Performed by: INTERNAL MEDICINE

## 2017-11-18 PROCEDURE — 99231 SBSQ HOSP IP/OBS SF/LOW 25: CPT | Performed by: INTERNAL MEDICINE

## 2017-11-18 PROCEDURE — 94640 AIRWAY INHALATION TREATMENT: CPT

## 2017-11-18 PROCEDURE — 6370000000 HC RX 637 (ALT 250 FOR IP): Performed by: HOSPITALIST

## 2017-11-18 PROCEDURE — 2700000000 HC OXYGEN THERAPY PER DAY

## 2017-11-18 PROCEDURE — 2580000003 HC RX 258: Performed by: INTERNAL MEDICINE

## 2017-11-18 PROCEDURE — 6360000002 HC RX W HCPCS: Performed by: ANESTHESIOLOGY

## 2017-11-18 PROCEDURE — 6370000000 HC RX 637 (ALT 250 FOR IP): Performed by: ANESTHESIOLOGY

## 2017-11-18 PROCEDURE — 2580000003 HC RX 258: Performed by: ANESTHESIOLOGY

## 2017-11-18 PROCEDURE — C9113 INJ PANTOPRAZOLE SODIUM, VIA: HCPCS | Performed by: ANESTHESIOLOGY

## 2017-11-18 RX ADMIN — ASPIRIN 81 MG 81 MG: 81 TABLET ORAL at 09:55

## 2017-11-18 RX ADMIN — INSULIN GLARGINE 8 UNITS: 100 INJECTION, SOLUTION SUBCUTANEOUS at 01:26

## 2017-11-18 RX ADMIN — GABAPENTIN 400 MG: 400 CAPSULE ORAL at 10:02

## 2017-11-18 RX ADMIN — ALLOPURINOL 100 MG: 100 TABLET ORAL at 09:35

## 2017-11-18 RX ADMIN — HYDROCORTISONE SODIUM SUCCINATE 50 MG: 100 INJECTION, POWDER, FOR SOLUTION INTRAMUSCULAR; INTRAVENOUS at 07:01

## 2017-11-18 RX ADMIN — Medication 10 ML: at 09:58

## 2017-11-18 RX ADMIN — NYSTATIN 500000 UNITS: 100000 SUSPENSION ORAL at 10:02

## 2017-11-18 RX ADMIN — COLLAGENASE SANTYL: 250 OINTMENT TOPICAL at 09:56

## 2017-11-18 RX ADMIN — FUROSEMIDE 20 MG: 20 TABLET ORAL at 09:55

## 2017-11-18 RX ADMIN — MEROPENEM 1 G: 1 INJECTION, POWDER, FOR SOLUTION INTRAVENOUS at 07:00

## 2017-11-18 RX ADMIN — Medication 400 MG: at 09:56

## 2017-11-18 RX ADMIN — FOLIC ACID 1 MG: 1 TABLET ORAL at 09:55

## 2017-11-18 RX ADMIN — FERROUS SULFATE TAB 325 MG (65 MG ELEMENTAL FE) 325 MG: 325 (65 FE) TAB at 09:56

## 2017-11-18 RX ADMIN — METOPROLOL SUCCINATE 50 MG: 50 TABLET, FILM COATED, EXTENDED RELEASE ORAL at 09:55

## 2017-11-18 RX ADMIN — HYPROMELLOSE 2 DROP: 4 SOLUTION/ DROPS OPHTHALMIC at 09:56

## 2017-11-18 RX ADMIN — SODIUM CHLORIDE, PRESERVATIVE FREE 10 ML: 5 INJECTION INTRAVENOUS at 09:58

## 2017-11-18 RX ADMIN — HYDROCORTISONE SODIUM SUCCINATE 50 MG: 100 INJECTION, POWDER, FOR SOLUTION INTRAMUSCULAR; INTRAVENOUS at 01:24

## 2017-11-18 RX ADMIN — IPRATROPIUM BROMIDE AND ALBUTEROL SULFATE 1 AMPULE: 2.5; .5 SOLUTION RESPIRATORY (INHALATION) at 08:01

## 2017-11-18 RX ADMIN — MEROPENEM 1 G: 1 INJECTION, POWDER, FOR SOLUTION INTRAVENOUS at 01:25

## 2017-11-18 RX ADMIN — Medication 10 ML: at 01:27

## 2017-11-18 RX ADMIN — IPRATROPIUM BROMIDE AND ALBUTEROL SULFATE 1 AMPULE: 2.5; .5 SOLUTION RESPIRATORY (INHALATION) at 11:42

## 2017-11-18 RX ADMIN — PANTOPRAZOLE SODIUM 40 MG: 40 INJECTION, POWDER, FOR SOLUTION INTRAVENOUS at 09:39

## 2017-11-18 RX ADMIN — PANTOPRAZOLE SODIUM 40 MG: 40 INJECTION, POWDER, FOR SOLUTION INTRAVENOUS at 01:25

## 2017-11-18 RX ADMIN — HYPROMELLOSE 2 DROP: 4 SOLUTION/ DROPS OPHTHALMIC at 01:25

## 2017-11-18 NOTE — PROGRESS NOTES
INPATIENT PROGRESS NOTES    PATIENT NAME: Lindalee Homans  MRN: [id number]  SERVICE DATE:  November 18, 2017   SERVICE TIME:  2:23 PM      PRIMARY SERVICE: Pulmonary Disease    CHIEF COMPLAINTS: Weakness and shortness of breath    INTERVAL HPI: Patient seen and examined at bedside, Interval Notes, orders reviewed. Nursing notes noted  Patient was seen earlier this morning ,Patient was doing okay,   She denies shortness of breath or chest pain. She is going to [address] instead of an [employer]. Just left for NH as per staff    OBJECTIVE    Body mass index is 35.33 kg/m². PHYSICAL EXAM:  Vitals:  BP (!) 115/59   Pulse 62   Temp 97.8 °F (36.6 °C) (Axillary)   Resp 18   Ht 5' 6\" (1.676 m) Comment: 10/2017  Wt 218 lb 14.7 oz (99.3 kg)   SpO2 100%   BMI 35.33 kg/m²   General: Patient is somnolent but arousable to voice    Head: Atraumatic , Normocephalic   Eyes: PERRL. No sclera icterus. No conjunctival injection. No discharge   ENT: No nasal  discharge. Pharynx clear. Neck:  Trachea midline. No thyromegaly, no JVD, No cervical adenopathy. Chest : Bilaterally symmetrical ,Normal effort,  No accessory muscle use  Lung : Decreased breath sounds bilaterally with few scattered coarse crackles in the bases   Heart[de-identified] Normal  rate. Regular rhythm. No mumur ,  Rub or gallop  ABD: Non-tender. Non-distended. No masses. No organmegaly. Normal bowel sounds. No hernia. EXT: No Pitting both leg , No Cyanosis No clubbing  Neuro: no focal weakness  Skin: Warm and dry. No erythema rash on exposed extremities. DATA:   Recent Labs      11/16/17   0600  11/17/17   0600   WBC  17.0*  18.2*   HGB  9.4*  9.8*   HCT  29.4*  30.6*   MCV  92.9  92.6   PLT  175  182     No results for input(s): NA, K, CL, CO2, BUN, CREATININE, GLUCOSE, CALCIUM, PROT, LABALBU, BILITOT, ALKPHOS, AST, ALT, LABGLOM, GFRAA, AGRATIO, GLOB in the last 72 hours.     MV Settings:     Rate Set: 12 bmp  FiO2 : 35 %  PEEP/CPAP: 6  [place]

## 2017-11-18 NOTE — DISCHARGE INSTR - DIET

## 2017-11-18 NOTE — OP NOTE
the  expected tract of the of the catheter and the port in the right  infraclavicular area were infiltrated with 1% lidocaine with epinephrine. Two small incisions were made, one in the right infraclavicular area, which  we are using a sharp dissection, the subcutaneous pouch was developed  exposing the fascia of the pectoralis muscle. At the small incision was  made in the right side of the neck at the entrance of the guidewire. Using  a tunneling device, the venous port was advanced from the right  infraclavicular area to the right side of the neck. With the patient in  Trendelenburg over the guidewire, the introducer with a peeling cannula was  inserted in the right internal jugular vein and after introducing the  guidewire removed. The catheter was advanced through the peeling cannula  and placed into the superior vena cava which was done under fluoroscopy. The port was secured to the fascia of the pectoralis muscle using  interrupted suture of Prolene in each side. With the port in place, the  port was accessed and after aspirating and obtaining blood it was flushed  with heparinized solution. After the and the report secured and the wound  irrigated with the saline, the incision was closed using interrupted suture  of 4-0 Monocryl for the subcutaneous tissues and the skin approximated with  a subcuticular suture of 4-0 Monocryl. Also, the incision in the right  side of the neck was closed with a subcuticular suture of 4-0 Monocryl. After the dressing applied, the patient sent to recovery in satisfactory  condition tolerating well the procedure.         Rowan Talavera MD    D: 11/17/2017 17:11:41       T: 11/17/2017 17:15:18     [id number]  Job#: [id number]     Doc#: [id number]    CC:  Nina Reveles MD

## 2017-11-18 NOTE — DISCHARGE SUMMARY
[address]                       [address], [address] [facility]                                 DISCHARGE SUMMARY    PATIENT NAME: Shelva Litten                  :        1940  MED REC NO:   [id number]                            ROOM:       O689  ACCOUNT NO:   [de-identified]                           ADMIT DATE: 2017  PROVIDER:     Alma Delia Botello MD              [facility] DATE:    HOSPITAL COURSE:  The patient is a 55-year-old with complex past medical  history including COPD. The patient had just been discharged from the hospital and came back in  with the diagnosis of hypotension, acute on chronic anemia, uncontrolled  hypertension, history of DVT, sacral decubitus, left forearm wound infected  with MRSA, atrial fibrillation, CHF, COPD. The patient was seen here by  multiple consultants. The patient was seen by Dr. Hien Gurrola, Dr. Gifty Cota, and  Dr. Jesse Valencia. The patient was seen by Dr. Stephanie Mejia and he recommended  wet-to-dry dressing to the sacral decubitus ulcers. He has also  recommended debridement with Santyl. The patient was then seen by Dr. Jesse Valencia and his impression was, critically ill patient with poor  prognosis due to respiratory deficiency and invasive uterine cancer,  chronic anemia, severe malnutrition. He felt there was no obvious reason  for an IVC filter at that time. The patient was seen by Dr. Lydia Grimes and his impression was GI bleeding,  possibly due to gastritis or _____. The patient was seen by Dr. Aleena Rodriguez. His impression was acute on chronic respiratory failure, on BiPAP. The  patient was seen by Dr. Chao Michelle. His impression was acute on chronic  anemia, multifactorial due to GI bleed and CKD. His other impression was  thrombocytopenia. The patient was seen by Dr. Manny Morales and he recommended  changing back to vancomycin and meropenem for treatment of infected ulcer  of the skin.     The patient was seen by Dr. Jesse Valencia today

## 2017-11-18 NOTE — FLOWSHEET NOTE
Assessment complete. Lungs clear and diminished bilaterally. Patient with cough at times. Bowel sounds times 4 quad and abdomen soft. Flexiseal in place draining brownish stool. Patient with 4 + edema times all 4 extremities and weeping. Dressings with xeroform intact to bilateral arms. Patient repositioned for comfort. 1:1 care provided.

## 2017-11-18 NOTE — PROGRESS NOTES
CARDIOLOGY [address] PROGRESS NOTE         11/18/2017      Bolivar Perez    [id number]  1940    Rounding MD: Gretta Vang MD ,[facility]    Primary Cardiologist:  Blane Milian        ASSESSMENT:    1. Sepsis with shock, resolved  2. Decubitus to coccyx and left upper extremity abscess with MRSA, ID following, on Rx  3. Acute on chronic respiratory failure, intermittent BiPAP  4. Acute blood loss anemia, HgB stable, has had significant bleeding on both warfarin and eliquis, remains off anticoagulation  5. Altered mental status/encephalopathy, improved  6. Anasarca, multifactorial but exacerbated by protein calorie malnutrition. 7. Atrial fibrillation, rates well controlled, no anticoagulation due to severe bleeding  8. Moderately severe AS, conservative management  9. Endometrial cancer  10. Poor Long term Prognosis    PLAN:    OK to DC from CV point as previously noted by Dr Blane Milian  Cardiology sign off  FU as prior noted. SUBJECTIVE:     Patient has no new complaints. Some SOB, No CP. Cardiac and general ROS otherwise negative and unchanged.     OBJECTIVE:     MEDICATIONS:     Scheduled Meds:   sodium chloride  250 mL Intravenous Once    sodium chloride  250 mL Intravenous Once    meropenem  1 g Intravenous B8C    folic acid  1 mg Oral Daily    insulin glargine  8 Units Subcutaneous Nightly    aspirin  81 mg Oral Daily    magnesium oxide  400 mg Oral Daily    allopurinol  100 mg Oral Daily    furosemide  20 mg Oral Daily    levothyroxine  200 mcg Oral Daily    hypromellose  2 drop Both Eyes BID    atorvastatin  40 mg Oral Nightly    ferrous sulfate  325 mg Oral Daily with breakfast    oxybutynin  5 mg Oral Nightly    gabapentin  400 mg Oral BID    nystatin  500,000 Units Oral 4x Daily    sennosides-docusate sodium  1 tablet Oral BID    metoprolol succinate  50 mg Oral Daily    insulin lispro  0-18 Units Subcutaneous TID     insulin lispro  0-9 Units Subcutaneous Nightly    lactulose  20 g Oral BID    vancomycin  1,000 mg Intravenous Q48H    collagenase   Topical Daily    sodium chloride flush  10 mL Intravenous 2 times per day    sodium chloride  250 mL Intravenous Once    darbepoetin stephanie-polysorbate  40 mcg Subcutaneous Weekly    pantoprazole  40 mg Intravenous BID    And    sodium chloride (PF)  10 mL Intravenous Daily    hydrocortisone sodium succinate PF  50 mg Intravenous Q6H    ipratropium-albuterol  1 ampule Inhalation 4x daily     Continuous Infusions:   dextrose       PRN Meds:simethicone, docusate sodium, albuterol, LORazepam, bisacodyl, promethazine, glucose, dextrose, glucagon (rDNA), dextrose, sodium chloride flush, sodium chloride flush, acetaminophen    PHYSICAL EXAM:    CURRENT VITALS: BP (!) 115/59   Pulse 62   Temp 97.8 °F (36.6 °C) (Axillary)   Resp 18   Ht 5' 6\" (1.676 m) Comment: 10/2017  Wt 218 lb 14.7 oz (99.3 kg)   SpO2 100%   BMI 35.33 kg/m²     CONSTITUTIONAL:  awake, alert, cooperative, no apparent distress,   ENT:  Normocephalic, without obvious abnormality, atraumatic, sinuses nontender on palpation, external ears without lesions,  NECK:  Supple, symmetrical, trachea midline, no adenopathy, thyroid symmetric, not enlarged and no tenderness, skin normal  LUNGS:  No increased work of breathing, good air exchange, clear to auscultation bilaterally, no crackles or wheezing  CARDIOVASCULAR:  Normal apical impulse, regular rate and rhythm, normal S1 and S2,  and 2/6 murmur noted  ABDOMEN:  Obese, Normal bowel sounds, soft, non-distended, non-tender, no masses palpated, no hepatosplenomegally  EXTREMITIES:  No edema, Pulses strong throughout. NEUROLOGIC:  Awake, alert, oriented to name, place and time.  Following all commands and moving all extremties  SKIN:  no bruising or bleeding, normal skin color, texture, turgor and no rashes     Data:        LABS:      Recent Results (from the past 24 hour(s))   POCT Glucose    Collection Time: 11/17/17

## 2017-11-22 LAB
ANION GAP SERPL CALCULATED.3IONS-SCNC: 11 MEQ/L (ref 7–13)
BUN BLDV-MCNC: 59 MG/DL (ref 8–23)
CALCIUM SERPL-MCNC: 9.4 MG/DL (ref 8.6–10.2)
CHLORIDE BLD-SCNC: 112 MEQ/L (ref 98–107)
CO2: 31 MEQ/L (ref 22–29)
CREAT SERPL-MCNC: 0.77 MG/DL (ref 0.5–0.9)
GFR AFRICAN AMERICAN: >60
GFR NON-AFRICAN AMERICAN: >60
GLUCOSE BLD-MCNC: 94 MG/DL (ref 74–109)
HCT VFR BLD CALC: 32.4 % (ref 37–47)
HEMOGLOBIN: 10.5 G/DL (ref 12–16)
MCH RBC QN AUTO: 31.3 PG (ref 27–31.3)
MCHC RBC AUTO-ENTMCNC: 32.4 % (ref 33–37)
MCV RBC AUTO: 96.6 FL (ref 82–100)
PDW BLD-RTO: 19.2 % (ref 11.5–14.5)
PLATELET # BLD: 101 K/UL (ref 130–400)
POTASSIUM SERPL-SCNC: 4.8 MEQ/L (ref 3.5–5.1)
RBC # BLD: 3.35 M/UL (ref 4.2–5.4)
SODIUM BLD-SCNC: 154 MEQ/L (ref 132–144)
WBC # BLD: 12.2 K/UL (ref 4.8–10.8)

## 2020-11-03 PROBLEM — I48.91 ATRIAL FIBRILLATION (HCC): Status: RESOLVED | Noted: 2017-08-19 | Resolved: 2020-11-03

## 2024-02-14 NOTE — OP NOTE
Procedures          PROCEDURE PERFORMED:  Bone Marrow Aspirate & Biopsy    SITE PERFORMED:  right superior iliac crest    ORDERING PHYSICIAN:  hector    INDICATIONS:  anemia    INFORMED CONSENT & TIME OUT  As documented in the Time out and Pre-Procedure Check Lists    MEDICATION REVIEW    DIAGNOSTIC DATA REVIEW    CBC:  No results found for: WBC, RBC, HGB, HCT, MCV, RDW, PLT        PATIENT POSITION    Patient was placed in the right lateral position. FULL BARRIER PRECAUTIONS & STERILE TECHNIQUE:    As documented on Pre-Procedure Check List    STERILE TECHNIQUE    Bone marrow aspiration and biopsy was done after lidocaine 2 % as local anesthesia. A small incision was made and bone aspiration and biopsy was done done using jamshidi needle. Patient tolerated the procedure well. LOCAL ANESTHESIA. Lidocaine 2 %.         MODERATE (CONSCIOUS) SEDATIon    NUMBER OF KITS USED:  1    ESTIMATED BLOOD LOSS:  1 cc    COMPLICATIONS: none    POST PROCEDURE PAIN LEVEL:  1    PAIN EDUCATION:      TESTS ORDERED:        INSTRUCTIONS/RECOMMENDATION TO FLOOR SERVICE:    Electronically signed by Isidoro oHwe MD on 10/26/17 at 12:02 PM
excised including  a rim with the normal skin. The  excisional debridement was extended to the presacral fascia including  all of the necrotic foul-smelling tissue as well as the subcutaneous  Tissues, which will make the wound stage 1V. Multiple small bleeder were controlled with electrocautery. After the wound irrigated with saline, the tissue was obtained for  culture of the wound in the sacral area. After the irrigation was  complete, the wound which measured 4 x 3 x 2 cm was dressed using  wet-to-dry dressing. After the dressing applied, the procedure was  moved to the left forearm with using also similar excision, the wound  in the left forearm with the necrotic tissue where the full-thickness  wound with all of the necrotic tissue were excised. Presented with  two wounds in the lateral forearm; the #1, which measured 4 x 3.5 x  0.3 cm and the second one measured 2 x 1.5 x 0.2 cm. After all of the  necrotic tissue were excised, the tissues were obtained from the main  wound in the left forearm for culture. Also, after the wound  irrigated with the saline, those wounds were dressed using wet-to-dry  dressing. After the dressing applied, the patient sent to recovery in  satisfactory condition, tolerating well with the procedure.         Olivia Salas MD    D: 10/30/2017 17:18:47       T: 10/30/2017 17:22:36     [id number]  Job#: [id number]     Doc#: [id number]    cc:  Selvin Haynes MD
I have personally seen and examined the patient. I have collaborated with and supervised the

## (undated) DEVICE — TRAY PREP DRY W/ PREM GLV 2 APPL 6 SPNG 2 UNDPD 1 OVERWRAP

## (undated) DEVICE — INTENDED FOR TISSUE SEPARATION, AND OTHER PROCEDURES THAT REQUIRE A SHARP SURGICAL BLADE TO PUNCTURE OR CUT.: Brand: BARD-PARKER ® CARBON RIB-BACK BLADES

## (undated) DEVICE — 2000CC GUARDIAN II: Brand: GUARDIAN

## (undated) DEVICE — NG KIT, 21 GA, 10 PACK: Brand: SITE-RITE

## (undated) DEVICE — SYRINGE BLB 50CC IRRIG PLIABLE FNGR FLNG GRAD FLSK DISP

## (undated) DEVICE — LABEL MED MINI W/ MARKER

## (undated) DEVICE — MEDI-VAC NON-CONDUCTIVE SUCTION TUBING: Brand: CARDINAL HEALTH

## (undated) DEVICE — DRAPE SURG C-ARM MOBILE XRAY LF

## (undated) DEVICE — TOWEL,OR,DSP,ST,BLUE,STD,4/PK,20PK/CS: Brand: MEDLINE

## (undated) DEVICE — SYRINGE MED 10ML LUERLOCK TIP W/O SFTY DISP

## (undated) DEVICE — GOWN,AURORA,NONREINFORCED,LARGE: Brand: MEDLINE

## (undated) DEVICE — ENT II-LF: Brand: MEDLINE INDUSTRIES, INC.

## (undated) DEVICE — SKIN MARKER,REGULAR TIP WITH RULER: Brand: DEVON

## (undated) DEVICE — SPONGE: LAP 4X18 W XR 200/CS: Brand: MEDICAL ACTION INDUSTRIES

## (undated) DEVICE — PENCIL ES L3M BTTN SWCH HOLSTER W/ BLDE ELECTRD EDGE

## (undated) DEVICE — 1842 FOAM BLOCK NEEDLE COUNTER: Brand: DEVON

## (undated) DEVICE — STERILE LATEX POWDER-FREE SURGICAL GLOVESWITH NITRILE COATING: Brand: PROTEXIS

## (undated) DEVICE — STERILE POLYISOPRENE POWDER-FREE SURGICAL GLOVES: Brand: PROTEXIS

## (undated) DEVICE — NEEDLE HYPO 25GA L1.5IN BLU POLYPR HUB S STL REG BVL STR

## (undated) DEVICE — ELECTRODE PT RET AD L9FT HI MOIST COND ADH HYDRGEL CORDED

## (undated) DEVICE — COVER,MAYO STAND,STERILE: Brand: MEDLINE

## (undated) DEVICE — PACK,LAPAROTOMY,NO GOWNS: Brand: MEDLINE

## (undated) DEVICE — CHLORAPREP 26ML ORANGE

## (undated) DEVICE — DISCONTINUED USE 393278 SYRINGE 10 ML HYPO W/O NDL LL TP PLSTC ST

## (undated) DEVICE — 3M™ TEGADERM™ TRANSPARENT FILM DRESSING FRAME STYLE, 1626W, 4 IN X 4-3/4 IN (10 CM X 12 CM), 50/CT 4CT/CASE: Brand: 3M™ TEGADERM™

## (undated) DEVICE — 3M™ STERI-STRIP™ REINFORCED ADHESIVE SKIN CLOSURES, R1541, 1/4 IN X 3 IN (6 MM X 75 MM), 3 STRIPS/ENVELOPE: Brand: 3M™ STERI-STRIP™

## (undated) DEVICE — SUTURE MCRYL SZ 4-0 L27IN ABSRB UD L19MM PS-2 1/2 CIR PRIM Y426H

## (undated) DEVICE — SUTURE PROL SZ 3-0 L30IN NONABSORBABLE BLU L26MM SH 1/2 CIR 8832H

## (undated) DEVICE — SUPER SPONGES,MEDIUM: Brand: KERLIX

## (undated) DEVICE — GAUZE,SPONGE,2"X2",8PLY,STERILE,LF,2'S: Brand: MEDLINE

## (undated) DEVICE — SUTURE PERMAHAND SZ 2-0 L12X18IN NONABSORBABLE BLK SILK A185H

## (undated) DEVICE — LABEL MED MED CHPOR